# Patient Record
Sex: FEMALE | Race: WHITE | Employment: UNEMPLOYED | URBAN - METROPOLITAN AREA
[De-identification: names, ages, dates, MRNs, and addresses within clinical notes are randomized per-mention and may not be internally consistent; named-entity substitution may affect disease eponyms.]

---

## 2017-01-05 ENCOUNTER — OFFICE VISIT (OUTPATIENT)
Dept: UROLOGY | Facility: OTHER | Age: 62
End: 2017-01-05
Payer: MEDICARE

## 2017-01-05 VITALS
BODY MASS INDEX: 29.56 KG/M2 | SYSTOLIC BLOOD PRESSURE: 120 MMHG | DIASTOLIC BLOOD PRESSURE: 80 MMHG | WEIGHT: 159 LBS | HEART RATE: 72 BPM

## 2017-01-05 DIAGNOSIS — N36.2 URETHRAL CARUNCLE: Primary | ICD-10-CM

## 2017-01-05 DIAGNOSIS — N81.10 PROLAPSE OF VAGINAL WALL: ICD-10-CM

## 2017-01-05 PROCEDURE — 99214 OFFICE O/P EST MOD 30 MIN: CPT | Performed by: UROLOGY

## 2017-01-05 ASSESSMENT — PAIN SCALES - GENERAL: PAINLEVEL: MODERATE PAIN (4)

## 2017-01-05 NOTE — PROGRESS NOTES
SUBJECTIVE:                                                    Nathaly Farias is a 61 year old female who presents to clinic today for the following health issues:  Patient has not been taking medications since about 12/25/16. She states her abdominal pain gets worse from the meds. Patient has not been eating solids since then also. Reduced fluid intake also.    HPI    ABDOMINAL PAIN     Onset: 1 month     Description:   Character: Dull ache  Location: edy-umbilical region suprapubic region pelvic region  Radiation: None    Intensity: severe    Progression of Symptoms:  Same     Accompanying Signs & Symptoms:  Fever/Chills?: YES  Gas/Bloating: No  Nausea: YES  Vomitting: no   Diarrhea?: YES  Constipation:no   Dysuria or Hematuria: no    History:   Trauma: no   Previous similar pain: no    Previous tests done: none    Precipitating factors:   Does the pain change with:     Food: no      BM: no     Urination: no     Alleviating factors:  nothing    Therapies Tried and outcome: nothing    LMP:  not applicable     Dizziness     Onset: 1 month     Description:   Do you feel faint:  YES- not at this time  Does it feel like the surroundings (bed, room) are moving: YES- with fast movement  Unsteady/off balance: YES  Have you passed out or fallen: no     Intensity: severe    Progression of Symptoms:  worse    Accompanying Signs & Symptoms:  Heart palpitations: YES  Nausea, vomiting: no   Weakness in arms or legs: YES  Fatigue: YES  Vision or speech changes: no   Ringing in ears (Tinnitus): no   Hearing Loss: no    History:   Head trauma/concussion hx: no   Previous similar symptoms: no   Recent bleeding history: no     Precipitating factors:   Worse with activity or head movement: YES  Any new medications (BP?): no   Alcohol/drug abuse/withdrawal: no     Alleviating factors:   Does staying in a fixed position give relief:  YES       Therapies Tried and outcome: nothing      Problem list and histories reviewed & adjusted,  "as indicated.  Additional history: as documented        Problem list, Medication list, Allergies, and Medical/Social/Surgical histories reviewed in [facility] and updated as appropriate.    ROS:  C: NEGATIVE for fever, chills, change in weight  E/M: NEGATIVE for ear, mouth and throat problems  R: NEGATIVE for significant cough or SOB  CV: NEGATIVE for chest pain, palpitations or peripheral edema  : has hesitancy and is not passing urine well.    OBJECTIVE:                                                    /67 mmHg  Pulse 64  Temp(Src) 97.8  F (36.6  C) (Oral)  Resp 16  Ht 5' 1.5\" (1.562 m)  Wt 158 lb (71.668 kg)  BMI 29.37 kg/m2  LMP 06/05/2006  Body mass index is 29.37 kg/(m^2).   GENERAL: healthy, alert, well nourished, well hydrated, no distress  RESP: lungs clear to auscultation - no rales, no rhonchi, no wheezes  CV: regular rates and rhythm, normal S1 S2, no S3 or S4 and no murmur, no click or rub -  ABDOMEN: soft, full and uncomfortable over the bladder, no  hepatosplenomegaly, no masses, normal bowel sounds  Frantz- hallpike negative.  Though had laying to sitting or standing dizziness confirmed with orthostatics.  Cap refill at 7 sec, eyes and mucous membranes very dry.  Significant tenting of the skin.    Diagnostic test results:  Urinalysis - UTI noted     ASSESSMENT/PLAN:                                                        ICD-10-CM    1. Urinary hesitancy R39.11 *UA reflex to Microscopic and Culture ([facility] and [facility] (except [place] and [place])     Urine Culture Aerobic Bacterial     Urine Microscopic   2. Dizziness R42 CBC with platelets     Basic metabolic panel  (Ca, Cl, CO2, Creat, Gluc, K, Na, BUN)     Urine Culture Aerobic Bacterial   3. Acute cystitis with hematuria N30.01 ondansetron (ZOFRAN ODT) 4 MG ODT tab     ciprofloxacin (CIPRO) 250 MG tablet   4. Dehydration, severe E86.0 ondansetron (ZOFRAN ODT) 4 MG ODT tab   5. Orthostatic hypotension I95.1    6. " Paralysis agitans (H) G20        Discussed the urinary symptoms, need treatment with antibiotics.  Has pressure remaining after urination, residual today after urination reported at 130 ml.  This is not enough for urinary retention.  But the nausea and pressure she feels has had her limiting her water intake and she is severely dehydrated today.  She has not urinated for over 6 hours and it is a dark color according to her it is deep orange, though the UA reports yellow and clear.  Will get some electrolytes to see if there is more to correct now.  But encouraged hydration, antibiotics for UTI, Zofran given for the nausea to help keep fluids down.    Is too dizzy to drive or walk far.  Her lack of routine meds may have contributed to this.  Asked to hold BP meds as BP was initially low and orthostatics positive (whichi s the only med she was taking) and encouraged to return to her sinemet and other baseline meds.  Counseled on above issues for 50 min and >50% of time was spent in counseling for issues below:   1. Urinary hesitancy    2. Dizziness    3. Acute cystitis with hematuria    4. Dehydration, severe    5. Orthostatic hypotension    6. Paralysis agitans (H)    .      Maxine Monae MD, MD  [facility]

## 2017-01-05 NOTE — MR AVS SNAPSHOT
After Visit Summary   1/5/2017    Nathaly Farias    MRN: [id number]           Patient Information     Date Of Birth          1955        Visit Information        Provider Department      1/5/2017 8:45 AM Jack Esposito MD [facility]        Today's Diagnoses     Urethral caruncle    -  1     Prolapse of vaginal wall            Follow-ups after your visit        Your next 10 appointments already scheduled     Jan 06, 2017  9:30 AM   (Arrive by 9:15 AM)   New Patient Visit with Radha Shah, PhD [facility] ([facility])    [address] [place] [address]  [address] [facility] [id number] [phone]            Mar 06, 2017 10:00 AM   [facility]    [address] [place] [id number] [phone]            Oct 05, 2017  8:00 AM   [facility]    [address] [facility] [address] [phone]              Who to contact     If you have questions or need follow up information about today's clinic visit or your schedule please contact [facility] directly at [phone].  Normal or non-critical lab and imaging results will be communicated to you by MyChart, letter or phone within 4 business days after the clinic has received the results. If you do not hear from us within 7 days, please contact the clinic through MyChart or phone. If you have a critical or abnormal lab result, we will notify you by phone as soon as possible.  Submit refill requests through [employer] or call your pharmacy and they will forward the refill request to us. Please allow 3 business days for your refill to be completed.          Additional Information About Your Visit        [employer] Information     [employer] gives you secure access to your electronic health  record. If you see a primary care provider, you can also send messages to your care team and make appointments. If you have questions, please call your primary care clinic.  If you do not have a primary care provider, please call [phone] and they will assist you.        Care EveryWhere ID     This is your Care EveryWhere ID. This could be used by other organizations to access your [place] medical records  [id number]        Your Vitals Were     Pulse Last Period                72 06/05/2006           Blood Pressure from Last 3 Encounters:   01/05/17 120/80   12/28/16 110/84   12/21/16 112/85    Weight from Last 3 Encounters:   01/05/17 159 lb (72.122 kg)   12/28/16 168 lb (76.204 kg)   12/09/16 168 lb (76.204 kg)              Today, you had the following     No orders found for display         Today's Medication Changes          These changes are accurate as of: 1/5/17  9:31 AM.  If you have any questions, ask your nurse or doctor.               Start taking these medicines.        Dose/Directions    conjugated estrogens cream   Commonly known as:  PREMARIN   Used for:  Urethral caruncle   Started by:  Jack Esposito MD        Dose:  0.5 g   Place 0.5 g vaginally twice a week Use daily first week of use   Quantity:  30 g   Refills:  11            Where to get your medicines      These medications were sent to [facility] [address] [place] [address] [address]  [address] [facility] [address]     Phone:  [phone]    - conjugated estrogens cream             Primary Care Provider Office Phone # Fax #    Maxine Monae [phone]       [facility]  [address] [facility] [address]        Thank you!     Thank you for choosing [facility]  for your care. Our goal is always to provide you with excellent care. Hearing back from our patients is one way we can continue to improve our services. Please take a few minutes to complete the written survey  that you may receive in the mail after your visit with us. Thank you!             Your Updated Medication List - Protect others around you: Learn how to safely use, store and throw away your medicines at www.disposemymeds.org.          This list is accurate as of: 1/5/17  9:31 AM.  Always use your most recent med list.                   Brand Name Dispense Instructions for use    acetaminophen-codeine 300-30 MG per tablet    TYLENOL w/CODEINE No. 3    40 tablet    Take 1 tablet by mouth every 12 hours       carbidopa-levodopa  MG per tablet    SINEMET    540 tablet    2 tablets @ 8am, 11am/12noon, and 9pm       citalopram 20 MG tablet    celeXA    90 tablet    Take 1 tablet (20 mg) by mouth daily       conjugated estrogens cream    PREMARIN    30 g    Place 0.5 g vaginally twice a week Use daily first week of use       lisinopril-hydrochlorothiazide 10-12.5 MG per tablet    PRINZIDE/ZESTORETIC    30 tablet    TAKE ONE TABLET BY MOUTH ONCE DAILY DUE  FOR  LABS       melatonin 3 MG tablet     90 tablet    Take 1 tablet (3 mg) by mouth nightly as needed for sleep       polyethylene glycol powder    MIRALAX    510 g    Take 17 g (1 capful) by mouth daily Adjust as needed to TID prn in same ratio of 1 capful per 8 oz water       pramipexole 0.25 MG tablet    MIRAPEX     Take 1 tablet by mouth 3 times daily       trospium 20 MG tablet    SANCTURA    90 tablet    Take 1 tablet (20 mg) by mouth daily       ZOLMitriptan 5 MG tablet    ZOMIG    6 tablet    Take 1 tablet (5 mg) by mouth at onset of headache for migraine MAY REPEAT ONCE AFTER 2 HOURS. DO NOT EXCEED 2 TABLETS IN 24 HOURS.

## 2017-01-05 NOTE — PROGRESS NOTES
SUBJECTIVE:  Arvind Curry is a pleasant 61-year-old  female with history of overactive bladder and cystocele who presents to clinic today because of bleeding with wiping for the last several days.  She has history of a prolapsed bladder and has noticed blood in the tissue when wiping, she has no blood in the urine.  A recent urinary test showed 2-5 red blood cells per high-power field.  She has no other issues.      ASSESSMENT:  Pleasant 61-year-old  female with complaint of blood on tissue when wiping.  Examination today showed that she has urethral caruncle which is a common condition in postmenopausal and can lead to this condition.      RECOMMENDATION:  I will go ahead and start patient on estrogen cream to be used as directed and will have the patient come back to see me in 2 months from now for a reexamination.  She does have a cystocele, will consider treatment if she is more symptomatic from it.         PETER DAVID MD             D: 2017 09:34   T: 2017 13:11   MT: STEPHANIE#[id number]      Name:     ARVIND CURRY   MRN:      [id number]        Account:      [id number]   :      1955           Visit Date:   2017      Document: [id number]       cc: Maxine Monae MD

## 2017-01-05 NOTE — NURSING NOTE
"Chief Complaint   Patient presents with     RECHECK     Prolapse bladder, with hematuria.       Initial /80 mmHg  Pulse 72  Wt 159 lb (72.122 kg)  LMP 06/05/2006 Estimated body mass index is 29.56 kg/(m^2) as calculated from the following:    Height as of 12/28/16: 5' 1.5\" (1.562 m).    Weight as of this encounter: 159 lb (72.122 kg).  BP completed using cuff size: manoj Holm CMA      "

## 2017-01-05 NOTE — PROGRESS NOTES
Chief Complaint   Patient presents with     RECHECK     Prolapse bladder, with hematuria.       Nathaly Farias is a 61 year old female who presents today for follow up of   Chief Complaint   Patient presents with     RECHECK     Prolapse bladder, with hematuria.    See dictated note     Current Outpatient Prescriptions   Medication Sig Dispense Refill     conjugated estrogens (PREMARIN) cream Place 0.5 g vaginally twice a week Use daily first week of use 30 g 11     citalopram (CELEXA) 20 MG tablet Take 1 tablet (20 mg) by mouth daily 90 tablet 1     melatonin 3 MG tablet Take 1 tablet (3 mg) by mouth nightly as needed for sleep 90 tablet 3     trospium (SANCTURA) 20 MG tablet Take 1 tablet (20 mg) by mouth daily 90 tablet 3     polyethylene glycol (MIRALAX) powder Take 17 g (1 capful) by mouth daily Adjust as needed to TID prn in same ratio of 1 capful per 8 oz water 510 g 1     acetaminophen-codeine (TYLENOL W/CODEINE NO. 3) 300-30 MG per tablet Take 1 tablet by mouth every 12 hours 40 tablet 0     pramipexole (MIRAPEX) 0.25 MG tablet Take 1 tablet by mouth 3 times daily       carbidopa-levodopa (SINEMET)  MG per tablet 2 tablets @ 8am, 11am/12noon, and 9pm 540 tablet 3     lisinopril-hydrochlorothiazide (PRINZIDE,ZESTORETIC) 10-12.5 MG per tablet TAKE ONE TABLET BY MOUTH ONCE DAILY DUE  FOR  LABS 30 tablet 11     ZOLMitriptan (ZOMIG) 5 MG tablet Take 1 tablet (5 mg) by mouth at onset of headache for migraine MAY REPEAT ONCE AFTER 2 HOURS. DO NOT EXCEED 2 TABLETS IN 24 HOURS. 6 tablet 10     Allergies   Allergen Reactions     Sulfa Drugs      Tacchycardia, had to go to ED     Naproxen GI Disturbance     Oxycontin [Oxycodone] GI Disturbance     Made her feel funny/upset stomach     Ropinirole      Did not work      Past Medical History   Diagnosis Date     Unspecified arthropathy, hand 08/07/1997     Arthralgia of temporomandibular joint 10/22/1996     Other and unspecified hyperlipidemia      Variants of  migraine, not elsewhere classified, without mention of intractable migraine without mention of status migrainosus      Unspecified sleep apnea      moderate, sleep study 11/07, on CPAP, has daytime somnolence with this     History of MRI of brain and brain stem 2/21/2015     MRI OF THE BRAIN WITHOUT CONTRAST 7/10/2014 1:21 PM  COMPARISON: None. HISTORY: Left-sided upper and lower extremity tremor. Balance issues. TECHNIQUE:  Brain: Axial diffusion-weighted with ADC map, T2-weighted with fat saturation, T1-weighted and turboFLAIR and coronal T1-weighted images of the brain were obtained without intravenous contrast.  FINDINGS: There is mild diffuse cerebral volume loss     Wears glasses 2/23/2015     Constipation 2/23/2015     Incomplete RBBB 2/23/2015     Heart murmur 2/23/2015     Parkinson's disease (H)      Seborrheic dermatitis 2/29/2016     Motion sickness      Arthritis      Depressive disorder      Hypertension      Past Surgical History   Procedure Laterality Date     Hc open tx tibial shaft fx w plate/screws w/wo cerclage  2000     Multiple operations on left leg     C ligate fallopian tube  1998     Hc colonoscopy w/wo brush/wash  08/30/06     normal     Hc shldr arthroscop,surg,dis claviculectomy  11/16/06     Right shoulder     Hc shldr arthroscop,part acromioplas  11/16/06     Right shoulder     Cholecystectomy, laporoscopic  5/12/2008     Cholecystectomy, Laparoscopic     Arthrodesis toe(s)  3/2/2011     ARTHRODESIS TOE(S) performed by CLARA LUCAS at  OR     ------------other-------------       left shoulder     Family History   Problem Relation Age of Onset     DIABETES Father      type II, diagnosed in late 60's     Hypertension Father      CANCER Father      rare kidney cancer had kidney removed, 75 y.o. at onset     CEREBROVASCULAR DISEASE Father      HEART DISEASE Brother      Valvular disease corrected with surgery     HEART DISEASE Paternal Grandfather      MI     Migraines Son       "Migraines Sister      Neurologic Disorder Mother      head tremor     Rashes/Skin Problems Father      rosacea     Rashes/Skin Problems Sister      rosacea     Social History     Social History     Marital Status:      Spouse Name: violet     Number of Children: 2     Years of Education: N/A     Occupational History           Social History Main Topics     Smoking status: Never Smoker      Smokeless tobacco: Never Used      Comment: no smokers in the household     Alcohol Use: 0.0 oz/week     0 Standard drinks or equivalent per week      Comment: very occ     Drug Use: No     Sexual Activity:     Partners: Male     Birth Control/ Protection: Surgical, Female Surgical      Comment: tubal ligation     Other Topics Concern     Caffeine Concern Yes     OCC     Exercise No     \"not faithfully\"     Seat Belt Yes     Self-Exams No     Social History Narrative    ALLERGIES:  She is allergic to sulfa, and has had gastrointestinal side effects from naproxen.               FAMILY HISTORY:  Strongly positive for headaches, including in her son and sister.  There is no family history of tremor.               SOCIAL HISTORY:  She does not work outside the home.  She does not smoke.  She uses very little alcohol.  Lives in [place]. . Has 2 kids: son who is 35 yr s old. And daughter who is 30 yrs old. Her  is working as a  .        REVIEW OF SYSTEMS  =================  C: NEGATIVE for fever, chills, change in weight  I: NEGATIVE for worrisome rashes, moles or lesions  E/M: NEGATIVE for ear, mouth and throat problems  R: NEGATIVE for significant cough or SHORTNESS OF BREATH,   CV: NEGATIVE for chest pain, palpitations or peripheral edema  GI: NEGATIVE for nausea, abdominal pain, heartburn, or change in bowel habits  NEURO: NEGATIVE any motor/sensory changes  PSYCH: NEGATIVE for recent mood disorder    Physical Exam:  /80 mmHg  Pulse 72  Wt 72.122 kg (159 lb)  LMP 06/05/2006 "   Patient is pleasant, in no acute distress, good general condition.  Lung: no evidence of respiratory distress    Abdomen: Soft, nondistended, non tender. No masses. No rebound or guarding.   Exam: urethral caruncle.  Cystocele seen.  No abnormal vaginal discharge  Skin: Warm and dry.  No redness.  Psych: normal mood and affect  Neuro: alert and oriented    Assessment/Plan:   (N36.2) Urethral caruncle  (primary encounter diagnosis)  Comment:    Plan: conjugated estrogens (PREMARIN) cream         d    (N81.10) Prolapse of vaginal wall  Comment:    Plan: See dictated note

## 2017-01-09 ENCOUNTER — OFFICE VISIT (OUTPATIENT)
Dept: FAMILY MEDICINE | Facility: OTHER | Age: 62
End: 2017-01-09
Payer: MEDICARE

## 2017-01-09 VITALS
SYSTOLIC BLOOD PRESSURE: 123 MMHG | HEIGHT: 62 IN | TEMPERATURE: 97.8 F | WEIGHT: 158 LBS | BODY MASS INDEX: 29.08 KG/M2 | HEART RATE: 64 BPM | RESPIRATION RATE: 16 BRPM | DIASTOLIC BLOOD PRESSURE: 67 MMHG

## 2017-01-09 DIAGNOSIS — E86.0 DEHYDRATION, SEVERE: ICD-10-CM

## 2017-01-09 DIAGNOSIS — G20.A1 PARALYSIS AGITANS (H): ICD-10-CM

## 2017-01-09 DIAGNOSIS — I95.1 ORTHOSTATIC HYPOTENSION: ICD-10-CM

## 2017-01-09 DIAGNOSIS — R42 DIZZINESS: ICD-10-CM

## 2017-01-09 DIAGNOSIS — R39.11 URINARY HESITANCY: Primary | ICD-10-CM

## 2017-01-09 DIAGNOSIS — N30.01 ACUTE CYSTITIS WITH HEMATURIA: ICD-10-CM

## 2017-01-09 LAB
ALBUMIN UR-MCNC: NEGATIVE MG/DL
ANION GAP SERPL CALCULATED.3IONS-SCNC: 10 MMOL/L (ref 3–14)
APPEARANCE UR: CLEAR
BACTERIA #/AREA URNS HPF: ABNORMAL /HPF
BILIRUB UR QL STRIP: ABNORMAL
BUN SERPL-MCNC: 14 MG/DL (ref 7–30)
CALCIUM SERPL-MCNC: 9.5 MG/DL (ref 8.5–10.1)
CHLORIDE SERPL-SCNC: 105 MMOL/L (ref 94–109)
CO2 SERPL-SCNC: 27 MMOL/L (ref 20–32)
COLOR UR AUTO: YELLOW
CREAT SERPL-MCNC: 0.87 MG/DL (ref 0.52–1.04)
ERYTHROCYTE [DISTWIDTH] IN BLOOD BY AUTOMATED COUNT: 14.1 % (ref 10–15)
GFR SERPL CREATININE-BSD FRML MDRD: 66 ML/MIN/1.7M2
GLUCOSE SERPL-MCNC: 84 MG/DL (ref 70–99)
GLUCOSE UR STRIP-MCNC: NEGATIVE MG/DL
HCT VFR BLD AUTO: 41 % (ref 35–47)
HGB BLD-MCNC: 13.4 G/DL (ref 11.7–15.7)
HGB UR QL STRIP: ABNORMAL
KETONES UR STRIP-MCNC: 15 MG/DL
LEUKOCYTE ESTERASE UR QL STRIP: ABNORMAL
MCH RBC QN AUTO: 30.5 PG (ref 26.5–33)
MCHC RBC AUTO-ENTMCNC: 32.7 G/DL (ref 31.5–36.5)
MCV RBC AUTO: 93 FL (ref 78–100)
MUCOUS THREADS #/AREA URNS LPF: PRESENT /LPF
NITRATE UR QL: NEGATIVE
PH UR STRIP: 5 PH (ref 5–7)
PLATELET # BLD AUTO: 287 10E9/L (ref 150–450)
POTASSIUM SERPL-SCNC: 4.3 MMOL/L (ref 3.4–5.3)
RBC # BLD AUTO: 4.39 10E12/L (ref 3.8–5.2)
RBC #/AREA URNS AUTO: ABNORMAL /HPF (ref 0–2)
SODIUM SERPL-SCNC: 142 MMOL/L (ref 133–144)
SP GR UR STRIP: >1.03 (ref 1–1.03)
URN SPEC COLLECT METH UR: ABNORMAL
UROBILINOGEN UR STRIP-ACNC: 0.2 EU/DL (ref 0.2–1)
WBC # BLD AUTO: 7.9 10E9/L (ref 4–11)
WBC #/AREA URNS AUTO: ABNORMAL /HPF (ref 0–2)

## 2017-01-09 PROCEDURE — 36415 COLL VENOUS BLD VENIPUNCTURE: CPT | Performed by: FAMILY MEDICINE

## 2017-01-09 PROCEDURE — 81001 URINALYSIS AUTO W/SCOPE: CPT | Performed by: FAMILY MEDICINE

## 2017-01-09 PROCEDURE — 85027 COMPLETE CBC AUTOMATED: CPT | Performed by: FAMILY MEDICINE

## 2017-01-09 PROCEDURE — 87086 URINE CULTURE/COLONY COUNT: CPT | Performed by: FAMILY MEDICINE

## 2017-01-09 PROCEDURE — 99214 OFFICE O/P EST MOD 30 MIN: CPT | Performed by: FAMILY MEDICINE

## 2017-01-09 PROCEDURE — 80048 BASIC METABOLIC PNL TOTAL CA: CPT | Performed by: FAMILY MEDICINE

## 2017-01-09 RX ORDER — CIPROFLOXACIN 250 MG/1
250 TABLET, FILM COATED ORAL 2 TIMES DAILY
Qty: 6 TABLET | Refills: 0 | Status: SHIPPED | OUTPATIENT
Start: 2017-01-09 | End: 2017-04-03

## 2017-01-09 RX ORDER — ONDANSETRON 4 MG/1
4-8 TABLET, ORALLY DISINTEGRATING ORAL EVERY 8 HOURS PRN
Qty: 20 TABLET | Refills: 1 | Status: SHIPPED | OUTPATIENT
Start: 2017-01-09 | End: 2017-04-03

## 2017-01-09 ASSESSMENT — PAIN SCALES - GENERAL: PAINLEVEL: MODERATE PAIN (4)

## 2017-01-09 NOTE — PROGRESS NOTES
Bladder scanned performed. Average reading was 100cc. Highest reading was 131cc. Dr. Dov de la torre.     Iveth Mckeon, RN, BSN

## 2017-01-09 NOTE — Clinical Note
SASKIAI patient symptoms sounds like she has bladder outlet type issues but did not have significant bladder scan today.  As she is severely dehydrated at this time, would it be worthwhile to have her come back and repeat while hydrated or does this not make a difference.

## 2017-01-10 NOTE — PROGRESS NOTES
Quick Note:    Nathaly, the rest of the labs are normal.  Please let me know if you have any questions.   Maxine Monae MD      ______

## 2017-01-11 LAB
BACTERIA SPEC CULT: NORMAL
MICRO REPORT STATUS: NORMAL
SPECIMEN SOURCE: NORMAL

## 2017-01-11 NOTE — PROGRESS NOTES
Quick Note:    Nathaly, there was not enough growing to test on sensitivities. Hopefully you are feeling better with your current meds.  Please let me know if you have any questions.   Maxine Monae MD      ______

## 2017-02-17 ENCOUNTER — OFFICE VISIT (OUTPATIENT)
Dept: NEUROPSYCHOLOGY | Facility: CLINIC | Age: 62
End: 2017-02-17

## 2017-02-17 DIAGNOSIS — F09 MENTAL DISORDER DUE TO GENERAL MEDICAL CONDITION: ICD-10-CM

## 2017-02-17 DIAGNOSIS — G20.A1 PARKINSON'S DISEASE (H): Primary | ICD-10-CM

## 2017-02-17 NOTE — MR AVS SNAPSHOT
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 ([facility])    [address] [facility] [id number] [phone]              Who to contact     Please call your clinic at [phone] to:    Ask questions about your health    Make or cancel appointments    Discuss your medicines    Learn about your test results    Speak to your doctor   If you have compliments or concerns about an experience at your clinic, or if you wish to file a complaint, please contact [facility] Patient Relations at [phone] or email us at [email or web address]         Additional Information About Your Visit        MyChart Information     [employer] gives you secure access to your electronic health record. If you see a primary care provider, you can also send messages to your care team and make appointments. If you have questions, please call your primary care clinic.  If you do not have a primary care provider, please call [phone] and they will assist you.      "[employer]" is an electronic gateway that provides easy, online access to your medical records. With "[employer]", you can request a clinic appointment, read your test results, renew a prescription or  communicate with your care team.     To access your existing account, please contact your [facility] or call [phone] for assistance.        Care EveryWhere ID     This is your Care EveryWhere ID. This could be used by other organizations to access your [place] medical records  [id number]        Your Vitals Were     Last Period                   06/05/2006            Blood Pressure from Last 3 Encounters:   01/09/17 123/67   01/05/17 120/80   12/28/16 110/84    Weight from Last 3 Encounters:   02/27/17 65.8 kg (145 lb)   01/09/17 71.7 kg (158 lb)   01/05/17 72.1 kg (159 lb)              We Performed the Following     [id number] TESTING, PER HR/PSYCHOLOGIST     NEUROPSYCH TESTING BY [facility]        Primary Care Provider Office Phone # Fax #    Maxine Luda Monae [phone]       [facility]  [address] [address]        Thank you!     Thank you for choosing [facility]  for your care. Our goal is always to provide you with excellent care. Hearing back from our patients is one way we can continue to improve our services. Please take a few minutes to complete the written survey that you may receive in the mail after your visit with us. Thank you!             Your Updated Medication List - Protect others around you: Learn how to safely use, store and throw away your medicines at www.disposemymeds.org.          This list is accurate as of: 2/17/17 11:59 PM.  Always use your most recent med list.                   Brand Name Dispense Instructions for use    acetaminophen-codeine 300-30 MG per tablet    TYLENOL w/CODEINE No. 3    40 tablet    Take 1 tablet by mouth every 12 hours       carbidopa-levodopa  MG per tablet    SINEMET    540 tablet    2 tablets @ 8am, 11am/12noon, and 9pm       ciprofloxacin 250 MG tablet    CIPRO    6 tablet    Take 1 tablet (250 mg) by mouth 2 times daily       citalopram 20 MG tablet     celeXA    90 tablet    Take 1 tablet (20 mg) by mouth daily       conjugated estrogens cream    PREMARIN    30 g    Place 0.5 g vaginally twice a week Use daily first week of use       lisinopril-hydrochlorothiazide 10-12.5 MG per tablet    PRINZIDE/ZESTORETIC    30 tablet    TAKE ONE TABLET BY MOUTH ONCE DAILY DUE  FOR  LABS       melatonin 3 MG tablet     90 tablet    Take 1 tablet (3 mg) by mouth nightly as needed for sleep       ondansetron 4 MG ODT tab    ZOFRAN ODT    20 tablet    Take 1-2 tablets (4-8 mg) by mouth every 8 hours as needed for nausea       polyethylene glycol powder    MIRALAX    510 g    Take 17 g (1 capful) by mouth daily Adjust as needed to TID prn in same ratio of 1 capful per 8 oz water       pramipexole 0.25 MG tablet    MIRAPEX     Take 1 tablet by mouth 3 times daily       trospium 20 MG tablet    SANCTURA    90 tablet    Take 1 tablet (20 mg) by mouth daily       ZOLMitriptan 5 MG tablet    ZOMIG    6 tablet    Take 1 tablet (5 mg) by mouth at onset of headache for migraine MAY REPEAT ONCE AFTER 2 HOURS. DO NOT EXCEED 2 TABLETS IN 24 HOURS.

## 2017-02-17 NOTE — PROGRESS NOTES
The patient was seen for neuropsychological evaluation at the request of Maxine Monae for the purpose of diagnostic clarification and treatment planning.  Three hours of face to face testing were provided by this writer.  Please see Dr. Radha Shah's report for a full interpretation of the findings.    Lashanda Ya  Psychometrist

## 2017-02-27 ENCOUNTER — OFFICE VISIT (OUTPATIENT)
Dept: ORTHOPEDICS | Facility: CLINIC | Age: 62
End: 2017-02-27
Payer: MEDICARE

## 2017-02-27 VITALS — WEIGHT: 145 LBS | BODY MASS INDEX: 26.68 KG/M2 | TEMPERATURE: 97.3 F | HEIGHT: 62 IN

## 2017-02-27 DIAGNOSIS — M17.32 POST-TRAUMATIC OSTEOARTHRITIS OF LEFT KNEE: Primary | ICD-10-CM

## 2017-02-27 PROCEDURE — 20610 DRAIN/INJ JOINT/BURSA W/O US: CPT | Mod: LT | Performed by: ORTHOPAEDIC SURGERY

## 2017-02-27 ASSESSMENT — PAIN SCALES - GENERAL: PAINLEVEL: MODERATE PAIN (4)

## 2017-02-27 NOTE — NURSING NOTE
"Chief Complaint   Patient presents with     RECHECK     left knee lov 12/9/16       Initial Temp 97.3  F (36.3  C)  Ht 1.562 m (5' 1.5\")  Wt 65.8 kg (145 lb)  LMP 06/05/2006  BMI 26.95 kg/m2 Estimated body mass index is 26.95 kg/(m^2) as calculated from the following:    Height as of this encounter: 1.562 m (5' 1.5\").    Weight as of this encounter: 65.8 kg (145 lb).  Medication Reconciliation: complete    BP completed using cuff size: NA (Not Taken)    Sindy Russell MA      "

## 2017-02-27 NOTE — PATIENT INSTRUCTIONS
Encounter Diagnosis   Name Primary?     Post-traumatic osteoarthritis of left knee Yes     Rest, ice and elevate above heart level as needed for pain control  1. We talked about how your cortisone injection done on 12/9/16 that gave you excellent relief, but after you bumped your left knee 1 1/2 weeks ago.    2. Now it is hurting again.    3. We talked about the gel injection and decided to proceed with this.  4. We feel that this injection will hopefully give you a longer-lasting relief time.  Synvisc 1 injection instructions:  1. You received your injection of Synvisc one interior left knee today.  2. The joint may be more painful for the first 1-2 days.  3. We ask you to continue to rest the joint for a few more days before resuming regular activities.  4. Pain Medications you can take:  Tylenol  Take 1000 mg by mouth every 6 hours as needed; maximum dose 4000 mg a day  Ibuprofen  600 mg every 6 hours as needed; maximum 2400 mg a day  (OK to take tylenol and ibuprofen at the same time)  5. Rest, ice and elevate as needed for pain control  6. Watch for these signs of infection: redness, swelling, drainage, warmth to touch, increased pain, or fever. Call the clinic or make an appointment to be seen if you think you have an infection.  7. Also sometimes, because it is a lot of fluid, and he can react to the injection and swell. If this is the case sometimes we have you come back and we take some fluid off interview a cortisone injection.  8. Sometimes it can take 6 weeks from the last injection for it to reach its full effect.  9. You can followup with Christine Crowley MD in 2 weeks, at that time if you are still having pain we would consider a cortisone injection. No xrays will be needed at that time.  [employer] and [employer] may offer reliable information regarding your diagnosis and treatment plan.    THANK YOU for coming in today. If you receive a survey via [email or web address] or mail please let us know if there was  anything you especially appreciated today or if there is any way we can improve our clinic. We appreciate your input.    GENERAL INFORMATION:  Our hours are:  Monday :     Clinic 7:30 AM-430 PM ([facility])  Tuesday:      Operating Room All Day ([facility])  Wednesday: Clinic 7:30 AM - 11:15 AM ([facility])             Clinic 1:00 PM - 4:00PM ([facility])  Thursday:     Administrative Day  Friday:          Clinic 7:30 AM - 11:15 AM ([facility])            Clinic 1:00 PM - 4:00 PM ([facility])    We are not in the office Thursdays. Therefore non- urgent calls and medical messages received on Thursday will be addressed when we are back in the office on Wednesday. Urgent matters will be reviewed and addressed by one of our partners in the office as needed.    If lab work was done today as part of your evaluation you will generally be contacted via [employer], mail, or phone with the results within 1-5 days. If there is an alarming result we will contact you by phone. Lab results come back at varying times, I generally wait until all labs are resulted before making comments on results. Please note labs are automatically released to [employer] (if you have signed up for it) once available-at times you may see these prior to my having a chance to review them as well.    If you need refills please contact your pharmacist. They will send a refill request to me to review. Please allow 3 business days for us to process all refill requests. All narcotic refills should be handled in the clinic at the time of your visit.

## 2017-02-27 NOTE — NURSING NOTE
The following medication was given by Jack Kaplan PA-C     MEDICATION: Synvisc/ONE  ROUTE: Joint Injection  SITE: left knee  DOSE: one syringe  LOT #: [id number]  : [employer]  EXPIRATION DATE:  5/2019  NDC: [address]-3      Patient instructed to remain in clinic for 20 minutes afterwards, and to report any adverse reaction to me immediately.

## 2017-02-27 NOTE — PROGRESS NOTES
"Office Visit-Follow up    Chief Complaint: Nathaly Farias is a 61 year old female who is being seen for   Chief Complaint   Patient presents with     RECHECK     left knee lov 12/9/16       History of Present Illness:   Mechanism of Injury: no new major injury however a week and a half ago she bumped her knee against the gate and about a week ago she stepped up from getting out of bed and felt knee pain. She had approximately a little less than 3 months of relief from the last cortisone injection.  Location: left knee medial  Duration of Pain: for the last week and a half  Rating of Pain: 4 out of 10  Pain Quality: ache  Pain is better with: rest  Pain is worse with: activity  Treatment so far consists of: Aleve, Tylenol, cortisone injection.12/9/2016, physical therapy for her tibial injury in the past.   Associated Features: slight knee swelling at times. Patient denies any numbness or tingling or catching a lodging or large swelling. Patient feels that the inside of her knee is causing her pain.  Prior history of related problems: yes prior to the cortisone injection  Pain is Limiting: ambulation  Here to: follow-up on cortisone injection    REVIEW OF SYSTEMS  General: negative for, night sweats, dizziness, fatigue  Resp: No shortness of breath and no cough  CV: negative for chest pain, syncope or near-syncope  GI: negative for nausea, vomiting and diarrhea  : negative for dysuria and hematuria  Musculoskeletal: as above  Neurologic: negative for syncope   Hematologic: negative for bleeding disorder    Physical Exam:  Vitals: Temp 97.3  F (36.3  C)  Ht 1.562 m (5' 1.5\")  Wt 65.8 kg (145 lb)  LMP 06/05/2006  BMI 26.95 kg/m2  BMI= Body mass index is 26.95 kg/(m^2).  Constitutional: healthy, alert and no acute distress   Psychiatric: mentation appears normal and affect normal/bright  NEURO: no focal deficits  RESP: Normal with easy respirations and no use of accessory muscles to breathe, no audible wheezing or " retractions  CV: left leg warm to the touch  SKIN: No erythema, rashes, excoriation, or breakdown. No evidence of infection. We see a will healed midline incision on her left knee   MUSCULOSKELETAL:    INSPECTION of left knee: No gross deformities, erythema, ecchymosis, atrophy or fasciculations. We see a very slight amount of swelling. We do see a very slight tremor from the patient's Parkinson's.    PALPATION: there is a very slight amount of confusion in the left knee. No major tenderness to palpation on palpation anteriorly and posteriorly. Patient has some medial generalized knee pain on palpation and also some tenderness laterally. No increased warmth noted.    ROM: patient able to do for extension and flexes to approximately 100 to 105  without any major catching or locking her pain     STRENGTH: able to do a straight leg raise    SPECIAL TEST: Patient has a negative Lachman's negative drawer sign. Patient's knee is stable to varus and valgus stress at 30  of flexion. Patient has a negative Kecia's.   GAIT: slight antalgic left leg  Lymph: no palpable lymph nodes      Diagnostic Modalities:  Previous imaging reviewed.  Independent visualization of the images was performed.      Impression: 1. Left knee posttraumatic degenerative joint disease. 2. Painful hardware from left tibia ORIF    Plan:  All of the above pertinent physical exam and imaging modalities findings was reviewed with Nathaly and her daughter.                                          INJECTION PROCEDURE:  The patient was counseled about an  injection, including discussion of risks (including infection), contents of the injection, rationale for performing the injection, and expected benefits of the injection. The skin was prepped with alcohol and betadine and then utilizing sterile technique an injection of the left knee joint from the anterolateral approach in the seated position was performed. The injection consisted Synvisc-One (6cc). The  patient tolerated the injection well, and there were no complications. The injection site was covered with a Band-Aid. The injection was performed by RICHELLE Serna    Patient Instructions:   1. We talked about how your cortisone injection done on 12/9/16 that gave you excellent relief, but after you bumped your left knee 1 1/2 weeks ago.    2. Now it is hurting again.    3. We talked about the gel injection and decided to proceed with this.  4. We feel that this injection will hopefully give you a longer-lasting relief time.  5. You can followup with Christine Crowley MD in 2 weeks, at that time if you are still having pain we would consider a cortisone injection. No xrays will be needed at that time.  Re-x-ray on return: No    Scribed by Jack Kaplan PA-C on 2/27/2017 at 10:37 AM, based on Dr. Christine Crowley's statements to me.    This note was dictated with [facility].    SARBJIT Ortiz MD

## 2017-02-27 NOTE — MR AVS SNAPSHOT
After Visit Summary   2/27/2017    Nathaly Farias    MRN: [id number]           Patient Information     Date Of Birth          1955        Visit Information        Provider Department      2/27/2017 10:00 AM Christine Crowley MD [facility]        Today's Diagnoses     Post-traumatic osteoarthritis of left knee    -  1      Care Instructions    Encounter Diagnosis   Name Primary?     Post-traumatic osteoarthritis of left knee Yes     Rest, ice and elevate above heart level as needed for pain control  1. We talked about how your cortisone injection done on 12/9/16 that gave you excellent relief, but after you bumped your left knee 1 1/2 weeks ago.    2. Now it is hurting again.    3. We talked about the gel injection and decided to proceed with this.  4. We feel that this injection will hopefully give you a longer-lasting relief time.  Synvisc 1 injection instructions:  1. You received your injection of Synvisc one interior left knee today.  2. The joint may be more painful for the first 1-2 days.  3. We ask you to continue to rest the joint for a few more days before resuming regular activities.  4. Pain Medications you can take:  Tylenol  Take 1000 mg by mouth every 6 hours as needed; maximum dose 4000 mg a day  Ibuprofen  600 mg every 6 hours as needed; maximum 2400 mg a day  (OK to take tylenol and ibuprofen at the same time)  5. Rest, ice and elevate as needed for pain control  6. Watch for these signs of infection: redness, swelling, drainage, warmth to touch, increased pain, or fever. Call the clinic or make an appointment to be seen if you think you have an infection.  7. Also sometimes, because it is a lot of fluid, and he can react to the injection and swell. If this is the case sometimes we have you come back and we take some fluid off interview a cortisone injection.  8. Sometimes it can take 6 weeks from the last injection for it to reach its full effect.  9. You can  followup with Christine Crowley MD in 2 weeks, at that time if you are still having pain we would consider a cortisone injection. No xrays will be needed at that time.  [employer] may offer reliable information regarding your diagnosis and treatment plan.    THANK YOU for coming in today. If you receive a survey via Clip or mail please let us know if there was anything you especially appreciated today or if there is any way we can improve our clinic. We appreciate your input.    GENERAL INFORMATION:  Our hours are:  Monday :     Clinic 7:30 AM-430 PM ([facility])  Tuesday:      Operating Room All Day ([facility])  Wednesday: Clinic 7:30 AM - 11:15 AM ([facility])             Clinic 1:00 PM - 4:00PM ([facility])  Thursday:     Administrative Day  Friday:          Clinic 7:30 AM - 11:15 AM ([facility])            Clinic 1:00 PM - 4:00 PM ([facility])    We are not in the office Thursdays. Therefore non- urgent calls and medical messages received on Thursday will be addressed when we are back in the office on Wednesday. Urgent matters will be reviewed and addressed by one of our partners in the office as needed.    If lab work was done today as part of your evaluation you will generally be contacted via Clip, mail, or phone with the results within 1-5 days. If there is an alarming result we will contact you by phone. Lab results come back at varying times, I generally wait until all labs are resulted before making comments on results. Please note labs are automatically released to [employer] (if you have signed up for it) once available-at times you may see these prior to my having a chance to review them as well.    If you need refills please contact your pharmacist. They will send a refill request to me to review. Please allow 3 business days for us to process all refill  requests. All narcotic refills should be handled in the clinic at the time of your visit.                 Follow-ups after your visit        Your next 10 appointments already scheduled     Mar 06, 2017 10:00 AM CST   [facility] Visit with Jet Porter MD   [facility]    [address] [place] [id number]   [phone]            Mar 08, 2017 11:15 AM CST   Return Visit with Jack Esposito MD   [facility]    [address] [id number] [phone]            Oct 05, 2017  8:00 AM CDT   Return Visit with Jack Esposito MD   [facility]    [address] [place] [id number] [phone]              Who to contact     If you have questions or need follow up information about today's clinic visit or your schedule please contact [facility] directly at [phone].  Normal or non-critical lab and imaging results will be communicated to you by MyChart, letter or phone within 4 business days after the clinic has received the results. If you do not hear from us within 7 days, please contact the clinic through [employer] or phone. If you have a critical or abnormal lab result, we will notify you by phone as soon as possible.  Submit refill requests through [employer] or call your pharmacy and they will forward the refill request to us. Please allow 3 business days for your refill to be completed.          Additional Information About Your Visit        [employer] Information     [employer] gives you secure access to your electronic health record. If you see a primary care provider, you can also send messages to your care team and make appointments. If you have questions, please call your primary care clinic.  If you do not have a primary care provider, please call [phone] and they will assist you.        Care EveryWhere ID     This is  "your Care EveryWhere ID. This could be used by other organizations to access your [place] medical records  [id number]        Your Vitals Were     Temperature Height Last Period BMI (Body Mass Index)          97.3  F (36.3  C) 1.562 m (5' 1.5\") 06/05/2006 26.95 kg/m2         Blood Pressure from Last 3 Encounters:   01/09/17 123/67   01/05/17 120/80   12/28/16 110/84    Weight from Last 3 Encounters:   02/27/17 65.8 kg (145 lb)   01/09/17 71.7 kg (158 lb)   01/05/17 72.1 kg (159 lb)              Today, you had the following     No orders found for display       Primary Care Provider Office Phone # Fax #    Maxine Monae [phone]       [facility]  [address] [address]        Thank you!     Thank you for choosing [facility]  for your care. Our goal is always to provide you with excellent care. Hearing back from our patients is one way we can continue to improve our services. Please take a few minutes to complete the written survey that you may receive in the mail after your visit with us. Thank you!             Your Updated Medication List - Protect others around you: Learn how to safely use, store and throw away your medicines at www.disposemymeds.org.          This list is accurate as of: 2/27/17 10:28 AM.  Always use your most recent med list.                   Brand Name Dispense Instructions for use    acetaminophen-codeine 300-30 MG per tablet    TYLENOL w/CODEINE No. 3    40 tablet    Take 1 tablet by mouth every 12 hours       carbidopa-levodopa  MG per tablet    SINEMET    540 tablet    2 tablets @ 8am, 11am/12noon, and 9pm       ciprofloxacin 250 MG tablet    CIPRO    6 tablet    Take 1 tablet (250 mg) by mouth 2 times daily       citalopram 20 MG tablet    celeXA    90 tablet    Take 1 tablet (20 mg) by mouth daily       conjugated estrogens cream    PREMARIN    30 g    Place 0.5 g vaginally twice a week Use daily first week of use "       lisinopril-hydrochlorothiazide 10-12.5 MG per tablet    PRINZIDE/ZESTORETIC    30 tablet    TAKE ONE TABLET BY MOUTH ONCE DAILY DUE  FOR  LABS       melatonin 3 MG tablet     90 tablet    Take 1 tablet (3 mg) by mouth nightly as needed for sleep       ondansetron 4 MG ODT tab    ZOFRAN ODT    20 tablet    Take 1-2 tablets (4-8 mg) by mouth every 8 hours as needed for nausea       polyethylene glycol powder    MIRALAX    510 g    Take 17 g (1 capful) by mouth daily Adjust as needed to TID prn in same ratio of 1 capful per 8 oz water       pramipexole 0.25 MG tablet    MIRAPEX     Take 1 tablet by mouth 3 times daily       trospium 20 MG tablet    SANCTURA    90 tablet    Take 1 tablet (20 mg) by mouth daily       ZOLMitriptan 5 MG tablet    ZOMIG    6 tablet    Take 1 tablet (5 mg) by mouth at onset of headache for migraine MAY REPEAT ONCE AFTER 2 HOURS. DO NOT EXCEED 2 TABLETS IN 24 HOURS.

## 2017-03-06 ENCOUNTER — DOCUMENTATION ONLY (OUTPATIENT)
Dept: NEUROLOGY | Facility: CLINIC | Age: 62
End: 2017-03-06

## 2017-03-06 PROBLEM — F09 COGNITIVE DISORDER: Status: ACTIVE | Noted: 2017-03-06

## 2017-03-06 NOTE — PROGRESS NOTES
She has marked attentional difficulties, impairments in memory including a rapid forgetting rate, mild anomia, and mild executive dysfunction. 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 She's required increasing assistance with finances, medications, driving, and cooking. I think this is a mild dementia, and there's a suggestion of mesial temporal lobe involvement, so Alzheimer's disease can't be ruled out, although there are also some features consistent with frontal system involvement. She's not reporting significant problems with mood

## 2017-03-12 NOTE — PROGRESS NOTES
NAME: Nathaly Farias  MR#: [id number]  YOB: 1955  DATE OF EXAM: 2/17/2017    Neuropsychology Laboratory  [facility]  [address]  [place]  [address] [phone]    NEUROPSYCHOLOGICAL EVALUATION    RELEVANT HISTORY AND REASON FOR REFERRAL    Nathaly Farias is a 61-year-old, right-handed disabled  with 12 years of formal education. Information was obtained via interview with the patient and her  and daughter, and review of her medical records. Records indicate that she was initially evaluated by neurology in July 2014 for tremor, which began in 2009 and affected mainly her left hand at rest. She also was reporting worsening balance at the time over the last couple of years, and shuffling gait. She has been treated for Parkinson's disease since then. More recently, there have been some concerns regarding memory decline. She was referred for neuropsychological evaluation by Jet Potrer M.D., for further characterization of any cognitive difficulties and to evaluate mood.    CLINICAL INTERVIEW FINDINGS    Upon interview, Ms. Farias indicated that she was diagnosed with Parkinson's disease about 3   years ago, although her family believes it was closer to six years ago. They agreed that her left hand was shaking, and interfered with her job. The symptoms reportedly moved to her leg as well, and her  stated that they are worse if she is stressed or tired. She reported that she has had difficulty with cognition since high school. Her family believes that she has had gradually progressive difficulty with cognition over the last two or three years. She forgets what is being said even within a conversation. She has been misplacing items. She repeats questions. Her daughter stated that they may talk one day, and then she will call a couple of days later and ask the same thing. She has noticed difficult with word finding. She has difficulty  "multitasking. She is less organized and perhaps more indecisive than normal.    Ms. Farias lives with her , who manages their finances. Her  and daughter have been helping her to remember to take her medications. She drives short distances, and not in unfamiliar places. Her family has had some concerns about her reaction time, and she cannot see as well at night. Her  has always done most of the cooking. She handles her personal cares independently.    Ms. Farias reported a history of depression which was treated when her father  in . She continues to be prescribed psychotropic medications by her primary care provider. She has not worked with a psychologist, nor has she been hospitalized for psychiatric treatment. She acknowledged that things seem to get to her and she is more tearful. Her dog  six months ago, and she is still grieving that. She does not believe that she is particularly depressed, but she is anxious and more \"touchy.\" She has been sleeping fairly well, for almost 12 hours a night, which is more than she used to sleep. She may nap for two or three hours during the day. Her daughter noted that although she sleeps a lot, this is not necessarily a change for her. Her appetite has been low, and she has lost some weight, perhaps because she is eating better. Her energy level is low. Her interest level and motivation are also low, and her daughter noted that she thinks everything is too much of a hassle. She does enjoy spending time with her grandchildren, however. She denied suicidal ideation or any history of attempts to commit suicide. She denied visual or auditory hallucinations, although her  stated that she sometimes hears people talking in the house when there is no one there. She denied illicit drug use, tobacco use, alcohol use, gambling, or other compulsive behaviors.    In school, Ms. Farias had trouble remembering what she had read, and perhaps with reading " comprehension. She earned C's and D's. She enjoyed track and horses in school, and she graduated from high school. She worked for [employer]. She last worked six years ago, leaving her job because she was shaking too much. 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 She received disability. She was  to her first  for 19 years. She has been  to her current  since 2001. She has two children and five grandchildren.    Ms. Farias denied any history of seizure, stroke, or head injury resulting loss of consciousness. Her balance has not been good. She has fallen more than once; when this happens, she is suddenly on the ground. She seems to have poor coordination on her left side. Her handwriting is smaller than normal. She has had chronic headaches since the age of 17. She is bothered by left leg pain. There has been some discussion about deep brain stimulation surgery for management of her symptoms, but they have not heard much about it yet.    PAST MEDICAL HISTORY:  Medical records indicate a history of depression, osteoarthritis, heart murmur, rosacea, Parkinson's disease, hyperlipidemia, hypertension, sleep apnea, obesity, migraine.    CURRENT MEDICATIONS:  Include ondansetron, ciprofloxacin, conjugated estrogens, citalopram, melatonin, trospium, polyethylene glycol, acetaminophen-codeine, pramipexole, carbidopa-levodopa, lisinopril-hydrochlorothiazide, and zolmitriptan.    FAMILY MEDICAL HISTORY:  Significant for a grandmother with dementia who lived to be [facility], and a father with cerebrovascular disease and bladder cancer.    BEHAVIORAL OBSERVATIONS    During the evaluation, Ms. Farias was pleasant, cooperative, and seemed to understand the instructions, although she tended to lose set once she started a task. She was alert and oriented to person, place, and dates, but not the year, stating first that it was 2012 and then correcting herself to state that it  was 2016. Tremors were observed clinically, primarily on her left side. Gait was slow and shuffling. Mood was euthymic although she stated that she was nervous. Speech was somewhat slow, with normal articulation and volume. Spontaneous conversation was present and appropriate. Ms. Farias appeared to put forth consistent effort, and the results are believed to accurately reflect her current level of functioning.    MEASURES ADMINISTERED    The following measures were administered by a trained psychometrist, under the direct supervision of a licensed psychologist.    Subtests of the Wechsler Adult Intelligence Scale-4; Reading subtest of the Wide Range Achievement Test-4; subtests of the Wechsler Memory Scale-Revised; Adam Verbal Learning Test-Revised; Denver Naming Test; Controlled Oral Word Association Test; Semantic Fluency; Trail Making Test; Stroop; Wisconsin Card Sorting Test; Ochoa Judgement of Line Orientation; Clock Drawing; Dementia Rating Scale - 2 (DRS-2); Garcia Depression Inventory-2 (BDI-2).      RESULTS AND INTERPRETATION    Performance on a screening measure of dementia was mildly impaired (DRS-2 Total Score = 123/144). Specifically, she had difficulty on tasks of conceptualization and memory on this measure. Overall intellectual functioning was estimated to fall in the borderline range, consistent with premorbid estimates based on single word reading abilities.    Confrontation naming was mildly impaired for her age. Expressive vocabulary was mildly impaired. Letter fluency was low average, and generative naming to category was mildly impaired.    Attention span was moderately impaired. Divided attention was severely impaired on both a timed, visuomotor sequencing task, and an untimed, auditory sequencing task. Performance on a measure of cognitive flexibility and speed was severely impaired. Psychomotor processing speed was low average.    Basic visual perception, including matching lines and  angles, was average for her age. Construction of a clock was notable for difficulty with conceptualization. She rody all of the numbers correctly with the exception of the six, which she replaced with the number 30. Nonverbal deductive reasoning was mildly impaired.    Novel problem-solving, including the ability to generate strategies and solutions, was mildly impaired for her age and level of education. On this task, she had moderate difficulty with conceptualization.    Immediate and 30 minute delayed recall of verbal narrative material were moderately to severely impaired. On a multiple trial list learning task, immediate recall was average, with moderately impaired retention (56%) following a 25 minute delay. Recognition memory in this task was mildly impaired. Immediate recall of visual material was mildly impaired, with moderately impaired recall following a 30 minute delay. Recognition memory on this task was somewhat better than free recall.    On the BDI-2, a self-report questionnaire, Ms. Farias denied experiencing significant depressive symptomatology.     IMPRESSIONS AND RECOMMENDATIONS    Current results indicate marked attentional difficulties, impairments in memory, which in some cases reflected impairments in retention and in other cases reflected retrieval difficulties, and mild anomia as well as mild executive dysfunction. Premorbid intellectual functioning is estimated to fall in the borderline range. She denied experiencing significant depressive symptomatology.    This pattern of performance is suggestive of mild dementia, and there is a suggestion of mesial temporal lobe as well as frontal and subcortical system involvement. She has had increasing difficulty managing her instrumental activities of daily living. Taken together with her history, a neurodegenerative etiology seems likely. Although Parkinson's disease with dementia is a diagnostic consideration, the possibility of an additional  etiology, including Alzheimer's disease, cannot entirely be ruled out. In view of the marked attentional difficulties, nonrestorative sleep and the effects of medications may also contribute.  Although she has a history of depression, she is not reporting significant depressive symptomatology currently, but does appear to be experiencing mild anxiety.    In terms of daily functioning, Ms. Farias may require increasing assistance managing her instrumental activities of daily living. In particular, she may require additional assistance managing her medications. She continues to drive, although she has limited her driving to short distances and places with which she is familiar. Given her cognitive difficulties, it may be prudent for her to consider a formal 's assessment, such as that offered at [facility]. She will likely benefit from structure and routine. If she has difficulty managing large, complex tasks, others may assist by breaking down such tasks into smaller, more manageable parts. She has marked attentional difficulties, and may find it helpful when working on a task to work in an environment that is relatively free from distractions, such as noises or other interruptions. She may find it helpful to take frequent, brief breaks when working on a project. She may benefit from the use of written reminders or checklists. It may be helpful to follow her over time. Repeated neuropsychological evaluation in one year may help to determine whether her cognitive difficulties are progressive. These results have not been discussed with Ms. Farias or her family, although they were encouraged to contact my office to schedule an appointment for feedback should they so desire.      Radha Shah, Ph.D., ABPP  Licensed Psychologist, LP [id number]  Board Certified in Clinical Neuropsychology    Time spent:  Four hours professional time, including interview, record review, data integration, and report writing (CPT 32451); an  additional three hours, including testing administered by a psychometrist and interpreted by a neuropsychologist (CPT 65842). ICD-10 diagnosis: G20; F06.8.

## 2017-03-13 PROBLEM — F03.90 DEMENTIA (H): Status: ACTIVE | Noted: 2017-03-13

## 2017-03-13 NOTE — PROGRESS NOTES
DEMENTIA RATING SCALE- 2        TRAIL MAKING TEST             Raw  MAS    Raw  MAS      Seconds Errors  MAS  %ile       Attention  36  11  Concept 29  5    A  45  0  7  16       Init/Psv   35  8  Memory 17  3    B  d/c  --  --  --       Construct  6  10  Total  123/144 3                                   STROOP             WAIS-IV                Raw ? Loyd ? Total  MAS  %ile           Raw    Age SS     Word  49 ? 0 ? 49  2  <1     Similarities    16    6      Color  32 ? 0 ? 32  2  <1     Comprehension   19    8      C/W  10 ? 0 ? 10  2  <1     Letter Num. Seq  5    1                     Digit Span    14    4      WISCONSIN CARD SORTING TEST     Matrix Reas.    7    5      # Categories  1    6-10 %ile                     % persev err.  12   T 41  18 %ile     WIDE RANGE ACHIEVEMENT TEST- 4                         Standard  %ile    Grade    LEIGH VERBAL LEARNIING TEST-Rev         Score    Rank    Equiv    Form  1             Reading   75    5    5.4          Raw  T                       Trial 1      7         WECHSLER MEMORY SCALE- REVISED      Trial 2      9                 Raw  MAS  %ile    Trial 3      9         Information & Orientation  13        Total 1-3    25  44       Logical Memory Immed.  7  2  <1    Learning    2         Logical Memory 30 Min.  2  2  <1    Delay      5  25       Visual Reprod Immed.    25  5  5    Percent Retained  56  25       Visual Reprod 30 Min.   5  2  <1    True Positives    11         30 Minute Recognition   3        Discrimination Index  9  36                       False Positives   2         BOSTON NAMING TEST                         Score  47  MAS  5  5  %ile      BOOGIE JUDGEMENT of LINE ORIENTATION      [ 47  w/o cues 4 w/phonemic cues]    Form  H                             Raw  20   MAS  9       CONTROLLED ORAL WORD ASSOC TEST                   Score  28  MAS  7  16 %ile                                     CLOCK DRAWING           SEMANTIC FLUENCY            Command 3 \3   Copy  3 \3   Score  26  MAS  5  5 %ile                                     BDI-2    4      7     MAS= [place]ay Older Adult Normative Study Age Adj. Scaled Scores

## 2017-03-14 ENCOUNTER — TELEPHONE (OUTPATIENT)
Dept: FAMILY MEDICINE | Facility: OTHER | Age: 62
End: 2017-03-14

## 2017-03-14 NOTE — TELEPHONE ENCOUNTER
Panel Management Review      Patient has the following on her problem list:     Depression / Dysthymia review  PHQ-9 SCORE 8/24/2015 3/14/2016 12/5/2016   Total Score - - -   Total Score 0 1 7      Patient is due for:  None    Hypertension   Last three blood pressure readings:  BP Readings from Last 3 Encounters:   01/09/17 123/67   01/05/17 120/80   12/28/16 110/84     Blood pressure: Passed    HTN Guidelines:  Age 18-59 BP range:  Less than 140/90  Age 60-85 with Diabetes:  Less than 140/90  Age 60-85 without Diabetes:  less than 150/90      Composite cancer screening  Chart review shows that this patient is due/due soon for the following None  Summary:    Patient is due/failing the following:   Lipids, CMP    Action needed:   Patient needs fasting lab only appointment    Type of outreach:    Phone, left message for patient to call back.     Questions for provider review:    None                                                                                                                                    Vale Larios [facility]       Chart routed to Care Team .

## 2017-03-14 NOTE — LETTER
[facility]  [address]   [place] [address]  Phone: [phone]  March 22, 2017      Nathaly Farias  [address] [address]      Dear Nathaly,    We care about your health and have reviewed your health plan including your medical conditions, medications, and lab results.  Based on this review, it is recommended that you follow up regarding the following health topic(s):  -Cholesterol  -High Blood Pressure    We recommend you take the following action(s):  -schedule a LAB ONLY APPOINTMENT to recheck your: Lipids (fasting cholesterol - nothing to eat except water and/or meds for 8-10 hours) and CMP(complete metabolic panel) within the next 1-4 weeks.  If' you have had labs completed eslewhere, please let us know so we can update your records.       Please call us at the [facility] - [phone] (or use [facility]) to address the above recommendations.     Thank you for trusting [facility] and we appreciate the opportunity to serve you.  We look forward to supporting your healthcare needs in the future.    Healthy Regards,    Your Health Care Team  [facility]

## 2017-03-23 ENCOUNTER — TELEPHONE (OUTPATIENT)
Dept: ORTHOPEDICS | Facility: OTHER | Age: 62
End: 2017-03-23

## 2017-03-23 NOTE — TELEPHONE ENCOUNTER
Reason for call:  Same Day Appointment  Reason for Call:  Same Day Appointment, Requested Provider:  JOANNA     PCP: Maxine Monae    Reason for visit: LEFT KNEE     Duration of symptoms: 3 MONTHS    Have you been treated for this in the past? Yes    Additional comments: NONE    Can we leave a detailed message on this number? YES    Phone number patient can be reached at: Home number on file [phone] (home)    Best Time: ANYTIME    Call taken on 3/23/2017 at 8:35 AM by Anirudh Russell

## 2017-03-23 NOTE — TELEPHONE ENCOUNTER
Spoke with patient, she is having increased left knee pain in the last few days.  I told her that Dr. Crowley is not in today and is full on Friday.  I asked her if she felt it could wait till Monday and she is fine with this plan.  She is scheduled for  Monday at 8:00 in [place].

## 2017-03-27 ENCOUNTER — OFFICE VISIT (OUTPATIENT)
Dept: ORTHOPEDICS | Facility: CLINIC | Age: 62
End: 2017-03-27
Payer: MEDICARE

## 2017-03-27 VITALS — BODY MASS INDEX: 26.68 KG/M2 | HEIGHT: 62 IN | WEIGHT: 145 LBS | TEMPERATURE: 96 F

## 2017-03-27 DIAGNOSIS — M17.32 POST-TRAUMATIC OSTEOARTHRITIS OF LEFT KNEE: Primary | ICD-10-CM

## 2017-03-27 PROCEDURE — 20610 DRAIN/INJ JOINT/BURSA W/O US: CPT | Mod: LT | Performed by: ORTHOPAEDIC SURGERY

## 2017-03-27 ASSESSMENT — PAIN SCALES - GENERAL: PAINLEVEL: NO PAIN (1)

## 2017-03-27 NOTE — LETTER
"  3/27/2017       RE: Nathaly Farias  [address] [address]           Dear Colleague,    Thank you for referring your patient, Nathaly Farias, to the [facility]. Please see a copy of my visit note below.    Office Visit-Follow up    Chief Complaint: Nathaly Farias is a 61 year old female who is being seen for   Chief Complaint   Patient presents with     RECHECK     left knee, increase pain and feels like it slips        History of Present Illness:   Mechanism of Injury: no recent injury  Location: left lateral knee  Duration of Pain: for many years  Rating of Pain: 1 out of 10 today  Pain Quality: sharp, can feel of rubbing on the outside of the knee  Pain is better with: rest  Pain is worse with: ambulation  Treatment so far consists of: cortisone injection on 12/9/2016, Synvisc one injection on 2/27/2017.   Associated Features: none. No new injury.  Pain is Limiting: activity  Here to: discussed possible cortisone injection    REVIEW OF SYSTEMS  General: negative for, night sweats, dizziness, fatigue  Resp: No shortness of breath and no cough  CV: negative for chest pain, syncope or near-syncope  GI: negative for nausea, vomiting and diarrhea  : negative for dysuria and hematuria  Musculoskeletal: as above  Neurologic: negative for syncope   Hematologic: negative for bleeding disorder    Physical Exam:  Vitals: Temp 96  F (35.6  C)  Ht 1.562 m (5' 1.5\")  Wt 65.8 kg (145 lb)  LMP 06/05/2006  BMI 26.95 kg/m2  BMI= Body mass index is 26.95 kg/(m^2).  Constitutional: healthy, alert and no acute distress   Psychiatric: mentation appears normal and affect normal/bright  NEURO: no focal deficits, CMS intact left lower extremity except for some slight decrease sensation lateral to the incision she has on her leg  RESP: Normal with easy respirations and no use of accessory muscles to breathe, no audible wheezing or retractions  CV: montana warm  SKIN: No erythema, rashes, " excoriation, or breakdown. No evidence of infection. Incision well-heeled   MUSCULOSKELETAL:    INSPECTION of left knee: No gross deformities, erythema, edema, ecchymosis, atrophy or fasciculations.     PALPATION: slight tenderness when palpation over the lateral aspect of the knee close to the lateral incision. No tenderness to palpation anterior posterior or medial. No increased warmth. I am able to palpate prominent hardware on the lateral side of the knee but this does not seem to cause her much pain.    ROM: full extension flexion 115  without catching lock-in or major pain today.     STRENGTH: 5 out of 5 quad strength of the    SPECIAL TEST: Patient has a negative Lachman's negative drawer sign. Patient's knee is stable to varus and valgus stress at 30  of flexion. Patient has a negative Kecia's.   GAIT: non-antalgic  Lymph: no palpable lymph nodes      Diagnostic Modalities:  Previous imaging reviewed.  Independent visualization of the images was performed.      Impression: left knee posttraumatic arthritis    Plan:  All of the above pertinent physical exam and imaging modalities findings was reviewed with Nathaly.                                          INJECTION PROCEDURE:  The patient was counseled about an  injection, including discussion of risks (including infection), contents of the injection, rationale for performing the injection, and expected benefits of the injection. The skin was prepped with alcohol and betadine and then utilizing sterile technique an injection of the left knee joint from the anterolateral approach in the seated position was performed. I used ekta chloride spray prior to doing the injection. The injection consisted 1ml of Kenalog (40mg per 1ml) with 8ml 1% lidocaine plain. The patient tolerated the injection well, and there were no complications. The injection site was covered with a Band-Aid. The injection was performed by RICHELLE Serna     Patient Instructions:   1. It  sounds like this in this one helped you but only a small amount.  2. You are stating that you are feeling rubbing inside of your knee that is causing your pain especially on the lateral side of the knee.  3. Your last cortisone injection was 12/9/2016. This helped to more than the gel injection so we will do another cortisone injection today.  4. You can followup with Christine Crowley MD in 6 weeks, if you are having pain at that time we can do a cortisone injection.    Re-x-ray on return: No    Scribed by Jack Kaplan PA-C on 3/27/2017 at 8:22 AM, based on Dr. Christine Crowley's statements to me.    This note was dictated with [employer].    SARBJIT Ortiz MD         Again, thank you for allowing me to participate in the care of your patient.        Sincerely,    Christine Crowley MD

## 2017-03-27 NOTE — NURSING NOTE
"Chief Complaint   Patient presents with     RECHECK     left knee, increase pain and feels like it slips        Initial Temp 96  F (35.6  C)  Ht 1.562 m (5' 1.5\")  Wt 65.8 kg (145 lb)  LMP 06/05/2006  BMI 26.95 kg/m2 Estimated body mass index is 26.95 kg/(m^2) as calculated from the following:    Height as of this encounter: 1.562 m (5' 1.5\").    Weight as of this encounter: 65.8 kg (145 lb).  Medication Reconciliation: complete    BP completed using cuff size: NA (Not Taken)    Sindy Russell MA      "

## 2017-03-27 NOTE — MR AVS SNAPSHOT
After Visit Summary   3/27/2017    Nathaly Farias    MRN: [id number]           Patient Information     Date Of Birth          1955        Visit Information        Provider Department      3/27/2017 8:00 AM Christine Crowley MD [facility]        Today's Diagnoses     Post-traumatic osteoarthritis of left knee    -  1      Care Instructions    Encounter Diagnosis   Name Primary?     Post-traumatic osteoarthritis of left knee Yes     Rest, ice and elevate above heart level as needed for pain control  1. It sounds like this in this one helped you but only a small amount.  2. You are stating that you are feeling rubbing inside of your knee that is causing your pain especially on the lateral side of the knee.  3. Your last cortisone injection was 12/9/2016. This helped to more than the gel injection so we will do another cortisone injection today.  Cortisone Instructions:  1. You received an injection of cortisone into your left knee today.  2. The joint may be more painful for the first 1-2 days.  3. We ask you to continue to rest the joint for a few more days before resuming regular activities.  4. Pain Medications you can take:  Tylenol  Take 1000 mg by mouth every 6 hours as needed; maximum dose 4000 mg a day  Ibuprofen  600 mg every 6 hours as needed; maximum 2400 mg a day  (OK to take tylenol and ibuprofen at the same time)  5. Rest, ice and elevate as needed for pain control  6. Watch for these signs of infection: redness, swelling, drainage, warmth to touch, increased pain, or fever. Call the clinic or make an appointment to be seen if you think you have an infection.  7. If you are diabetic, make sure you keep a close eye on your blood sugars, they can get elevated with cortisone injections.   8. Sometimes it can take 1-2 weeks for it to reach its full effect.  9. You can followup with Christine Crowley MD in 6 weeks.    [employer] may offer reliable  information regarding your diagnosis and treatment plan.    THANK YOU for coming in today. If you receive a survey via Mine or mail please let us know if there was anything you especially appreciated today or if there is any way we can improve our clinic. We appreciate your input.    GENERAL INFORMATION:  Our hours are:  Monday :     Clinic 7:30 AM-430 PM ([facility])  Tuesday:      Operating Room All Day ([facility])  Wednesday: Clinic 7:30 AM - 11:15 AM ([facility])             Clinic 1:00 PM - 4:00PM ([facility])  Thursday:     Administrative Day  Friday:          Clinic 7:30 AM - 11:15 AM ([facility])            Clinic 1:00 PM - 4:00 PM ([facility])    We are not in the office Thursdays. Therefore non- urgent calls and medical messages received on Thursday will be addressed when we are back in the office on Wednesday. Urgent matters will be reviewed and addressed by one of our partners in the office as needed.    If lab work was done today as part of your evaluation you will generally be contacted via Mine, mail, or phone with the results within 1-5 days. If there is an alarming result we will contact you by phone. Lab results come back at varying times, I generally wait until all labs are resulted before making comments on results. Please note labs are automatically released to Mine (if you have signed up for it) once available-at times you may see these prior to my having a chance to review them as well.    If you need refills please contact your pharmacist. They will send a refill request to me to review. Please allow 3 business days for us to process all refill requests. All narcotic refills should be handled in the clinic at the time of your visit.    Cortisone Injections  Cortisone is a type of steroid. It can greatly reduce swelling, redness, and  irritation (inflammation) and pain. Being injected with cortisone is simple and doesn t take long. Your doctor may ask you questions about your health. Certain health conditions, such as diabetes, can be affected by cortisone.     Your pain may be relieved by a cortisone injection.   Why have a cortisone injection?  Injecting cortisone can relieve pain for anything from a sports injury to arthritis. Your doctor may suggest an injection if rest, splints, or oral medicine doesn t relieve your pain. Injecting cortisone is simpler than having surgery. And cortisone may provide the lasting pain relief that can help you get out and enjoy life again.  Getting the injection  Your doctor will start by cleaning and occasionally numbing your skin at the injection site. Next you ll be injected with local anesthetics (for short-term pain relief) and cortisone. The injection may last a few moments. A small bandage will be put over the injection site. You ll then be ready to go home.  After your injection  After being injected, make sure you don t injure the treated region. But stay active. Enjoy a walk or some other mild activity. Just be careful not to strain the region that gave you trouble.  The next day  Some people feel more pain after being injected. This is normal, and it will go away soon. Applying ice for 20 minutes at a time to your injury may reduce the increased pain. Rest for the first day or two. You don t need to stay in bed. But avoid tasks that may strain the injured region.  If you have diabetes  Cortisone injections can cause blood sugar to be increased for several days after the injection. If you have diabetes, you should follow your blood  sugar closely during this time. Follow your regular plan for what to do when your blood sugar is elevated.     6363-3303 [employer]. [address] [place] [address], [place] [address]. All rights reserved. This information is not intended as a substitute for  professional medical care. Always follow your healthcare professional's instructions.                  Follow-ups after your visit        Your next 10 appointments already scheduled     Apr 03, 2017  3:00 PM CDT   Return Visit with Jet Porter MD   [facility]    [id number] [place] [id number] [phone]            Oct 05, 2017  8:00 AM CDT   [facility] Visit with Jack Esposito MD   [facility]    [address] [facility] [id number] [phone]              Who to contact     If you have questions or need follow up information about today's clinic visit or your schedule please contact [facility] directly at [phone].  Normal or non-critical lab and imaging results will be communicated to you by MyChart, letter or phone within 4 business days after the clinic has received the results. If you do not hear from us within 7 days, please contact the clinic through [employer] or phone. If you have a critical or abnormal lab result, we will notify you by phone as soon as possible.  Submit refill requests through [employer] or call your pharmacy and they will forward the refill request to us. Please allow 3 business days for your refill to be completed.          Additional Information About Your Visit        [employer] Information     [employer] gives you secure access to your electronic health record. If you see a primary care provider, you can also send messages to your care team and make appointments. If you have questions, please call your primary care clinic.  If you do not have a primary care provider, please call [phone] and they will assist you.        Care EveryWhere ID     This is your Care EveryWhere ID. This could be used by other organizations to access your [facility] medical records  [id number]        Your Vitals Were     Temperature Height Last Period BMI (Body Mass  "Index)          96  F (35.6  C) 1.562 m (5' 1.5\") 06/05/2006 26.95 kg/m2         Blood Pressure from Last 3 Encounters:   01/09/17 123/67   01/05/17 120/80   12/28/16 110/84    Weight from Last 3 Encounters:   03/27/17 65.8 kg (145 lb)   02/27/17 65.8 kg (145 lb)   01/09/17 71.7 kg (158 lb)              Today, you had the following     No orders found for display       Primary Care Provider Office Phone # Fax #    Maxine Luda Monae [phone]       [facility]  [address] [facility] [address]        Thank you!     Thank you for choosing [facility]  for your care. Our goal is always to provide you with excellent care. Hearing back from our patients is one way we can continue to improve our services. Please take a few minutes to complete the written survey that you may receive in the mail after your visit with us. Thank you!             Your Updated Medication List - Protect others around you: Learn how to safely use, store and throw away your medicines at www.disposemymeds.org.          This list is accurate as of: 3/27/17  8:11 AM.  Always use your most recent med list.                   Brand Name Dispense Instructions for use    acetaminophen-codeine 300-30 MG per tablet    TYLENOL w/CODEINE No. 3    40 tablet    Take 1 tablet by mouth every 12 hours       carbidopa-levodopa  MG per tablet    SINEMET    540 tablet    2 tablets @ 8am, 11am/12noon, and 9pm       ciprofloxacin 250 MG tablet    CIPRO    6 tablet    Take 1 tablet (250 mg) by mouth 2 times daily       citalopram 20 MG tablet    celeXA    90 tablet    Take 1 tablet (20 mg) by mouth daily       conjugated estrogens cream    PREMARIN    30 g    Place 0.5 g vaginally twice a week Use daily first week of use       lisinopril-hydrochlorothiazide 10-12.5 MG per tablet    PRINZIDE/ZESTORETIC    30 tablet    TAKE ONE TABLET BY MOUTH ONCE DAILY DUE  FOR  LABS       melatonin 3 MG tablet     90 tablet    Take " 1 tablet (3 mg) by mouth nightly as needed for sleep       ondansetron 4 MG ODT tab    ZOFRAN ODT    20 tablet    Take 1-2 tablets (4-8 mg) by mouth every 8 hours as needed for nausea       polyethylene glycol powder    MIRALAX    510 g    Take 17 g (1 capful) by mouth daily Adjust as needed to TID prn in same ratio of 1 capful per 8 oz water       pramipexole 0.25 MG tablet    MIRAPEX     Take 1 tablet by mouth 3 times daily       trospium 20 MG tablet    SANCTURA    90 tablet    Take 1 tablet (20 mg) by mouth daily       ZOLMitriptan 5 MG tablet    ZOMIG    6 tablet    Take 1 tablet (5 mg) by mouth at onset of headache for migraine MAY REPEAT ONCE AFTER 2 HOURS. DO NOT EXCEED 2 TABLETS IN 24 HOURS.

## 2017-03-27 NOTE — PATIENT INSTRUCTIONS
Encounter Diagnosis   Name Primary?     Post-traumatic osteoarthritis of left knee Yes     Rest, ice and elevate above heart level as needed for pain control  1. It sounds like this in this one helped you but only a small amount.  2. You are stating that you are feeling rubbing inside of your knee that is causing your pain especially on the lateral side of the knee.  3. Your last cortisone injection was 12/9/2016. This helped to more than the gel injection so we will do another cortisone injection today.  Cortisone Instructions:  1. You received an injection of cortisone into your left knee today.  2. The joint may be more painful for the first 1-2 days.  3. We ask you to continue to rest the joint for a few more days before resuming regular activities.  4. Pain Medications you can take:  Tylenol  Take 1000 mg by mouth every 6 hours as needed; maximum dose 4000 mg a day  Ibuprofen  600 mg every 6 hours as needed; maximum 2400 mg a day  (OK to take tylenol and ibuprofen at the same time)  5. Rest, ice and elevate as needed for pain control  6. Watch for these signs of infection: redness, swelling, drainage, warmth to touch, increased pain, or fever. Call the clinic or make an appointment to be seen if you think you have an infection.  7. If you are diabetic, make sure you keep a close eye on your blood sugars, they can get elevated with cortisone injections.   8. Sometimes it can take 1-2 weeks for it to reach its full effect.  9. You can followup with Christine Crowley MD in 6 weeks.    [employer] and [facility] may offer reliable information regarding your diagnosis and treatment plan.    THANK YOU for coming in today. If you receive a survey via [employer] or mail please let us know if there was anything you especially appreciated today or if there is any way we can improve our clinic. We appreciate your input.    GENERAL INFORMATION:  Our hours are:  Monday :     Clinic 7:30 AM-430 PM ([facility]  [place])  Tuesday:      Operating Room All Day ([facility])  Wednesday: Clinic 7:30 AM - 11:15 AM ([facility])             Clinic 1:00 PM - 4:00PM ([facility])  Thursday:     Administrative Day  Friday:          Clinic 7:30 AM - 11:15 AM ([facility])            Clinic 1:00 PM - 4:00 PM ([facility])    We are not in the office Thursdays. Therefore non- urgent calls and medical messages received on Thursday will be addressed when we are back in the office on Wednesday. Urgent matters will be reviewed and addressed by one of our partners in the office as needed.    If lab work was done today as part of your evaluation you will generally be contacted via [employer] mail, or phone with the results within 1-5 days. If there is an alarming result we will contact you by phone. Lab results come back at varying times, I generally wait until all labs are resulted before making comments on results. Please note labs are automatically released to [employer] (if you have signed up for it) once available-at times you may see these prior to my having a chance to review them as well.    If you need refills please contact your pharmacist. They will send a refill request to me to review. Please allow 3 business days for us to process all refill requests. All narcotic refills should be handled in the clinic at the time of your visit.    Cortisone Injections  Cortisone is a type of steroid. It can greatly reduce swelling, redness, and irritation (inflammation) and pain. Being injected with cortisone is simple and doesn t take long. Your doctor may ask you questions about your health. Certain health conditions, such as diabetes, can be affected by cortisone.     Your pain may be relieved by a cortisone injection.   Why have a cortisone injection?  Injecting cortisone can relieve pain for anything from a sports injury to arthritis. Your  doctor may suggest an injection if rest, splints, or oral medicine doesn t relieve your pain. Injecting cortisone is simpler than having surgery. And cortisone may provide the lasting pain relief that can help you get out and enjoy life again.  Getting the injection  Your doctor will start by cleaning and occasionally numbing your skin at the injection site. Next you ll be injected with local anesthetics (for short-term pain relief) and cortisone. The injection may last a few moments. A small bandage will be put over the injection site. You ll then be ready to go home.  After your injection  After being injected, make sure you don t injure the treated region. But stay active. Enjoy a walk or some other mild activity. Just be careful not to strain the region that gave you trouble.  The next day  Some people feel more pain after being injected. This is normal, and it will go away soon. Applying ice for 20 minutes at a time to your injury may reduce the increased pain. Rest for the first day or two. You don t need to stay in bed. But avoid tasks that may strain the injured region.  If you have diabetes  Cortisone injections can cause blood sugar to be increased for several days after the injection. If you have diabetes, you should follow your blood  sugar closely during this time. Follow your regular plan for what to do when your blood sugar is elevated.     6352-5731 [employer]. [address] [place] [address], [place] [address]. All rights reserved. This information is not intended as a substitute for professional medical care. Always follow your healthcare professional's instructions.

## 2017-03-27 NOTE — PROGRESS NOTES
"Office Visit-Follow up    Chief Complaint: Nathaly Farias is a 61 year old female who is being seen for   Chief Complaint   Patient presents with     RECHECK     left knee, increase pain and feels like it slips        History of Present Illness:   Mechanism of Injury: no recent injury  Location: left lateral knee  Duration of Pain: for many years  Rating of Pain: 1 out of 10 today  Pain Quality: sharp, can feel of rubbing on the outside of the knee  Pain is better with: rest  Pain is worse with: ambulation  Treatment so far consists of: cortisone injection on 12/9/2016, Synvisc one injection on 2/27/2017.   Associated Features: none. No new injury.  Pain is Limiting: activity  Here to: discussed possible cortisone injection    REVIEW OF SYSTEMS  General: negative for, night sweats, dizziness, fatigue  Resp: No shortness of breath and no cough  CV: negative for chest pain, syncope or near-syncope  GI: negative for nausea, vomiting and diarrhea  : negative for dysuria and hematuria  Musculoskeletal: as above  Neurologic: negative for syncope   Hematologic: negative for bleeding disorder    Physical Exam:  Vitals: Temp 96  F (35.6  C)  Ht 1.562 m (5' 1.5\")  Wt 65.8 kg (145 lb)  LMP 06/05/2006  BMI 26.95 kg/m2  BMI= Body mass index is 26.95 kg/(m^2).  Constitutional: healthy, alert and no acute distress   Psychiatric: mentation appears normal and affect normal/bright  NEURO: no focal deficits, CMS intact left lower extremity except for some slight decrease sensation lateral to the incision she has on her leg  RESP: Normal with easy respirations and no use of accessory muscles to breathe, no audible wheezing or retractions  CV: [place] warm  SKIN: No erythema, rashes, excoriation, or breakdown. No evidence of infection. Incision well-heeled   MUSCULOSKELETAL:    INSPECTION of left knee: No gross deformities, erythema, edema, ecchymosis, atrophy or fasciculations.     PALPATION: slight tenderness when palpation over " the lateral aspect of the knee close to the lateral incision. No tenderness to palpation anterior posterior or medial. No increased warmth. I am able to palpate prominent hardware on the lateral side of the knee but this does not seem to cause her much pain.    ROM: full extension flexion 115  without catching lock-in or major pain today.     STRENGTH: 5 out of 5 quad strength of the    SPECIAL TEST: Patient has a negative Lachman's negative drawer sign. Patient's knee is stable to varus and valgus stress at 30  of flexion. Patient has a negative Kecia's.   GAIT: non-antalgic  Lymph: no palpable lymph nodes      Diagnostic Modalities:  Previous imaging reviewed.  Independent visualization of the images was performed.      Impression: left knee posttraumatic arthritis    Plan:  All of the above pertinent physical exam and imaging modalities findings was reviewed with Nathaly.                                          INJECTION PROCEDURE:  The patient was counseled about an  injection, including discussion of risks (including infection), contents of the injection, rationale for performing the injection, and expected benefits of the injection. The skin was prepped with alcohol and betadine and then utilizing sterile technique an injection of the left knee joint from the anterolateral approach in the seated position was performed. I used ekta chloride spray prior to doing the injection. The injection consisted 1ml of Kenalog (40mg per 1ml) with 8ml 1% lidocaine plain. The patient tolerated the injection well, and there were no complications. The injection site was covered with a Band-Aid. The injection was performed by RICHELLE Serna     Patient Instructions:   1. It sounds like this in this one helped you but only a small amount.  2. You are stating that you are feeling rubbing inside of your knee that is causing your pain especially on the lateral side of the knee.  3. Your last cortisone injection was 12/9/2016.  This helped to more than the gel injection so we will do another cortisone injection today.  4. You can followup with Christine Crowley MD in 6 weeks, if you are having pain at that time we can do a cortisone injection.    Re-x-ray on return: No    Scribed by Jack Kaplan PA-C on 3/27/2017 at 8:22 AM, based on Dr. Christine Crowley's statements to me.    This note was dictated with [employer].    SARBJIT Ortiz MD

## 2017-04-03 ENCOUNTER — OFFICE VISIT (OUTPATIENT)
Dept: NEUROLOGY | Facility: CLINIC | Age: 62
End: 2017-04-03
Payer: MEDICARE

## 2017-04-03 VITALS
BODY MASS INDEX: 28.65 KG/M2 | WEIGHT: 154.1 LBS | DIASTOLIC BLOOD PRESSURE: 71 MMHG | HEART RATE: 88 BPM | SYSTOLIC BLOOD PRESSURE: 109 MMHG | OXYGEN SATURATION: 96 %

## 2017-04-03 DIAGNOSIS — G20.A1 PARALYSIS AGITANS (H): ICD-10-CM

## 2017-04-03 DIAGNOSIS — M25.562 ACUTE PAIN OF LEFT KNEE: ICD-10-CM

## 2017-04-03 DIAGNOSIS — F03.A0 MILD DEMENTIA (H): Primary | ICD-10-CM

## 2017-04-03 PROCEDURE — 99214 OFFICE O/P EST MOD 30 MIN: CPT | Performed by: PSYCHIATRY & NEUROLOGY

## 2017-04-03 RX ORDER — RIVASTIGMINE 13.3 MG/24H
1 PATCH, EXTENDED RELEASE TRANSDERMAL DAILY
Qty: 30 PATCH | Refills: 11 | Status: SHIPPED | OUTPATIENT
Start: 2017-04-03 | End: 2017-07-07

## 2017-04-03 RX ORDER — CARBIDOPA AND LEVODOPA 25; 100 MG/1; MG/1
TABLET ORAL
Qty: 540 TABLET | Refills: 3 | Status: SHIPPED | OUTPATIENT
Start: 2017-04-03 | End: 2018-02-26

## 2017-04-03 RX ORDER — PRAMIPEXOLE DIHYDROCHLORIDE 0.25 MG/1
0.25 TABLET ORAL 3 TIMES DAILY
Qty: 270 TABLET | Refills: 3 | COMMUNITY
Start: 2017-04-03 | End: 2017-07-19

## 2017-04-03 RX ORDER — RIVASTIGMINE 9.5 MG/24H
PATCH, EXTENDED RELEASE TRANSDERMAL
Qty: 30 PATCH | Refills: 11 | Status: SHIPPED | OUTPATIENT
Start: 2017-04-03 | End: 2017-07-07

## 2017-04-03 RX ORDER — DONEPEZIL HYDROCHLORIDE 5 MG/1
TABLET, FILM COATED ORAL
Qty: 30 TABLET | Refills: 11 | Status: SHIPPED | OUTPATIENT
Start: 2017-04-03 | End: 2017-07-19

## 2017-04-03 RX ORDER — RIVASTIGMINE 4.6 MG/24H
PATCH, EXTENDED RELEASE TRANSDERMAL
Qty: 30 PATCH | Refills: 11 | Status: SHIPPED | OUTPATIENT
Start: 2017-04-03 | End: 2017-07-07

## 2017-04-03 ASSESSMENT — PAIN SCALES - GENERAL: PAINLEVEL: NO PAIN (0)

## 2017-04-03 NOTE — NURSING NOTE
"Nathaly Farias's goals for this visit include: return  She requests these members of her care team be copied on today's visit information:     PCP: Maxine Monae    Referring Provider:  No referring provider defined for this encounter.    Chief Complaint   Patient presents with     RECHECK       Initial /71  Pulse 88  Wt 69.9 kg (154 lb 1.6 oz)  LMP 06/05/2006  SpO2 96%  BMI 28.65 kg/m2 Estimated body mass index is 28.65 kg/(m^2) as calculated from the following:    Height as of 3/27/17: 1.562 m (5' 1.5\").    Weight as of this encounter: 69.9 kg (154 lb 1.6 oz).  Medication Reconciliation: complete    Do you need any medication refills at today's visit? N  "

## 2017-04-03 NOTE — MR AVS SNAPSHOT
After Visit Summary   4/3/2017    Nathaly Farias    MRN: [id number]           Patient Information     Date Of Birth          1955        Visit Information        Provider Department      4/3/2017 3:00 PM Jet Porter MD [facility]        Today's Diagnoses     Mild dementia    -  1    Paralysis agitans (H)        Acute pain of left knee          Care Instructions      PATIENT: Nathaly Farias    : 1955    MOISE: April 3, 2017    Parkinson  Mild dementia    Sinemet 25/100 2 tabs 3 times per day. She is encouraged to take her medication one hour before protein or two hours after protein.   cipro - completed course.   celexa 20mg per day for depression   Prinzide - for blood pressure.   miralax - as needed.   mirapex  0.25mg 3/day -- NOT SURE IF SHE IS TAKING   sanctura  -- NOT SURE IF SHE IS TAKING.   zomig as needed.   Melatonin 5mg 2 tabs daily     Ongoing leg problems.   Had synvisc shot and has had been using crutches and has metal in the leg.     PLAN  1. Sort out medications - mirapex, sanctura  2. Not sure if rivastigmine will be covered but will try this with the exelon patch  4.6 mg patch daily x 1month then 9.5mg patch daily x 1 month then 13.3mg patch daily    If not then may have to use donepezil 5mg/day x month then 10mg /day    Return back in 3 months.     Jet porter MD         Follow-ups after your visit        Follow-up notes from your care team     Return in about 3 months (around 7/3/2017).      Your next 10 appointments already scheduled     Oct 05, 2017  8:00 AM CDT   Return Visit with Jack Esposito MD   [facility] ([facility])    [address]  [facility] [address] [phone]              Who to contact     If you have questions or need follow up information about today's clinic visit or your schedule please contact [facility] directly at [phone].  Normal or non-critical lab and  imaging results will be communicated to you by MyChart, letter or phone within 4 business days after the clinic has received the results. If you do not hear from us within 7 days, please contact the clinic through [employer] or phone. If you have a critical or abnormal lab result, we will notify you by phone as soon as possible.  Submit refill requests through [employer] or call your pharmacy and they will forward the refill request to us. Please allow 3 business days for your refill to be completed.          Additional Information About Your Visit        R[employer] Information     [employer] gives you secure access to your electronic health record. If you see a primary care provider, you can also send messages to your care team and make appointments. If you have questions, please call your primary care clinic.  If you do not have a primary care provider, please call [phone] and they will assist you.      [employer] is an electronic gateway that provides easy, online access to your medical records. With [employer], you can request a clinic appointment, read your test results, renew a prescription or communicate with your care team.     To access your existing account, please contact your [facility] or call [phone] for assistance.        Care EveryWhere ID     This is your Care EveryWhere ID. This could be used by other organizations to access your [place] medical records  [id number]        Your Vitals Were     Pulse Last Period Pulse Oximetry BMI (Body Mass Index)          88 06/05/2006 96% 28.65 kg/m2         Blood Pressure from Last 3 Encounters:   04/03/17 109/71   01/09/17 123/67   01/05/17 120/80    Weight from Last 3 Encounters:   04/03/17 69.9 kg (154 lb 1.6 oz)   03/27/17 65.8 kg (145 lb)   02/27/17 65.8 kg (145 lb)              Today, you had the following     No orders found for display         Today's Medication Changes          These changes are accurate as of: 4/3/17  3:41 PM.   If you have any questions, ask your nurse or doctor.               Start taking these medicines.        Dose/Directions    donepezil 5 MG tablet   Commonly known as:  ARICEPT   Used for:  Mild dementia, Paralysis agitans (H)   Started by:  Jet Porter MD        5mg daily for one month then 10mg daily   Quantity:  30 tablet   Refills:  11       * rivastigmine 4.6 MG/24HR 24 hr patch   Commonly known as:  EXELON   Used for:  Paralysis agitans (H), Mild dementia   Started by:  Jet Porter MD        4.6 mg patch daily x 1month then 9.5mg patch daily x 1 month then 13.3mg patch daily   Quantity:  30 patch   Refills:  11       * rivastigmine 9.5 MG/24HR 24 hr patch   Commonly known as:  EXELON   Used for:  Paralysis agitans (H), Mild dementia   Started by:  Jet Porter MD        4.6 mg patch daily x 1month then 9.5mg patch daily x 1 month then 13.3mg patch daily   Quantity:  30 patch   Refills:  11       * rivastigmine 13.3 MG/24HR 24 hr patch   Commonly known as:  EXELON   Used for:  Paralysis agitans (H), Mild dementia   Started by:  Jet Porter MD        Dose:  1 patch   Place 1 patch onto the skin daily 4.6 mg patch daily x 1month then 9.5mg patch daily x 1 month then 13.3mg patch daily   Quantity:  30 patch   Refills:  11       * Notice:  This list has 3 medication(s) that are the same as other medications prescribed for you. Read the directions carefully, and ask your doctor or other care provider to review them with you.      These medicines have changed or have updated prescriptions.        Dose/Directions    melatonin 5 MG tablet   This may have changed:  Another medication with the same name was removed. Continue taking this medication, and follow the directions you see here.   Changed by:  Jet Porter MD        Dose:  10 mg   Take 10 mg by mouth At Bedtime   Refills:  0         Stop taking these medicines if you haven't already. Please contact your care team if you have  questions.     ciprofloxacin 250 MG tablet   Commonly known as:  CIPRO   Stopped by:  Jet Porter MD                Where to get your medicines      These medications were sent to [facility] [address] [place], [place] - [address]  [address], [facility] [address]     Phone:  [phone]     carbidopa-levodopa  MG per tablet    rivastigmine 13.3 MG/24HR 24 hr patch    rivastigmine 4.6 MG/24HR 24 hr patch    rivastigmine 9.5 MG/24HR 24 hr patch         Some of these will need a paper prescription and others can be bought over the counter.  Ask your nurse if you have questions.     Bring a paper prescription for each of these medications     donepezil 5 MG tablet                Primary Care Provider Office Phone # Fax #    Maxine Monae -[phone]       [facility]  [address] [facility] [address]        Thank you!     Thank you for choosing [facility]  for your care. Our goal is always to provide you with excellent care. Hearing back from our patients is one way we can continue to improve our services. Please take a few minutes to complete the written survey that you may receive in the mail after your visit with us. Thank you!             Your Updated Medication List - Protect others around you: Learn how to safely use, store and throw away your medicines at www.disposemymeds.org.          This list is accurate as of: 4/3/17  3:41 PM.  Always use your most recent med list.                   Brand Name Dispense Instructions for use    carbidopa-levodopa  MG per tablet    SINEMET    540 tablet    2 tablets @ 8am, 11am/12noon, and 9pm       citalopram 20 MG tablet    celeXA    90 tablet    Take 1 tablet (20 mg) by mouth daily       conjugated estrogens cream    PREMARIN    30 g    Place 0.5 g vaginally twice a week Use daily first week of use       donepezil 5 MG tablet    ARICEPT    30 tablet    5mg daily for one month then 10mg daily        lisinopril-hydrochlorothiazide 10-12.5 MG per tablet    PRINZIDE/ZESTORETIC    30 tablet    TAKE ONE TABLET BY MOUTH ONCE DAILY DUE  FOR  LABS       melatonin 5 MG tablet      Take 10 mg by mouth At Bedtime       polyethylene glycol powder    MIRALAX    510 g    Take 17 g (1 capful) by mouth daily Adjust as needed to TID prn in same ratio of 1 capful per 8 oz water       pramipexole 0.25 MG tablet    MIRAPEX    270 tablet    Take 1 tablet (0.25 mg) by mouth 3 times daily       * rivastigmine 4.6 MG/24HR 24 hr patch    EXELON    30 patch    4.6 mg patch daily x 1month then 9.5mg patch daily x 1 month then 13.3mg patch daily       * rivastigmine 9.5 MG/24HR 24 hr patch    EXELON    30 patch    4.6 mg patch daily x 1month then 9.5mg patch daily x 1 month then 13.3mg patch daily       * rivastigmine 13.3 MG/24HR 24 hr patch    EXELON    30 patch    Place 1 patch onto the skin daily 4.6 mg patch daily x 1month then 9.5mg patch daily x 1 month then 13.3mg patch daily       trospium 20 MG tablet    SANCTURA    90 tablet    Take 1 tablet (20 mg) by mouth daily       TYLENOL PM EXTRA STRENGTH PO      Take 1 tablet by mouth as needed       ZOLMitriptan 5 MG tablet    ZOMIG    6 tablet    Take 1 tablet (5 mg) by mouth at onset of headache for migraine MAY REPEAT ONCE AFTER 2 HOURS. DO NOT EXCEED 2 TABLETS IN 24 HOURS.       * Notice:  This list has 3 medication(s) that are the same as other medications prescribed for you. Read the directions carefully, and ask your doctor or other care provider to review them with you.

## 2017-04-03 NOTE — LETTER
4/3/2017      RE: Nathaly Farias  [address] [facility] [id number]       Diagnosis/Summary/Recommendations:    PATIENT: Nathaly Farias    : 1955    MOISE: April 3, 2017    Parkinson  Mild dementia    Sinemet 25/100 2 tabs 3 times per day. She is encouraged to take her medication one hour before protein or two hours after protein.   cipro - completed course.   celexa 20mg per day for depression   Prinzide - for blood pressure.   miralax - as needed.   mirapex  0.25mg 3/day -- NOT SURE IF SHE IS TAKING   sanctura  -- NOT SURE IF SHE IS TAKING.   zomig as needed.   Melatonin 5mg 2 tabs daily     Ongoing leg problems.   Had synvisc shot and has had been using crutches and has metal in the leg.     PLAN  1. Sort out medications - mirapex, sanctura  2. Not sure if rivastigmine will be covered but will try this with the exelon patch  4.6 mg patch daily x 1month then 9.5mg patch daily x 1 month then 13.3mg patch daily    If not then may have to use donepezil 5mg/day x month then 10mg /day    Return back in 3 months.     While dbs is not likely to be considered down the road, we may consider repeating her neuropsych and think about less invasive procedures such as gamma knife or focused beam u/s - these were discussed briefly as well as dbs.     Jet porter MD     ______________________________________    Cc: 62 year old female   Issues discussed today April 3, 2017      parkinson      Over 50% of this visit was spent in patient care and care coordination.     History obtained from patient    Total visit time was 25 minutes      Jet Porter MD     ______________________________________    Last visit date and details:      She has marked attentional difficulties, impairments in memory including a rapid forgetting rate, mild anomia, and mild executive dysfunction. 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 She's  required increasing assistance with finances, medications, driving, and cooking. I think this is a mild dementia, and there's a suggestion of mesial temporal lobe involvement, so Alzheimer's disease can't be ruled out, although there are also some features consistent with frontal system involvement. She's not reporting significant problems with mood    IMPRESSIONS AND RECOMMENDATIONS     Current results indicate marked attentional difficulties, impairments in memory, which in some cases reflected impairments in retention and in other cases reflected retrieval difficulties, and mild anomia as well as mild executive dysfunction. Premorbid intellectual functioning is estimated to fall in the borderline range. She denied experiencing significant depressive symptomatology.     This pattern of performance is suggestive of mild dementia, and there is a suggestion of mesial temporal lobe as well as frontal and subcortical system involvement. She has had increasing difficulty managing her instrumental activities of daily living. Taken together with her history, a neurodegenerative etiology seems likely. Although Parkinson's disease with dementia is a diagnostic consideration, the possibility of an additional etiology, including Alzheimer's disease, cannot entirely be ruled out. In view of the marked attentional difficulties, nonrestorative sleep and the effects of medications may also contribute. Although she has a history of depression, she is not reporting significant depressive symptomatology currently, but does appear to be experiencing mild anxiety.     In terms of daily functioning, Ms. Farias may require increasing assistance managing her instrumental activities of daily living. In particular, she may require additional assistance managing her medications. She continues to drive, although she has limited her driving to short distances and places with which she is familiar. Given her cognitive difficulties, it may be  prudent for her to consider a formal 's assessment, such [facility]. She will likely benefit from structure and routine. If she has difficulty managing large, complex tasks, others may assist by breaking down such tasks into smaller, more manageable parts. She has marked attentional difficulties, and may find it helpful when working on a task to work in an environment that is relatively free from distractions, such as noises or other interruptions. She may find it helpful to take frequent, brief breaks when working on a project. She may benefit from the use of written reminders or checklists. It may be helpful to follow her over time. Repeated neuropsychological evaluation in one year may help to determine whether her cognitive difficulties are progressive. These results have not been discussed with Ms. Farias or her family, although they were encouraged to contact my office to schedule an appointment for feedback should they so desire.        Radha Shah, Ph.D., [facility]  Licensed Psychologist, [employer] [id number]  Board Certified in Clinical Neuropsychology     Time spent: Four hours professional time, including interview, record review, data integration, and report writing (CPT 77968); an additional three hours, including testing administered by a psychometrist and interpreted by a neuropsychologist (CPT 34151). ICD-10 diagnosis: G20; F06.8.    Parkinson  Sinemet 25/100: 2 tabs three times per day  mirapex 0.25mg 3/day     Memory loss     Bladder  sanctura 20mg twice daily  Has not tried to go off this medication  It could be affecting her memory.      Mood  celexa 20mg/day and has been on it for a while.      Blood pressure  Prinzide     Minocycline - has not been taking this for her rosacea     zomig headaches     Insomnia   Wakes up 2 hours after going to bed and then has problems going back to sleep and cannot sleep very well. She has not tried melatonin.      Here today with her daughter migel  who lives in [place].      PLAN  1. Melatonin 5mg 1 hour before bedtime to see if helps her sleep.  There is a longer acting form of melatonin if still having sleep problems. The dose can be increased to 10 or 15mg at night. After trying this for a week or two may consider a trial of remeron/mirtazapine which is an antidepressant that also helps sleep.   2. Mirtazapine (remeron) comes in a 15mg, 30mg and 45mg dose. Usually start with 1/2 of 15mg at bedtime to see if helps sleep and after a week or two may increase to 15mg at night. And the dose can be increased further with possible wean off celexa. Sometimes people go off the celexa by going to 1/2 tablet daily for one week and then stopping it for 2 weeks before trying the remeron; others take the remeron with the celexa initially and then go off the celexa if the remeron is doing the job.   3. She is on sanctura (bladder medication) and may be this can be reduced to once per day and then go off this but bladder problems may return.  4. Would like to have memory testing done at some point with Dr. Shah  5. May consider another brain mri scan  6. If needed Dr. Hubbard is an excellent neurologist in [place] through [facility] and we work well together.   7. Return back in 3-6 months to see me or Carmen Milligan NP  8. Discussed mychart with patient and her daughter.   9. Briefly talked about mirapex and its effect on cognition.  10. Briefly talked about dbs     Jet Porter MD            ______________________________________      Patient was asked about 14 Review of systems including changes in vision (dry eyes, double vision), hearing, heart, lungs, musculoskeletal, depression, anxiety, snoring, RBD, insomnia, urinary frequency, urinary urgency, constipation, swallowing problems, hematological, ID, allergies, skin problems: seborrhea, endocrinological: thyroid, diabetes, cholesterol; balance, weight changes, and other neurological problems and these were not  significant at this time except for   Patient Active Problem List   Diagnosis     Prolapse of vaginal wall     Migraine variant     Symptomatic menopausal or female climacteric states     Obesity     Malaise and fatigue     Other and unspecified adverse effect of drug, medicinal and biological substance     Other and unspecified disc disorder of cervical region     Sleep apnea     Essential hypertension, benign     Chronic cholecystitis     Overactive bladder     Neck pain     Headache     HYPERLIPIDEMIA LDL GOAL <160     Mild major depression (H)     Advanced directives, counseling/discussion     HTN, goal below 140/90     Paralysis agitans (H)     History of MRI of brain and brain stem     Wears glasses     Constipation     Incomplete RBBB     Heart murmur     Family history of tremor     Rosacea     Asymptomatic varicose veins     Seborrheic dermatitis     Post-traumatic osteoarthritis of left knee     Pain from implanted hardware, initial encounter     Cognitive disorder evalaution 2017     Dementia          Allergies   Allergen Reactions     Sulfa Drugs      Tacchycardia, had to go to ED     Naproxen GI Disturbance     Oxycontin [Oxycodone] GI Disturbance     Made her feel funny/upset stomach     Ropinirole      Did not work     Past Surgical History:   Procedure Laterality Date     ------------OTHER-------------      left shoulder     ARTHRODESIS TOE(S)  3/2/2011    ARTHRODESIS TOE(S) performed by CLARA LUCAS at [facility]     C LIGATE FALLOPIAN TUBE  1998     CHOLECYSTECTOMY, LAPOROSCOPIC  5/12/2008    Cholecystectomy, Laparoscopic     HC COLONOSCOPY W/WO BRUSH/WASH  08/30/06    normal     HC OPEN TX TIBIAL SHAFT FX W PLATE/SCREWS W/WO CERCLAGE  2000    Multiple operations on left leg      SHLDR ARTHROSCOP,PART ACROMIOPLAS  11/16/06    Right shoulder     [facility]    Right shoulder     Past Medical History:   Diagnosis Date     Arthralgia of temporomandibular  joint 10/22/1996     Arthritis      Cognitive disorder evalaution 2017 3/6/2017    She has marked attentional difficulties, impairments in memory including a rapid forgetting rate, mild anomia, and mild executive dysfunction. 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 She's required increasing assistance with finances, medications, driving, and cooking. I think this is a mild dementia, and     Constipation 2/23/2015     Dementia 3/13/2017    IMPRESSIONS AND RECOMMENDATIONS   Current results indicate marked attentional difficulties, impairments in memory, which in some cases reflected impairments in retention and in other cases reflected retrieval difficulties, and mild anomia as well as mild executive dysfunction. Premorbid intellectual functioning is estimated to fall in the borderline range. She denied experiencing significant depre     Depressive disorder      Heart murmur 2/23/2015     History of MRI of brain and brain stem 2/21/2015    MRI OF THE BRAIN WITHOUT CONTRAST 7/10/2014 1:21 PM  COMPARISON: None. HISTORY: Left-sided upper and lower extremity tremor. Balance issues. TECHNIQUE:  Brain: Axial diffusion-weighted with ADC map, T2-weighted with fat saturation, T1-weighted and turboFLAIR and coronal T1-weighted images of the brain were obtained without intravenous contrast.  FINDINGS: There is mild diffuse cerebral volume loss     Hypertension      Incomplete RBBB 2/23/2015     Motion sickness      Other and unspecified hyperlipidemia      Parkinson's disease (H)      Seborrheic dermatitis 2/29/2016     Unspecified arthropathy, hand 08/07/1997     Unspecified sleep apnea     moderate, sleep study 11/07, on CPAP, has daytime somnolence with this     Variants of migraine, not elsewhere classified, without mention of intractable migraine without mention of status migrainosus      Wears glasses 2/23/2015     Social History  "    Social History     Marital status:      Spouse name: violet     Number of children: 2     Years of education: N/A     Occupational History           Social History Main Topics     Smoking status: Never Smoker     Smokeless tobacco: Never Used      Comment: no smokers in the household     Alcohol use 0.0 oz/week     0 Standard drinks or equivalent per week      Comment: very occ     Drug use: No     Sexual activity: Yes     Partners: Male     Birth control/ protection: Surgical, Female Surgical      Comment: tubal ligation     Other Topics Concern     Caffeine Concern Yes     OCC     Exercise No     \"not faithfully\"     Seat Belt Yes     Self-Exams No     Social History Narrative    ALLERGIES:  She is allergic to sulfa, and has had gastrointestinal side effects from naproxen.               FAMILY HISTORY:  Strongly positive for headaches, including in her son and sister.  There is no family history of tremor.               SOCIAL HISTORY:  She does not work outside the home.  She does not smoke.  She uses very little alcohol.  Lives in [place]. . Has 2 kids: son who is 35 yr s old. And daughter who is 30 yrs old. Her  is working as a  .        Drug and lactation database from the [place] [employer]:  http://toxnet.nlm.nih.gov/cgi-bin/sis/htmlgen?LACT      B/P: 109/71, T: Data Unavailable, P: 88, R: Data Unavailable 154 lbs 1.6 oz  Blood pressure 109/71, pulse 88, weight 69.9 kg (154 lb 1.6 oz), last menstrual period 06/05/2006, SpO2 96 %, not currently breastfeeding., Body mass index is 28.65 kg/(m^2).  Medications and Vitals not listed are documented in the cart and reviewed by me.     Current Outpatient Prescriptions   Medication Sig Dispense Refill     Diphenhydramine-APAP, sleep, (TYLENOL PM EXTRA STRENGTH PO) Take 1 tablet by mouth as needed       melatonin 5 MG tablet Take 10 mg by mouth At Bedtime       ciprofloxacin (CIPRO) 250 MG tablet " Take 1 tablet (250 mg) by mouth 2 times daily 6 tablet 0     conjugated estrogens (PREMARIN) cream Place 0.5 g vaginally twice a week Use daily first week of use 30 g 11     citalopram (CELEXA) 20 MG tablet Take 1 tablet (20 mg) by mouth daily 90 tablet 1     polyethylene glycol (MIRALAX) powder Take 17 g (1 capful) by mouth daily Adjust as needed to TID prn in same ratio of 1 capful per 8 oz water 510 g 1     pramipexole (MIRAPEX) 0.25 MG tablet Take 1 tablet by mouth 3 times daily       carbidopa-levodopa (SINEMET)  MG per tablet 2 tablets @ 8am, 11am/12noon, and 9pm 540 tablet 3     lisinopril-hydrochlorothiazide (PRINZIDE,ZESTORETIC) 10-12.5 MG per tablet TAKE ONE TABLET BY MOUTH ONCE DAILY DUE  FOR  LABS 30 tablet 11     ZOLMitriptan (ZOMIG) 5 MG tablet Take 1 tablet (5 mg) by mouth at onset of headache for migraine MAY REPEAT ONCE AFTER 2 HOURS. DO NOT EXCEED 2 TABLETS IN 24 HOURS. 6 tablet 10     trospium (SANCTURA) 20 MG tablet Take 1 tablet (20 mg) by mouth daily (Patient not taking: Reported on 4/3/2017) 90 tablet 3         Jet Porter MD

## 2017-04-03 NOTE — PATIENT INSTRUCTIONS
PATIENT: Nathaly Farias    : 1955    MOISE: April 3, 2017    Parkinson  Mild dementia    Sinemet 25/100 2 tabs 3 times per day. She is encouraged to take her medication one hour before protein or two hours after protein.   cipro - completed course.   celexa 20mg per day for depression   Prinzide - for blood pressure.   miralax - as needed.   mirapex  0.25mg 3/day -- NOT SURE IF SHE IS TAKING   sanctura  -- NOT SURE IF SHE IS TAKING.   zomig as needed.   Melatonin 5mg 2 tabs daily     Ongoing leg problems.   Had synvisc shot and has had been using crutches and has metal in the leg.     PLAN  1. Sort out medications - mirapex, sanctura  2. Not sure if rivastigmine will be covered but will try this with the exelon patch  4.6 mg patch daily x 1month then 9.5mg patch daily x 1 month then 13.3mg patch daily    If not then may have to use donepezil 5mg/day x month then 10mg /day    Return back in 3 months.     Jet myers MD

## 2017-04-03 NOTE — PROGRESS NOTES
Diagnosis/Summary/Recommendations:    PATIENT: Nathaly Farias    : 1955    MOISE: April 3, 2017    Parkinson  Mild dementia    Sinemet 25/100 2 tabs 3 times per day. She is encouraged to take her medication one hour before protein or two hours after protein.   cipro - completed course.   celexa 20mg per day for depression   Prinzide - for blood pressure.   miralax - as needed.   mirapex  0.25mg 3/day -- NOT SURE IF SHE IS TAKING   sanctura  -- NOT SURE IF SHE IS TAKING.   zomig as needed.   Melatonin 5mg 2 tabs daily     Ongoing leg problems.   Had synvisc shot and has had been using crutches and has metal in the leg.     PLAN  1. Sort out medications - mirapex, sanctura  2. Not sure if rivastigmine will be covered but will try this with the exelon patch  4.6 mg patch daily x 1month then 9.5mg patch daily x 1 month then 13.3mg patch daily    If not then may have to use donepezil 5mg/day x month then 10mg /day    Return back in 3 months.     While dbs is not likely to be considered down the road, we may consider repeating her neuropsych and think about less invasive procedures such as gamma knife or focused beam u/s - these were discussed briefly as well as dbs.     Jet porter MD     ______________________________________    Cc: 62 year old female   Issues discussed today April 3, 2017      parkinson      Over 50% of this visit was spent in patient care and care coordination.     History obtained from patient    Total visit time was 25 minutes      Jet Porter MD     ______________________________________    Last visit date and details:      She has marked attentional difficulties, impairments in memory including a rapid forgetting rate, mild anomia, and mild executive dysfunction. 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 She's required increasing assistance with finances, medications, driving, and cooking. I think this  is a mild dementia, and there's a suggestion of mesial temporal lobe involvement, so Alzheimer's disease can't be ruled out, although there are also some features consistent with frontal system involvement. She's not reporting significant problems with mood    IMPRESSIONS AND RECOMMENDATIONS     Current results indicate marked attentional difficulties, impairments in memory, which in some cases reflected impairments in retention and in other cases reflected retrieval difficulties, and mild anomia as well as mild executive dysfunction. Premorbid intellectual functioning is estimated to fall in the borderline range. She denied experiencing significant depressive symptomatology.     This pattern of performance is suggestive of mild dementia, and there is a suggestion of mesial temporal lobe as well as frontal and subcortical system involvement. She has had increasing difficulty managing her instrumental activities of daily living. Taken together with her history, a neurodegenerative etiology seems likely. Although Parkinson's disease with dementia is a diagnostic consideration, the possibility of an additional etiology, including Alzheimer's disease, cannot entirely be ruled out. In view of the marked attentional difficulties, nonrestorative sleep and the effects of medications may also contribute. Although she has a history of depression, she is not reporting significant depressive symptomatology currently, but does appear to be experiencing mild anxiety.     In terms of daily functioning, Ms. Farias may require increasing assistance managing her instrumental activities of daily living. In particular, she may require additional assistance managing her medications. She continues to drive, although she has limited her driving to short distances and places with which she is familiar. Given her cognitive difficulties, it may be prudent for her to consider a formal 's assessment, such as that offered at [facility].  She will likely benefit from structure and routine. If she has difficulty managing large, complex tasks, others may assist by breaking down such tasks into smaller, more manageable parts. She has marked attentional difficulties, and may find it helpful when working on a task to work in an environment that is relatively free from distractions, such as noises or other interruptions. She may find it helpful to take frequent, brief breaks when working on a project. She may benefit from the use of written reminders or checklists. It may be helpful to follow her over time. Repeated neuropsychological evaluation in one year may help to determine whether her cognitive difficulties are progressive. These results have not been discussed with Ms. Farias or her family, although they were encouraged to contact my office to schedule an appointment for feedback should they so desire.        Radha Shah, Ph.D., [facility]  Licensed Psychologist, [place] [id number]  Board Certified in Clinical Neuropsychology     Time spent: Four hours professional time, including interview, record review, data integration, and report writing (CPT 04864); an additional three hours, including testing administered by a psychometrist and interpreted by a neuropsychologist (CPT 47124). ICD-10 diagnosis: G20; F06.8.    Parkinson  Sinemet 25/100: 2 tabs three times per day  mirapex 0.25mg 3/day     Memory loss     Bladder  sanctura 20mg twice daily  Has not tried to go off this medication  It could be affecting her memory.      Mood  celexa 20mg/day and has been on it for a while.      Blood pressure  Prinzide     Minocycline - has not been taking this for her rosacea     zomig headaches     Insomnia   Wakes up 2 hours after going to bed and then has problems going back to sleep and cannot sleep very well. She has not tried melatonin.      Here today with her daughter migel who lives in [place].      PLAN  1. Melatonin 5mg 1 hour before bedtime to see if helps her  sleep.  There is a longer acting form of melatonin if still having sleep problems. The dose can be increased to 10 or 15mg at night. After trying this for a week or two may consider a trial of remeron/mirtazapine which is an antidepressant that also helps sleep.   2. Mirtazapine (remeron) comes in a 15mg, 30mg and 45mg dose. Usually start with 1/2 of 15mg at bedtime to see if helps sleep and after a week or two may increase to 15mg at night. And the dose can be increased further with possible wean off celexa. Sometimes people go off the celexa by going to 1/2 tablet daily for one week and then stopping it for 2 weeks before trying the remeron; others take the remeron with the celexa initially and then go off the celexa if the remeron is doing the job.   3. She is on sanctura (bladder medication) and may be this can be reduced to once per day and then go off this but bladder problems may return.  4. Would like to have memory testing done at some point with Dr. Shah  5. May consider another brain mri scan  6. If needed Dr. Hubbard is an excellent neurologist in [place] through [employer] and we work well together.   7. Return back in 3-6 months to see me or Carmen Milligan NP  8. Discussed mychart with patient and her daughter.   9. Briefly talked about mirapex and its effect on cognition.  10. Briefly talked about dbs     Jet Porter MD            ______________________________________      Patient was asked about 14 Review of systems including changes in vision (dry eyes, double vision), hearing, heart, lungs, musculoskeletal, depression, anxiety, snoring, RBD, insomnia, urinary frequency, urinary urgency, constipation, swallowing problems, hematological, ID, allergies, skin problems: seborrhea, endocrinological: thyroid, diabetes, cholesterol; balance, weight changes, and other neurological problems and these were not significant at this time except for   Patient Active Problem List   Diagnosis     Prolapse of  vaginal wall     Migraine variant     Symptomatic menopausal or female climacteric states     Obesity     Malaise and fatigue     Other and unspecified adverse effect of drug, medicinal and biological substance     Other and unspecified disc disorder of cervical region     Sleep apnea     Essential hypertension, benign     Chronic cholecystitis     Overactive bladder     Neck pain     Headache     HYPERLIPIDEMIA LDL GOAL <160     Mild major depression (H)     Advanced directives, counseling/discussion     HTN, goal below 140/90     Paralysis agitans (H)     History of MRI of brain and brain stem     Wears glasses     Constipation     Incomplete RBBB     Heart murmur     Family history of tremor     Rosacea     Asymptomatic varicose veins     Seborrheic dermatitis     Post-traumatic osteoarthritis of left knee     Pain from implanted hardware, initial encounter     Cognitive disorder evalaution 2017     Dementia          Allergies   Allergen Reactions     Sulfa Drugs      Tacchycardia, had to go to ED     Naproxen GI Disturbance     Oxycontin [Oxycodone] GI Disturbance     Made her feel funny/upset stomach     Ropinirole      Did not work     Past Surgical History:   Procedure Laterality Date     ------------OTHER-------------      left shoulder     ARTHRODESIS TOE(S)  3/2/2011    ARTHRODESIS TOE(S) performed by CLARA LUCAS at [facility]     C LIGATE FALLOPIAN TUBE  1998     CHOLECYSTECTOMY, LAPOROSCOPIC  5/12/2008    Cholecystectomy, Laparoscopic     HC COLONOSCOPY W/WO BRUSH/WASH  08/30/06    normal     HC OPEN TX TIBIAL SHAFT FX W PLATE/SCREWS W/WO CERCLAGE  2000    Multiple operations on left leg      SHLDR ARTHROSCOP,PART ACROMIOPLAS  11/16/06    Right shoulder     [facility]  11[facility]16/[facility]    Right shoulder     Past Medical History:   Diagnosis Date     Arthralgia of temporomandibular joint 10/22/1996     Arthritis      Cognitive disorder evalaution 2017 3/6/2017    She has  marked attentional difficulties, impairments in memory including a rapid forgetting rate, mild anomia, and mild executive dysfunction. 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 She's required increasing assistance with finances, medications, driving, and cooking. I think this is a mild dementia, and     Constipation 2/23/2015     Dementia 3/13/2017    IMPRESSIONS AND RECOMMENDATIONS   Current results indicate marked attentional difficulties, impairments in memory, which in some cases reflected impairments in retention and in other cases reflected retrieval difficulties, and mild anomia as well as mild executive dysfunction. Premorbid intellectual functioning is estimated to fall in the borderline range. She denied experiencing significant depre     Depressive disorder      Heart murmur 2/23/2015     History of MRI of brain and brain stem 2/21/2015    MRI OF THE BRAIN WITHOUT CONTRAST 7/10/2014 1:21 PM  COMPARISON: None. HISTORY: Left-sided upper and lower extremity tremor. Balance issues. TECHNIQUE:  Brain: Axial diffusion-weighted with ADC map, T2-weighted with fat saturation, T1-weighted and turboFLAIR and coronal T1-weighted images of the brain were obtained without intravenous contrast.  FINDINGS: There is mild diffuse cerebral volume loss     Hypertension      Incomplete RBBB 2/23/2015     Motion sickness      Other and unspecified hyperlipidemia      Parkinson's disease (H)      Seborrheic dermatitis 2/29/2016     Unspecified arthropathy, hand 08/07/1997     Unspecified sleep apnea     moderate, sleep study 11/07, on CPAP, has daytime somnolence with this     Variants of migraine, not elsewhere classified, without mention of intractable migraine without mention of status migrainosus      Wears glasses 2/23/2015     Social History     Social History     Marital status:      Spouse name: violet Flores  "children: 2     Years of education: N/A     Occupational History           Social History Main Topics     Smoking status: Never Smoker     Smokeless tobacco: Never Used      Comment: no smokers in the household     Alcohol use 0.0 oz/week     0 Standard drinks or equivalent per week      Comment: very occ     Drug use: No     Sexual activity: Yes     Partners: Male     Birth control/ protection: Surgical, Female Surgical      Comment: tubal ligation     Other Topics Concern     Caffeine Concern Yes     OCC     Exercise No     \"not faithfully\"     Seat Belt Yes     Self-Exams No     Social History Narrative    ALLERGIES:  She is allergic to sulfa, and has had gastrointestinal side effects from naproxen.               FAMILY HISTORY:  Strongly positive for headaches, including in her son and sister.  There is no family history of tremor.               SOCIAL HISTORY:  She does not work outside the home.  She does not smoke.  She uses very little alcohol.  Lives in [place]. . Has 2 kids: son who is 35 yr s old. And daughter who is 30 yrs old. Her  is working as a  .        Drug and lactation database from the [place] [employer]:  http://toxnet.nlm.nih.gov/cgi-bin/sis/htmlgen?LACT      B/P: 109/71, T: Data Unavailable, P: 88, R: Data Unavailable 154 lbs 1.6 oz  Blood pressure 109/71, pulse 88, weight 69.9 kg (154 lb 1.6 oz), last menstrual period 06/05/2006, SpO2 96 %, not currently breastfeeding., Body mass index is 28.65 kg/(m^2).  Medications and Vitals not listed are documented in the cart and reviewed by me.     Current Outpatient Prescriptions   Medication Sig Dispense Refill     Diphenhydramine-APAP, sleep, (TYLENOL PM EXTRA STRENGTH PO) Take 1 tablet by mouth as needed       melatonin 5 MG tablet Take 10 mg by mouth At Bedtime       ciprofloxacin (CIPRO) 250 MG tablet Take 1 tablet (250 mg) by mouth 2 times daily 6 tablet 0     conjugated estrogens " (PREMARIN) cream Place 0.5 g vaginally twice a week Use daily first week of use 30 g 11     citalopram (CELEXA) 20 MG tablet Take 1 tablet (20 mg) by mouth daily 90 tablet 1     polyethylene glycol (MIRALAX) powder Take 17 g (1 capful) by mouth daily Adjust as needed to TID prn in same ratio of 1 capful per 8 oz water 510 g 1     pramipexole (MIRAPEX) 0.25 MG tablet Take 1 tablet by mouth 3 times daily       carbidopa-levodopa (SINEMET)  MG per tablet 2 tablets @ 8am, 11am/12noon, and 9pm 540 tablet 3     lisinopril-hydrochlorothiazide (PRINZIDE,ZESTORETIC) 10-12.5 MG per tablet TAKE ONE TABLET BY MOUTH ONCE DAILY DUE  FOR  LABS 30 tablet 11     ZOLMitriptan (ZOMIG) 5 MG tablet Take 1 tablet (5 mg) by mouth at onset of headache for migraine MAY REPEAT ONCE AFTER 2 HOURS. DO NOT EXCEED 2 TABLETS IN 24 HOURS. 6 tablet 10     trospium (SANCTURA) 20 MG tablet Take 1 tablet (20 mg) by mouth daily (Patient not taking: Reported on 4/3/2017) 90 tablet 3         Jet Porter MD

## 2017-04-21 ENCOUNTER — TELEPHONE (OUTPATIENT)
Dept: FAMILY MEDICINE | Facility: OTHER | Age: 62
End: 2017-04-21

## 2017-04-21 NOTE — TELEPHONE ENCOUNTER
Reason for call:  Symptom  Reason for call:  Patient reporting a symptom    Symptom or request: nauseated, stomach pain , dizziness    Duration (how long have symptoms been present): about a month    Have you been treated for this before? No    Additional comments: pt has been feeling sick lately. She complains of stomach pain and dizziness. She also feels nauseated. Pt being triaged.     Phone Number patient can be reached at:  Home number on file [phone] (home)    Best Time:  anytime    Can we leave a detailed message on this number:  YES    Call taken on 4/21/2017 at 3:29 PM by Radha Roach

## 2017-04-21 NOTE — TELEPHONE ENCOUNTER
Spoke with patient.  Feeling nauseated and stomach hurts.  Feels like stomach movement in stomach.  Started about month ago.  Stays that same.    Hx of yeast infections: didn't feel like she was getting better. Saw Dr. Esposito.     Advised to be seen in clinic.    RECOMMENDED DISPOSITION:  See within 2 weeks -   Will comply with recommendation: yes   If further questions/concerns or if Sx do not improve, worsen or new Sx develop, call your PCP or [facility] as soon as possible.    NOTES:  Disposition was determined by the first positive assessment question, therefore all previous assessment questions were negative.     Guideline used:abdominal pain adult  Telephone Triage Protocols for Nurses, Fourth Edition, Sonam Duran RN

## 2017-04-24 ENCOUNTER — OFFICE VISIT (OUTPATIENT)
Dept: FAMILY MEDICINE | Facility: OTHER | Age: 62
End: 2017-04-24
Payer: MEDICARE

## 2017-04-24 VITALS
BODY MASS INDEX: 26.68 KG/M2 | WEIGHT: 145 LBS | OXYGEN SATURATION: 100 % | HEART RATE: 65 BPM | SYSTOLIC BLOOD PRESSURE: 116 MMHG | HEIGHT: 62 IN | TEMPERATURE: 98.3 F | DIASTOLIC BLOOD PRESSURE: 74 MMHG | RESPIRATION RATE: 16 BRPM

## 2017-04-24 DIAGNOSIS — Z12.11 SCREEN FOR COLON CANCER: ICD-10-CM

## 2017-04-24 DIAGNOSIS — R30.0 DYSURIA: ICD-10-CM

## 2017-04-24 DIAGNOSIS — R10.13 ABDOMINAL PAIN, EPIGASTRIC: ICD-10-CM

## 2017-04-24 DIAGNOSIS — G20.A1 PARALYSIS AGITANS (H): ICD-10-CM

## 2017-04-24 DIAGNOSIS — K21.9 GASTROESOPHAGEAL REFLUX DISEASE, ESOPHAGITIS PRESENCE NOT SPECIFIED: Primary | ICD-10-CM

## 2017-04-24 LAB
ALBUMIN SERPL-MCNC: 4.1 G/DL (ref 3.4–5)
ALP SERPL-CCNC: 49 U/L (ref 40–150)
ALT SERPL W P-5'-P-CCNC: 13 U/L (ref 0–50)
ANION GAP SERPL CALCULATED.3IONS-SCNC: 6 MMOL/L (ref 3–14)
AST SERPL W P-5'-P-CCNC: 16 U/L (ref 0–45)
BASOPHILS # BLD AUTO: 0 10E9/L (ref 0–0.2)
BASOPHILS NFR BLD AUTO: 0.3 %
BILIRUB SERPL-MCNC: 0.9 MG/DL (ref 0.2–1.3)
BUN SERPL-MCNC: 20 MG/DL (ref 7–30)
CALCIUM SERPL-MCNC: 9.6 MG/DL (ref 8.5–10.1)
CHLORIDE SERPL-SCNC: 104 MMOL/L (ref 94–109)
CO2 SERPL-SCNC: 29 MMOL/L (ref 20–32)
CREAT SERPL-MCNC: 0.96 MG/DL (ref 0.52–1.04)
CRP SERPL-MCNC: <2.9 MG/L (ref 0–8)
DIFFERENTIAL METHOD BLD: NORMAL
EOSINOPHIL # BLD AUTO: 0.1 10E9/L (ref 0–0.7)
EOSINOPHIL NFR BLD AUTO: 1.3 %
ERYTHROCYTE [DISTWIDTH] IN BLOOD BY AUTOMATED COUNT: 13.6 % (ref 10–15)
GFR SERPL CREATININE-BSD FRML MDRD: 59 ML/MIN/1.7M2
GLUCOSE SERPL-MCNC: 87 MG/DL (ref 70–99)
HCT VFR BLD AUTO: 40.3 % (ref 35–47)
HGB BLD-MCNC: 13.3 G/DL (ref 11.7–15.7)
LIPASE SERPL-CCNC: 130 U/L (ref 73–393)
LYMPHOCYTES # BLD AUTO: 2.8 10E9/L (ref 0.8–5.3)
LYMPHOCYTES NFR BLD AUTO: 37.3 %
MCH RBC QN AUTO: 30.8 PG (ref 26.5–33)
MCHC RBC AUTO-ENTMCNC: 33 G/DL (ref 31.5–36.5)
MCV RBC AUTO: 93 FL (ref 78–100)
MONOCYTES # BLD AUTO: 0.5 10E9/L (ref 0–1.3)
MONOCYTES NFR BLD AUTO: 6.2 %
NEUTROPHILS # BLD AUTO: 4.2 10E9/L (ref 1.6–8.3)
NEUTROPHILS NFR BLD AUTO: 54.9 %
PLATELET # BLD AUTO: 294 10E9/L (ref 150–450)
POTASSIUM SERPL-SCNC: 4.9 MMOL/L (ref 3.4–5.3)
PROT SERPL-MCNC: 8.2 G/DL (ref 6.8–8.8)
RBC # BLD AUTO: 4.32 10E12/L (ref 3.8–5.2)
SODIUM SERPL-SCNC: 139 MMOL/L (ref 133–144)
WBC # BLD AUTO: 7.6 10E9/L (ref 4–11)

## 2017-04-24 PROCEDURE — 85025 COMPLETE CBC W/AUTO DIFF WBC: CPT | Performed by: FAMILY MEDICINE

## 2017-04-24 PROCEDURE — 80053 COMPREHEN METABOLIC PANEL: CPT | Performed by: FAMILY MEDICINE

## 2017-04-24 PROCEDURE — 36415 COLL VENOUS BLD VENIPUNCTURE: CPT | Performed by: FAMILY MEDICINE

## 2017-04-24 PROCEDURE — 83690 ASSAY OF LIPASE: CPT | Performed by: FAMILY MEDICINE

## 2017-04-24 PROCEDURE — 99214 OFFICE O/P EST MOD 30 MIN: CPT | Performed by: FAMILY MEDICINE

## 2017-04-24 PROCEDURE — 86140 C-REACTIVE PROTEIN: CPT | Performed by: FAMILY MEDICINE

## 2017-04-24 RX ORDER — OMEPRAZOLE 40 MG/1
40 CAPSULE, DELAYED RELEASE ORAL DAILY
Qty: 30 CAPSULE | Refills: 2 | Status: SHIPPED | OUTPATIENT
Start: 2017-04-24 | End: 2017-07-19

## 2017-04-24 ASSESSMENT — PAIN SCALES - GENERAL: PAINLEVEL: EXTREME PAIN (8)

## 2017-04-24 NOTE — NURSING NOTE
"Chief Complaint   Patient presents with     Abdominal Pain     Health Maintenance       Initial /74 (BP Location: Right arm, Patient Position: Chair, Cuff Size: Adult Regular)  Pulse 65  Temp 98.3  F (36.8  C) (Oral)  Resp 16  Ht 5' 1.5\" (1.562 m)  Wt 145 lb (65.8 kg)  LMP 06/05/2006  SpO2 100%  BMI 26.95 kg/m2 Estimated body mass index is 26.95 kg/(m^2) as calculated from the following:    Height as of this encounter: 5' 1.5\" (1.562 m).    Weight as of this encounter: 145 lb (65.8 kg).  Medication Reconciliation: complete   Michelle Chi CMA (AAMA)      "

## 2017-04-24 NOTE — PATIENT INSTRUCTIONS
Tips to Control Acid Reflux  To control acid reflux, you ll need to make some basic diet and lifestyle changes. The simple steps outlined below may be all you ll need to relieve discomfort.  Watch What You Eat      Avoid fatty foods and spicy foods.    Eat fewer acidic foods, such as citrus and tomato-based foods. These can increase symptoms.    Limit drinking alcohol, caffeine, and fizzy beverages. All increase acid reflux.    Try limiting chocolate, peppermint, and spearmint. These can worsen acid reflux in some people.  Watch When You Eat    Avoid lying down for 3 hours after eating.    Do not snack before going to bed.  Raise Your Head    Raising your head and upper body by 4 inches to 6 inches helps limit reflux when you re lying down. Put blocks under the head of the bed frame to raise it.  Other Changes    Lose weight, if you need to.    Don t work out near bedtime.    Avoid tight-fitting clothes.    Limit aspirin and ibuprofen.    Stop smoking.     1142-3735 [employer]. [address] [place] [address], [place] [id number]. All rights reserved. This information is not intended as a substitute for professional medical care. Always follow your healthcare professional's instructions.        Tips to Control Acid Reflux  To control acid reflux, you ll need to make some basic diet and lifestyle changes. The simple steps outlined below may be all you ll need to relieve discomfort.  Watch What You Eat      Avoid fatty foods and spicy foods.    Eat fewer acidic foods, such as citrus and tomato-based foods. These can increase symptoms.    Limit drinking alcohol, caffeine, and fizzy beverages. All increase acid reflux.    Try limiting chocolate, peppermint, and spearmint. These can worsen acid reflux in some people.  Watch When You Eat    Avoid lying down for 3 hours after eating.    Do not snack before going to bed.  Raise Your Head    Raising your head and upper body by 4 inches to 6 inches helps limit reflux  when you re lying down. Put blocks under the head of the bed frame to raise it.  Other Changes    Lose weight, if you need to.    Don t work out near bedtime.    Avoid tight-fitting clothes.    Limit aspirin and ibuprofen.    Stop smoking.     5475-8981 [employer]. [address] [place] [address]. All rights reserved. This information is not intended as a substitute for professional medical care. Always follow your healthcare professional's instructions.        Discharge Instructions for Gastroesophageal Reflux Disease (GERD)  Gastroesophageal reflux disease (GERD) is a backflow of acid from the stomach into the swallowing tube (esophagus).  Home care  These home care steps can help you manage GERD:    Maintain a healthy weight. Get help to lose any extra pounds.    Avoid lying down after meals.    Avoid eating late at night.    Elevate the head of your bed by 6 inches. You can do this by placing wooden blocks under the head of your bed.    Avoid wearing tight-fitting clothes.    Avoid foods that might irritate your stomach, such as the following:    Alcohol    Fat    Chocolate    Caffeine    Spearmint or peppermint    Talk to your doctor if you are taking any of the following medications. These medications can make GERD symptoms worse:    Calcium channel blockers    Theophylline    Anticholinergic medications such as oxybutynin and benzatropine    Begin an exercise program. Ask your doctor how to get started. You can benefit from simple activities, such as walking or gardening.    Break the smoking habit. Enroll in a stop-smoking program to improve your chances of success.    Limit alcohol intake to no more than 2 drinks a day.    Take your medications exactly as directed. Don t skip doses.    Avoid over-the-counter nonsteroidal anti-inflammatory drugs, such as aspirin and ibuprofen (Advil, Motrin).    If possible, avoid nitrates (heart medications such as nitroglycerin and  Isordil).  Follow-up care  Make a follow-up appointment as directed by our staff.     When to seek medical care  Call your doctor immediately if you have any of the following:    Trouble swallowing    Pain when swallowing    Feeling of food caught in your chest or throat    Pain in the neck, chest, or back    Heartburn that causes you to vomit    Vomiting blood    Black or tarry stools (from digested blood)    More saliva (watering of the mouth) than usual    Weight loss of more than 3% to 5% of your total body weight in a month    Hoarseness or sore throat that won t go away    Choking, coughing, or wheezing     1257-8371 [employer]. [address] [place] [address], [place] [address]. All rights reserved. This information is not intended as a substitute for professional medical care. Always follow your healthcare professional's instructions.

## 2017-04-24 NOTE — MR AVS SNAPSHOT
After Visit Summary   4/24/2017    Nathaly Farias    MRN: [id number]           Patient Information     Date Of Birth          1955        Visit Information        Provider Department      4/24/2017 11:20 AM Melisa Hinojosa MD [facility]        Today's Diagnoses     Gastroesophageal reflux disease, esophagitis presence not specified    -  1    Abdominal pain, epigastric        Screen for colon cancer          Care Instructions      Tips to Control Acid Reflux  To control acid reflux, you ll need to make some basic diet and lifestyle changes. The simple steps outlined below may be all you ll need to relieve discomfort.  Watch What You Eat      Avoid fatty foods and spicy foods.    Eat fewer acidic foods, such as citrus and tomato-based foods. These can increase symptoms.    Limit drinking alcohol, caffeine, and fizzy beverages. All increase acid reflux.    Try limiting chocolate, peppermint, and spearmint. These can worsen acid reflux in some people.  Watch When You Eat    Avoid lying down for 3 hours after eating.    Do not snack before going to bed.  Raise Your Head    Raising your head and upper body by 4 inches to 6 inches helps limit reflux when you re lying down. Put blocks under the head of the bed frame to raise it.  Other Changes    Lose weight, if you need to.    Don t work out near bedtime.    Avoid tight-fitting clothes.    Limit aspirin and ibuprofen.    Stop smoking.     2503-6227 [employer]. [address] [place] [address], [place] [address]. All rights reserved. This information is not intended as a substitute for professional medical care. Always follow your healthcare professional's instructions.        Tips to Control Acid Reflux  To control acid reflux, you ll need to make some basic diet and lifestyle changes. The simple steps outlined below may be all you ll need to relieve discomfort.  Watch What You Eat      Avoid fatty foods and spicy foods.    Eat  fewer acidic foods, such as citrus and tomato-based foods. These can increase symptoms.    Limit drinking alcohol, caffeine, and fizzy beverages. All increase acid reflux.    Try limiting chocolate, peppermint, and spearmint. These can worsen acid reflux in some people.  Watch When You Eat    Avoid lying down for 3 hours after eating.    Do not snack before going to bed.  Raise Your Head    Raising your head and upper body by 4 inches to 6 inches helps limit reflux when you re lying down. Put blocks under the head of the bed frame to raise it.  Other Changes    Lose weight, if you need to.    Don t work out near bedtime.    Avoid tight-fitting clothes.    Limit aspirin and ibuprofen.    Stop smoking.     2160-3523 [employer] [address] [place] [address], [place] [address]. All rights reserved. This information is not intended as a substitute for professional medical care. Always follow your healthcare professional's instructions.        Discharge Instructions for Gastroesophageal Reflux Disease (GERD)  Gastroesophageal reflux disease (GERD) is a backflow of acid from the stomach into the swallowing tube (esophagus).  Home care  These home care steps can help you manage GERD:    Maintain a healthy weight. Get help to lose any extra pounds.    Avoid lying down after meals.    Avoid eating late at night.    Elevate the head of your bed by 6 inches. You can do this by placing wooden blocks under the head of your bed.    Avoid wearing tight-fitting clothes.    Avoid foods that might irritate your stomach, such as the following:    Alcohol    Fat    Chocolate    Caffeine    Spearmint or peppermint    Talk to your doctor if you are taking any of the following medications. These medications can make GERD symptoms worse:    Calcium channel blockers    Theophylline    Anticholinergic medications such as oxybutynin and benzatropine    Begin an exercise program. Ask your doctor how to get started. You can benefit from  simple activities, such as walking or gardening.    Break the smoking habit. Enroll in a stop-smoking program to improve your chances of success.    Limit alcohol intake to no more than 2 drinks a day.    Take your medications exactly as directed. Don t skip doses.    Avoid over-the-counter nonsteroidal anti-inflammatory drugs, such as aspirin and ibuprofen (Advil, Motrin).    If possible, avoid nitrates (heart medications such as nitroglycerin and Isordil).  Follow-up care  Make a follow-up appointment as directed by our staff.     When to seek medical care  Call your doctor immediately if you have any of the following:    Trouble swallowing    Pain when swallowing    Feeling of food caught in your chest or throat    Pain in the neck, chest, or back    Heartburn that causes you to vomit    Vomiting blood    Black or tarry stools (from digested blood)    More saliva (watering of the mouth) than usual    Weight loss of more than 3% to 5% of your total body weight in a month    Hoarseness or sore throat that won t go away    Choking, coughing, or wheezing     2832-5407 [employer]. [address] [place] [address]. All rights reserved. This information is not intended as a substitute for professional medical care. Always follow your healthcare professional's instructions.              Follow-ups after your visit        Follow-up notes from your care team     Return in about 4 weeks (around 5/22/2017).      Your next 10 appointments already scheduled     Jul 17, 2017  9:30 AM CDT   Return Visit with Jet Porter MD   [facility]    [address] [place] [id number] [phone]            Oct 05, 2017  8:00 AM CDT   Return Visit with Jack Esposito MD   [facility]    [address] [facility] [id number] [phone]              Who to contact     If you have questions or  "need follow up information about today's clinic visit or your schedule please contact [facility] directly at [phone].  Normal or non-critical lab and imaging results will be communicated to you by MyChart, letter or phone within 4 business days after the clinic has received the results. If you do not hear from us within 7 days, please contact the clinic through [employer] or phone. If you have a critical or abnormal lab result, we will notify you by phone as soon as possible.  Submit refill requests through [employer] or call your pharmacy and they will forward the refill request to us. Please allow 3 business days for your refill to be completed.          Additional Information About Your Visit        [employer] Information     [employer] gives you secure access to your electronic health record. If you see a primary care provider, you can also send messages to your care team and make appointments. If you have questions, please call your primary care clinic.  If you do not have a primary care provider, please call [phone] and they will assist you.        Care EveryWhere ID     This is your Care EveryWhere ID. This could be used by other organizations to access your [place] medical records  [id number]        Your Vitals Were     Pulse Temperature Respirations Height Last Period Pulse Oximetry    65 98.3  F (36.8  C) (Oral) 16 5' 1.5\" (1.562 m) 06/05/2006 100%    BMI (Body Mass Index)                   26.95 kg/m2            Blood Pressure from Last 3 Encounters:   04/24/17 116/74   04/03/17 109/71   01/09/17 123/67    Weight from Last 3 Encounters:   04/24/17 145 lb (65.8 kg)   04/03/17 154 lb 1.6 oz (69.9 kg)   03/27/17 145 lb (65.8 kg)              We Performed the Following     *UA reflex to Microscopic     CBC with platelets and differential     Comprehensive metabolic panel (BMP + Alb, Alk Phos, ALT, AST, Total. Bili, TP)     CRP, inflammation     Lipase          Today's Medication Changes       "    These changes are accurate as of: 4/24/17 12:22 PM.  If you have any questions, ask your nurse or doctor.               Start taking these medicines.        Dose/Directions    omeprazole 40 MG capsule   Commonly known as:  priLOSEC   Used for:  Gastroesophageal reflux disease, esophagitis presence not specified   Started by:  Melisa Hinojosa MD        Dose:  40 mg   Take 1 capsule (40 mg) by mouth daily Take 30-60 minutes before a meal.   Quantity:  30 capsule   Refills:  2            Where to get your medicines      These medications were sent to [facility] [address] [place] [address] [address]  [address] [facility] [address]     Phone:  [phone]     omeprazole 40 MG capsule                Primary Care Provider Office Phone # Fax #    Maxine Monae [phone]       [facility]  [address] [facility] [address]        Thank you!     Thank you for choosing [facility]  for your care. Our goal is always to provide you with excellent care. Hearing back from our patients is one way we can continue to improve our services. Please take a few minutes to complete the written survey that you may receive in the mail after your visit with us. Thank you!             Your Updated Medication List - Protect others around you: Learn how to safely use, store and throw away your medicines at www.disposemymeds.org.          This list is accurate as of: 4/24/17 12:22 PM.  Always use your most recent med list.                   Brand Name Dispense Instructions for use    carbidopa-levodopa  MG per tablet    SINEMET    540 tablet    2 tablets @ 8am, 11am/12noon, and 9pm       citalopram 20 MG tablet    celeXA    90 tablet    Take 1 tablet (20 mg) by mouth daily       conjugated estrogens cream    PREMARIN    30 g    Place 0.5 g vaginally twice a week Use daily first week of use       donepezil 5 MG tablet    ARICEPT    30 tablet    5mg daily for one month then  10mg daily       lisinopril-hydrochlorothiazide 10-12.5 MG per tablet    PRINZIDE/ZESTORETIC    30 tablet    TAKE ONE TABLET BY MOUTH ONCE DAILY DUE  FOR  LABS       melatonin 5 MG tablet      Take 10 mg by mouth At Bedtime       omeprazole 40 MG capsule    priLOSEC    30 capsule    Take 1 capsule (40 mg) by mouth daily Take 30-60 minutes before a meal.       polyethylene glycol powder    MIRALAX    510 g    Take 17 g (1 capful) by mouth daily Adjust as needed to TID prn in same ratio of 1 capful per 8 oz water       pramipexole 0.25 MG tablet    MIRAPEX    270 tablet    Take 1 tablet (0.25 mg) by mouth 3 times daily       * rivastigmine 4.6 MG/24HR 24 hr patch    EXELON    30 patch    4.6 mg patch daily x 1month then 9.5mg patch daily x 1 month then 13.3mg patch daily       * rivastigmine 9.5 MG/24HR 24 hr patch    EXELON    30 patch    4.6 mg patch daily x 1month then 9.5mg patch daily x 1 month then 13.3mg patch daily       * rivastigmine 13.3 MG/24HR 24 hr patch    EXELON    30 patch    Place 1 patch onto the skin daily 4.6 mg patch daily x 1month then 9.5mg patch daily x 1 month then 13.3mg patch daily       trospium 20 MG tablet    SANCTURA    90 tablet    Take 1 tablet (20 mg) by mouth daily       TYLENOL PM EXTRA STRENGTH PO      Take 1 tablet by mouth as needed Reported on 4/24/2017       ZOLMitriptan 5 MG tablet    ZOMIG    6 tablet    Take 1 tablet (5 mg) by mouth at onset of headache for migraine MAY REPEAT ONCE AFTER 2 HOURS. DO NOT EXCEED 2 TABLETS IN 24 HOURS.       * Notice:  This list has 3 medication(s) that are the same as other medications prescribed for you. Read the directions carefully, and ask your doctor or other care provider to review them with you.

## 2017-04-24 NOTE — PROGRESS NOTES
SUBJECTIVE:                                                    Nathaly Farias is a 62 year old female who presents to clinic today for the following health issues:      HPI    ABDOMINAL PAIN     Onset: x3 weeks    Description:   Character: Patient states it just hurst  Location: epigastric region  Radiation: None    Intensity: moderate    Progression of Symptoms:  same    Accompanying Signs & Symptoms:  Fever/Chills?: YES- Chills  Gas/Bloating: no   Nausea: YES  Vomitting: no   Diarrhea?: no   Constipation: no  Dysuria or Hematuria: no    History:   Trauma: no   Previous similar pain: no    Previous tests done: Colonoscopy (12/21/16)    Precipitating factors:   Does the pain change with:     Food: no      BM: no     Urination: no     Alleviating factors:  No    Therapies Tried and outcome: Tums    LMP:  not applicable     Dizziness     Onset: [id number]    Description:   Do you feel faint:  no   Does it feel like the surroundings (bed, room) are moving: No (but has at home)  Unsteady/off balance: no (does have Parkinson's)  Have you passed out or fallen: no     Intensity: mild, moderate    Progression of Symptoms:  same    Accompanying Signs & Symptoms:  Heart palpitations: no   Nausea, vomiting: YES- Nausea  Weakness in arms or legs: no   Fatigue: YES  Vision or speech changes: no   Ringing in ears (Tinnitus): no   Hearing Loss: no    History:   Head trauma/concussion hx: no   Previous similar symptoms: no   Recent bleeding history: no     Precipitating factors:   Worse with activity or head movement: YES- Patient moves slower  Any new medications (BP?): no   Alcohol/drug abuse/withdrawal: no     Alleviating factors:   Does staying in a fixed position give relief:  YES       Therapies Tried and outcome: Rest      Pain all over the abdomen . Pain stays the same. It may get little quiet. Does not know if its releated to food. Had an yeast infection a while ago and  Does not know if they could be related. She reports  "she got better after the abx in January . Is a poor historian and does give a clear history of evaluation of symptoms . She was seen by her PCP in January for similar symptoms at which point she was diagnosed with a urinary tract infection . She does not have a recollection of this even when I reminded her . SHe has history of parkinsons . She reports she is consistantly taking her pills including her sinnemet. She denies any diarrhea, blood in stools, fevers . Has nausea but no vomitings.    When asked about dizziness- states \" I do not know how to explain it . Its just with this stuff going on. I used to be able to go in blinking light but now I cannot handle it. Its not related to changes in position. Present all the time . Denies any changes in vision , or one sided weakness, syncope , chest pain, Shortness of breath or palpitations with it . \"     Problem list and histories reviewed & adjusted, as indicated.  Additional history: as documented      Patient Active Problem List   Diagnosis     Prolapse of vaginal wall     Migraine variant     Symptomatic menopausal or female climacteric states     Obesity     Malaise and fatigue     Other and unspecified adverse effect of drug, medicinal and biological substance     Other and unspecified disc disorder of cervical region     Sleep apnea     Essential hypertension, benign     Chronic cholecystitis     Overactive bladder     Neck pain     Headache     HYPERLIPIDEMIA LDL GOAL <160     Mild major depression (H)     Advanced directives, counseling/discussion     HTN, goal below 140/90     Paralysis agitans (H)     History of MRI of brain and brain stem     Wears glasses     Constipation     Incomplete RBBB     Heart murmur     Family history of tremor     Rosacea     Asymptomatic varicose veins     Seborrheic dermatitis     Post-traumatic osteoarthritis of left knee     Pain from implanted hardware, initial encounter     Cognitive disorder evalaution 2017     Dementia "     Past Surgical History:   Procedure Laterality Date     ------------OTHER-------------      left shoulder     ARTHRODESIS TOE(S)  3/2/2011    ARTHRODESIS TOE(S) performed by CLARA LUCAS at [facility]     C LIGATE FALLOPIAN TUBE  1998     CHOLECYSTECTOMY, LAPOROSCOPIC  5/12/2008    Cholecystectomy, Laparoscopic     HC COLONOSCOPY W/WO BRUSH/WASH  08/30/06    normal     HC OPEN TX TIBIAL SHAFT FX W PLATE/SCREWS W/WO CERCLAGE  2000    Multiple operations on left leg     HC SHLDR ARTHROSCOP,PART ACROMIOPLAS  11/16/06    Right shoulder     HC SHLDR ARTHROSCOP,SURG,DIS CLAVICULECTOMY  11/16/06    Right shoulder       Social History   Substance Use Topics     Smoking status: Never Smoker     Smokeless tobacco: Never Used      Comment: no smokers in the household     Alcohol use 0.0 oz/week     0 Standard drinks or equivalent per week      Comment: very occ     Family History   Problem Relation Age of Onset     DIABETES Father      type II, diagnosed in late 60's     Hypertension Father      CANCER Father      rare kidney cancer had kidney removed, 75 y.o. at onset     CEREBROVASCULAR DISEASE Father      Rashes/Skin Problems Father      rosacea     Neurologic Disorder Mother      head tremor     HEART DISEASE Brother      Valvular disease corrected with surgery     HEART DISEASE Paternal Grandfather      MI     Migraines Son      Migraines Sister      Rashes/Skin Problems Sister      rosacea         Current Outpatient Prescriptions   Medication Sig Dispense Refill     omeprazole (PRILOSEC) 40 MG capsule Take 1 capsule (40 mg) by mouth daily Take 30-60 minutes before a meal. 30 capsule 2     melatonin 5 MG tablet Take 10 mg by mouth At Bedtime       carbidopa-levodopa (SINEMET)  MG per tablet 2 tablets @ 8am, 11am/12noon, and 9pm 540 tablet 3     pramipexole (MIRAPEX) 0.25 MG tablet Take 1 tablet (0.25 mg) by mouth 3 times daily 270 tablet 3     rivastigmine (EXELON) 4.6 MG/24HR 24 hr patch 4.6 mg patch daily x  1month then 9.5mg patch daily x 1 month then 13.3mg patch daily 30 patch 11     rivastigmine (EXELON) 9.5 MG/24HR 24 hr patch 4.6 mg patch daily x 1month then 9.5mg patch daily x 1 month then 13.3mg patch daily 30 patch 11     rivastigmine (EXELON) 13.3 MG/24HR 24 hr patch Place 1 patch onto the skin daily 4.6 mg patch daily x 1month then 9.5mg patch daily x 1 month then 13.3mg patch daily 30 patch 11     donepezil (ARICEPT) 5 MG tablet 5mg daily for one month then 10mg daily 30 tablet 11     conjugated estrogens (PREMARIN) cream Place 0.5 g vaginally twice a week Use daily first week of use 30 g 11     citalopram (CELEXA) 20 MG tablet Take 1 tablet (20 mg) by mouth daily 90 tablet 1     lisinopril-hydrochlorothiazide (PRINZIDE,ZESTORETIC) 10-12.5 MG per tablet TAKE ONE TABLET BY MOUTH ONCE DAILY DUE  FOR  LABS 30 tablet 11     Diphenhydramine-APAP, sleep, (TYLENOL PM EXTRA STRENGTH PO) Take 1 tablet by mouth as needed Reported on 4/24/2017       trospium (SANCTURA) 20 MG tablet Take 1 tablet (20 mg) by mouth daily (Patient not taking: Reported on 4/3/2017) 90 tablet 3     polyethylene glycol (MIRALAX) powder Take 17 g (1 capful) by mouth daily Adjust as needed to TID prn in same ratio of 1 capful per 8 oz water (Patient not taking: Reported on 4/24/2017) 510 g 1     ZOLMitriptan (ZOMIG) 5 MG tablet Take 1 tablet (5 mg) by mouth at onset of headache for migraine MAY REPEAT ONCE AFTER 2 HOURS. DO NOT EXCEED 2 TABLETS IN 24 HOURS. (Patient not taking: Reported on 4/24/2017) 6 tablet 10     Allergies   Allergen Reactions     Sulfa Drugs      Tacchycardia, had to go to ED     Naproxen GI Disturbance     Oxycontin [Oxycodone] GI Disturbance     Made her feel funny/upset stomach     Ropinirole      Did not work     BP Readings from Last 3 Encounters:   04/24/17 116/74   04/03/17 109/71   01/09/17 123/67    Wt Readings from Last 3 Encounters:   04/24/17 145 lb (65.8 kg)   04/03/17 154 lb 1.6 oz (69.9 kg)   03/27/17 145 lb  "(65.8 kg)                  Labs reviewed in EPIC    ROS:  Constitutional, HEENT, cardiovascular, pulmonary, gi and gu systems are negative, except as otherwise noted.    OBJECTIVE:                                                    /74 (BP Location: Right arm, Patient Position: Chair, Cuff Size: Adult Regular)  Pulse 65  Temp 98.3  F (36.8  C) (Oral)  Resp 16  Ht 5' 1.5\" (1.562 m)  Wt 145 lb (65.8 kg)  LMP 06/05/2006  SpO2 100%  BMI 26.95 kg/m2  Body mass index is 26.95 kg/(m^2).  Physical Exam   Constitutional: She is oriented to person, place, and time. She appears well-developed and well-nourished.   HENT:   Head: Normocephalic and atraumatic.   Eyes: EOM are normal.   Cardiovascular: Normal rate and regular rhythm.    Pulmonary/Chest: Effort normal and breath sounds normal.   Abdominal: Soft. Bowel sounds are normal. She exhibits no distension and no mass. There is tenderness. There is no rebound and no guarding.   Neurological: She is alert and oriented to person, place, and time. No cranial nerve deficit.   tremors         Diagnostic Test Results:  none      ASSESSMENT/PLAN:                                                      Problem List Items Addressed This Visit     None      Visit Diagnoses     Gastroesophageal reflux disease, esophagitis presence not specified    -  Primary    Relevant Medications    omeprazole (PRILOSEC) 40 MG capsule    Abdominal pain, epigastric        Relevant Orders    CBC with platelets and differential    Comprehensive metabolic panel (BMP + Alb, Alk Phos, ALT, AST, Total. Bili, TP)    *UA reflex to Microscopic    Lipase    CRP, inflammation    Screen for colon cancer        Relevant Orders    Fecal colorectal cancer screen (FIT)       Pt comes in complaining of epigastric pain and dizziness constantly . On review of charts she had similar symptoms even when presented in January at [facility] point she was diagnosed with a UTI. She has since been treated for it but per her " report symptoms still persist. Based on exam and presentation this seems like GERD especially with her history of parkinsons and the medications she is on could aggravate this . She is s/p cholecystectomy . Will try PPI for now. Labs as ordered to r/o other acute pathology likePancreatitis and IBD and other metabolic causes   U/a to r/o residual UTI . Last colonoscopy was in 12/2016.   Advised to return to clinic in 4 wks , if persistent symptoms will consider Endoscopy  Dizziness likely orthostatic from poor PO intake . Advise to keep up with hydration . Héctor evalaute further if sympotms change or persist   Pt is agreeable to the above plan      Update- Urinalysis shows signs of mild infection. Will treat with ciprofloxacin.  Melisa Hinojosa MD  [facility]

## 2017-04-25 ENCOUNTER — TELEPHONE (OUTPATIENT)
Dept: FAMILY MEDICINE | Facility: OTHER | Age: 62
End: 2017-04-25

## 2017-04-25 DIAGNOSIS — R10.13 ABDOMINAL PAIN, EPIGASTRIC: ICD-10-CM

## 2017-04-25 LAB
ALBUMIN UR-MCNC: NEGATIVE MG/DL
APPEARANCE UR: CLEAR
BILIRUB UR QL STRIP: NEGATIVE
COLOR UR AUTO: YELLOW
GLUCOSE UR STRIP-MCNC: NEGATIVE MG/DL
HGB UR QL STRIP: NEGATIVE
KETONES UR STRIP-MCNC: 15 MG/DL
LEUKOCYTE ESTERASE UR QL STRIP: ABNORMAL
NITRATE UR QL: NEGATIVE
NON-SQ EPI CELLS #/AREA URNS LPF: ABNORMAL /LPF
PH UR STRIP: 5 PH (ref 5–7)
RBC #/AREA URNS AUTO: ABNORMAL /HPF (ref 0–2)
SP GR UR STRIP: 1.01 (ref 1–1.03)
URN SPEC COLLECT METH UR: ABNORMAL
UROBILINOGEN UR STRIP-ACNC: 0.2 EU/DL (ref 0.2–1)
WBC #/AREA URNS AUTO: ABNORMAL /HPF (ref 0–2)

## 2017-04-25 PROCEDURE — 81001 URINALYSIS AUTO W/SCOPE: CPT | Performed by: FAMILY MEDICINE

## 2017-04-25 RX ORDER — CIPROFLOXACIN 500 MG/1
500 TABLET, FILM COATED ORAL 2 TIMES DAILY
Qty: 10 TABLET | Refills: 0 | Status: SHIPPED | OUTPATIENT
Start: 2017-04-25 | End: 2017-04-30

## 2017-04-25 NOTE — TELEPHONE ENCOUNTER
----- Message from Melisa Hinojosa MD sent at 4/25/2017  2:37 PM CDT -----  Reason for patient to the urinalysis showed signs of mild infection.  I sent an antibiotic to the pharmacy. Please Advise to fill the prescription

## 2017-04-27 ENCOUNTER — TELEPHONE (OUTPATIENT)
Dept: FAMILY MEDICINE | Facility: OTHER | Age: 62
End: 2017-04-27

## 2017-04-27 NOTE — TELEPHONE ENCOUNTER
Reason for Call:  Medication or medication refill:    Do you use a [facility]?  Name of the pharmacy and phone number for the current request:  [facility] - [phone] (Fax [phone])    Name of the medication requested: Ciprofloxacin 500mg 2x aday for 5days    Other request: Pt states she is having lots of diarrhea from medication, stopped taking the medication.  Would like to know if an alternative med?      Can we leave a detailed message on this number? YES    Phone number patient can be reached at: Home number on file [phone] (home)    Best Time: anytime    Call taken on 4/27/2017 at 8:35 AM by Jammie Otero

## 2017-04-27 NOTE — TELEPHONE ENCOUNTER
RK saw patient for this.  No note as to why this length of course.  Usual is 3 days, so this would be completed today.  Also no culture done, so unclear if another med needed.  Should culture if not feeling better.  Otherwise should be completing antibiotics today.  Maxine Monae MD

## 2017-04-27 NOTE — TELEPHONE ENCOUNTER
Patient informed. She expressed understanding. Will stop taking abx after today.  Ashwini Joiner, CMA

## 2017-05-02 ENCOUNTER — TELEPHONE (OUTPATIENT)
Dept: FAMILY MEDICINE | Facility: OTHER | Age: 62
End: 2017-05-02

## 2017-05-02 NOTE — TELEPHONE ENCOUNTER
Reason for Call:  Same Day Appointment, Requested Provider:  Maxine Monae MD     PCP: Maxine Monae    Reason for visit: stomach pain, medicatin does not seem to be helping    Duration of symptoms: ongoing    Have you been treated for this in the past? Yes    Additional comments: is scheduled for next available on 5/10.  Is asking if you could see her this week.     Can we leave a detailed message on this number? YES    Phone number patient can be reached at: Home number on file [phone] (home)    Best Time: any, patient is aware Dr Monae is out today and will address request tomorrow,     Call taken on 5/2/2017 at 8:01 AM by Mima Smith

## 2017-05-02 NOTE — TELEPHONE ENCOUNTER
Please schedule where there is an opening. Has already been evaluated for symptoms, but can transfer to triage for new or worsening symptoms.     Iveth Mckeon RN, BSN

## 2017-05-02 NOTE — TELEPHONE ENCOUNTER
Contacted pt, she will keep upcoming appt on 5/10/17.  Will call back if having worsening sx's.  Kaleigh Azevedo CMA

## 2017-05-04 NOTE — PROGRESS NOTES
"  SUBJECTIVE:                                                    Nathaly Farias is a 62 year old female who presents with sister to clinic today for the following health issues:      HPI    ABDOMINAL PAIN     Onset: ongoing, months    Description:   Character: \"constant pain\"  Location: right lower quadrant, across abdomen  Radiation: None    Intensity: moderate    Progression of Symptoms:  worsening    Accompanying Signs & Symptoms:  Fever/Chills?: no   Gas/Bloating: no   Nausea: YES  Vomitting: YES  Diarrhea?: no   Constipation:YES  Dysuria or Hematuria: no    History:   Trauma: no   Previous similar pain: YES   Previous tests done: none    Precipitating factors:   Does the pain change with:     Food: no      BM: no     Urination: no     Alleviating factors:  none    Therapies Tried and outcome: omeprazole-no relief    LMP:  not applicable     Dizziness     Onset: months    Description:   Do you feel faint:  YES  Does it feel like the surroundings (bed, room) are moving: YES  Unsteady/off balance: YES  Have you passed out or fallen: no     Intensity: moderate    Progression of Symptoms:  same    Accompanying Signs & Symptoms:  Heart palpitations: no   Nausea, vomiting: YES  Weakness in arms or legs: no   Fatigue: YES  Vision or speech changes: no   Ringing in ears (Tinnitus): no   Hearing Loss: no    History:   Head trauma/concussion hx: no   Previous similar symptoms: YES  Recent bleeding history: no     Precipitating factors:   Worse with activity or head movement: YES  Any new medications (BP?): no   Alcohol/drug abuse/withdrawal: no     Alleviating factors:   Does staying in a fixed position give relief:  YES       Therapies Tried and outcome: none    Meghna Cam RN Telephone Encounter 5/10/16 9:20 AM     S-(situation): appt today-CTC on file for  Wolf     B-(background): Wolf wants provider to know that she is still having problems and would like any and every test ran possible.  very " concerned. States she has parkinson's and early onset dementia and is concerned she won't ask appropriate questions at the visit     A-(assessment): abdominal pain still present; states is now more on the side (unsure what side) vs epigastric region. Vomits after eating. Dizzy at times off and on. Weight loss present, keeps losing. Not sleeping.      R-(recommendations): notified  will forward this to provider. Provider will examine and order necessary tests. Verbalizes understanding.     Meghna Cam, RN, BSN        GI: The patient reports RLQ abdominal pain for the past month. She has a hx of upper quadrant pain but now reports that the pain has switched to the lower abdomen area. She reports a lot of pain and notes that it is affecting her eating abilities. She thinks the pain is food related but is unsure of patterns. She reports that she is able to eat crackers and chicken but notes pain increase with brisa. She reports that her last bowel movement was 2-3 weeks ago and notes it being pebbly but not cracked.    Problem list and histories reviewed & adjusted, as indicated.  Additional history: as documented    Patient Active Problem List   Diagnosis     Prolapse of vaginal wall     Migraine variant     Symptomatic menopausal or female climacteric states     Obesity     Malaise and fatigue     Other and unspecified adverse effect of drug, medicinal and biological substance     Other and unspecified disc disorder of cervical region     Sleep apnea     Essential hypertension, benign     Chronic cholecystitis     Overactive bladder     Neck pain     Headache     HYPERLIPIDEMIA LDL GOAL <160     Mild major depression (H)     Advanced directives, counseling/discussion     HTN, goal below 140/90     Paralysis agitans (H)     History of MRI of brain and brain stem     Wears glasses     Constipation     Incomplete RBBB     Heart murmur     Family history of tremor     Rosacea     Asymptomatic varicose veins      Seborrheic dermatitis     Post-traumatic osteoarthritis of left knee     Pain from implanted hardware, initial encounter     Cognitive disorder evalaution 2017     Dementia     Past Surgical History:   Procedure Laterality Date     ------------OTHER-------------      left shoulder     ARTHRODESIS TOE(S)  3/2/2011    ARTHRODESIS TOE(S) performed by CLARA LUCAS at [facility]     C LIGATE FALLOPIAN TUBE  1998     CHOLECYSTECTOMY, LAPOROSCOPIC  5/12/2008    Cholecystectomy, Laparoscopic     HC COLONOSCOPY W/WO BRUSH/WASH  08/30/06    normal     HC OPEN TX TIBIAL SHAFT FX W PLATE/SCREWS W/WO CERCLAGE  2000    Multiple operations on left leg     HC SHLDR ARTHROSCOP,PART ACROMIOPLAS  11/16/06    Right shoulder     HC SHLDR ARTHROSCOP,SURG,DIS CLAVICULECTOMY  11/16/06    Right shoulder       Social History   Substance Use Topics     Smoking status: Never Smoker     Smokeless tobacco: Never Used      Comment: no smokers in the household     Alcohol use 0.0 oz/week     0 Standard drinks or equivalent per week      Comment: very occ     Family History   Problem Relation Age of Onset     DIABETES Father      type II, diagnosed in late 60's     Hypertension Father      CANCER Father      rare kidney cancer had kidney removed, 75 y.o. at onset     CEREBROVASCULAR DISEASE Father      Rashes/Skin Problems Father      rosacea     Neurologic Disorder Mother      head tremor     HEART DISEASE Brother      Valvular disease corrected with surgery     HEART DISEASE Paternal Grandfather      MI     Migraines Son      Migraines Sister      Rashes/Skin Problems Sister      rosacea           ROS:  Constitutional, HEENT, cardiovascular, pulmonary, GI, , musculoskeletal, neuro, skin, endocrine and psych systems are negative, except as in HPI or otherwise noted     This document serves as a record of the services and decisions personally performed and made by Maxine Monae MD. It was created on her behalf by Jack Duarte , a trained  medical scribe. The creation of this document is based the provider's statements to the medical scribe.  Jack Duarte, May 10, 2017 2:44 PM     OBJECTIVE:                                                    /60  Pulse 76  Temp 98.8  F (37.1  C) (Temporal)  Resp 16  Wt 138 lb (62.6 kg)  LMP 06/05/2006  BMI 25.65 kg/m2  Body mass index is 25.65 kg/(m^2).   GENERAL: healthy, alert, well nourished, well hydrated, no distress  ABDOMEN: soft, no tenderness, no  hepatosplenomegaly, no masses, normal bowel sounds  SKIN: no suspicious lesions, no rashes  PSYCH: Alert and oriented times 3; speech- coherent , normal rate and volume; able to articulate logical thoughts, able to abstract reason, no tangential thoughts, no hallucinations or delusions, affect- normal    No results found for this or any previous visit (from the past 24 hour(s)).     ASSESSMENT/PLAN:                                                        ICD-10-CM    1. Hyperlipidemia LDL goal <160 E78.5 NUTRITION REFERRAL   2. HTN, goal below 140/90 I10 lisinopril-hydrochlorothiazide (PRINZIDE/ZESTORETIC) 10-12.5 MG per tablet     NUTRITION REFERRAL   3. Other constipation K59.09 polyethylene glycol (MIRALAX) powder     NUTRITION REFERRAL     GASTROENTEROLOGY ADULT REF PROCEDURE ONLY     Noted constipation as well as epigastric pain today.  Likely may have GERD secondary to the constipation.  Needs help with nutrition eventually as she describes mostly bread and chicken diet.  Would do better to add in fruits and veggies and high fiber foods once we get her caught up.  Did well with BMs after colonoscopy for awhile last DEc.  May need to do colonoscopy prep if her instructions are not enough for her.  Plan nutrition to maintain after miralax catches her up, though will likely need 1 time daily minimum to keep on top of stools.    Patient Instructions   -Your infrequent bowel movements may be causing your pain. Miralax capful in glass of water: up to 3x  daily as needed.  -Prunes, apples, high fiber foods to help with bowel movents.  -Endoscopy recommended if pain does not improve in a week.      The information in this document, created by the medical scribe for me, accurately reflects the services I personally performed and the decisions made by me. I have reviewed and approved this document for accuracy.   MD Maxine Brown MD, MD  [facility]

## 2017-05-10 ENCOUNTER — TELEPHONE (OUTPATIENT)
Dept: FAMILY MEDICINE | Facility: OTHER | Age: 62
End: 2017-05-10

## 2017-05-10 ENCOUNTER — OFFICE VISIT (OUTPATIENT)
Dept: FAMILY MEDICINE | Facility: OTHER | Age: 62
End: 2017-05-10
Payer: MEDICARE

## 2017-05-10 VITALS
SYSTOLIC BLOOD PRESSURE: 124 MMHG | DIASTOLIC BLOOD PRESSURE: 60 MMHG | HEART RATE: 76 BPM | BODY MASS INDEX: 25.65 KG/M2 | WEIGHT: 138 LBS | RESPIRATION RATE: 16 BRPM | TEMPERATURE: 98.8 F

## 2017-05-10 DIAGNOSIS — E78.5 HYPERLIPIDEMIA LDL GOAL <160: Primary | ICD-10-CM

## 2017-05-10 DIAGNOSIS — K59.09 OTHER CONSTIPATION: ICD-10-CM

## 2017-05-10 DIAGNOSIS — I10 HTN, GOAL BELOW 140/90: ICD-10-CM

## 2017-05-10 PROCEDURE — 99214 OFFICE O/P EST MOD 30 MIN: CPT | Performed by: FAMILY MEDICINE

## 2017-05-10 RX ORDER — LISINOPRIL/HYDROCHLOROTHIAZIDE 10-12.5 MG
TABLET ORAL
Qty: 30 TABLET | Refills: 11 | Status: SHIPPED | OUTPATIENT
Start: 2017-05-10 | End: 2017-10-26

## 2017-05-10 RX ORDER — POLYETHYLENE GLYCOL 3350 17 G/17G
1 POWDER, FOR SOLUTION ORAL DAILY
Qty: 510 G | Refills: 1 | Status: SHIPPED | OUTPATIENT
Start: 2017-05-10 | End: 2017-11-13

## 2017-05-10 NOTE — NURSING NOTE
"Chief Complaint   Patient presents with     Abdominal Pain     Panel Management       Initial /60  Pulse 76  Temp 98.8  F (37.1  C) (Temporal)  Resp 16  Wt 138 lb (62.6 kg)  LMP 06/05/2006  BMI 25.65 kg/m2 Estimated body mass index is 25.65 kg/(m^2) as calculated from the following:    Height as of 4/24/17: 5' 1.5\" (1.562 m).    Weight as of this encounter: 138 lb (62.6 kg).  Medication Reconciliation: complete   Vale Larios CMA    "

## 2017-05-10 NOTE — MR AVS SNAPSHOT
After Visit Summary   5/10/2017    Nathaly Farias    MRN: [id number]           Patient Information     Date Of Birth          1955        Visit Information        Provider Department      5/10/2017 2:30 PM Maxine Monae MD [facility]        Today's Diagnoses     Hyperlipidemia LDL goal <160    -  1    HTN, goal below 140/90        Other constipation          Care Instructions    -Your infrequent bowel movements may be causing your pain. Miralax capful in glass of water: up to 3x daily as needed.  -Prunes, apples, high fiber foods to help with bowel movents.  -Endoscopy recommended if pain does not improve in a week.        Follow-ups after your visit        Additional Services     GASTROENTEROLOGY ADULT REF PROCEDURE ONLY       Last Lab Result: Creatinine (mg/dL)       Date                     Value                 04/24/2017               0.96             ----------  Body mass index is 25.65 kg/(m^2).     Needed:  No  Language:  English    Patient will be contacted to schedule procedure.     Please be aware that coverage of these services is subject to the terms and limitations of your health insurance plan.  Call member services at your health plan with any benefit or coverage questions.  Any procedures must be performed at a [facility] facility OR coordinated by your clinic's referral office.    Please bring the following with you to your appointment:    (1) Any X-Rays, CTs or MRIs which have been performed.  Contact the facility where they were done to arrange for  prior to your scheduled appointment.    (2) List of current medications   (3) This referral request   (4) Any documents/labs given to you for this referral            NUTRITION REFERRAL       Your provider has referred you to: [facility]: [facility] [phone]   [email or web address]    Please be aware that coverage of these services is subject to the  terms and limitations of your health insurance plan.  Call member services at your health plan with any benefit or coverage questions.      Please bring the following with you to your appointment:    (1) This referral request  (2) Any documents given to you regarding this referral  (3) Any specific questions you have about diet and/or food choices                  Your next 10 appointments already scheduled     May 22, 2017  9:20 AM CDT   [facility]    [address] [id number] [phone]           Bring a current list of meds and any records pertaining to this visit.  For Physicals, please bring immunization records and any forms needing to be filled out.  Please arrive 10 minutes early to complete paperwork.            Jul 17, 2017  9:30 AM CDT   Return Visit with Jet Potrer MD   [facility]    [address] [place] [id number] [phone]            Oct 05, 2017  8:00 AM CDT   [facility] Visit with Jack Esposito MD   [facility]    [address] [facility] [id number] [phone]              Who to contact     If you have questions or need follow up information about today's clinic visit or your schedule please contact [facility] directly at [phone].  Normal or non-critical lab and imaging results will be communicated to you by MyChart, letter or phone within 4 business days after the clinic has received the results. If you do not hear from us within 7 days, please contact the clinic through MyChart or phone. If you have a critical or abnormal lab result, we will notify you by phone as soon as possible.  Submit refill requests through [employer] or call your pharmacy and they will forward the refill request to us. Please allow 3 business days for your refill to be  completed.          Additional Information About Your Visit        [employer] Information     [employer] gives you secure access to your electronic health record. If you see a primary care provider, you can also send messages to your care team and make appointments. If you have questions, please call your primary care clinic.  If you do not have a primary care provider, please call [phone] and they will assist you.        Care EveryWhere ID     This is your Care EveryWhere ID. This could be used by other organizations to access your [facility] medical records  [id number]        Your Vitals Were     Pulse Temperature Respirations Last Period BMI (Body Mass Index)       76 98.8  F (37.1  C) (Temporal) 16 06/05/2006 25.65 kg/m2        Blood Pressure from Last 3 Encounters:   05/10/17 124/60   04/24/17 116/74   04/03/17 109/71    Weight from Last 3 Encounters:   05/10/17 138 lb (62.6 kg)   04/24/17 145 lb (65.8 kg)   04/03/17 154 lb 1.6 oz (69.9 kg)              We Performed the Following     GASTROENTEROLOGY ADULT REF PROCEDURE ONLY     NUTRITION REFERRAL          Today's Medication Changes          These changes are accurate as of: 5/10/17  3:29 PM.  If you have any questions, ask your nurse or doctor.               These medicines have changed or have updated prescriptions.        Dose/Directions    lisinopril-hydrochlorothiazide 10-12.5 MG per tablet   Commonly known as:  PRINZIDE/ZESTORETIC   This may have changed:  See the new instructions.   Used for:  HTN, goal below 140/90   Changed by:  Maxine Monae MD        TAKE ONE TABLET BY MOUTH ONCE DAILY DUE  FOR  LABS   Quantity:  30 tablet   Refills:  11            Where to get your medicines      These medications were sent to [facility] [address] [place] [address] - [address]  [address] [facility] [address]     Phone:  [phone]     lisinopril-hydrochlorothiazide 10-12.5 MG per tablet    polyethylene glycol powder                Primary Care Provider  Office Phone # Fax #    Maxine Luda Monae [phone]       [facility]  [address] [facility] [address]        Thank you!     Thank you for choosing [facility]  for your care. Our goal is always to provide you with excellent care. Hearing back from our patients is one way we can continue to improve our services. Please take a few minutes to complete the written survey that you may receive in the mail after your visit with us. Thank you!             Your Updated Medication List - Protect others around you: Learn how to safely use, store and throw away your medicines at www.disposemymeds.org.          This list is accurate as of: 5/10/17  3:29 PM.  Always use your most recent med list.                   Brand Name Dispense Instructions for use    carbidopa-levodopa  MG per tablet    SINEMET    540 tablet    2 tablets @ 8am, 11am/12noon, and 9pm       citalopram 20 MG tablet    celeXA    90 tablet    Take 1 tablet (20 mg) by mouth daily       conjugated estrogens cream    PREMARIN    30 g    Place 0.5 g vaginally twice a week Use daily first week of use       donepezil 5 MG tablet    ARICEPT    30 tablet    5mg daily for one month then 10mg daily       lisinopril-hydrochlorothiazide 10-12.5 MG per tablet    PRINZIDE/ZESTORETIC    30 tablet    TAKE ONE TABLET BY MOUTH ONCE DAILY DUE  FOR  LABS       melatonin 5 MG tablet      Take 10 mg by mouth At Bedtime Reported on 5/10/2017       omeprazole 40 MG capsule    priLOSEC    30 capsule    Take 1 capsule (40 mg) by mouth daily Take 30-60 minutes before a meal.       polyethylene glycol powder    MIRALAX    510 g    Take 17 g (1 capful) by mouth daily Adjust as needed to TID prn in same ratio of 1 capful per 8 oz water       pramipexole 0.25 MG tablet    MIRAPEX    270 tablet    Take 1 tablet (0.25 mg) by mouth 3 times daily       * rivastigmine 4.6 MG/24HR 24 hr patch    EXELON    30 patch    4.6 mg patch daily x  1month then 9.5mg patch daily x 1 month then 13.3mg patch daily       * rivastigmine 9.5 MG/24HR 24 hr patch    EXELON    30 patch    4.6 mg patch daily x 1month then 9.5mg patch daily x 1 month then 13.3mg patch daily       * rivastigmine 13.3 MG/24HR 24 hr patch    EXELON    30 patch    Place 1 patch onto the skin daily 4.6 mg patch daily x 1month then 9.5mg patch daily x 1 month then 13.3mg patch daily       trospium 20 MG tablet    SANCTURA    90 tablet    Take 1 tablet (20 mg) by mouth daily       TYLENOL PM EXTRA STRENGTH PO      Take 1 tablet by mouth as needed Reported on 4/24/2017       ZOLMitriptan 5 MG tablet    ZOMIG    6 tablet    Take 1 tablet (5 mg) by mouth at onset of headache for migraine MAY REPEAT ONCE AFTER 2 HOURS. DO NOT EXCEED 2 TABLETS IN 24 HOURS.       * Notice:  This list has 3 medication(s) that are the same as other medications prescribed for you. Read the directions carefully, and ask your doctor or other care provider to review them with you.

## 2017-05-10 NOTE — TELEPHONE ENCOUNTER
S-(situation): appt today-CTC on file for  Wolf    B-(background): Wolf wants provider to know that she is still having problems and would like any and every test ran possible.  very concerned. States she has parkinson's and early onset dementia and is concerned she won't ask appropriate questions at the visit    A-(assessment): abdominal pain still present; states is now more on the side (unsure what side) vs epigastric region. Vomits after eating. Dizzy at times off and on. Weight loss present, keeps losing. Not sleeping.     R-(recommendations): notified  will forward this to provider. Provider will examine and order necessary tests. Verbalizes understanding.    Meghna Cam, RN, BSN

## 2017-05-10 NOTE — TELEPHONE ENCOUNTER
Reason for call:  Symptom  Reason for call:  Patient reporting a symptom    Symptom or request: stomach pains,  is concerned that this could be cancer or something more serious.     Duration (how long have symptoms been present): has been seen in past for this    Have you been treated for this before? Yes    Additional comments: daughter calling to touch base with you before her appt today due to dimensia and patient not giving all info. Will discuss further with nurse or provider.    Phone Number patient can be reached at:  Other phone number:  Daughter Veronica Glaser [phone]    Best Time:  any    Can we leave a detailed message on this number:  YES    Call taken on 5/10/2017 at 8:19 AM by Narcisa Johns

## 2017-05-10 NOTE — PATIENT INSTRUCTIONS
-Your infrequent bowel movements may be causing your pain. Miralax capful in glass of water: up to 3x daily as needed.  -Prunes, apples, high fiber foods to help with bowel movents.  -Endoscopy recommended if pain does not improve in a week.

## 2017-05-11 PROCEDURE — 82274 ASSAY TEST FOR BLOOD FECAL: CPT | Performed by: FAMILY MEDICINE

## 2017-05-12 ENCOUNTER — TELEPHONE (OUTPATIENT)
Dept: FAMILY MEDICINE | Facility: OTHER | Age: 62
End: 2017-05-12

## 2017-05-12 DIAGNOSIS — Z12.11 SPECIAL SCREENING FOR MALIGNANT NEOPLASMS, COLON: Primary | ICD-10-CM

## 2017-05-12 NOTE — TELEPHONE ENCOUNTER
Left message for patient to return call to schedule EGD/colonoscopy. If Sue or Cecy are not available, please transfer to same day surgery        OK to schedule with RAMIRO or jim

## 2017-05-13 LAB — HEMOCCULT STL QL IA: NEGATIVE

## 2017-05-15 NOTE — TELEPHONE ENCOUNTER
Left message for patient to return call to schedule colonoscopy or EGD. If Cecy or Sue are unavailable, please transfer to the surgery center.     OK to schedule with RAMIRO or Keagan

## 2017-05-30 ENCOUNTER — TELEPHONE (OUTPATIENT)
Dept: FAMILY MEDICINE | Facility: OTHER | Age: 62
End: 2017-05-30

## 2017-05-30 NOTE — TELEPHONE ENCOUNTER
Spoke with pt, she is going to try to get a rid to the [facility] lab since it's to closest and get her cholesterol drawn in the next week or so.

## 2017-06-14 ENCOUNTER — OFFICE VISIT (OUTPATIENT)
Dept: ORTHOPEDICS | Facility: CLINIC | Age: 62
End: 2017-06-14
Payer: MEDICARE

## 2017-06-14 VITALS — TEMPERATURE: 97.8 F | HEIGHT: 62 IN

## 2017-06-14 DIAGNOSIS — M17.32 POST-TRAUMATIC OSTEOARTHRITIS OF LEFT KNEE: Primary | ICD-10-CM

## 2017-06-14 PROCEDURE — 20610 DRAIN/INJ JOINT/BURSA W/O US: CPT | Mod: LT | Performed by: ORTHOPAEDIC SURGERY

## 2017-06-14 ASSESSMENT — PAIN SCALES - GENERAL: PAINLEVEL: MODERATE PAIN (4)

## 2017-06-14 NOTE — LETTER
"  6/14/2017       RE: Nathaly Farias  [address] [facility] [address]           Dear Colleague,    Thank you for referring your patient, Nathaly Farias, to the [facility]. Please see a copy of my visit note below.    Office Visit-Follow up    Chief Complaint: Nathaly Farias is a 62 year old female who is being seen for   Chief Complaint   Patient presents with     RECHECK     left knee lov 3/27/17 inj given       History of Present Illness:   Cortisone helps better than visco injections, and last for at least a few months, would like another one today  She is not interested in knee replacement, which i would refer her to one of my partners for since her knee is complex    REVIEW OF SYSTEMS  General: negative for, night sweats, dizziness, fatigue  Resp: No shortness of breath and no cough  CV: negative for chest pain, syncope or near-syncope  GI: negative for nausea, vomiting and diarrhea  : negative for dysuria and hematuria  Musculoskeletal: as above  Neurologic: negative for syncope   Hematologic: negative for bleeding disorder    Physical Exam:  Vitals: Temp 97.8  F (36.6  C)  Ht 1.57 m (5' 1.8\")  LMP 06/05/2006  BMI= There is no height or weight on file to calculate BMI.  Constitutional: healthy, alert and no acute distress   Psychiatric: mentation appears normal and affect normal/bright  NEURO: no focal deficits  RESP: Normal with easy respirations and no use of accessory muscles to breathe, no audible wheezing or retractions  CV: No peripheral edema  SKIN: No erythema, rashes, excoriation, or breakdown. No evidence of infection.   JOINT/EXTREMITIES:left Knee Exam: Inspection: No effusion, No quad atrophy  Tender: mild globally about the knee and down tibial spine  Active Range of Motion: pain with flexion, full extension  GAIT: antalgic            Diagnostic Modalities:  None today.      Impression: left knee severe posttraumatic arthritis    Plan:  All of the above pertinent " physical exam and imaging modalities findings was reviewed with Nathaly.                                          CONSERVATIVE CARE:  I recommend conservative care for the patient to include steroid injections, activity modifications.                                         INJECTION PROCEDURE:  The patient was counseled about an  injection, including discussion of risks (including infection), contents of the injection, rationale for performing the injection, and expected benefits of the injection. The skin was prepped with alcohol and betadine and then utilizing sterile technique an injection of the left knee joint from the superior lateral approach in the supine position was performed. The injection consisted 1ml of Kenalog (40mg per 1ml) with 8ml 1% lidocaine plain. The patient tolerated the injection well, and there were no complications. The injection site was covered with a Band-Aid. The injection was performed by Christine Crowley M.D.                                                FUTURE PLAN:  On their return if they still have symptoms we will consider injection of steroids.      Return to clinic 6, week(s), or sooner as needed for changes.  Re-x-ray on return: No    Christine Crowley M.D.          Again, thank you for allowing me to participate in the care of your patient.        Sincerely,              Christine Crowley MD

## 2017-06-14 NOTE — MR AVS SNAPSHOT
After Visit Summary   6/14/2017    Nathaly Farias    MRN: [id number]           Patient Information     Date Of Birth          1955        Visit Information        Provider Department      6/14/2017 1:40 PM Christine Crowley MD [facility]        Care Instructions    Follow up in 6 weeks          Follow-ups after your visit        Your next 10 appointments already scheduled     Jul 05, 2017   Procedure with Tj Denney MD   [facility] ([facility])    [address] [facility] [address]   [phone]            Jul 17, 2017  9:30 AM CDT   [facility] Visit with Jet Porter MD   [facility]    [address] [place] [id number] [phone]            Oct 05, 2017  8:00 AM CDT   [facility]    [address] [facility] [id number] [phone]              Who to contact     If you have questions or need follow up information about today's clinic visit or your schedule please contact [facility] directly at [phone].  Normal or non-critical lab and imaging results will be communicated to you by MyChart, letter or phone within 4 business days after the clinic has received the results. If you do not hear from us within 7 days, please contact the clinic through [employer] or phone. If you have a critical or abnormal lab result, we will notify you by phone as soon as possible.  Submit refill requests through [employer] or call your pharmacy and they will forward the refill request to us. Please allow 3 business days for your refill to be completed.          Additional Information About Your Visit        [email or web address] Information     [employer] gives you secure access to your electronic health record. If you see a primary care provider, you can also send messages to your care team and  "make appointments. If you have questions, please call your primary care clinic.  If you do not have a primary care provider, please call [phone] and they will assist you.        Care EveryWhere ID     This is your Care EveryWhere ID. This could be used by other organizations to access your [facility] medical records  [id number]        Your Vitals Were     Temperature Height Last Period             97.8  F (36.6  C) 1.57 m (5' 1.8\") 06/05/2006          Blood Pressure from Last 3 Encounters:   05/10/17 124/60   04/24/17 116/74   04/03/17 109/71    Weight from Last 3 Encounters:   05/10/17 62.6 kg (138 lb)   04/24/17 65.8 kg (145 lb)   04/03/17 69.9 kg (154 lb 1.6 oz)              Today, you had the following     No orders found for display       Primary Care Provider Office Phone # Fax #    Maxine Luda Monae [phone]       [address] [address]        Thank you!     Thank you for choosing [facility]  for your care. Our goal is always to provide you with excellent care. Hearing back from our patients is one way we can continue to improve our services. Please take a few minutes to complete the written survey that you may receive in the mail after your visit with us. Thank you!             Your Updated Medication List - Protect others around you: Learn how to safely use, store and throw away your medicines at www.disposemymeds.org.          This list is accurate as of: 6/14/17  2:08 PM.  Always use your most recent med list.                   Brand Name Dispense Instructions for use    carbidopa-levodopa  MG per tablet    SINEMET    540 tablet    2 tablets @ 8am, 11am/12noon, and 9pm       citalopram 20 MG tablet    celeXA    90 tablet    Take 1 tablet (20 mg) by mouth daily       conjugated estrogens cream    PREMARIN    30 g    Place 0.5 g vaginally twice a week Use daily first week of use       donepezil 5 MG tablet    ARICEPT    30 " tablet    5mg daily for one month then 10mg daily       lisinopril-hydrochlorothiazide 10-12.5 MG per tablet    PRINZIDE/ZESTORETIC    30 tablet    TAKE ONE TABLET BY MOUTH ONCE DAILY DUE  FOR  LABS       melatonin 5 MG tablet      Take 10 mg by mouth At Bedtime Reported on 5/10/2017       omeprazole 40 MG capsule    priLOSEC    30 capsule    Take 1 capsule (40 mg) by mouth daily Take 30-60 minutes before a meal.       polyethylene glycol powder    MIRALAX    510 g    Take 17 g (1 capful) by mouth daily Adjust as needed to TID prn in same ratio of 1 capful per 8 oz water       pramipexole 0.25 MG tablet    MIRAPEX    270 tablet    Take 1 tablet (0.25 mg) by mouth 3 times daily       * rivastigmine 4.6 MG/24HR 24 hr patch    EXELON    30 patch    4.6 mg patch daily x 1month then 9.5mg patch daily x 1 month then 13.3mg patch daily       * rivastigmine 9.5 MG/24HR 24 hr patch    EXELON    30 patch    4.6 mg patch daily x 1month then 9.5mg patch daily x 1 month then 13.3mg patch daily       * rivastigmine 13.3 MG/24HR 24 hr patch    EXELON    30 patch    Place 1 patch onto the skin daily 4.6 mg patch daily x 1month then 9.5mg patch daily x 1 month then 13.3mg patch daily       trospium 20 MG tablet    SANCTURA    90 tablet    Take 1 tablet (20 mg) by mouth daily       TYLENOL PM EXTRA STRENGTH PO      Take 1 tablet by mouth as needed Reported on 4/24/2017       ZOLMitriptan 5 MG tablet    ZOMIG    6 tablet    Take 1 tablet (5 mg) by mouth at onset of headache for migraine MAY REPEAT ONCE AFTER 2 HOURS. DO NOT EXCEED 2 TABLETS IN 24 HOURS.       * Notice:  This list has 3 medication(s) that are the same as other medications prescribed for you. Read the directions carefully, and ask your doctor or other care provider to review them with you.

## 2017-06-14 NOTE — PROGRESS NOTES
"Office Visit-Follow up    Chief Complaint: Nathaly Farias is a 62 year old female who is being seen for   Chief Complaint   Patient presents with     RECHECK     left knee lov 3/27/17 inj given       History of Present Illness:   Cortisone helps better than visco injections, and last for at least a few months, would like another one today  She is not interested in knee replacement, which i would refer her to one of my partners for since her knee is complex    REVIEW OF SYSTEMS  General: negative for, night sweats, dizziness, fatigue  Resp: No shortness of breath and no cough  CV: negative for chest pain, syncope or near-syncope  GI: negative for nausea, vomiting and diarrhea  : negative for dysuria and hematuria  Musculoskeletal: as above  Neurologic: negative for syncope   Hematologic: negative for bleeding disorder    Physical Exam:  Vitals: Temp 97.8  F (36.6  C)  Ht 1.57 m (5' 1.8\")  LMP 06/05/2006  BMI= There is no height or weight on file to calculate BMI.  Constitutional: healthy, alert and no acute distress   Psychiatric: mentation appears normal and affect normal/bright  NEURO: no focal deficits  RESP: Normal with easy respirations and no use of accessory muscles to breathe, no audible wheezing or retractions  CV: No peripheral edema  SKIN: No erythema, rashes, excoriation, or breakdown. No evidence of infection.   JOINT/EXTREMITIES:left Knee Exam: Inspection: No effusion, No quad atrophy  Tender: mild globally about the knee and down tibial spine  Active Range of Motion: pain with flexion, full extension  GAIT: antalgic            Diagnostic Modalities:  None today.      Impression: left knee severe posttraumatic arthritis    Plan:  All of the above pertinent physical exam and imaging modalities findings was reviewed with Nathaly.                                          CONSERVATIVE CARE:  I recommend conservative care for the patient to include steroid injections, activity modifications.                "                          INJECTION PROCEDURE:  The patient was counseled about an  injection, including discussion of risks (including infection), contents of the injection, rationale for performing the injection, and expected benefits of the injection. The skin was prepped with alcohol and betadine and then utilizing sterile technique an injection of the left knee joint from the superior lateral approach in the supine position was performed. The injection consisted 1ml of Kenalog (40mg per 1ml) with 8ml 1% lidocaine plain. The patient tolerated the injection well, and there were no complications. The injection site was covered with a Band-Aid. The injection was performed by Chritsine Crowley M.D.                                                FUTURE PLAN:  On their return if they still have symptoms we will consider injection of steroids.      Return to clinic 6, week(s), or sooner as needed for changes.  Re-x-ray on return: No    Christine Crowley M.D.

## 2017-06-14 NOTE — NURSING NOTE
"Chief Complaint   Patient presents with     RECHECK     left knee lov 3/27/17 inj given       Initial Temp 97.8  F (36.6  C)  Ht 1.57 m (5' 1.8\")  LMP 06/05/2006 Estimated body mass index is 25.65 kg/(m^2) as calculated from the following:    Height as of 4/24/17: 1.562 m (5' 1.5\").    Weight as of 5/10/17: 62.6 kg (138 lb).  Medication Reconciliation: complete    BP completed using cuff size: NA (Not Taken)    Sindy Russell MA      "

## 2017-07-05 ENCOUNTER — SURGERY (OUTPATIENT)
Age: 62
End: 2017-07-05

## 2017-07-05 ENCOUNTER — TELEPHONE (OUTPATIENT)
Dept: FAMILY MEDICINE | Facility: OTHER | Age: 62
End: 2017-07-05

## 2017-07-05 ENCOUNTER — HOSPITAL ENCOUNTER (OUTPATIENT)
Facility: CLINIC | Age: 62
Discharge: HOME | End: 2017-07-05
Attending: INTERNAL MEDICINE | Admitting: INTERNAL MEDICINE
Payer: MEDICARE

## 2017-07-05 VITALS
TEMPERATURE: 98.2 F | OXYGEN SATURATION: 99 % | DIASTOLIC BLOOD PRESSURE: 95 MMHG | SYSTOLIC BLOOD PRESSURE: 140 MMHG | RESPIRATION RATE: 18 BRPM

## 2017-07-05 PROBLEM — K44.9 HIATAL HERNIA: Status: ACTIVE | Noted: 2017-07-05

## 2017-07-05 LAB — UPPER GI ENDOSCOPY: NORMAL

## 2017-07-05 PROCEDURE — 25000128 H RX IP 250 OP 636: Performed by: INTERNAL MEDICINE

## 2017-07-05 PROCEDURE — 43239 EGD BIOPSY SINGLE/MULTIPLE: CPT | Performed by: INTERNAL MEDICINE

## 2017-07-05 PROCEDURE — 40000296 ZZH STATISTIC ENDO RECOVERY CLASS 1:2 FIRST HOUR: Performed by: INTERNAL MEDICINE

## 2017-07-05 PROCEDURE — 88305 TISSUE EXAM BY PATHOLOGIST: CPT | Mod: 26 | Performed by: INTERNAL MEDICINE

## 2017-07-05 PROCEDURE — 88305 TISSUE EXAM BY PATHOLOGIST: CPT | Performed by: INTERNAL MEDICINE

## 2017-07-05 PROCEDURE — G0500 MOD SEDAT ENDO SERVICE >5YRS: HCPCS

## 2017-07-05 RX ORDER — ONDANSETRON 4 MG/1
4 TABLET, ORALLY DISINTEGRATING ORAL EVERY 6 HOURS PRN
Status: CANCELLED | OUTPATIENT
Start: 2017-07-05

## 2017-07-05 RX ORDER — FLUMAZENIL 0.1 MG/ML
0.2 INJECTION, SOLUTION INTRAVENOUS
Status: CANCELLED | OUTPATIENT
Start: 2017-07-05 | End: 2017-07-05

## 2017-07-05 RX ORDER — FENTANYL CITRATE 50 UG/ML
INJECTION, SOLUTION INTRAMUSCULAR; INTRAVENOUS PRN
Status: DISCONTINUED | OUTPATIENT
Start: 2017-07-05 | End: 2017-07-05 | Stop reason: HOSPADM

## 2017-07-05 RX ORDER — ONDANSETRON 2 MG/ML
4 INJECTION INTRAMUSCULAR; INTRAVENOUS EVERY 6 HOURS PRN
Status: CANCELLED | OUTPATIENT
Start: 2017-07-05

## 2017-07-05 RX ORDER — LIDOCAINE 40 MG/G
CREAM TOPICAL
Status: DISCONTINUED | OUTPATIENT
Start: 2017-07-05 | End: 2017-07-05 | Stop reason: HOSPADM

## 2017-07-05 RX ORDER — NALOXONE HYDROCHLORIDE 0.4 MG/ML
.1-.4 INJECTION, SOLUTION INTRAMUSCULAR; INTRAVENOUS; SUBCUTANEOUS
Status: CANCELLED | OUTPATIENT
Start: 2017-07-05 | End: 2017-07-06

## 2017-07-05 RX ORDER — ONDANSETRON 2 MG/ML
4 INJECTION INTRAMUSCULAR; INTRAVENOUS
Status: DISCONTINUED | OUTPATIENT
Start: 2017-07-05 | End: 2017-07-05 | Stop reason: HOSPADM

## 2017-07-05 RX ADMIN — FENTANYL CITRATE 25 MCG: 50 INJECTION, SOLUTION INTRAMUSCULAR; INTRAVENOUS at 08:38

## 2017-07-05 RX ADMIN — MIDAZOLAM HYDROCHLORIDE 1 MG: 1 INJECTION, SOLUTION INTRAMUSCULAR; INTRAVENOUS at 08:39

## 2017-07-05 RX ADMIN — MIDAZOLAM HYDROCHLORIDE 1 MG: 1 INJECTION, SOLUTION INTRAMUSCULAR; INTRAVENOUS at 08:43

## 2017-07-05 RX ADMIN — MIDAZOLAM HYDROCHLORIDE 2 MG: 1 INJECTION, SOLUTION INTRAMUSCULAR; INTRAVENOUS at 08:38

## 2017-07-05 RX ADMIN — MIDAZOLAM HYDROCHLORIDE 1 MG: 1 INJECTION, SOLUTION INTRAMUSCULAR; INTRAVENOUS at 08:45

## 2017-07-05 NOTE — H&P
[facility] GI Pre-Procedure Physical Assessment    Nathaly Farias MRN# [id number]   Age: 62 year old YOB: 1955      Date of Surgery: 7/5/2017  Location [facility]      Date of Exam 7/5/2017 Facility (Same day)       Home clinic: [facility]  Primary care provider: Maxine Monae         Active problem list:   Patient Active Problem List   Diagnosis     Prolapse of vaginal wall     Migraine variant     Symptomatic menopausal or female climacteric states     Obesity     Malaise and fatigue     Other and unspecified adverse effect of drug, medicinal and biological substance     Other and unspecified disc disorder of cervical region     Sleep apnea     Essential hypertension, benign     Chronic cholecystitis     Overactive bladder     Neck pain     Headache     HYPERLIPIDEMIA LDL GOAL <160     Mild major depression (H)     Advanced directives, counseling/discussion     HTN, goal below 140/90     Paralysis agitans (H)     History of MRI of brain and brain stem     Wears glasses     Constipation     Incomplete RBBB     Heart murmur     Family history of tremor     Rosacea     Asymptomatic varicose veins     Seborrheic dermatitis     Post-traumatic osteoarthritis of left knee     Pain from implanted hardware, initial encounter     Cognitive disorder evalaution 2017     Dementia            Medications (include herbals and vitamins):   Any Plavix use in the last 7 days?  No     Current Facility-Administered Medications   Medication     lidocaine 1 % 1 mL     lidocaine (LMX4) kit     sodium chloride (PF) 0.9% PF flush 3 mL     sodium chloride (PF) 0.9% PF flush 3 mL     sodium chloride (PF) 0.9% PF flush 3 mL     ondansetron (ZOFRAN) injection 4 mg             Allergies:      Allergies   Allergen Reactions     Sulfa Drugs      Tacchycardia, had to go to ED     Naproxen GI Disturbance     Oxycontin [Oxycodone] GI Disturbance     Made her feel funny/upset stomach      Ropinirole      Did not work     Allergy to Latex?  No  Allergy to tape?    No          Social History:     Social History   Substance Use Topics     Smoking status: Never Smoker     Smokeless tobacco: Never Used      Comment: no smokers in the household     Alcohol use 0.0 oz/week     0 Standard drinks or equivalent per week      Comment: very occ            Physical Exam:   All vitals have been reviewed  Blood pressure 121/82, temperature 98.2  F (36.8  C), temperature source Oral, resp. rate 18, last menstrual period 06/05/2006, SpO2 100 %, not currently breastfeeding.  Airway assessment:   Patient is able to open mouth wide  Patient is able to stick out tongue      Lungs:   No increased work of breathing, good air exchange, clear to auscultation bilaterally, no crackles or wheezing      Cardiovascular:   Normal apical impulse, regular rate and rhythm, normal S1 and S2, no S3 or S4, and no murmur noted           Lab / Radiology Results:   All laboratory data reviewed          Assessment:   Appropriately NPO  Chief complaint or anatomic assessment of involved area: abdominal pain         Plan:   Moderate (conscious) sedation     Patient's active problems diagnostically and therapeutically optimized for the planned procedure  Risks, benefits, alternatives to sedation and blood explained and consent obtained  Risks, benefits, alternatives to procedure explained and consent obtained  P2 (patient with mild systemic disease)  Orders and progress notes are in the chart  Discharge from Phase 1 and / or Phase 2 recovery when patient meets criteria    I have reviewed the history and physical, lab finding(s), diagnostic data, medicaitons, and the plan for sedation.  I have determined this patient to be an appropriate candidate for the planned sedation / procedure and have reassessed the patient immediately prior to sedation / procedure.    I have personally and medically directed the administration of medications  used.    PAZ BENITEZ MD

## 2017-07-05 NOTE — LETTER
[facility]  [address]   [place] [address]  Phone: [phone]  July 5, 2017      Nathaly Farias  [address] [facility] [address]      Dear Nathaly,      We care about your health and have reviewed your health plan including your medical conditions, medications, and lab results.     We recommend you take the following action(s):  -schedule a LAB ONLY APPOINTMENT to recheck your: Lipids (fasting cholesterol - nothing to eat except water and/or meds for 8-10 hours) within the next 1-4 weeks.  If' you have had labs completed eslewhere, please let us know so we can update your records.       Please call us at the [facility] - [phone] (or use [employer]) to address the above recommendations.     Please fill out the PHQ-9, this is based on the last two weeks. Please drop it off at the clinic or mail it back in when you are done.     Thank you for trusting [facility] and we appreciate the opportunity to serve you.  We look forward to supporting your healthcare needs in the future.    Healthy Regards,    Your Health Care Team  [facility]

## 2017-07-05 NOTE — TELEPHONE ENCOUNTER
Panel Management Review      Patient has the following on her problem list:     Depression / Dysthymia review  PHQ-9 SCORE 8/24/2015 3/14/2016 12/5/2016   Total Score - - -   Total Score 0 1 7      Patient is due for:  PHQ9    Hypertension   Last three blood pressure readings:  BP Readings from Last 3 Encounters:   07/05/17 (!) 140/95   05/10/17 124/60   04/24/17 116/74     Blood pressure: FAILED    HTN Guidelines:  Age 18-59 BP range:  Less than 140/90  Age 60-85 with Diabetes:  Less than 140/90  Age 60-85 without Diabetes:  less than 150/90      Composite cancer screening  Chart review shows that this patient is due/due soon for the following None  Summary:    Patient is due/failing the following:   Advance directive planning , LDL and PHQ9    Action needed:   Patient needs to do PHQ9. and Patient needs fasting lab only appointment    Type of outreach:    Sent letter. and sent PHQ9.     Questions for provider review:    None                                                                                                                                    Lu Nunez      Chart routed to Care Team .

## 2017-07-07 ENCOUNTER — HOSPITAL ENCOUNTER (EMERGENCY)
Facility: CLINIC | Age: 62
Discharge: HOME | End: 2017-07-07
Attending: EMERGENCY MEDICINE | Admitting: EMERGENCY MEDICINE
Payer: MEDICARE

## 2017-07-07 ENCOUNTER — APPOINTMENT (OUTPATIENT)
Dept: CT IMAGING | Facility: CLINIC | Age: 62
End: 2017-07-07
Attending: EMERGENCY MEDICINE
Payer: MEDICARE

## 2017-07-07 VITALS
SYSTOLIC BLOOD PRESSURE: 118 MMHG | BODY MASS INDEX: 26.06 KG/M2 | OXYGEN SATURATION: 96 % | TEMPERATURE: 98.3 F | DIASTOLIC BLOOD PRESSURE: 88 MMHG | RESPIRATION RATE: 16 BRPM | WEIGHT: 138 LBS | HEART RATE: 71 BPM | HEIGHT: 61 IN

## 2017-07-07 DIAGNOSIS — F41.9 ANXIETY: ICD-10-CM

## 2017-07-07 DIAGNOSIS — G20.A1 PARKINSON'S DISEASE (H): ICD-10-CM

## 2017-07-07 DIAGNOSIS — R10.84 ABDOMINAL PAIN, GENERALIZED: ICD-10-CM

## 2017-07-07 DIAGNOSIS — R03.1 NONSPECIFIC LOW BLOOD PRESSURE READING: ICD-10-CM

## 2017-07-07 LAB
ALBUMIN SERPL-MCNC: 3.8 G/DL (ref 3.4–5)
ALBUMIN UR-MCNC: NEGATIVE MG/DL
ALP SERPL-CCNC: 54 U/L (ref 40–150)
ALT SERPL W P-5'-P-CCNC: 8 U/L (ref 0–50)
ANION GAP SERPL CALCULATED.3IONS-SCNC: 8 MMOL/L (ref 3–14)
APPEARANCE UR: ABNORMAL
AST SERPL W P-5'-P-CCNC: 16 U/L (ref 0–45)
BACTERIA #/AREA URNS HPF: ABNORMAL /HPF
BILIRUB SERPL-MCNC: 1.1 MG/DL (ref 0.2–1.3)
BILIRUB UR QL STRIP: NEGATIVE
BUN SERPL-MCNC: 26 MG/DL (ref 7–30)
CALCIUM SERPL-MCNC: 9.4 MG/DL (ref 8.5–10.1)
CHLORIDE SERPL-SCNC: 103 MMOL/L (ref 94–109)
CO2 SERPL-SCNC: 29 MMOL/L (ref 20–32)
COLOR UR AUTO: YELLOW
COPATH REPORT: NORMAL
CREAT SERPL-MCNC: 1.24 MG/DL (ref 0.52–1.04)
DIFFERENTIAL METHOD BLD: NORMAL
EOSINOPHIL # BLD AUTO: 0.1 10E9/L (ref 0–0.7)
EOSINOPHIL NFR BLD AUTO: 1 %
ERYTHROCYTE [DISTWIDTH] IN BLOOD BY AUTOMATED COUNT: 13.7 % (ref 10–15)
GFR SERPL CREATININE-BSD FRML MDRD: 44 ML/MIN/1.7M2
GLUCOSE SERPL-MCNC: 92 MG/DL (ref 70–99)
GLUCOSE UR STRIP-MCNC: NEGATIVE MG/DL
HCT VFR BLD AUTO: 40.2 % (ref 35–47)
HGB BLD-MCNC: 12.7 G/DL (ref 11.7–15.7)
HGB UR QL STRIP: NEGATIVE
HYALINE CASTS #/AREA URNS LPF: 4 /LPF (ref 0–2)
KETONES UR STRIP-MCNC: 5 MG/DL
LEUKOCYTE ESTERASE UR QL STRIP: ABNORMAL
LIPASE SERPL-CCNC: 183 U/L (ref 73–393)
LYMPHOCYTES # BLD AUTO: 2.5 10E9/L (ref 0.8–5.3)
LYMPHOCYTES NFR BLD AUTO: 31 %
MCH RBC QN AUTO: 29.7 PG (ref 26.5–33)
MCHC RBC AUTO-ENTMCNC: 31.6 G/DL (ref 31.5–36.5)
MCV RBC AUTO: 94 FL (ref 78–100)
MONOCYTES # BLD AUTO: 0.6 10E9/L (ref 0–1.3)
MONOCYTES NFR BLD AUTO: 7 %
MUCOUS THREADS #/AREA URNS LPF: PRESENT /LPF
NEUTROPHILS # BLD AUTO: 4.9 10E9/L (ref 1.6–8.3)
NEUTROPHILS NFR BLD AUTO: 61 %
NITRATE UR QL: NEGATIVE
PH UR STRIP: 5 PH (ref 5–7)
PLATELET # BLD AUTO: 250 10E9/L (ref 150–450)
POTASSIUM SERPL-SCNC: 4.2 MMOL/L (ref 3.4–5.3)
PROT SERPL-MCNC: 7.7 G/DL (ref 6.8–8.8)
RBC # BLD AUTO: 4.28 10E12/L (ref 3.8–5.2)
RBC #/AREA URNS AUTO: 1 /HPF (ref 0–2)
SODIUM SERPL-SCNC: 140 MMOL/L (ref 133–144)
SP GR UR STRIP: 1.02 (ref 1–1.03)
SQUAMOUS #/AREA URNS AUTO: 1 /HPF (ref 0–1)
TSH SERPL DL<=0.05 MIU/L-ACNC: 0.91 MU/L (ref 0.4–4)
URN SPEC COLLECT METH UR: ABNORMAL
UROBILINOGEN UR STRIP-MCNC: 0 MG/DL (ref 0–2)
WBC # BLD AUTO: 8.1 10E9/L (ref 4–11)
WBC #/AREA URNS AUTO: 12 /HPF (ref 0–2)

## 2017-07-07 PROCEDURE — 74177 CT ABD & PELVIS W/CONTRAST: CPT

## 2017-07-07 PROCEDURE — 83690 ASSAY OF LIPASE: CPT | Performed by: EMERGENCY MEDICINE

## 2017-07-07 PROCEDURE — 80053 COMPREHEN METABOLIC PANEL: CPT | Performed by: EMERGENCY MEDICINE

## 2017-07-07 PROCEDURE — 25000128 H RX IP 250 OP 636: Performed by: EMERGENCY MEDICINE

## 2017-07-07 PROCEDURE — 84443 ASSAY THYROID STIM HORMONE: CPT | Performed by: EMERGENCY MEDICINE

## 2017-07-07 PROCEDURE — 96374 THER/PROPH/DIAG INJ IV PUSH: CPT | Mod: 59 | Performed by: EMERGENCY MEDICINE

## 2017-07-07 PROCEDURE — 96361 HYDRATE IV INFUSION ADD-ON: CPT | Performed by: EMERGENCY MEDICINE

## 2017-07-07 PROCEDURE — 85025 COMPLETE CBC W/AUTO DIFF WBC: CPT | Performed by: EMERGENCY MEDICINE

## 2017-07-07 PROCEDURE — 81001 URINALYSIS AUTO W/SCOPE: CPT | Performed by: EMERGENCY MEDICINE

## 2017-07-07 PROCEDURE — 99285 EMERGENCY DEPT VISIT HI MDM: CPT | Mod: Z6 | Performed by: EMERGENCY MEDICINE

## 2017-07-07 PROCEDURE — 25000125 ZZHC RX 250: Performed by: EMERGENCY MEDICINE

## 2017-07-07 PROCEDURE — 99285 EMERGENCY DEPT VISIT HI MDM: CPT | Mod: 25 | Performed by: EMERGENCY MEDICINE

## 2017-07-07 RX ORDER — LORAZEPAM 0.5 MG/1
0.5 TABLET ORAL 2 TIMES DAILY PRN
Qty: 10 TABLET | Refills: 0 | Status: SHIPPED | OUTPATIENT
Start: 2017-07-07 | End: 2017-07-07

## 2017-07-07 RX ORDER — LORAZEPAM 0.5 MG/1
0.5 TABLET ORAL 2 TIMES DAILY PRN
Qty: 10 TABLET | Refills: 0 | Status: SHIPPED | OUTPATIENT
Start: 2017-07-07 | End: 2018-05-03

## 2017-07-07 RX ORDER — LIDOCAINE 40 MG/G
CREAM TOPICAL
Status: DISCONTINUED | OUTPATIENT
Start: 2017-07-07 | End: 2017-07-07 | Stop reason: HOSPADM

## 2017-07-07 RX ORDER — LORAZEPAM 2 MG/ML
0.5 INJECTION INTRAMUSCULAR ONCE
Status: COMPLETED | OUTPATIENT
Start: 2017-07-07 | End: 2017-07-07

## 2017-07-07 RX ORDER — ONDANSETRON 2 MG/ML
4 INJECTION INTRAMUSCULAR; INTRAVENOUS EVERY 30 MIN PRN
Status: DISCONTINUED | OUTPATIENT
Start: 2017-07-07 | End: 2017-07-07 | Stop reason: HOSPADM

## 2017-07-07 RX ORDER — HYDROMORPHONE HYDROCHLORIDE 1 MG/ML
0.5 INJECTION, SOLUTION INTRAMUSCULAR; INTRAVENOUS; SUBCUTANEOUS
Status: DISCONTINUED | OUTPATIENT
Start: 2017-07-07 | End: 2017-07-07 | Stop reason: HOSPADM

## 2017-07-07 RX ORDER — IOPAMIDOL 755 MG/ML
500 INJECTION, SOLUTION INTRAVASCULAR ONCE
Status: COMPLETED | OUTPATIENT
Start: 2017-07-07 | End: 2017-07-07

## 2017-07-07 RX ADMIN — SODIUM CHLORIDE 60 ML: 9 INJECTION, SOLUTION INTRAVENOUS at 18:46

## 2017-07-07 RX ADMIN — LORAZEPAM 0.5 MG: 2 INJECTION INTRAMUSCULAR; INTRAVENOUS at 17:41

## 2017-07-07 RX ADMIN — IOPAMIDOL 100 ML: 755 INJECTION, SOLUTION INTRAVENOUS at 18:47

## 2017-07-07 RX ADMIN — SODIUM CHLORIDE 1000 ML: 9 INJECTION, SOLUTION INTRAVENOUS at 17:40

## 2017-07-07 ASSESSMENT — ENCOUNTER SYMPTOMS
DIARRHEA: 0
FATIGUE: 1
ABDOMINAL PAIN: 1
CONSTIPATION: 0
BLOOD IN STOOL: 0
VOMITING: 1
NAUSEA: 1
APPETITE CHANGE: 1
ANAL BLEEDING: 0
FEVER: 0

## 2017-07-07 NOTE — ED NOTES
Pt comes in with daughter for complaints of fatigue and nausea. Pt had an EGD on Wednesday, 7/5, for ongoing abd pain. Pt returned home and vomited dark red blood. Pt denies blood in her stool or vomiting recently. Pt does complain of abd discomfort.

## 2017-07-07 NOTE — ED NOTES
PT w/complex medical hx including Parkinson's/dementia who lives w/her  who is an .  An Upper GI was done Wednesday am.  When she got home she had a lot of n/v that was dark brown w/o any further emesis since.  She has been very tired and wanted to sleep all the time.  No appetite r/t nausea.  Pt has a medium sized hiatal hernia and is just not hungry and constantly feels nauseated.  These sx have been going on since christmas.

## 2017-07-07 NOTE — ED PROVIDER NOTES
"  History     Chief Complaint   Patient presents with     Fatigue     The history is provided by the patient and a relative.     Nathaly Farias is a 62 year old female with a history of Parkinson's Disease, Dementia, Constipation, and Hiatal Hernia, who presents to the ED complaining of abdominal pain and fatigue. The patient was initially evaluated for abdominal pain while in the clinic on 12/5/2016, about 7 months ago. At that time, the provider thought that she was likely constipated and started her on Miralax. She also ordered a colonoscopy. Patient had a colonoscopy on 12/21/2016 in which 3 adenomatous polyps were removed, but was otherwise normal. She was seen in the clinic once again complaining of abdominal pain a short time later on 1/9, about 6 months ago. She was found to have a UTI at that time so she was treated with antibiotics and given Zofran to combat nausea. Patient was not seen regarding her abdominal pain again until 4/24, a little over 2 months ago, at which time the provider thought her symptoms were related to GERd and prescribed a PPI. She presented soon after on 5/10 complaining of the same pain so she was scheduled for an endoscopy. Patient had an EGD on 7/5, 2 days ago. She says that when she returned home from her procedure she felt nauseated and experienced an episode of emesis which \"looked black and thick like coffee grounds\". Her daughter states that since then she has continued to complain of nausea, upper abdominal discomfort, and extreme fatigue - \"she just wants to sleep\". Her appetite has been decreased due to her nausea and her daughter is concerned that she has not been taking her daily medications as prescribed because she has been feeling ill. Patient denies any hematochezia, melena, hematemesis, fever, or other associated symptoms. SHx of Cholecystectomy. Her last Abdominal CT was normal in 4/2016, a little over a year ago.     Patient Active Problem List   Diagnosis     " Prolapse of vaginal wall     Migraine variant     Symptomatic menopausal or female climacteric states     Obesity     Malaise and fatigue     Other and unspecified adverse effect of drug, medicinal and biological substance     Other and unspecified disc disorder of cervical region     Sleep apnea     Essential hypertension, benign     Chronic cholecystitis     Overactive bladder     Neck pain     Headache     HYPERLIPIDEMIA LDL GOAL <160     Mild major depression (H)     Advanced directives, counseling/discussion     HTN, goal below 140/90     Paralysis agitans (H)     History of MRI of brain and brain stem     Wears glasses     Constipation     Incomplete RBBB     Heart murmur     Family history of tremor     Rosacea     Asymptomatic varicose veins     Seborrheic dermatitis     Post-traumatic osteoarthritis of left knee     Pain from implanted hardware, initial encounter     Cognitive disorder evalaution 2017     Dementia     Hiatal hernia     Past Medical History:   Diagnosis Date     Arthralgia of temporomandibular joint 10/22/1996     Arthritis      Cognitive disorder evalaution 2017 3/6/2017    She has marked attentional difficulties, impairments in memory including a rapid forgetting rate, mild anomia, and mild executive dysfunction. 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 She's required increasing assistance with finances, medications, driving, and cooking. I think this is a mild dementia, and     Constipation 2/23/2015     Dementia 3/13/2017    IMPRESSIONS AND RECOMMENDATIONS   Current results indicate marked attentional difficulties, impairments in memory, which in some cases reflected impairments in retention and in other cases reflected retrieval difficulties, and mild anomia as well as mild executive dysfunction. Premorbid intellectual functioning is estimated to fall in the borderline range. She denied  experiencing significant depre     Depressive disorder      Heart murmur 2/23/2015     History of MRI of brain and brain stem 2/21/2015    MRI OF THE BRAIN WITHOUT CONTRAST 7/10/2014 1:21 PM  COMPARISON: None. HISTORY: Left-sided upper and lower extremity tremor. Balance issues. TECHNIQUE:  Brain: Axial diffusion-weighted with ADC map, T2-weighted with fat saturation, T1-weighted and turboFLAIR and coronal T1-weighted images of the brain were obtained without intravenous contrast.  FINDINGS: There is mild diffuse cerebral volume loss     Hypertension      Incomplete RBBB 2/23/2015     Motion sickness      Other and unspecified hyperlipidemia      Parkinson's disease (H)      Seborrheic dermatitis 2/29/2016     Unspecified arthropathy, hand 08/07/1997     Unspecified sleep apnea     moderate, sleep study 11/07, on CPAP, has daytime somnolence with this     Variants of migraine, not elsewhere classified, without mention of intractable migraine without mention of status migrainosus      Wears glasses 2/23/2015       Past Surgical History:   Procedure Laterality Date     ------------OTHER-------------      left shoulder     ARTHRODESIS TOE(S)  3/2/2011    ARTHRODESIS TOE(S) performed by CLARA LUCAS at  OR     C LIGATE FALLOPIAN TUBE  1998     CHOLECYSTECTOMY, LAPOROSCOPIC  5/12/2008    Cholecystectomy, Laparoscopic     ESOPHAGOSCOPY, GASTROSCOPY, DUODENOSCOPY (EGD), COMBINED N/A 7/5/2017    Procedure: COMBINED ESOPHAGOSCOPY, GASTROSCOPY, DUODENOSCOPY (EGD), BIOPSY SINGLE OR MULTIPLE;  Esophagogastroduodenoscopy with Multiple Biopsies by Biopsy;  Surgeon: Tj Denney MD;  Location: [facility]     HC COLONOSCOPY W/WO BRUSH/WASH  08/30/06    normal     HC OPEN TX TIBIAL SHAFT FX W PLATE/SCREWS W/WO CERCLAGE  2000    Multiple operations on left leg     HC SHLDR ARTHROSCOP,PART ACROMIOPLAS  11/16/06    Right shoulder     HC SHLDR ARTHROSCOP,SURG,DIS CLAVICULECTOMY  11/16/06    Right shoulder       Family History    Problem Relation Age of Onset     DIABETES Father      type II, diagnosed in late 60's     Hypertension Father      CANCER Father      rare kidney cancer had kidney removed, 75 y.o. at onset     CEREBROVASCULAR DISEASE Father      Rashes/Skin Problems Father      rosacea     Neurologic Disorder Mother      head tremor     HEART DISEASE Brother      Valvular disease corrected with surgery     HEART DISEASE Paternal Grandfather      MI     Migraines Son      Migraines Sister      Rashes/Skin Problems Sister      rosacea       Social History   Substance Use Topics     Smoking status: Never Smoker     Smokeless tobacco: Never Used      Comment: no smokers in the household     Alcohol use 0.0 oz/week     0 Standard drinks or equivalent per week      Comment: very occ        Immunization History   Administered Date(s) Administered     Influenza (H1N1) 01/12/2010     Influenza (IIV3) 10/15/1996, 10/14/1997, 10/15/1998, 10/06/1999, 12/29/2000, 11/15/2001, 11/11/2002, 10/16/2003, 11/01/2004, 11/02/2005, 11/12/2007, 11/10/2008, 11/10/2010, 10/04/2011, 01/04/2013, 09/03/2014     Influenza Vaccine IM 3yrs+ 4 Valent IIV4 08/22/2016     Influenza Vaccine, 3 YRS +, IM (QUADRIVALENT W/PRESERVATIVES) 09/08/2015     Pneumococcal 23 valent 11/01/2004     TD (ADULT, 7+) 10/05/1999     TDAP Vaccine (Adacel) 10/04/2011     Zoster vaccine, live 11/29/2014          Allergies   Allergen Reactions     Sulfa Drugs      Tacchycardia, had to go to ED     Naproxen GI Disturbance     Oxycontin [Oxycodone] GI Disturbance     Made her feel funny/upset stomach     Ropinirole      Did not work       Current Outpatient Prescriptions   Medication Sig Dispense Refill     LORazepam (ATIVAN) 0.5 MG tablet Take 1 tablet (0.5 mg) by mouth 2 times daily as needed for anxiety 10 tablet 0     lisinopril-hydrochlorothiazide (PRINZIDE/ZESTORETIC) 10-12.5 MG per tablet TAKE ONE TABLET BY MOUTH ONCE DAILY DUE  FOR  LABS 30 tablet 11     polyethylene glycol  "(MIRALAX) powder Take 17 g (1 capful) by mouth daily Adjust as needed to TID prn in same ratio of 1 capful per 8 oz water 510 g 1     omeprazole (PRILOSEC) 40 MG capsule Take 1 capsule (40 mg) by mouth daily Take 30-60 minutes before a meal. 30 capsule 2     Diphenhydramine-APAP, sleep, (TYLENOL PM EXTRA STRENGTH PO) Take 1 tablet by mouth as needed Reported on 4/24/2017       melatonin 5 MG tablet Take 10 mg by mouth At Bedtime Reported on 5/10/2017       carbidopa-levodopa (SINEMET)  MG per tablet 2 tablets @ 8am, 11am/12noon, and 9pm 540 tablet 3     pramipexole (MIRAPEX) 0.25 MG tablet Take 1 tablet (0.25 mg) by mouth 3 times daily 270 tablet 3     donepezil (ARICEPT) 5 MG tablet 5mg daily for one month then 10mg daily 30 tablet 11     citalopram (CELEXA) 20 MG tablet Take 1 tablet (20 mg) by mouth daily 90 tablet 1     trospium (SANCTURA) 20 MG tablet Take 1 tablet (20 mg) by mouth daily 90 tablet 3     ZOLMitriptan (ZOMIG) 5 MG tablet Take 1 tablet (5 mg) by mouth at onset of headache for migraine MAY REPEAT ONCE AFTER 2 HOURS. DO NOT EXCEED 2 TABLETS IN 24 HOURS. 6 tablet 10     conjugated estrogens (PREMARIN) cream Place 0.5 g vaginally twice a week Use daily first week of use 30 g 11     I have reviewed the Medications, Allergies, Past Medical and Surgical History, and Social History in the Epic system.    Review of Systems   Constitutional: Positive for appetite change (decreased) and fatigue. Negative for fever.   Gastrointestinal: Positive for abdominal pain (upper), nausea and vomiting. Negative for anal bleeding, blood in stool, constipation and diarrhea.   All other systems reviewed and are negative.      Physical Exam   BP: 94/85  Pulse: 113  Temp: 98.3  F (36.8  C)  Resp: 16  Height: 154.9 cm (5' 1\")  Weight: 62.6 kg (138 lb)  SpO2: 96 %    Physical Exam   Constitutional: She is oriented to person, place, and time. No distress.   Patient is tearful, anxious, and tremulous.    HENT:   Head: " Normocephalic and atraumatic.   Eyes: Conjunctivae and EOM are normal.   Cardiovascular: Normal rate, regular rhythm, normal heart sounds and intact distal pulses.    No murmur heard.  Pulmonary/Chest: Effort normal and breath sounds normal. No respiratory distress. She has no wheezes. She has no rales. She exhibits no tenderness.   Abdominal: Soft. Bowel sounds are normal. She exhibits no distension. There is no hepatosplenomegaly. There is tenderness (diffuse, mild). There is guarding. There is no rigidity and no rebound.   Neurological: She is alert and oriented to person, place, and time.   Skin: Skin is warm and dry. No rash noted. She is not diaphoretic. No pallor.   Psychiatric: She has a normal mood and affect. Her behavior is normal.   Nursing note and vitals reviewed.      ED Course     ED Course     Procedures             Critical Care time:  none               Results for orders placed or performed during the hospital encounter of 07/07/17 (from the past 24 hour(s))   UA reflex to Microscopic   Result Value Ref Range    Color Urine Yellow     Appearance Urine Slightly Cloudy     Glucose Urine Negative NEG mg/dL    Bilirubin Urine Negative NEG    Ketones Urine 5 (A) NEG mg/dL    Specific Gravity Urine 1.020 1.003 - 1.035    Blood Urine Negative NEG    pH Urine 5.0 5.0 - 7.0 pH    Protein Albumin Urine Negative NEG mg/dL    Urobilinogen mg/dL 0.0 0.0 - 2.0 mg/dL    Nitrite Urine Negative NEG    Leukocyte Esterase Urine Moderate (A) NEG    Source Midstream Urine     RBC Urine 1 0 - 2 /HPF    WBC Urine 12 (H) 0 - 2 /HPF    Bacteria Urine Few (A) NEG /HPF    Squamous Epithelial /HPF Urine 1 0 - 1 /HPF    Mucous Urine Present (A) NEG /LPF    Hyaline Casts 4 (H) 0 - 2 /LPF   CBC with platelets differential   Result Value Ref Range    WBC 8.1 4.0 - 11.0 10e9/L    RBC Count 4.28 3.8 - 5.2 10e12/L    Hemoglobin 12.7 11.7 - 15.7 g/dL    Hematocrit 40.2 35.0 - 47.0 %    MCV 94 78 - 100 fl    MCH 29.7 26.5 - 33.0 pg     MCHC 31.6 31.5 - 36.5 g/dL    RDW 13.7 10.0 - 15.0 %    Platelet Count 250 150 - 450 10e9/L    Diff Method Automated Method     % Neutrophils 61.0 %    % Lymphocytes 31.0 %    % Monocytes 7.0 %    % Eosinophils 1.0 %    Absolute Neutrophil 4.9 1.6 - 8.3 10e9/L    Absolute Lymphocytes 2.5 0.8 - 5.3 10e9/L    Absolute Monocytes 0.6 0.0 - 1.3 10e9/L    Absolute Eosinophils 0.1 0.0 - 0.7 10e9/L   Comprehensive metabolic panel   Result Value Ref Range    Sodium 140 133 - 144 mmol/L    Potassium 4.2 3.4 - 5.3 mmol/L    Chloride 103 94 - 109 mmol/L    Carbon Dioxide 29 20 - 32 mmol/L    Anion Gap 8 3 - 14 mmol/L    Glucose 92 70 - 99 mg/dL    Urea Nitrogen 26 7 - 30 mg/dL    Creatinine 1.24 (H) 0.52 - 1.04 mg/dL    GFR Estimate 44 (L) >60 mL/min/1.7m2    GFR Estimate If Black 53 (L) >60 mL/min/1.7m2    Calcium 9.4 8.5 - 10.1 mg/dL    Bilirubin Total 1.1 0.2 - 1.3 mg/dL    Albumin 3.8 3.4 - 5.0 g/dL    Protein Total 7.7 6.8 - 8.8 g/dL    Alkaline Phosphatase 54 40 - 150 U/L    ALT 8 0 - 50 U/L    AST 16 0 - 45 U/L   Lipase   Result Value Ref Range    Lipase 183 73 - 393 U/L   TSH   Result Value Ref Range    TSH 0.91 0.40 - 4.00 mU/L   CT Abdomen Pelvis w Contrast    Narrative    CT ABDOMEN AND PELVIS WITH CONTRAST  7/7/2017 7:03 PM    TECHNIQUE: Images from diaphragm to pubic symphysis 100mL, Isovue-370  IV contrast.  Radiation dose for this scan was reduced using automated exposure  control, adjustment of the mA and/or kV according to patient size, or  iterative reconstruction technique.    HISTORY: Abdominal pain.    COMPARISON: 4/7/2016 CT abdomen and pelvis.    FINDINGS:   Abdomen and Pelvis: Lung bases clear. Splenic calcifications  compatible with granulomas. Cholecystectomy clips. No worrisome focal  liver lesions, hypodense band near the falciform ligament is a typical  location for focal fatty infiltration. Normal-appearing pancreas,  adrenal glands and kidneys. No periaortic or pelvic adenopathy. No  free  fluid.    Stomach is distended with gas and fluid. No other acute-appearing  bowel abnormality and no evidence for bowel obstruction. Appendix  appears normal. No aggressive bone lesions.      Impression    IMPRESSION: Stomach distended with gas and fluid, otherwise no acute  abnormality.    NERISSA DOW MD       Medications   lidocaine 1 % 1 mL (not administered)   lidocaine (LMX4) kit (not administered)   sodium chloride (PF) 0.9% PF flush 3 mL (not administered)   sodium chloride (PF) 0.9% PF flush 3 mL (not administered)   ondansetron (ZOFRAN) injection 4 mg (not administered)   HYDROmorphone (PF) (DILAUDID) injection 0.5 mg (not administered)   0.9% sodium chloride BOLUS (0 mLs Intravenous Stopped 7/7/17 1948)   LORazepam (ATIVAN) injection 0.5 mg (0.5 mg Intravenous Given 7/7/17 1741)   sodium chloride (PF) 0.9% PF flush 3 mL (3 mLs Intravenous Given 7/7/17 1846)   iopamidol (ISOVUE-370) solution 500 mL (100 mLs Intravenous Given 7/7/17 1847)   sodium chloride 0.9 % for CT scan flush dose 1,000 mL (60 mLs Intravenous Given 7/7/17 1846)       Assessments & Plan (with Medical Decision Making)  Nathaly is a 62 year old female who presents to the ED complaining of persistent abdominal pain for several months that has worsened since her EGD 2 days ago, nausea, vomiting, and fatigue. Her blood pressure is slightly low at 94/85 mmHg. She is otherwise afebrile with normal vitals upon presentation to the ED. On exam, she exhibits guarding and mild, diffuse abdominal tenderness. She is tearful, anxious, and tremulous. IV established. Patient was given IV fluids, IV Zofran, IV Dilaudid, and IV Ativan with some relief. Labs and CT without significant acute finding.  Will send urine for culture.  As she admits to some anxiety and improved here with Ativan was sent home with small dose of Ativan as trial.  Will need close follow up in clinic.  Return if condition worsens.       I have reviewed the nursing notes.    I have  reviewed the findings, diagnosis, plan and need for follow up with the patient.       Discharge Medication List as of 7/7/2017  7:55 PM      START taking these medications    Details   LORazepam (ATIVAN) 0.5 MG tablet Take 1 tablet (0.5 mg) by mouth 2 times daily as needed for anxiety, Disp-10 tablet, R-0, Local Print             Final diagnoses:   Abdominal pain, generalized   Anxiety   Parkinson's disease (H)     This document serves as a record of services personally performed by Jose J Clark MD. It was created on their behalf by Disha Reed, a trained medical scribe. The creation of this record is based on the provider's personal observations and the statements of the patient. This document has been checked and approved by the attending provider.    Note: Chart documentation done in part with Dragon Voice Recognition software. Although reviewed after completion, some word and grammatical errors may remain.    7/7/2017   [facility] EMERGENCY DEPARTMENT     Jose J Clark MD  07/07/17 0227

## 2017-07-07 NOTE — ED AVS SNAPSHOT
[facility] Emergency Department    [address]     [place] [address]    Phone:  [phone]    Fax:  [phone]                                       Nathaly Farias   MRN: [id number]    Department:  [facility] Emergency Department   Date of Visit:  7/7/2017           Patient Information     Date Of Birth          1955        Your diagnoses for this visit were:     Abdominal pain, generalized     Anxiety     Parkinson's disease (H)        You were seen by Jose J Clark MD.      Follow-up Information     Follow up with Maxine Monae MD.    Specialty:  Family Practice    Why:  next week    Contact information:    [facility]   [address]  [facility] [address]  [phone]        Discharge References/Attachments     ABDOMINAL PAIN, UNKNOWN CAUSE, (FEMALE) (ENGLISH)      Future Appointments        Provider Department Dept Phone Center    7/13/2017 2:15 PM Maxine Monae MD, MD [facility] [phone] [facility]    7/17/2017 9:30 AM Jet Porter MD [facility] [phone] [place]    10/5/2017 8:00 AM Jack Esposito MD [facility] [phone] [facility]      24 Hour Appointment Hotline       To make an appointment at any [facility], call [phone] ([phone]). If you don't have a family doctor or clinic, we will help you find one. [facility] are conveniently located to serve the needs of you and your family.             Review of your medicines      START taking        Dose / Directions Last dose taken    LORazepam 0.5 MG tablet   Commonly known as:  ATIVAN   Dose:  0.5 mg   Quantity:  10 tablet        Take 1 tablet (0.5 mg) by mouth 2 times daily as needed for anxiety   Refills:  0          Our records show that you are taking the medicines listed below. If these are incorrect, please call your family doctor or clinic.        Dose / Directions Last dose taken    carbidopa-levodopa   MG per tablet   Commonly known as:  SINEMET   Quantity:  540 tablet        2 tablets @ 8am, 11am/12noon, and 9pm   Refills:  3        citalopram 20 MG tablet   Commonly known as:  celeXA   Dose:  20 mg   Quantity:  90 tablet        Take 1 tablet (20 mg) by mouth daily   Refills:  1        conjugated estrogens cream   Commonly known as:  PREMARIN   Dose:  0.5 g   Quantity:  30 g        Place 0.5 g vaginally twice a week Use daily first week of use   Refills:  11        donepezil 5 MG tablet   Commonly known as:  ARICEPT   Quantity:  30 tablet        5mg daily for one month then 10mg daily   Refills:  11        lisinopril-hydrochlorothiazide 10-12.5 MG per tablet   Commonly known as:  PRINZIDE/ZESTORETIC   Quantity:  30 tablet        TAKE ONE TABLET BY MOUTH ONCE DAILY DUE  FOR  LABS   Refills:  11        melatonin 5 MG tablet   Dose:  10 mg        Take 10 mg by mouth At Bedtime Reported on 5/10/2017   Refills:  0        omeprazole 40 MG capsule   Commonly known as:  priLOSEC   Dose:  40 mg   Quantity:  30 capsule        Take 1 capsule (40 mg) by mouth daily Take 30-60 minutes before a meal.   Refills:  2        polyethylene glycol powder   Commonly known as:  MIRALAX   Dose:  1 capful   Quantity:  510 g        Take 17 g (1 capful) by mouth daily Adjust as needed to TID prn in same ratio of 1 capful per 8 oz water   Refills:  1        pramipexole 0.25 MG tablet   Commonly known as:  MIRAPEX   Dose:  0.25 mg   Quantity:  270 tablet        Take 1 tablet (0.25 mg) by mouth 3 times daily   Refills:  3        trospium 20 MG tablet   Commonly known as:  SANCTURA   Dose:  20 mg   Quantity:  90 tablet        Take 1 tablet (20 mg) by mouth daily   Refills:  3        TYLENOL PM EXTRA STRENGTH PO   Dose:  1 tablet        Take 1 tablet by mouth as needed Reported on 4/24/2017   Refills:  0        ZOLMitriptan 5 MG tablet   Commonly known as:  ZOMIG   Dose:  5 mg   Quantity:  6 tablet        Take 1 tablet (5 mg) by mouth at  onset of headache for migraine MAY REPEAT ONCE AFTER 2 HOURS. DO NOT EXCEED 2 TABLETS IN 24 HOURS.   Refills:  10                Prescriptions were sent or printed at these locations (1 Prescription)                   [facility], [place] - [address]   [address], [place] [address]    Telephone:  [phone]   Fax:  [phone]   Hours:                  Printed at Department/Unit printer (1 of 1)         LORazepam (ATIVAN) 0.5 MG tablet                Procedures and tests performed during your visit     CBC with platelets differential    CT Abdomen Pelvis w Contrast    Comprehensive metabolic panel    Lipase    Peripheral IV catheter    TSH    UA reflex to Microscopic      Orders Needing Specimen Collection     None      Pending Results     No orders found from 7/5/2017 to 7/8/2017.            Pending Culture Results     No orders found from 7/5/2017 to 7/8/2017.            Pending Results Instructions     If you had any lab results that were not finalized at the time of your Discharge, you can call the ED Lab Result RN at [phone]. You will be contacted by this team for any positive Lab results or changes in treatment. The nurses are available 7 days a week from 10A to 6:30P.  You can leave a message 24 hours per day and they will return your call.        Thank you for choosing [facility]       Thank you for choosing [facility] for your care. Our goal is always to provide you with excellent care. Hearing back from our patients is one way we can continue to improve our services. Please take a few minutes to complete the written survey that you may receive in the mail after you visit with us. Thank you!        [employer] Information     [employer] gives you secure access to your electronic health record. If you see a primary care provider, you can also send messages to your care team and make appointments. If you have questions, please call your primary care clinic.  If you do not  have a primary care provider, please call [phone] and they will assist you.        Care EveryWhere ID     This is your Care EveryWhere ID. This could be used by other organizations to access your [facility] medical records  [id number]        Equal Access to Services     ARACELY DAVENPORT : Jn Rodgers, april samuel, xi worley. So [facility] [phone].    ATENCIÓN: Si habla español, tiene a shoemaker disposición servicios gratuitos de asistencia lingüística. Llame al [phone].    We comply with applicable federal civil rights laws and [place] laws. We do not discriminate on the basis of race, color, national origin, age, disability sex, sexual orientation or gender identity.            After Visit Summary       This is your record. Keep this with you and show to your community pharmacist(s) and doctor(s) at your next visit.

## 2017-07-07 NOTE — ED AVS SNAPSHOT
[facility] Emergency Department    [address] [place] [address]    Phone:  [phone]    Fax:  [phone]                                       Nathaly Farias   MRN: [id number]    Department:  [facility] Emergency Department   Date of Visit:  7/7/2017           After Visit Summary Signature Page     I have received my discharge instructions, and my questions have been answered. I have discussed any challenges I see with this plan with the nurse or doctor.    ..........................................................................................................................................  Patient/Patient Representative Signature      ..........................................................................................................................................  Patient Representative Print Name and Relationship to Patient    ..................................................               ................................................  Date                                            Time    ..........................................................................................................................................  Reviewed by Signature/Title    ...................................................              ..............................................  Date                                                            Time

## 2017-07-08 NOTE — ED NOTES
Report received from Cecy DE LEON at 1915.  Reviewed discharge instructions with pt and her daughter.

## 2017-07-10 ENCOUNTER — OFFICE VISIT (OUTPATIENT)
Dept: FAMILY MEDICINE | Facility: OTHER | Age: 62
End: 2017-07-10
Payer: MEDICARE

## 2017-07-10 ENCOUNTER — TELEPHONE (OUTPATIENT)
Dept: FAMILY MEDICINE | Facility: OTHER | Age: 62
End: 2017-07-10

## 2017-07-10 VITALS
WEIGHT: 132 LBS | BODY MASS INDEX: 24.29 KG/M2 | SYSTOLIC BLOOD PRESSURE: 122 MMHG | DIASTOLIC BLOOD PRESSURE: 86 MMHG | TEMPERATURE: 98 F | RESPIRATION RATE: 18 BRPM | HEIGHT: 62 IN | HEART RATE: 68 BPM

## 2017-07-10 DIAGNOSIS — R10.84 ABDOMINAL PAIN, GENERALIZED: Primary | ICD-10-CM

## 2017-07-10 PROCEDURE — 99213 OFFICE O/P EST LOW 20 MIN: CPT | Performed by: FAMILY MEDICINE

## 2017-07-10 ASSESSMENT — ANXIETY QUESTIONNAIRES
5. BEING SO RESTLESS THAT IT IS HARD TO SIT STILL: NOT AT ALL
7. FEELING AFRAID AS IF SOMETHING AWFUL MIGHT HAPPEN: NOT AT ALL
GAD7 TOTAL SCORE: 5
4. TROUBLE RELAXING: SEVERAL DAYS
GAD7 TOTAL SCORE: 5
1. FEELING NERVOUS, ANXIOUS, OR ON EDGE: SEVERAL DAYS
3. WORRYING TOO MUCH ABOUT DIFFERENT THINGS: NEARLY EVERY DAY
GAD7 TOTAL SCORE: 5
7. FEELING AFRAID AS IF SOMETHING AWFUL MIGHT HAPPEN: NOT AT ALL
2. NOT BEING ABLE TO STOP OR CONTROL WORRYING: NOT AT ALL
6. BECOMING EASILY ANNOYED OR IRRITABLE: NOT AT ALL

## 2017-07-10 ASSESSMENT — PATIENT HEALTH QUESTIONNAIRE - PHQ9
10. IF YOU CHECKED OFF ANY PROBLEMS, HOW DIFFICULT HAVE THESE PROBLEMS MADE IT FOR YOU TO DO YOUR WORK, TAKE CARE OF THINGS AT HOME, OR GET ALONG WITH OTHER PEOPLE: VERY DIFFICULT
SUM OF ALL RESPONSES TO PHQ QUESTIONS 1-9: 17
SUM OF ALL RESPONSES TO PHQ QUESTIONS 1-9: 17

## 2017-07-10 ASSESSMENT — PAIN SCALES - GENERAL: PAINLEVEL: MODERATE PAIN (4)

## 2017-07-10 NOTE — PROGRESS NOTES
SUBJECTIVE:                                                    Nathaly Farias is a 62 year old female who presents to clinic today for the following health issues:      HPI    ED/UC Followup:    Facility:  [facility] Emergency Department  Date of visit: 7/7/17  Reason for visit: Abdominal Pain  Current Status: Still has Abdominal Pain & Nauseas     Answers for HPI/ROS submitted by the patient on 7/10/2017   If you checked off any problems, how difficult have these problems made it for you to do your work, take care of things at home, or get along with other people?: Very difficult  PHQ9 TOTAL SCORE: 17  REX 7 TOTAL SCORE: 5    Problem list and histories reviewed & adjusted, as indicated.  Additional history: as documented      Patient Active Problem List   Diagnosis     Prolapse of vaginal wall     Migraine variant     Symptomatic menopausal or female climacteric states     Obesity     Malaise and fatigue     Other and unspecified adverse effect of drug, medicinal and biological substance     Other and unspecified disc disorder of cervical region     Sleep apnea     Essential hypertension, benign     Chronic cholecystitis     Overactive bladder     Neck pain     Headache     HYPERLIPIDEMIA LDL GOAL <160     Mild major depression (H)     Advanced directives, counseling/discussion     HTN, goal below 140/90     Paralysis agitans (H)     History of MRI of brain and brain stem     Wears glasses     Constipation     Incomplete RBBB     Heart murmur     Family history of tremor     Rosacea     Asymptomatic varicose veins     Seborrheic dermatitis     Post-traumatic osteoarthritis of left knee     Pain from implanted hardware, initial encounter     Cognitive disorder evalaution 2017     Dementia     Hiatal hernia     Past Surgical History:   Procedure Laterality Date     ------------OTHER-------------      left shoulder     ARTHRODESIS TOE(S)  3/2/2011    ARTHRODESIS TOE(S) performed by CLARA LUCAS at [facility]  OR     C LIGATE FALLOPIAN TUBE  1998     CHOLECYSTECTOMY, LAPOROSCOPIC  5/12/2008    Cholecystectomy, Laparoscopic     ESOPHAGOSCOPY, GASTROSCOPY, DUODENOSCOPY (EGD), COMBINED N/A 7/5/2017    Procedure: COMBINED ESOPHAGOSCOPY, GASTROSCOPY, DUODENOSCOPY (EGD), BIOPSY SINGLE OR MULTIPLE;  Esophagogastroduodenoscopy with Multiple Biopsies by Biopsy;  Surgeon: Tj Denney MD;  Location: [facility]     HC COLONOSCOPY W/WO BRUSH/WASH  08/30/06    normal     HC OPEN TX TIBIAL SHAFT FX W PLATE/SCREWS W/WO CERCLAGE  2000    Multiple operations on left leg     HC SHLDR ARTHROSCOP,PART ACROMIOPLAS  11/16/06    Right shoulder     HC SHLDR ARTHROSCOP,SURG,DIS CLAVICULECTOMY  11/16/06    Right shoulder       Social History   Substance Use Topics     Smoking status: Never Smoker     Smokeless tobacco: Never Used      Comment: no smokers in the household     Alcohol use 0.0 oz/week     0 Standard drinks or equivalent per week      Comment: very occ     Family History   Problem Relation Age of Onset     DIABETES Father      type II, diagnosed in late 60's     Hypertension Father      CANCER Father      rare kidney cancer had kidney removed, 75 y.o. at onset     CEREBROVASCULAR DISEASE Father      Rashes/Skin Problems Father      rosacea     Neurologic Disorder Mother      head tremor     HEART DISEASE Brother      Valvular disease corrected with surgery     HEART DISEASE Paternal Grandfather      MI     Migraines Son      Migraines Sister      Rashes/Skin Problems Sister      rosacea         Current Outpatient Prescriptions   Medication Sig Dispense Refill     LORazepam (ATIVAN) 0.5 MG tablet Take 1 tablet (0.5 mg) by mouth 2 times daily as needed for anxiety 10 tablet 0     lisinopril-hydrochlorothiazide (PRINZIDE/ZESTORETIC) 10-12.5 MG per tablet TAKE ONE TABLET BY MOUTH ONCE DAILY DUE  FOR  LABS 30 tablet 11     polyethylene glycol (MIRALAX) powder Take 17 g (1 capful) by mouth daily Adjust as needed to TID prn in same ratio of 1  "capful per 8 oz water 510 g 1     Diphenhydramine-APAP, sleep, (TYLENOL PM EXTRA STRENGTH PO) Take 1 tablet by mouth as needed Reported on 4/24/2017       melatonin 5 MG tablet Take 10 mg by mouth nightly as needed Reported on 5/10/2017       carbidopa-levodopa (SINEMET)  MG per tablet 2 tablets @ 8am, 11am/12noon, and 9pm 540 tablet 3     citalopram (CELEXA) 20 MG tablet Take 1 tablet (20 mg) by mouth daily 90 tablet 1     ZOLMitriptan (ZOMIG) 5 MG tablet Take 1 tablet (5 mg) by mouth at onset of headache for migraine MAY REPEAT ONCE AFTER 2 HOURS. DO NOT EXCEED 2 TABLETS IN 24 HOURS. 6 tablet 10     trospium (SANCTURA) 20 MG tablet Take 20 mg by mouth every morning       oxyCODONE (ROXICODONE) 5 MG IR tablet Take 5 mg by mouth every 4 hours as needed for moderate to severe pain 0.5-1 tab as needed       senna (SENOKOT) 8.6 MG tablet Take 1 tablet by mouth daily as needed for constipation       pramipexole (MIRAPEX) 0.25 MG tablet Taking 1/2 tab 3/day. Was rxd 1 tab 3/day 270 tablet 3     Allergies   Allergen Reactions     Sulfa Drugs      Tacchycardia, had to go to ED     Naproxen GI Disturbance     Oxycontin [Oxycodone] GI Disturbance     Made her feel funny/upset stomach     Rivastigmine      Patch was too expensive     Ropinirole      Did not work     BP Readings from Last 3 Encounters:   07/17/17 104/69   07/10/17 122/86   07/07/17 118/88    Wt Readings from Last 3 Encounters:   07/17/17 136 lb (61.7 kg)   07/10/17 132 lb (59.9 kg)   07/07/17 138 lb (62.6 kg)                  Labs reviewed in EPIC        ROS:  Constitutional, HEENT, cardiovascular, pulmonary, GI, , musculoskeletal, neuro, skin, endocrine and psych systems are negative, except as otherwise noted.      OBJECTIVE:   /86 (BP Location: Right arm, Patient Position: Chair, Cuff Size: Adult Regular)  Pulse 68  Temp 98  F (36.7  C) (Temporal)  Resp 18  Ht 5' 1.8\" (1.57 m)  Wt 132 lb (59.9 kg)  LMP 06/05/2006  BMI 24.3 kg/m2  Body " mass index is 24.3 kg/(m^2).   Physical Exam   Constitutional: She is oriented to person, place, and time. She appears well-developed and well-nourished.   HENT:   Head: Normocephalic and atraumatic.   Eyes: EOM are normal.   Cardiovascular: Normal rate and regular rhythm.    Pulmonary/Chest: Effort normal and breath sounds normal.   Abdominal: Soft. Bowel sounds are normal. She exhibits no distension and no mass. There is tenderness. There is no rebound and no guarding.   Neurological: She is alert and oriented to person, place, and time. No cranial nerve deficit.   tremors           ASSESSMENT/PLAN:     Problem List Items Addressed This Visit     None         1. Abdominal pain, generalized  Constipation vs GERD vs Anxiety   Discussed bowel regimen   Discussed medication that could contribute  Trial laxatives and anti anxiety medication  Discussed home care  Reportable signs and symptoms discussed  RTC if symptoms persist or fail to improve    Melisa Hinojosa MD  [facility]

## 2017-07-10 NOTE — TELEPHONE ENCOUNTER
Left message for patient to call back. Please see message below. AE not available for work in, but other providers have openings she could schedule with.  Ashwini Joiner, CMA

## 2017-07-10 NOTE — PATIENT INSTRUCTIONS
1. Miralax - 1capful every 6-8 hrs as needed  2. Senna -8.6mg 2 times a day as needed  3. Colace-100mg 2 times a day as needed.

## 2017-07-10 NOTE — MR AVS SNAPSHOT
After Visit Summary   7/10/2017    Nathaly Farias    MRN: [id number]           Patient Information     Date Of Birth          1955        Visit Information        Provider Department      7/10/2017 4:00 PM Melisa Hinojosa MD [facility]        Care Instructions    1. Miralax - 1capful every 6-8 hrs as needed  2. Senna -8.6mg 2 times a day as needed  3. Colace-100mg 2 times a day as needed.          Follow-ups after your visit        Your next 10 appointments already scheduled     Jul 17, 2017  9:30 AM CDT   [facility] Visit with Jet Porter MD   [facility]    [address] [place] [id number]   [phone]            Jul 17, 2017  1:00 PM CDT   [facility]    [address] [id number] [phone]           Bring a current list of meds and any records pertaining to this visit.  For Physicals, please bring immunization records and any forms needing to be filled out.  Please arrive 10 minutes early to complete paperwork.            Oct 05, 2017  8:00 AM CDT   [facility]ito [facility]   [facility]    [address] [facility] [phone]              Who to contact     If you have questions or need follow up information about today's clinic visit or your schedule please contact [facility] directly at [phone].  Normal or non-critical lab and imaging results will be communicated to you by MyChart, letter or phone within 4 business days after the clinic has received the results. If you do not hear from us within 7 days, please contact the clinic through MyChart or phone. If you have a critical or abnormal lab result, we will notify you by phone as soon as possible.  Submit refill requests through [facility] or call your  "pharmacy and they will forward the refill request to us. Please allow 3 business days for your refill to be completed.          Additional Information About Your Visit        MyChart Information     [facility] gives you secure access to your electronic health record. If you see a primary care provider, you can also send messages to your care team and make appointments. If you have questions, please call your primary care clinic.  If you do not have a primary care provider, please call [phone] and they will assist you.        Care EveryWhere ID     This is your Care EveryWhere ID. This could be used by other organizations to access your [facility] medical records  [id number]        Your Vitals Were     Pulse Temperature Respirations Height Last Period BMI (Body Mass Index)    68 98  F (36.7  C) (Temporal) 18 5' 1.8\" (1.57 m) 06/05/2006 24.3 kg/m2       Blood Pressure from Last 3 Encounters:   07/10/17 122/86   07/07/17 118/88   07/05/17 (!) 140/95    Weight from Last 3 Encounters:   07/10/17 132 lb (59.9 kg)   07/07/17 138 lb (62.6 kg)   05/10/17 138 lb (62.6 kg)              Today, you had the following     No orders found for display       Primary Care Provider Office Phone # Fax #    Maxine Luda Monae [phone]       [facility]  [address] [facility] [address]        Equal Access to Services     ARACELY DAVENPORT : Hadii aad ku hadasho Soomaali, waaxda luqadaha, qaybta kaalmada adeegyada, xi butts . So [facility] [phone].    ATENCIÓN: Si habla español, tiene a shoemaker disposición servicios gratuitos de asistencia lingüística. Paulo al [phone].    We comply with applicable federal civil rights laws and [place] laws. We do not discriminate on the basis of race, color, national origin, age, disability sex, sexual orientation or gender identity.            Thank you!     Thank you for choosing [facility]  for your care. Our goal is " always to provide you with excellent care. Hearing back from our patients is one way we can continue to improve our services. Please take a few minutes to complete the written survey that you may receive in the mail after your visit with us. Thank you!             Your Updated Medication List - Protect others around you: Learn how to safely use, store and throw away your medicines at www.disposemymeds.org.          This list is accurate as of: 7/10/17  5:27 PM.  Always use your most recent med list.                   Brand Name Dispense Instructions for use Diagnosis    carbidopa-levodopa  MG per tablet    SINEMET    540 tablet    2 tablets @ 8am, 11am/12noon, and 9pm    Paralysis agitans (H)       citalopram 20 MG tablet    celeXA    90 tablet    Take 1 tablet (20 mg) by mouth daily    Major depressive disorder, recurrent episode, mild (H)       conjugated estrogens cream    PREMARIN    30 g    Place 0.5 g vaginally twice a week Use daily first week of use    Urethral caruncle       donepezil 5 MG tablet    ARICEPT    30 tablet    5mg daily for one month then 10mg daily    Mild dementia, Paralysis agitans (H)       lisinopril-hydrochlorothiazide 10-12.5 MG per tablet    PRINZIDE/ZESTORETIC    30 tablet    TAKE ONE TABLET BY MOUTH ONCE DAILY DUE  FOR  LABS    HTN, goal below 140/90       LORazepam 0.5 MG tablet    ATIVAN    10 tablet    Take 1 tablet (0.5 mg) by mouth 2 times daily as needed for anxiety        melatonin 5 MG tablet      Take 10 mg by mouth At Bedtime Reported on 5/10/2017        omeprazole 40 MG capsule    priLOSEC    30 capsule    Take 1 capsule (40 mg) by mouth daily Take 30-60 minutes before a meal.    Gastroesophageal reflux disease, esophagitis presence not specified       polyethylene glycol powder    MIRALAX    510 g    Take 17 g (1 capful) by mouth daily Adjust as needed to TID prn in same ratio of 1 capful per 8 oz water    Other constipation       pramipexole 0.25 MG tablet     MIRAPEX    270 tablet    Take 1 tablet (0.25 mg) by mouth 3 times daily    Acute pain of left knee       trospium 20 MG tablet    SANCTURA    90 tablet    Take 1 tablet (20 mg) by mouth daily    Urinary frequency       TYLENOL PM EXTRA STRENGTH PO      Take 1 tablet by mouth as needed Reported on 4/24/2017        ZOLMitriptan 5 MG tablet    ZOMIG    6 tablet    Take 1 tablet (5 mg) by mouth at onset of headache for migraine MAY REPEAT ONCE AFTER 2 HOURS. DO NOT EXCEED 2 TABLETS IN 24 HOURS.    Variants of migraine, not elsewhere classified, without mention of intractable migraine without mention of status migrainosus

## 2017-07-10 NOTE — TELEPHONE ENCOUNTER
Reason for Call:  Same Day Appointment, Requested Provider:  Maxine Monae MD     PCP: Maxine Monae    Reason for visit: ED recheck abd pain per B Clark    Duration of symptoms: Ongoing since December 2016    Have you been treated for this in the past? Yes    Additional comments: Pt is scheduled on 7/13 @ 2:15pm, however due to transportation requesting to be seen today by Dov.    Can we leave a detailed message on this number? YES    Phone number patient can be reached at: Home number on file [phone] (home)    Best Time: asap    Call taken on 7/10/2017 at 8:59 AM by Jammie Otero

## 2017-07-10 NOTE — TELEPHONE ENCOUNTER
I am likely leaving clinic for delivery today and have to double book already scheduled patients, so will be unable to work more patients in today.  Maxine Monae MD

## 2017-07-11 ASSESSMENT — PATIENT HEALTH QUESTIONNAIRE - PHQ9: SUM OF ALL RESPONSES TO PHQ QUESTIONS 1-9: 17

## 2017-07-11 ASSESSMENT — ANXIETY QUESTIONNAIRES: GAD7 TOTAL SCORE: 5

## 2017-07-14 NOTE — PROGRESS NOTES
Diagnosis/Summary/Recommendations:    PATIENT: Nathaly Farias  62 year old female     : 1955    MOISE: 2017    Parkinson  Mild dementia  ED visit for abdominal pain    Did not get coverage for her rivastigmine due to cost  Had been started on donepezil   Not clear if gi problems related to donepezil      Sinemet 25/100 2 tabs 3/day  Citalopram 20mg - it is in her pill box.     aricept 5mg ? 10mg  - > ?taking this?    Lisinopril/hctz - is taking this.     Lorazepam - not clear what it looks like     Gi issues  Melatonin  Omeprazole  miralax - states she is taking miralax.     mirapex 0.25mg 3/day  - ? Taking this.   sanctura - not taking this anymore.   zolmitriptan    Over 50% of this visit was spent in patient care and care coordination.     History obtained from patient    Total visit time was 25 minutes    PLAN:  Add 1-2 senokot S at bedtime  Continue the miralax every morning    Need clarification of her medications  1. mirapex dose and if she is taking   2. Is she taking donepezil  3. The lorazepam - color of medication and how frequent.     We may ultimately having her go off the donepezil if she is still having abdominal pain    Use drugs.com to help in identifying medications and showing images of medications    Return back in 3-6 months.       Jet Porter MD     ______________________________________    Last visit date and details:     MOISE: April 3, 2017     Parkinson  Mild dementia     Sinemet 25/100 2 tabs 3 times per day. She is encouraged to take her medication one hour before protein or two hours after protein.   cipro - completed course.   celexa 20mg per day for depression   Prinzide - for blood pressure.   miralax - as needed.   mirapex  0.25mg 3/day -- NOT SURE IF SHE IS TAKING   sanctura  -- NOT SURE IF SHE IS TAKING.   zomig as needed.   Melatonin 5mg 2 tabs daily      Ongoing leg problems.   Had synvisc shot and has had been using crutches and has metal in the leg.      PLAN  1.  "Sort out medications - mirapex, sanctura  2. Not sure if rivastigmine will be covered but will try this with the exelon patch  4.6 mg patch daily x 1month then 9.5mg patch daily x 1 month then 13.3mg patch daily     If not then may have to use donepezil 5mg/day x month then 10mg /day     Return back in 3 months.      While dbs is not likely to be considered down the road, we may consider repeating her neuropsych and think about less invasive procedures such as gamma knife or focused beam u/s - these were discussed briefly as well as dbs.      Jet myers MD      EMERGENCY ROOM VISIT DETAILS BELOW    The history is provided by the patient and a relative.      Nathaly Farias is a 62 year old female with a history of Parkinson's Disease, Dementia, Constipation, and Hiatal Hernia, who presents to the ED complaining of abdominal pain and fatigue. The patient was initially evaluated for abdominal pain while in the clinic on 12/5/2016, about 7 months ago. At that time, the provider thought that she was likely constipated and started her on Miralax. She also ordered a colonoscopy. Patient had a colonoscopy on 12/21/2016 in which 3 adenomatous polyps were removed, but was otherwise normal. She was seen in the clinic once again complaining of abdominal pain a short time later on 1/9, about 6 months ago. She was found to have a UTI at that time so she was treated with antibiotics and given Zofran to combat nausea. Patient was not seen regarding her abdominal pain again until 4/24, a little over 2 months ago, at which time the provider thought her symptoms were related to GERd and prescribed a PPI. She presented soon after on 5/10 complaining of the same pain so she was scheduled for an endoscopy. Patient had an EGD on 7/5, 2 days ago. She says that when she returned home from her procedure she felt nauseated and experienced an episode of emesis which \"looked black and thick like coffee grounds\". Her daughter states that since " "then she has continued to complain of nausea, upper abdominal discomfort, and extreme fatigue - \"she just wants to sleep\". Her appetite has been decreased due to her nausea and her daughter is concerned that she has not been taking her daily medications as prescribed because she has been feeling ill. Patient denies any hematochezia, melena, hematemesis, fever, or other associated symptoms. SHx of Cholecystectomy. Her last Abdominal CT was normal in 4/2016, a little over a year ago.          Patient Active Problem List   Diagnosis     Prolapse of vaginal wall     Migraine variant     Symptomatic menopausal or female climacteric states     Obesity     Malaise and fatigue     Other and unspecified adverse effect of drug, medicinal and biological substance     Other and unspecified disc disorder of cervical region     Sleep apnea     Essential hypertension, benign     Chronic cholecystitis     Overactive bladder     Neck pain     Headache     HYPERLIPIDEMIA LDL GOAL <160     Mild major depression (H)     Advanced directives, counseling/discussion     HTN, goal below 140/90     Paralysis agitans (H)     History of MRI of brain and brain stem     Wears glasses     Constipation     Incomplete RBBB     Heart murmur     Family history of tremor     Rosacea     Asymptomatic varicose veins     Seborrheic dermatitis     Post-traumatic osteoarthritis of left knee     Pain from implanted hardware, initial encounter     Cognitive disorder evalaution 2017     Dementia     Hiatal hernia       Past Medical History    Past Medical History:   Diagnosis Date     Arthralgia of temporomandibular joint 10/22/1996     Arthritis       Cognitive disorder evalaution 2017 3/6/2017     She has marked attentional difficulties, impairments in memory including a rapid forgetting rate, mild anomia, and mild executive dysfunction. 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 She's required increasing assistance with finances, medications, driving, and cooking. I think this is a mild dementia, and     Constipation 2/23/2015     Dementia 3/13/2017     IMPRESSIONS AND RECOMMENDATIONS   Current results indicate marked attentional difficulties, impairments in memory, which in some cases reflected impairments in retention and in other cases reflected retrieval difficulties, and mild anomia as well as mild executive dysfunction. Premorbid intellectual functioning is estimated to fall in the borderline range. She denied experiencing significant depre     Depressive disorder       Heart murmur 2/23/2015     History of MRI of brain and brain stem 2/21/2015     MRI OF THE BRAIN WITHOUT CONTRAST 7/10/2014 1:21 PM  COMPARISON: None. HISTORY: Left-sided upper and lower extremity tremor. Balance issues. TECHNIQUE:  Brain: Axial diffusion-weighted with ADC map, T2-weighted with fat saturation, T1-weighted and turboFLAIR and coronal T1-weighted images of the brain were obtained without intravenous contrast.  FINDINGS: There is mild diffuse cerebral volume loss     Hypertension       Incomplete RBBB 2/23/2015     Motion sickness       Other and unspecified hyperlipidemia       Parkinson's disease (H)       Seborrheic dermatitis 2/29/2016     Unspecified arthropathy, hand 08/07/1997     Unspecified sleep apnea       moderate, sleep study 11/07, on CPAP, has daytime somnolence with this     Variants of migraine, not elsewhere classified, without mention of intractable migraine without mention of status migrainosus       Wears glasses 2/23/2015             Past Surgical History          Past Surgical History:   Procedure Laterality Date     ------------OTHER-------------         left shoulder     ARTHRODESIS TOE(S)   3/2/2011     ARTHRODESIS TOE(S) performed by CLARA LUCAS at  OR     C LIGATE FALLOPIAN TUBE   1998     CHOLECYSTECTOMY, LAPOROSCOPIC   5/12/2008     Cholecystectomy,  Laparoscopic     ESOPHAGOSCOPY, GASTROSCOPY, DUODENOSCOPY (EGD), COMBINED N/A 7/5/2017     Procedure: COMBINED ESOPHAGOSCOPY, GASTROSCOPY, DUODENOSCOPY (EGD), BIOPSY SINGLE OR MULTIPLE;  Esophagogastroduodenoscopy with Multiple Biopsies by Biopsy;  Surgeon: Tj Denney MD;  Location: [facility]     HC COLONOSCOPY W/WO BRUSH/WASH   08/30/06     normal     HC OPEN TX TIBIAL SHAFT FX W PLATE/SCREWS W/WO CERCLAGE   2000     Multiple operations on left leg     HC SHLDR ARTHROSCOP,PART ACROMIOPLAS   11/16/06     Right shoulder     HC SHLDR ARTHROSCOP,SURG,DIS CLAVICULECTOMY   11/16/06     Right shoulder             Family History           Family History   Problem Relation Age of Onset     DIABETES Father         type II, diagnosed in late 60's     Hypertension Father       CANCER Father         rare kidney cancer had kidney removed, 75 y.o. at onset     CEREBROVASCULAR DISEASE Father       Rashes/Skin Problems Father         rosacea     Neurologic Disorder Mother         head tremor     HEART DISEASE Brother         Valvular disease corrected with surgery     HEART DISEASE Paternal Grandfather         MI     Migraines Son       Migraines Sister       Rashes/Skin Problems Sister         rosacea                    Social History    Substance Use Topics      Smoking status: Never Smoker      Smokeless tobacco: Never Used          Comment: no smokers in the household      Alcohol use 0.0 oz/week       0 Standard drinks or equivalent per week         Comment: very occ               Immunization History   Administered Date(s) Administered     Influenza (H1N1) 01/12/2010     Influenza (IIV3) 10/15/1996, 10/14/1997, 10/15/1998, 10/06/1999, 12/29/2000, 11/15/2001, 11/11/2002, 10/16/2003, 11/01/2004, 11/02/2005, 11/12/2007, 11/10/2008, 11/10/2010, 10/04/2011, 01/04/2013, 09/03/2014     Influenza Vaccine IM 3yrs+ 4 Valent IIV4 08/22/2016     Influenza Vaccine, 3 YRS +, IM (QUADRIVALENT W/PRESERVATIVES) 09/08/2015     Pneumococcal  23 valent 11/01/2004     TD (ADULT, 7+) 10/05/1999     TDAP Vaccine (Adacel) 10/04/2011     Zoster vaccine, live 11/29/2014                   Allergies   Allergen Reactions     Sulfa Drugs         Tacchycardia, had to go to ED     Naproxen GI Disturbance     Oxycontin [Oxycodone] GI Disturbance       Made her feel funny/upset stomach     Ropinirole         Did not work          Current Outpatient Prescriptions           Current Outpatient Prescriptions   Medication Sig Dispense Refill     LORazepam (ATIVAN) 0.5 MG tablet Take 1 tablet (0.5 mg) by mouth 2 times daily as needed for anxiety 10 tablet 0     lisinopril-hydrochlorothiazide (PRINZIDE/ZESTORETIC) 10-12.5 MG per tablet TAKE ONE TABLET BY MOUTH ONCE DAILY DUE  FOR  LABS 30 tablet 11     polyethylene glycol (MIRALAX) powder Take 17 g (1 capful) by mouth daily Adjust as needed to TID prn in same ratio of 1 capful per 8 oz water 510 g 1     omeprazole (PRILOSEC) 40 MG capsule Take 1 capsule (40 mg) by mouth daily Take 30-60 minutes before a meal. 30 capsule 2     Diphenhydramine-APAP, sleep, (TYLENOL PM EXTRA STRENGTH PO) Take 1 tablet by mouth as needed Reported on 4/24/2017         melatonin 5 MG tablet Take 10 mg by mouth At Bedtime Reported on 5/10/2017         carbidopa-levodopa (SINEMET)  MG per tablet 2 tablets @ 8am, 11am/12noon, and 9pm 540 tablet 3     pramipexole (MIRAPEX) 0.25 MG tablet Take 1 tablet (0.25 mg) by mouth 3 times daily 270 tablet 3     donepezil (ARICEPT) 5 MG tablet 5mg daily for one month then 10mg daily 30 tablet 11     citalopram (CELEXA) 20 MG tablet Take 1 tablet (20 mg) by mouth daily 90 tablet 1     trospium (SANCTURA) 20 MG tablet Take 1 tablet (20 mg) by mouth daily 90 tablet 3     ZOLMitriptan (ZOMIG) 5 MG tablet Take 1 tablet (5 mg) by mouth at onset of headache for migraine MAY REPEAT ONCE AFTER 2 HOURS. DO NOT EXCEED 2 TABLETS IN 24 HOURS. 6 tablet 10     conjugated estrogens (PREMARIN) cream Place 0.5 g vaginally  "twice a week Use daily first week of use 30 g 11         I have reviewed the Medications, Allergies, Past Medical and Surgical History, and Social History in the Epic system.     Review of Systems   Constitutional: Positive for appetite change (decreased) and fatigue. Negative for fever.   Gastrointestinal: Positive for abdominal pain (upper), nausea and vomiting. Negative for anal bleeding, blood in stool, constipation and diarrhea.   All other systems reviewed and are negative.        Physical Exam   BP: 94/85  Pulse: 113  Temp: 98.3  F (36.8  C)  Resp: 16  Height: 154.9 cm (5' 1\")  Weight: 62.6 kg (138 lb)  SpO2: 96 %     Physical Exam   Constitutional: She is oriented to person, place, and time. No distress.   Patient is tearful, anxious, and tremulous.    HENT:   Head: Normocephalic and atraumatic.   Eyes: Conjunctivae and EOM are normal.   Cardiovascular: Normal rate, regular rhythm, normal heart sounds and intact distal pulses.    No murmur heard.  Pulmonary/Chest: Effort normal and breath sounds normal. No respiratory distress. She has no wheezes. She has no rales. She exhibits no tenderness.   Abdominal: Soft. Bowel sounds are normal. She exhibits no distension. There is no hepatosplenomegaly. There is tenderness (diffuse, mild). There is guarding. There is no rigidity and no rebound.   Neurological: She is alert and oriented to person, place, and time.   Skin: Skin is warm and dry. No rash noted. She is not diaphoretic. No pallor.   Psychiatric: She has a normal mood and affect. Her behavior is normal.   Nursing note and vitals reviewed.        ED Course      ED Course      Procedures              Critical Care time:  none                        Results for orders placed or performed during the hospital encounter of 07/07/17 (from the past 24 hour(s))   UA reflex to Microscopic   Result Value Ref Range     Color Urine Yellow       Appearance Urine Slightly Cloudy       Glucose Urine Negative NEG mg/dL     " Bilirubin Urine Negative NEG     Ketones Urine 5 (A) NEG mg/dL     Specific Gravity Urine 1.020 1.003 - 1.035     Blood Urine Negative NEG     pH Urine 5.0 5.0 - 7.0 pH     Protein Albumin Urine Negative NEG mg/dL     Urobilinogen mg/dL 0.0 0.0 - 2.0 mg/dL     Nitrite Urine Negative NEG     Leukocyte Esterase Urine Moderate (A) NEG     Source Midstream Urine       RBC Urine 1 0 - 2 /HPF     WBC Urine 12 (H) 0 - 2 /HPF     Bacteria Urine Few (A) NEG /HPF     Squamous Epithelial /HPF Urine 1 0 - 1 /HPF     Mucous Urine Present (A) NEG /LPF     Hyaline Casts 4 (H) 0 - 2 /LPF   CBC with platelets differential   Result Value Ref Range     WBC 8.1 4.0 - 11.0 10e9/L     RBC Count 4.28 3.8 - 5.2 10e12/L     Hemoglobin 12.7 11.7 - 15.7 g/dL     Hematocrit 40.2 35.0 - 47.0 %     MCV 94 78 - 100 fl     MCH 29.7 26.5 - 33.0 pg     MCHC 31.6 31.5 - 36.5 g/dL     RDW 13.7 10.0 - 15.0 %     Platelet Count 250 150 - 450 10e9/L     Diff Method Automated Method       % Neutrophils 61.0 %     % Lymphocytes 31.0 %     % Monocytes 7.0 %     % Eosinophils 1.0 %     Absolute Neutrophil 4.9 1.6 - 8.3 10e9/L     Absolute Lymphocytes 2.5 0.8 - 5.3 10e9/L     Absolute Monocytes 0.6 0.0 - 1.3 10e9/L     Absolute Eosinophils 0.1 0.0 - 0.7 10e9/L   Comprehensive metabolic panel   Result Value Ref Range     Sodium 140 133 - 144 mmol/L     Potassium 4.2 3.4 - 5.3 mmol/L     Chloride 103 94 - 109 mmol/L     Carbon Dioxide 29 20 - 32 mmol/L     Anion Gap 8 3 - 14 mmol/L     Glucose 92 70 - 99 mg/dL     Urea Nitrogen 26 7 - 30 mg/dL     Creatinine 1.24 (H) 0.52 - 1.04 mg/dL     GFR Estimate 44 (L) >60 mL/min/1.7m2     GFR Estimate If Black 53 (L) >60 mL/min/1.7m2     Calcium 9.4 8.5 - 10.1 mg/dL     Bilirubin Total 1.1 0.2 - 1.3 mg/dL     Albumin 3.8 3.4 - 5.0 g/dL     Protein Total 7.7 6.8 - 8.8 g/dL     Alkaline Phosphatase 54 40 - 150 U/L     ALT 8 0 - 50 U/L     AST 16 0 - 45 U/L   Lipase   Result Value Ref Range     Lipase 183 73 - 393 U/L   TSH    Result Value Ref Range     TSH 0.91 0.40 - 4.00 mU/L   CT Abdomen Pelvis w Contrast     Narrative     CT ABDOMEN AND PELVIS WITH CONTRAST  7/7/2017 7:03 PM     TECHNIQUE: Images from diaphragm to pubic symphysis 100mL, Isovue-370  IV contrast.  Radiation dose for this scan was reduced using automated exposure  control, adjustment of the mA and/or kV according to patient size, or  iterative reconstruction technique.     HISTORY: Abdominal pain.     COMPARISON: 4/7/2016 CT abdomen and pelvis.     FINDINGS:   Abdomen and Pelvis: Lung bases clear. Splenic calcifications  compatible with granulomas. Cholecystectomy clips. No worrisome focal  liver lesions, hypodense band near the falciform ligament is a typical  location for focal fatty infiltration. Normal-appearing pancreas,  adrenal glands and kidneys. No periaortic or pelvic adenopathy. No  free fluid.     Stomach is distended with gas and fluid. No other acute-appearing  bowel abnormality and no evidence for bowel obstruction. Appendix  appears normal. No aggressive bone lesions.     Impression     IMPRESSION: Stomach distended with gas and fluid, otherwise no acute  abnormality.     NERISSA DOW MD         Medications   lidocaine 1 % 1 mL (not administered)   lidocaine (LMX4) kit (not administered)   sodium chloride (PF) 0.9% PF flush 3 mL (not administered)   sodium chloride (PF) 0.9% PF flush 3 mL (not administered)   ondansetron (ZOFRAN) injection 4 mg (not administered)   HYDROmorphone (PF) (DILAUDID) injection 0.5 mg (not administered)   0.9% sodium chloride BOLUS (0 mLs Intravenous Stopped 7/7/17 1948)   LORazepam (ATIVAN) injection 0.5 mg (0.5 mg Intravenous Given 7/7/17 1741)   sodium chloride (PF) 0.9% PF flush 3 mL (3 mLs Intravenous Given 7/7/17 1846)   iopamidol (ISOVUE-370) solution 500 mL (100 mLs Intravenous Given 7/7/17 1847)   sodium chloride 0.9 % for CT scan flush dose 1,000 mL (60 mLs Intravenous Given 7/7/17 1846)         Assessments &  Plan (with Medical Decision Making)  Nathaly is a 62 year old female who presents to the ED complaining of persistent abdominal pain for several months that has worsened since her EGD 2 days ago, nausea, vomiting, and fatigue. Her blood pressure is slightly low at 94/85 mmHg. She is otherwise afebrile with normal vitals upon presentation to the ED. On exam, she exhibits guarding and mild, diffuse abdominal tenderness. She is tearful, anxious, and tremulous. IV established. Patient was given IV fluids, IV Zofran, IV Dilaudid, and IV Ativan with some relief. Labs and CT without significant acute finding.  Will send urine for culture.  As she admits to some anxiety and improved here with Ativan was sent home with small dose of Ativan as trial.  Will need close follow up in clinic.  Return if condition worsens.        I have reviewed the nursing notes.     I have reviewed the findings, diagnosis, plan and need for follow up with the patient.            Discharge Medication List as of 7/7/2017  7:55 PM           START taking these medications     Details   LORazepam (ATIVAN) 0.5 MG tablet Take 1 tablet (0.5 mg) by mouth 2 times daily as needed for anxiety, Disp-10 tablet, R-0, Local Print                 Final diagnoses:   Abdominal pain, generalized   Anxiety   Parkinson's disease (H)      This document serves as a record of services personally performed by Jose J Clark MD. It was created on their behalf by Disha Reed, a trained medical scribe. The creation of this record is based on the provider's personal observations and the statements of the patient. This document has been checked and approved by the attending provider.        ______________________________________      Patient was asked about 14 Review of systems including changes in vision (dry eyes, double vision), hearing, heart, lungs, musculoskeletal, depression, anxiety, snoring, RBD, insomnia, urinary frequency, urinary urgency, constipation, swallowing  problems, hematological, ID, allergies, skin problems: seborrhea, endocrinological: thyroid, diabetes, cholesterol; balance, weight changes, and other neurological problems and these were not significant at this time except for   Patient Active Problem List   Diagnosis     Prolapse of vaginal wall     Migraine variant     Symptomatic menopausal or female climacteric states     Obesity     Malaise and fatigue     Other and unspecified adverse effect of drug, medicinal and biological substance     Other and unspecified disc disorder of cervical region     Sleep apnea     Essential hypertension, benign     Chronic cholecystitis     Overactive bladder     Neck pain     Headache     HYPERLIPIDEMIA LDL GOAL <160     Mild major depression (H)     Advanced directives, counseling/discussion     HTN, goal below 140/90     Paralysis agitans (H)     History of MRI of brain and brain stem     Wears glasses     Constipation     Incomplete RBBB     Heart murmur     Family history of tremor     Rosacea     Asymptomatic varicose veins     Seborrheic dermatitis     Post-traumatic osteoarthritis of left knee     Pain from implanted hardware, initial encounter     Cognitive disorder evalaution 2017     Dementia     Hiatal hernia          Allergies   Allergen Reactions     Sulfa Drugs      Tacchycardia, had to go to ED     Naproxen GI Disturbance     Oxycontin [Oxycodone] GI Disturbance     Made her feel funny/upset stomach     Ropinirole      Did not work     Past Surgical History:   Procedure Laterality Date     ------------OTHER-------------      left shoulder     ARTHRODESIS TOE(S)  3/2/2011    ARTHRODESIS TOE(S) performed by CLARA LUCAS at [facility]     C LIGATE FALLOPIAN TUBE  1998     CHOLECYSTECTOMY, LAPOROSCOPIC  5/12/2008    Cholecystectomy, Laparoscopic     ESOPHAGOSCOPY, GASTROSCOPY, DUODENOSCOPY (EGD), COMBINED N/A 7/5/2017    Procedure: COMBINED ESOPHAGOSCOPY, GASTROSCOPY, DUODENOSCOPY (EGD), BIOPSY SINGLE OR  MULTIPLE;  Esophagogastroduodenoscopy with Multiple Biopsies by Biopsy;  Surgeon: Tj Denney MD;  Location: [facility]     HC COLONOSCOPY W/WO BRUSH/WASH  08/30/06    normal     HC OPEN TX TIBIAL SHAFT FX W PLATE/SCREWS W/WO CERCLAGE  2000    Multiple operations on left leg     HC SHLDR ARTHROSCOP,PART ACROMIOPLAS  11/16/06    Right shoulder     HC SHLDR ARTHROSCOP,SURG,DIS CLAVICULECTOMY  11/16/06    Right shoulder     Past Medical History:   Diagnosis Date     Arthralgia of temporomandibular joint 10/22/1996     Arthritis      Cognitive disorder evalaution 2017 3/6/2017    She has marked attentional difficulties, impairments in memory including a rapid forgetting rate, mild anomia, and mild executive dysfunction. 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 She's required increasing assistance with finances, medications, driving, and cooking. I think this is a mild dementia, and     Constipation 2/23/2015     Dementia 3/13/2017    IMPRESSIONS AND RECOMMENDATIONS   Current results indicate marked attentional difficulties, impairments in memory, which in some cases reflected impairments in retention and in other cases reflected retrieval difficulties, and mild anomia as well as mild executive dysfunction. Premorbid intellectual functioning is estimated to fall in the borderline range. She denied experiencing significant depre     Depressive disorder      Heart murmur 2/23/2015     History of MRI of brain and brain stem 2/21/2015    MRI OF THE BRAIN WITHOUT CONTRAST 7/10/2014 1:21 PM  COMPARISON: None. HISTORY: Left-sided upper and lower extremity tremor. Balance issues. TECHNIQUE:  Brain: Axial diffusion-weighted with ADC map, T2-weighted with fat saturation, T1-weighted and turboFLAIR and coronal T1-weighted images of the brain were obtained without intravenous contrast.  FINDINGS: There is mild diffuse cerebral volume loss  "    Hypertension      Incomplete RBBB 2/23/2015     Motion sickness      Other and unspecified hyperlipidemia      Parkinson's disease (H)      Seborrheic dermatitis 2/29/2016     Unspecified arthropathy, hand 08/07/1997     Unspecified sleep apnea     moderate, sleep study 11/07, on CPAP, has daytime somnolence with this     Variants of migraine, not elsewhere classified, without mention of intractable migraine without mention of status migrainosus      Wears glasses 2/23/2015     Social History     Social History     Marital status:      Spouse name: violet     Number of children: 2     Years of education: N/A     Occupational History           Social History Main Topics     Smoking status: Never Smoker     Smokeless tobacco: Never Used      Comment: no smokers in the household     Alcohol use 0.0 oz/week     0 Standard drinks or equivalent per week      Comment: very occ     Drug use: No     Sexual activity: Yes     Partners: Male     Birth control/ protection: Surgical, Female Surgical      Comment: tubal ligation     Other Topics Concern     Caffeine Concern Yes     OCC     Exercise No     \"not faithfully\"     Seat Belt Yes     Self-Exams No     Social History Narrative    ALLERGIES:  She is allergic to sulfa, and has had gastrointestinal side effects from naproxen.               FAMILY HISTORY:  Strongly positive for headaches, including in her son and sister.  There is no family history of tremor.               SOCIAL HISTORY:  She does not work outside the home.  She does not smoke.  She uses very little alcohol.  Lives in [place]. . Has 2 kids: son who is 35 yr s old. And daughter who is 30 yrs old. Her  is working as a  .        Drug and lactation database from the [place] [employer]:  http://toxnet.nlm.nih.gov/cgi-bin/sis/htmlgen?LACT      B/P: Data Unavailable, T: Data Unavailable, P: Data Unavailable, R: Data Unavailable 0 lbs 0 oz  Last " menstrual period 06/05/2006, not currently breastfeeding., There is no height or weight on file to calculate BMI.  Medications and Vitals not listed above were documented in the cart and reviewed by me.     Current Outpatient Prescriptions   Medication Sig Dispense Refill     LORazepam (ATIVAN) 0.5 MG tablet Take 1 tablet (0.5 mg) by mouth 2 times daily as needed for anxiety 10 tablet 0     lisinopril-hydrochlorothiazide (PRINZIDE/ZESTORETIC) 10-12.5 MG per tablet TAKE ONE TABLET BY MOUTH ONCE DAILY DUE  FOR  LABS 30 tablet 11     polyethylene glycol (MIRALAX) powder Take 17 g (1 capful) by mouth daily Adjust as needed to TID prn in same ratio of 1 capful per 8 oz water 510 g 1     omeprazole (PRILOSEC) 40 MG capsule Take 1 capsule (40 mg) by mouth daily Take 30-60 minutes before a meal. 30 capsule 2     Diphenhydramine-APAP, sleep, (TYLENOL PM EXTRA STRENGTH PO) Take 1 tablet by mouth as needed Reported on 4/24/2017       melatonin 5 MG tablet Take 10 mg by mouth At Bedtime Reported on 5/10/2017       carbidopa-levodopa (SINEMET)  MG per tablet 2 tablets @ 8am, 11am/12noon, and 9pm 540 tablet 3     pramipexole (MIRAPEX) 0.25 MG tablet Take 1 tablet (0.25 mg) by mouth 3 times daily 270 tablet 3     donepezil (ARICEPT) 5 MG tablet 5mg daily for one month then 10mg daily 30 tablet 11     conjugated estrogens (PREMARIN) cream Place 0.5 g vaginally twice a week Use daily first week of use 30 g 11     citalopram (CELEXA) 20 MG tablet Take 1 tablet (20 mg) by mouth daily 90 tablet 1     trospium (SANCTURA) 20 MG tablet Take 1 tablet (20 mg) by mouth daily 90 tablet 3     ZOLMitriptan (ZOMIG) 5 MG tablet Take 1 tablet (5 mg) by mouth at onset of headache for migraine MAY REPEAT ONCE AFTER 2 HOURS. DO NOT EXCEED 2 TABLETS IN 24 HOURS. 6 tablet 10         Jet Porter MD

## 2017-07-17 ENCOUNTER — OFFICE VISIT (OUTPATIENT)
Dept: NEUROLOGY | Facility: CLINIC | Age: 62
End: 2017-07-17
Payer: MEDICARE

## 2017-07-17 VITALS
BODY MASS INDEX: 25.04 KG/M2 | TEMPERATURE: 97.9 F | WEIGHT: 136 LBS | HEART RATE: 73 BPM | DIASTOLIC BLOOD PRESSURE: 69 MMHG | OXYGEN SATURATION: 98 % | SYSTOLIC BLOOD PRESSURE: 104 MMHG

## 2017-07-17 DIAGNOSIS — G20.A1 PARALYSIS AGITANS (H): Primary | ICD-10-CM

## 2017-07-17 PROCEDURE — 99214 OFFICE O/P EST MOD 30 MIN: CPT | Performed by: PSYCHIATRY & NEUROLOGY

## 2017-07-17 ASSESSMENT — PAIN SCALES - GENERAL: PAINLEVEL: MILD PAIN (3)

## 2017-07-17 NOTE — PATIENT INSTRUCTIONS
Diagnosis/Summary/Recommendations:    PATIENT: Nathaly Farias  62 year old female     : 1955    MOISE: 2017    Parkinson  Mild dementia  ED visit for abdominal pain    Did not get coverage for her rivastigmine due to cost  Had been started on donepezil   Not clear if gi problems related to donepezil      Sinemet 25/100 2 tabs 3/day  Citalopram 20mg - it is in her pill box.     aricept 5mg ? 10mg  - > ?taking this?    Lisinopril/hctz - is taking this.     Lorazepam - not clear what it looks like     Gi issues  Melatonin  Omeprazole  miralax - states she is taking miralax.     mirapex 0.25mg 3/day  - ? Taking this.   sanctura - not taking this anymore.   zolmitriptan    Over 50% of this visit was spent in patient care and care coordination.     History obtained from patient    Total visit time was 25 minutes    PLAN:  Add 1-2 senokot S at bedtime  Continue the miralax every morning    Need clarification of her medications  1. mirapex dose and if she is taking   2. Is she taking donepezil  3. The lorazepam - color of medication and how frequent.     We may ultimately having her go off the donepezil if she is still having abdominal pain    Use drugs.com to help in identifying medications and showing images of medications    Return back in 3-6 months.       Jet Porter MD

## 2017-07-17 NOTE — NURSING NOTE
"Nathaly Farias's goals for this visit include: return  She requests these members of her care team be copied on today's visit information:     PCP: Maxine Monae    Referring Provider:  No referring provider defined for this encounter.    Chief Complaint   Patient presents with     RECHECK       Initial /69  Pulse 73  Temp 97.9  F (36.6  C) (Oral)  Wt 61.7 kg (136 lb)  LMP 06/05/2006  SpO2 98%  BMI 25.04 kg/m2 Estimated body mass index is 25.04 kg/(m^2) as calculated from the following:    Height as of 7/10/17: 1.57 m (5' 1.8\").    Weight as of this encounter: 61.7 kg (136 lb).  Medication Reconciliation: complete    Do you need any medication refills at today's visit? ?  "

## 2017-07-17 NOTE — LETTER
2017      RE: Nathaly Farias  [address] [facility] [address]       Diagnosis/Summary/Recommendations:    PATIENT: Nathaly Farias  62 year old female     : 1955    MOISE: 2017    Parkinson  Mild dementia  ED visit for abdominal pain    Did not get coverage for her rivastigmine due to cost  Had been started on donepezil   Not clear if gi problems related to donepezil      Sinemet 25/100 2 tabs 3/day  Citalopram 20mg - it is in her pill box.     aricept 5mg ? 10mg  - > ?taking this?    Lisinopril/hctz - is taking this.     Lorazepam - not clear what it looks like     Gi issues  Melatonin  Omeprazole  miralax - states she is taking miralax.     mirapex 0.25mg 3/day  - ? Taking this.   sanctura - not taking this anymore.   zolmitriptan    Over 50% of this visit was spent in patient care and care coordination.     History obtained from patient    Total visit time was 25 minutes    PLAN:  Add 1-2 senokot S at bedtime  Continue the miralax every morning    Need clarification of her medications  1. mirapex dose and if she is taking   2. Is she taking donepezil  3. The lorazepam - color of medication and how frequent.     We may ultimately having her go off the donepezil if she is still having abdominal pain    Use drugs.com to help in identifying medications and showing images of medications    Return back in 3-6 months.       Jet Porter MD     ______________________________________    Last visit date and details:     MOISE: April 3, 2017     Parkinson  Mild dementia     Sinemet 25/100 2 tabs 3 times per day. She is encouraged to take her medication one hour before protein or two hours after protein.   cipro - completed course.   celexa 20mg per day for depression   Prinzide - for blood pressure.   miralax - as needed.   mirapex  0.25mg 3/day -- NOT SURE IF SHE IS TAKING   sanctura  -- NOT SURE IF SHE IS TAKING.   zomig as needed.   Melatonin 5mg 2 tabs daily      Ongoing leg problems.   Had  synvisc shot and has had been using crutches and has metal in the leg.      PLAN  1. Sort out medications - mirapex, sanctura  2. Not sure if rivastigmine will be covered but will try this with the exelon patch  4.6 mg patch daily x 1month then 9.5mg patch daily x 1 month then 13.3mg patch daily     If not then may have to use donepezil 5mg/day x month then 10mg /day     Return back in 3 months.      While dbs is not likely to be considered down the road, we may consider repeating her neuropsych and think about less invasive procedures such as gamma knife or focused beam u/s - these were discussed briefly as well as dbs.      Jet myers MD      EMERGENCY ROOM VISIT DETAILS BELOW    The history is provided by the patient and a relative.      Nathaly Farias is a 62 year old female with a history of Parkinson's Disease, Dementia, Constipation, and Hiatal Hernia, who presents to the ED complaining of abdominal pain and fatigue. The patient was initially evaluated for abdominal pain while in the clinic on 12/5/2016, about 7 months ago. At that time, the provider thought that she was likely constipated and started her on Miralax. She also ordered a colonoscopy. Patient had a colonoscopy on 12/21/2016 in which 3 adenomatous polyps were removed, but was otherwise normal. She was seen in the clinic once again complaining of abdominal pain a short time later on 1/9, about 6 months ago. She was found to have a UTI at that time so she was treated with antibiotics and given Zofran to combat nausea. Patient was not seen regarding her abdominal pain again until 4/24, a little over 2 months ago, at which time the provider thought her symptoms were related to GERd and prescribed a PPI. She presented soon after on 5/10 complaining of the same pain so she was scheduled for an endoscopy. Patient had an EGD on 7/5, 2 days ago. She says that when she returned home from her procedure she felt nauseated and experienced an episode of emesis  "which \"looked black and thick like coffee grounds\". Her daughter states that since then she has continued to complain of nausea, upper abdominal discomfort, and extreme fatigue - \"she just wants to sleep\". Her appetite has been decreased due to her nausea and her daughter is concerned that she has not been taking her daily medications as prescribed because she has been feeling ill. Patient denies any hematochezia, melena, hematemesis, fever, or other associated symptoms. SHx of Cholecystectomy. Her last Abdominal CT was normal in 4/2016, a little over a year ago.          Patient Active Problem List   Diagnosis     Prolapse of vaginal wall     Migraine variant     Symptomatic menopausal or female climacteric states     Obesity     Malaise and fatigue     Other and unspecified adverse effect of drug, medicinal and biological substance     Other and unspecified disc disorder of cervical region     Sleep apnea     Essential hypertension, benign     Chronic cholecystitis     Overactive bladder     Neck pain     Headache     HYPERLIPIDEMIA LDL GOAL <160     Mild major depression (H)     Advanced directives, counseling/discussion     HTN, goal below 140/90     Paralysis agitans (H)     History of MRI of brain and brain stem     Wears glasses     Constipation     Incomplete RBBB     Heart murmur     Family history of tremor     Rosacea     Asymptomatic varicose veins     Seborrheic dermatitis     Post-traumatic osteoarthritis of left knee     Pain from implanted hardware, initial encounter     Cognitive disorder evalaution 2017     Dementia     Hiatal hernia       Past Medical History         Past Medical History:   Diagnosis Date     Arthralgia of temporomandibular joint 10/22/1996     Arthritis       Cognitive disorder evalaution 2017 3/6/2017     She has marked attentional difficulties, impairments in memory including a rapid forgetting rate, mild anomia, and mild executive dysfunction. She seems to have borderline " intellectual functioning, which is probably consistent with her educational and occupational attainment. She's required increasing assistance with finances, medications, driving, and cooking. I think this is a mild dementia, and     Constipation 2/23/2015     Dementia 3/13/2017     IMPRESSIONS AND RECOMMENDATIONS   Current results indicate marked attentional difficulties, impairments in memory, which in some cases reflected impairments in retention and in other cases reflected retrieval difficulties, and mild anomia as well as mild executive dysfunction. Premorbid intellectual functioning is estimated to fall in the borderline range. She denied experiencing significant depre     Depressive disorder       Heart murmur 2/23/2015     History of MRI of brain and brain stem 2/21/2015     MRI OF THE BRAIN WITHOUT CONTRAST 7/10/2014 1:21 PM  COMPARISON: None. HISTORY: Left-sided upper and lower extremity tremor. Balance issues. TECHNIQUE:  Brain: Axial diffusion-weighted with ADC map, T2-weighted with fat saturation, T1-weighted and turboFLAIR and coronal T1-weighted images of the brain were obtained without intravenous contrast.  FINDINGS: There is mild diffuse cerebral volume loss     Hypertension       Incomplete RBBB 2/23/2015     Motion sickness       Other and unspecified hyperlipidemia       Parkinson's disease (H)       Seborrheic dermatitis 2/29/2016     Unspecified arthropathy, hand 08/07/1997     Unspecified sleep apnea       moderate, sleep study 11/07, on CPAP, has daytime somnolence with this     Variants of migraine, not elsewhere classified, without mention of intractable migraine without mention of status migrainosus       Wears glasses 2/23/2015             Past Surgical History          Past Surgical History:   Procedure Laterality Date     ------------OTHER-------------         left shoulder     ARTHRODESIS TOE(S)   3/2/2011     ARTHRODESIS TOE(S) performed by CLARA LUCAS at  OR     C LIGATE  FALLOPIAN TUBE   1998     CHOLECYSTECTOMY, LAPOROSCOPIC   5/12/2008     Cholecystectomy, Laparoscopic     ESOPHAGOSCOPY, GASTROSCOPY, DUODENOSCOPY (EGD), COMBINED N/A 7/5/2017     Procedure: COMBINED ESOPHAGOSCOPY, GASTROSCOPY, DUODENOSCOPY (EGD), BIOPSY SINGLE OR MULTIPLE;  Esophagogastroduodenoscopy with Multiple Biopsies by Biopsy;  Surgeon: Tj Denney MD;  Location: [facility]     HC COLONOSCOPY W/WO BRUSH/WASH   08/30/06     normal     HC OPEN TX TIBIAL SHAFT FX W PLATE/SCREWS W/WO CERCLAGE   2000     Multiple operations on left leg     HC SHLDR ARTHROSCOP,PART ACROMIOPLAS   11/16/06     Right shoulder     HC SHLDR ARTHROSCOP,SURG,DIS CLAVICULECTOMY   11/16/06     Right shoulder             Family History           Family History   Problem Relation Age of Onset     DIABETES Father         type II, diagnosed in late 60's     Hypertension Father       CANCER Father         rare kidney cancer had kidney removed, 75 y.o. at onset     CEREBROVASCULAR DISEASE Father       Rashes/Skin Problems Father         rosacea     Neurologic Disorder Mother         head tremor     HEART DISEASE Brother         Valvular disease corrected with surgery     HEART DISEASE Paternal Grandfather         MI     Migraines Son       Migraines Sister       Rashes/Skin Problems Sister         rosacea                    Social History    Substance Use Topics      Smoking status: Never Smoker      Smokeless tobacco: Never Used          Comment: no smokers in the household      Alcohol use 0.0 oz/week       0 Standard drinks or equivalent per week         Comment: very occ               Immunization History   Administered Date(s) Administered     Influenza (H1N1) 01/12/2010     Influenza (IIV3) 10/15/1996, 10/14/1997, 10/15/1998, 10/06/1999, 12/29/2000, 11/15/2001, 11/11/2002, 10/16/2003, 11/01/2004, 11/02/2005, 11/12/2007, 11/10/2008, 11/10/2010, 10/04/2011, 01/04/2013, 09/03/2014     Influenza Vaccine IM 3yrs+ 4 Valent IIV4 08/22/2016      Influenza Vaccine, 3 YRS +, IM (QUADRIVALENT W/PRESERVATIVES) 09/08/2015     Pneumococcal 23 valent 11/01/2004     TD (ADULT, 7+) 10/05/1999     TDAP Vaccine (Adacel) 10/04/2011     Zoster vaccine, live 11/29/2014                   Allergies   Allergen Reactions     Sulfa Drugs         Tacchycardia, had to go to ED     Naproxen GI Disturbance     Oxycontin [Oxycodone] GI Disturbance       Made her feel funny/upset stomach     Ropinirole         Did not work          Current Outpatient Prescriptions           Current Outpatient Prescriptions   Medication Sig Dispense Refill     LORazepam (ATIVAN) 0.5 MG tablet Take 1 tablet (0.5 mg) by mouth 2 times daily as needed for anxiety 10 tablet 0     lisinopril-hydrochlorothiazide (PRINZIDE/ZESTORETIC) 10-12.5 MG per tablet TAKE ONE TABLET BY MOUTH ONCE DAILY DUE  FOR  LABS 30 tablet 11     polyethylene glycol (MIRALAX) powder Take 17 g (1 capful) by mouth daily Adjust as needed to TID prn in same ratio of 1 capful per 8 oz water 510 g 1     omeprazole (PRILOSEC) 40 MG capsule Take 1 capsule (40 mg) by mouth daily Take 30-60 minutes before a meal. 30 capsule 2     Diphenhydramine-APAP, sleep, (TYLENOL PM EXTRA STRENGTH PO) Take 1 tablet by mouth as needed Reported on 4/24/2017         melatonin 5 MG tablet Take 10 mg by mouth At Bedtime Reported on 5/10/2017         carbidopa-levodopa (SINEMET)  MG per tablet 2 tablets @ 8am, 11am/12noon, and 9pm 540 tablet 3     pramipexole (MIRAPEX) 0.25 MG tablet Take 1 tablet (0.25 mg) by mouth 3 times daily 270 tablet 3     donepezil (ARICEPT) 5 MG tablet 5mg daily for one month then 10mg daily 30 tablet 11     citalopram (CELEXA) 20 MG tablet Take 1 tablet (20 mg) by mouth daily 90 tablet 1     trospium (SANCTURA) 20 MG tablet Take 1 tablet (20 mg) by mouth daily 90 tablet 3     ZOLMitriptan (ZOMIG) 5 MG tablet Take 1 tablet (5 mg) by mouth at onset of headache for migraine MAY REPEAT ONCE AFTER 2 HOURS. DO NOT EXCEED 2 TABLETS  "IN 24 HOURS. 6 tablet 10     conjugated estrogens (PREMARIN) cream Place 0.5 g vaginally twice a week Use daily first week of use 30 g 11         I have reviewed the Medications, Allergies, Past Medical and Surgical History, and Social History in the Epic system.     Review of Systems   Constitutional: Positive for appetite change (decreased) and fatigue. Negative for fever.   Gastrointestinal: Positive for abdominal pain (upper), nausea and vomiting. Negative for anal bleeding, blood in stool, constipation and diarrhea.   All other systems reviewed and are negative.        Physical Exam   BP: 94/85  Pulse: 113  Temp: 98.3  F (36.8  C)  Resp: 16  Height: 154.9 cm (5' 1\")  Weight: 62.6 kg (138 lb)  SpO2: 96 %     Physical Exam   Constitutional: She is oriented to person, place, and time. No distress.   Patient is tearful, anxious, and tremulous.    HENT:   Head: Normocephalic and atraumatic.   Eyes: Conjunctivae and EOM are normal.   Cardiovascular: Normal rate, regular rhythm, normal heart sounds and intact distal pulses.    No murmur heard.  Pulmonary/Chest: Effort normal and breath sounds normal. No respiratory distress. She has no wheezes. She has no rales. She exhibits no tenderness.   Abdominal: Soft. Bowel sounds are normal. She exhibits no distension. There is no hepatosplenomegaly. There is tenderness (diffuse, mild). There is guarding. There is no rigidity and no rebound.   Neurological: She is alert and oriented to person, place, and time.   Skin: Skin is warm and dry. No rash noted. She is not diaphoretic. No pallor.   Psychiatric: She has a normal mood and affect. Her behavior is normal.   Nursing note and vitals reviewed.        ED Course      ED Course      Procedures              Critical Care time:  none                        Results for orders placed or performed during the hospital encounter of 07/07/17 (from the past 24 hour(s))   UA reflex to Microscopic   Result Value Ref Range     Color Urine " Yellow       Appearance Urine Slightly Cloudy       Glucose Urine Negative NEG mg/dL     Bilirubin Urine Negative NEG     Ketones Urine 5 (A) NEG mg/dL     Specific Gravity Urine 1.020 1.003 - 1.035     Blood Urine Negative NEG     pH Urine 5.0 5.0 - 7.0 pH     Protein Albumin Urine Negative NEG mg/dL     Urobilinogen mg/dL 0.0 0.0 - 2.0 mg/dL     Nitrite Urine Negative NEG     Leukocyte Esterase Urine Moderate (A) NEG     Source Midstream Urine       RBC Urine 1 0 - 2 /HPF     WBC Urine 12 (H) 0 - 2 /HPF     Bacteria Urine Few (A) NEG /HPF     Squamous Epithelial /HPF Urine 1 0 - 1 /HPF     Mucous Urine Present (A) NEG /LPF     Hyaline Casts 4 (H) 0 - 2 /LPF   CBC with platelets differential   Result Value Ref Range     WBC 8.1 4.0 - 11.0 10e9/L     RBC Count 4.28 3.8 - 5.2 10e12/L     Hemoglobin 12.7 11.7 - 15.7 g/dL     Hematocrit 40.2 35.0 - 47.0 %     MCV 94 78 - 100 fl     MCH 29.7 26.5 - 33.0 pg     MCHC 31.6 31.5 - 36.5 g/dL     RDW 13.7 10.0 - 15.0 %     Platelet Count 250 150 - 450 10e9/L     Diff Method Automated Method       % Neutrophils 61.0 %     % Lymphocytes 31.0 %     % Monocytes 7.0 %     % Eosinophils 1.0 %     Absolute Neutrophil 4.9 1.6 - 8.3 10e9/L     Absolute Lymphocytes 2.5 0.8 - 5.3 10e9/L     Absolute Monocytes 0.6 0.0 - 1.3 10e9/L     Absolute Eosinophils 0.1 0.0 - 0.7 10e9/L   Comprehensive metabolic panel   Result Value Ref Range     Sodium 140 133 - 144 mmol/L     Potassium 4.2 3.4 - 5.3 mmol/L     Chloride 103 94 - 109 mmol/L     Carbon Dioxide 29 20 - 32 mmol/L     Anion Gap 8 3 - 14 mmol/L     Glucose 92 70 - 99 mg/dL     Urea Nitrogen 26 7 - 30 mg/dL     Creatinine 1.24 (H) 0.52 - 1.04 mg/dL     GFR Estimate 44 (L) >60 mL/min/1.7m2     GFR Estimate If Black 53 (L) >60 mL/min/1.7m2     Calcium 9.4 8.5 - 10.1 mg/dL     Bilirubin Total 1.1 0.2 - 1.3 mg/dL     Albumin 3.8 3.4 - 5.0 g/dL     Protein Total 7.7 6.8 - 8.8 g/dL     Alkaline Phosphatase 54 40 - 150 U/L     ALT 8 0 - 50 U/L      AST 16 0 - 45 U/L   Lipase   Result Value Ref Range     Lipase 183 73 - 393 U/L   TSH   Result Value Ref Range     TSH 0.91 0.40 - 4.00 mU/L   CT Abdomen Pelvis w Contrast     Narrative     CT ABDOMEN AND PELVIS WITH CONTRAST  7/7/2017 7:03 PM     TECHNIQUE: Images from diaphragm to pubic symphysis 100mL, Isovue-370  IV contrast.  Radiation dose for this scan was reduced using automated exposure  control, adjustment of the mA and/or kV according to patient size, or  iterative reconstruction technique.     HISTORY: Abdominal pain.     COMPARISON: 4/7/2016 CT abdomen and pelvis.     FINDINGS:   Abdomen and Pelvis: Lung bases clear. Splenic calcifications  compatible with granulomas. Cholecystectomy clips. No worrisome focal  liver lesions, hypodense band near the falciform ligament is a typical  location for focal fatty infiltration. Normal-appearing pancreas,  adrenal glands and kidneys. No periaortic or pelvic adenopathy. No  free fluid.     Stomach is distended with gas and fluid. No other acute-appearing  bowel abnormality and no evidence for bowel obstruction. Appendix  appears normal. No aggressive bone lesions.     Impression     IMPRESSION: Stomach distended with gas and fluid, otherwise no acute  abnormality.     NERISSA DOW MD         Medications   lidocaine 1 % 1 mL (not administered)   lidocaine (LMX4) kit (not administered)   sodium chloride (PF) 0.9% PF flush 3 mL (not administered)   sodium chloride (PF) 0.9% PF flush 3 mL (not administered)   ondansetron (ZOFRAN) injection 4 mg (not administered)   HYDROmorphone (PF) (DILAUDID) injection 0.5 mg (not administered)   0.9% sodium chloride BOLUS (0 mLs Intravenous Stopped 7/7/17 1948)   LORazepam (ATIVAN) injection 0.5 mg (0.5 mg Intravenous Given 7/7/17 1741)   sodium chloride (PF) 0.9% PF flush 3 mL (3 mLs Intravenous Given 7/7/17 1846)   iopamidol (ISOVUE-370) solution 500 mL (100 mLs Intravenous Given 7/7/17 1847)   sodium chloride 0.9 % for CT  scan flush dose 1,000 mL (60 mLs Intravenous Given 7/7/17 9622)         Assessments & Plan (with Medical Decision Making)  Nathaly is a 62 year old female who presents to the ED complaining of persistent abdominal pain for several months that has worsened since her EGD 2 days ago, nausea, vomiting, and fatigue. Her blood pressure is slightly low at 94/85 mmHg. She is otherwise afebrile with normal vitals upon presentation to the ED. On exam, she exhibits guarding and mild, diffuse abdominal tenderness. She is tearful, anxious, and tremulous. IV established. Patient was given IV fluids, IV Zofran, IV Dilaudid, and IV Ativan with some relief. Labs and CT without significant acute finding.  Will send urine for culture.  As she admits to some anxiety and improved here with Ativan was sent home with small dose of Ativan as trial.  Will need close follow up in clinic.  Return if condition worsens.        I have reviewed the nursing notes.     I have reviewed the findings, diagnosis, plan and need for follow up with the patient.            Discharge Medication List as of 7/7/2017  7:55 PM           START taking these medications     Details   LORazepam (ATIVAN) 0.5 MG tablet Take 1 tablet (0.5 mg) by mouth 2 times daily as needed for anxiety, Disp-10 tablet, R-0, Local Print                 Final diagnoses:   Abdominal pain, generalized   Anxiety   Parkinson's disease (H)      This document serves as a record of services personally performed by Jose J Clark MD. It was created on their behalf by Disha Reed, a trained medical scribe. The creation of this record is based on the provider's personal observations and the statements of the patient. This document has been checked and approved by the attending provider.        ______________________________________      Patient was asked about 14 Review of systems including changes in vision (dry eyes, double vision), hearing, heart, lungs, musculoskeletal, depression,  anxiety, snoring, RBD, insomnia, urinary frequency, urinary urgency, constipation, swallowing problems, hematological, ID, allergies, skin problems: seborrhea, endocrinological: thyroid, diabetes, cholesterol; balance, weight changes, and other neurological problems and these were not significant at this time except for   Patient Active Problem List   Diagnosis     Prolapse of vaginal wall     Migraine variant     Symptomatic menopausal or female climacteric states     Obesity     Malaise and fatigue     Other and unspecified adverse effect of drug, medicinal and biological substance     Other and unspecified disc disorder of cervical region     Sleep apnea     Essential hypertension, benign     Chronic cholecystitis     Overactive bladder     Neck pain     Headache     HYPERLIPIDEMIA LDL GOAL <160     Mild major depression (H)     Advanced directives, counseling/discussion     HTN, goal below 140/90     Paralysis agitans (H)     History of MRI of brain and brain stem     Wears glasses     Constipation     Incomplete RBBB     Heart murmur     Family history of tremor     Rosacea     Asymptomatic varicose veins     Seborrheic dermatitis     Post-traumatic osteoarthritis of left knee     Pain from implanted hardware, initial encounter     Cognitive disorder evalaution 2017     Dementia     Hiatal hernia          Allergies   Allergen Reactions     Sulfa Drugs      Tacchycardia, had to go to ED     Naproxen GI Disturbance     Oxycontin [Oxycodone] GI Disturbance     Made her feel funny/upset stomach     Ropinirole      Did not work     Past Surgical History:   Procedure Laterality Date     ------------OTHER-------------      left shoulder     ARTHRODESIS TOE(S)  3/2/2011    ARTHRODESIS TOE(S) performed by CL[facility] LUCAS at [facility]     C LIGATE FALLOPIAN TUBE  1998     CHOLECYSTECTOMY, LAPOROSCOPIC  5/12/2008    Cholecystectomy, Laparoscopic     ESOPHAGOSCOPY, GASTROSCOPY, DUODENOSCOPY (EGD), COMBINED N/A 7/5/2017     Procedure: COMBINED ESOPHAGOSCOPY, GASTROSCOPY, DUODENOSCOPY (EGD), BIOPSY SINGLE OR MULTIPLE;  Esophagogastroduodenoscopy with Multiple Biopsies by Biopsy;  Surgeon: Tj Denney MD;  Location: [facility]     HC COLONOSCOPY W/WO BRUSH/WASH  08/30/06    normal     HC OPEN TX TIBIAL SHAFT FX W PLATE/SCREWS W/WO CERCLAGE  2000    Multiple operations on left leg     HC SHLDR ARTHROSCOP,PART ACROMIOPLAS  11/16/06    Right shoulder     HC SHLDR ARTHROSCOP,SURG,DIS CLAVICULECTOMY  11/16/06    Right shoulder     Past Medical History:   Diagnosis Date     Arthralgia of temporomandibular joint 10/22/1996     Arthritis      Cognitive disorder evalaution 2017 3/6/2017    She has marked attentional difficulties, impairments in memory including a rapid forgetting rate, mild anomia, and mild executive dysfunction. 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 She's required increasing assistance with finances, medications, driving, and cooking. I think this is a mild dementia, and     Constipation 2/23/2015     Dementia 3/13/2017    IMPRESSIONS AND RECOMMENDATIONS   Current results indicate marked attentional difficulties, impairments in memory, which in some cases reflected impairments in retention and in other cases reflected retrieval difficulties, and mild anomia as well as mild executive dysfunction. Premorbid intellectual functioning is estimated to fall in the borderline range. She denied experiencing significant depre     Depressive disorder      Heart murmur 2/23/2015     History of MRI of brain and brain stem 2/21/2015    MRI OF THE BRAIN WITHOUT CONTRAST 7/10/2014 1:21 PM  COMPARISON: None. HISTORY: Left-sided upper and lower extremity tremor. Balance issues. TECHNIQUE:  Brain: Axial diffusion-weighted with ADC map, T2-weighted with fat saturation, T1-weighted and turboFLAIR and coronal T1-weighted images of the brain were  "obtained without intravenous contrast.  FINDINGS: There is mild diffuse cerebral volume loss     Hypertension      Incomplete RBBB 2/23/2015     Motion sickness      Other and unspecified hyperlipidemia      Parkinson's disease (H)      Seborrheic dermatitis 2/29/2016     Unspecified arthropathy, hand 08/07/1997     Unspecified sleep apnea     moderate, sleep study 11/07, on CPAP, has daytime somnolence with this     Variants of migraine, not elsewhere classified, without mention of intractable migraine without mention of status migrainosus      Wears glasses 2/23/2015     Social History     Social History     Marital status:      Spouse name: violet     Number of children: 2     Years of education: N/A     Occupational History           Social History Main Topics     Smoking status: Never Smoker     Smokeless tobacco: Never Used      Comment: no smokers in the household     Alcohol use 0.0 oz/week     0 Standard drinks or equivalent per week      Comment: very occ     Drug use: No     Sexual activity: Yes     Partners: Male     Birth control/ protection: Surgical, Female Surgical      Comment: tubal ligation     Other Topics Concern     Caffeine Concern Yes     OCC     Exercise No     \"not faithfully\"     Seat Belt Yes     Self-Exams No     Social History Narrative    ALLERGIES:  She is allergic to sulfa, and has had gastrointestinal side effects from naproxen.               FAMILY HISTORY:  Strongly positive for headaches, including in her son and sister.  There is no family history of tremor.               SOCIAL HISTORY:  She does not work outside the home.  She does not smoke.  She uses very little alcohol.  Lives in [place]. . Has 2 kids: son who is 35 yr s old. And daughter who is 30 yrs old. Her  is working as a  .        Drug and lactation database from the [place] [employer]:  http://toxnet.nlm.nih.gov/cgi-bin/sis/htmlgen?LACT      B/P: " Data Unavailable, T: Data Unavailable, P: Data Unavailable, R: Data Unavailable 0 lbs 0 oz  Last menstrual period 06/05/2006, not currently breastfeeding., There is no height or weight on file to calculate BMI.  Medications and Vitals not listed above were documented in the cart and reviewed by me.     Current Outpatient Prescriptions   Medication Sig Dispense Refill     LORazepam (ATIVAN) 0.5 MG tablet Take 1 tablet (0.5 mg) by mouth 2 times daily as needed for anxiety 10 tablet 0     lisinopril-hydrochlorothiazide (PRINZIDE/ZESTORETIC) 10-12.5 MG per tablet TAKE ONE TABLET BY MOUTH ONCE DAILY DUE  FOR  LABS 30 tablet 11     polyethylene glycol (MIRALAX) powder Take 17 g (1 capful) by mouth daily Adjust as needed to TID prn in same ratio of 1 capful per 8 oz water 510 g 1     omeprazole (PRILOSEC) 40 MG capsule Take 1 capsule (40 mg) by mouth daily Take 30-60 minutes before a meal. 30 capsule 2     Diphenhydramine-APAP, sleep, (TYLENOL PM EXTRA STRENGTH PO) Take 1 tablet by mouth as needed Reported on 4/24/2017       melatonin 5 MG tablet Take 10 mg by mouth At Bedtime Reported on 5/10/2017       carbidopa-levodopa (SINEMET)  MG per tablet 2 tablets @ 8am, 11am/12noon, and 9pm 540 tablet 3     pramipexole (MIRAPEX) 0.25 MG tablet Take 1 tablet (0.25 mg) by mouth 3 times daily 270 tablet 3     donepezil (ARICEPT) 5 MG tablet 5mg daily for one month then 10mg daily 30 tablet 11     conjugated estrogens (PREMARIN) cream Place 0.5 g vaginally twice a week Use daily first week of use 30 g 11     citalopram (CELEXA) 20 MG tablet Take 1 tablet (20 mg) by mouth daily 90 tablet 1     trospium (SANCTURA) 20 MG tablet Take 1 tablet (20 mg) by mouth daily 90 tablet 3     ZOLMitriptan (ZOMIG) 5 MG tablet Take 1 tablet (5 mg) by mouth at onset of headache for migraine MAY REPEAT ONCE AFTER 2 HOURS. DO NOT EXCEED 2 TABLETS IN 24 HOURS. 6 tablet 10         Jet Porter MD

## 2017-07-17 NOTE — MR AVS SNAPSHOT
After Visit Summary   2017    Nathaly Farias    MRN: [id number]           Patient Information     Date Of Birth          1955        Visit Information        Provider Department      2017 9:30 AM Jet Porter MD [facility]        Today's Diagnoses     Paralysis agitans (H)    -  1      Care Instructions    Diagnosis/Summary/Recommendations:    PATIENT: Nathaly Farias  62 year old female     : 1955    MOISE: 2017    Parkinson  Mild dementia  ED visit for abdominal pain    Did not get coverage for her rivastigmine due to cost  Had been started on donepezil   Not clear if gi problems related to donepezil      Sinemet 25/100 2 tabs 3/day  Citalopram 20mg - it is in her pill box.     aricept 5mg ? 10mg  - > ?taking this?    Lisinopril/hctz - is taking this.     Lorazepam - not clear what it looks like     Gi issues  Melatonin  Omeprazole  miralax - states she is taking miralax.     mirapex 0.25mg 3/day  - ? Taking this.   sanctura - not taking this anymore.   zolmitriptan    Over 50% of this visit was spent in patient care and care coordination.     History obtained from patient    Total visit time was 25 minutes    PLAN:  Add 1-2 senokot S at bedtime  Continue the miralax every morning    Need clarification of her medications  1. mirapex dose and if she is taking   2. Is she taking donepezil  3. The lorazepam - color of medication and how frequent.     We may ultimately having her go off the donepezil if she is still having abdominal pain    Use drugs.com to help in identifying medications and showing images of medications    Return back in 3-6 months.       Jet Porter MD           Follow-ups after your visit        Follow-up notes from your care team     Return in about 3 months (around 10/17/2017).      Your next 10 appointments already scheduled     Oct 05, 2017  8:00 AM CDT   Return Visit with Jack Esposito MD   [facility]  ([facility])    [address] [address]   [phone]            Oct 23, 2017  9:30 AM CDT   Return Visit with Jet Porter MD   [facility] ([facility])    [address] [place] [id number] [phone]              Who to contact     If you have questions or need follow up information about today's clinic visit or your schedule please contact [facility] directly at [phone].  Normal or non-critical lab and imaging results will be communicated to you by StreamSpechart, letter or phone within 4 business days after the clinic has received the results. If you do not hear from us within 7 days, please contact the clinic through Sennarit or phone. If you have a critical or abnormal lab result, we will notify you by phone as soon as possible.  Submit refill requests through Cleverlize or call your pharmacy and they will forward the refill request to us. Please allow 3 business days for your refill to be completed.          Additional Information About Your Visit        MyChart Information     Cleverlize gives you secure access to your electronic health record. If you see a primary care provider, you can also send messages to your care team and make appointments. If you have questions, please call your primary care clinic.  If you do not have a primary care provider, please call [phone] and they will assist you.      Cleverlize is an electronic gateway that provides easy, online access to your medical records. With Cleverlize, you can request a clinic appointment, read your test results, renew a prescription or communicate with your care team.     To access your existing account, please contact your [facility] or call [phone] for assistance.        Care EveryWhere ID     This is your Care EveryWhere ID. This could be used by other organizations to access your [facility]  records  [id number]        Your Vitals Were     Pulse Temperature Last Period Pulse Oximetry BMI (Body Mass Index)       73 97.9  F (36.6  C) (Oral) 06/05/2006 98% 25.04 kg/m2        Blood Pressure from Last 3 Encounters:   07/17/17 104/69   07/10/17 122/86   07/07/17 118/88    Weight from Last 3 Encounters:   07/17/17 61.7 kg (136 lb)   07/10/17 59.9 kg (132 lb)   07/07/17 62.6 kg (138 lb)              Today, you had the following     No orders found for display         Today's Medication Changes          These changes are accurate as of: 7/17/17 10:32 AM.  If you have any questions, ask your nurse or doctor.               Stop taking these medicines if you haven't already. Please contact your care team if you have questions.     trospium 20 MG tablet   Commonly known as:  SANCTURA   Stopped by:  Jet Porter MD                    Primary Care Provider Office Phone # Fax #    Maxine Luda Monae [phone]       [address] [address]        Equal Access to Services     [facility] AH: Hadii irving andersono Sojacque, waaxda luqadaha, qaybta kaalmada adesanthoshyada, xi butts . So [facility] [phone].    ATENCIÓN: Si habla español, tiene a shoemaker disposición servicios gratuitos de asistencia lingüística. Ll[facility] [phone].    We comply with applicable federal civil rights laws and [place] laws. We do not discriminate on the basis of race, color, national origin, age, disability sex, sexual orientation or gender identity.            Thank you!     Thank you for choosing [facility]  for your care. Our goal is always to provide you with excellent care. Hearing back from our patients is one way we can continue to improve our services. Please take a few minutes to complete the written survey that you may receive in the mail after your visit with us. Thank you!             Your Updated Medication List - Protect others  around you: Learn how to safely use, store and throw away your medicines at www.disposemymeds.org.          This list is accurate as of: 7/17/17 10:32 AM.  Always use your most recent med list.                   Brand Name Dispense Instructions for use Diagnosis    carbidopa-levodopa  MG per tablet    SINEMET    540 tablet    2 tablets @ 8am, 11am/12noon, and 9pm    Paralysis agitans (H)       citalopram 20 MG tablet    celeXA    90 tablet    Take 1 tablet (20 mg) by mouth daily    Major depressive disorder, recurrent episode, mild (H)       donepezil 5 MG tablet    ARICEPT    30 tablet    5mg daily for one month then 10mg daily    Mild dementia, Paralysis agitans (H)       lisinopril-hydrochlorothiazide 10-12.5 MG per tablet    PRINZIDE/ZESTORETIC    30 tablet    TAKE ONE TABLET BY MOUTH ONCE DAILY DUE  FOR  LABS    HTN, goal below 140/90       LORazepam 0.5 MG tablet    ATIVAN    10 tablet    Take 1 tablet (0.5 mg) by mouth 2 times daily as needed for anxiety        melatonin 5 MG tablet      Take 10 mg by mouth nightly as needed Reported on 5/10/2017        omeprazole 40 MG capsule    priLOSEC    30 capsule    Take 1 capsule (40 mg) by mouth daily Take 30-60 minutes before a meal.    Gastroesophageal reflux disease, esophagitis presence not specified       polyethylene glycol powder    MIRALAX    510 g    Take 17 g (1 capful) by mouth daily Adjust as needed to TID prn in same ratio of 1 capful per 8 oz water    Other constipation       pramipexole 0.25 MG tablet    MIRAPEX    270 tablet    Take 1 tablet (0.25 mg) by mouth 3 times daily    Acute pain of left knee       TYLENOL PM EXTRA STRENGTH PO      Take 1 tablet by mouth as needed Reported on 4/24/2017        ZOLMitriptan 5 MG tablet    ZOMIG    6 tablet    Take 1 tablet (5 mg) by mouth at onset of headache for migraine MAY REPEAT ONCE AFTER 2 HOURS. DO NOT EXCEED 2 TABLETS IN 24 HOURS.    Variants of migraine, not elsewhere classified, without mention  of intractable migraine without mention of status migrainosus

## 2017-07-19 DIAGNOSIS — M25.562 ACUTE PAIN OF LEFT KNEE: ICD-10-CM

## 2017-07-19 RX ORDER — TROSPIUM CHLORIDE 20 MG/1
20 TABLET, FILM COATED ORAL EVERY MORNING
COMMUNITY
End: 2018-02-26

## 2017-07-19 RX ORDER — PRAMIPEXOLE DIHYDROCHLORIDE 0.25 MG/1
0.25 TABLET ORAL 3 TIMES DAILY
Qty: 270 TABLET | Refills: 3 | COMMUNITY
Start: 2017-07-19 | End: 2017-11-20

## 2017-07-19 RX ORDER — OXYCODONE HYDROCHLORIDE 5 MG/1
5 TABLET ORAL EVERY 4 HOURS PRN
COMMUNITY
End: 2017-11-13

## 2017-07-19 RX ORDER — SENNOSIDES A AND B 8.6 MG/1
1 TABLET, FILM COATED ORAL DAILY PRN
COMMUNITY
End: 2017-11-27

## 2017-07-20 NOTE — PROGRESS NOTES
She is not taking donepezil.  She is taking 1/2 tablet of pramipexole (dose is .25mg)  3/day   She  Is taking sinemet 25/100 2 tabs 3/day  She is on lorazepam as needed  She is on celexa 20mg     PLAN.   1. Use senokot as recommended  2. May need to take her sinemet with applesauce or another food that does not have protein. and to consider ginger tablets as constipation and sinemet can both cause gi pain.

## 2017-07-20 NOTE — PROGRESS NOTES
Jet Porter MD Diaz, Melissa A, RN                   Let's wait to see if we can sort out her gi symptoms as donepezil can cause other gi problems   May need to try this in the future

## 2017-07-20 NOTE — PROGRESS NOTES
Writer spoke to the pt and explained Dr Porter' recommendations stated below. No other questions or concerns at this time.  Jammie Traylor RN

## 2017-08-18 ENCOUNTER — OFFICE VISIT (OUTPATIENT)
Dept: ORTHOPEDICS | Facility: CLINIC | Age: 62
End: 2017-08-18
Payer: COMMERCIAL

## 2017-08-18 VITALS — WEIGHT: 136 LBS | HEIGHT: 61 IN | BODY MASS INDEX: 25.68 KG/M2 | TEMPERATURE: 96.3 F

## 2017-08-18 DIAGNOSIS — M17.32 POST-TRAUMATIC OSTEOARTHRITIS OF LEFT KNEE: Primary | ICD-10-CM

## 2017-08-18 DIAGNOSIS — M17.11 PRIMARY OSTEOARTHRITIS OF RIGHT KNEE: ICD-10-CM

## 2017-08-18 PROCEDURE — 99212 OFFICE O/P EST SF 10 MIN: CPT | Mod: 25 | Performed by: ORTHOPAEDIC SURGERY

## 2017-08-18 PROCEDURE — 20610 DRAIN/INJ JOINT/BURSA W/O US: CPT | Mod: 50 | Performed by: ORTHOPAEDIC SURGERY

## 2017-08-18 ASSESSMENT — PAIN SCALES - GENERAL: PAINLEVEL: MILD PAIN (2)

## 2017-08-18 NOTE — PROGRESS NOTES
"Office Visit-Follow up    Chief Complaint: Nathaly Farias is a 62 year old female who is being seen for   Chief Complaint   Patient presents with     RECHECK     left knee lov 6/14/17 inj given       History of Present Illness:   Mechanism of Injury: no recent injury.  Location: left knee greater than the right knee. Patient just had some right knee pain this morning otherwise the left knee has been doing  after the 6/14/2017 cortisone injection however it is slowly wearing off.  Duration of Pain: pain this morning from the right knee but the left knee has slowly had some pain over the last few weeks  Rating of Pain: 2 out of 10  Pain Quality: ache  Pain is better with: rest  Pain is worse with: activity  Treatment so far consists of: cortisone injection to the left knee on 6/14/2017, Tylenol and Aleve as needed.   Associated Features: none  Pain is Limiting: activity  Here to: get another cortisone injection in the left knee  Additional History: patient asked today if she can get a cortisone injection in the right knee also.    REVIEW OF SYSTEMS  General: negative for, night sweats, dizziness, fatigue  Resp: No shortness of breath and no cough  CV: negative for chest pain, syncope or near-syncope  GI: negative for nausea, vomiting and diarrhea  : negative for dysuria and hematuria  Musculoskeletal: as above  Neurologic: negative for syncope   Hematologic: negative for bleeding disorder    Physical Exam:  Vitals: Temp 96.3  F (35.7  C)  Ht 1.549 m (5' 1\")  Wt 61.7 kg (136 lb)  LMP 06/05/2006  BMI 25.7 kg/m2  BMI= Body mass index is 25.7 kg/(m^2).  Constitutional: healthy, alert and no acute distress   Psychiatric: mentation appears normal and affect normal/bright  NEURO: no focal deficits, CMS intact left lower extremity  RESP: Normal with easy respirations and no use of accessory muscles to breathe, no audible wheezing or retractions  CV: Calf soft and nontender to palpation, leg warm   SKIN: No erythema, " rashes, excoriation, or breakdown. No evidence of infection.   MUSCULOSKELETAL:    INSPECTION of left knee: No gross deformities, erythema, edema, ecchymosis, atrophy or fasciculations.     PALPATION: No tenderness on palpation of the  anterior and posterior portion of the knee. No specific joint line tenderness. No increased warmth. Patient has generalized medial and lateral tenderness on palpation and it is the medial greater than the lateral.    ROM: Extension full, flexion to approximately 120 . All range of motion without catching locking or pain other than slight pain with full flexion.      STRENGTH: 5 out of 5 quad and hamstring strength.     SPECIAL TEST: Patient has a negative Lachman's negative drawer sign. Patient's knee is stable to varus and valgus stress at 30  of flexion. Patient has a negative Kecia's.   GAIT: non-antalgic  Lymph: no palpable lymph nodes      Diagnostic Modalities:  Previous imaging reviewed.  No new x-rays needed today.  Independent visualization of the images was performed.      Impression: 1. Left knee degenerative joint disease, posttraumatic arthritis status post left tibial plateau open reduction internal fixation at [facility]. 2. Right knee pain.    Plan:  All of the above pertinent physical exam and imaging modalities findings was reviewed with Nathaly.       INJECTION PROCEDURE:  The patient was counseled about the injections, including discussion of risks (including infection), contents of the injection, rationale for performing the injection, and expected benefits of the injection. The skin was prepped with alcohol and betadine and then utilizing sterile technique injections of the bilateral knee joints from the anterolateral approach in the seated position was performed. I used ekta chloride spray prior to doing the injections. The injections consisted of 1ml of Kenalog (40mg per 1ml) with 8ml 1% lidocaine plain. The patient tolerated the  injections well, and there were no complications. The injection sites were covered with a Band-Aid. The injections were performed by RICHELLE Serna     Patient Instructions:   1. We're glad that your last cortisone injection done on 6/14/2017 worked very well for your left knee. It sounds like it is still working.  2. We are okay doing another injection interior left knee.  3. As far as your right knee it is hurting most likely because of compensation for the left knee. On x-ray you do not have much for arthritis in that right knee. We feel that the right knee will get better after the cortisone injection of the left.  4. You want to have a injection in the right knee and we are okay with that.  5. We decided to bilateral cortisone injections today.  6. You can followup with Christine Crowley MD in 6 weeks, if you are having pain at that time we can do a cortisone injection.  You can always cancel the appointment if you are doing really well and follow-up as needed.  Re-x-ray on return: No    Scribed by Jack Kaplan PA-C on 8/18/2017 at 11:06 AM, based on Dr. Christine Crowley's statements to me.    This note was dictated with Summize.    SARBJIT Ortiz MD

## 2017-08-18 NOTE — NURSING NOTE
"Chief Complaint   Patient presents with     RECHECK     left knee lov 6/14/17 inj given       Initial Temp 96.3  F (35.7  C)  Ht 1.549 m (5' 1\")  Wt 61.7 kg (136 lb)  LMP 06/05/2006  BMI 25.7 kg/m2 Estimated body mass index is 25.7 kg/(m^2) as calculated from the following:    Height as of this encounter: 1.549 m (5' 1\").    Weight as of this encounter: 61.7 kg (136 lb).  Medication Reconciliation: complete    BP completed using cuff size: NA (Not Taken)    Sindy Russell MA      "

## 2017-08-18 NOTE — PATIENT INSTRUCTIONS
Encounter Diagnoses   Name Primary?     Post-traumatic osteoarthritis of left knee Yes     Primary osteoarthritis of right knee, very mild      Rest, ice and elevate above heart level as needed for pain control  1. We're glad that your last cortisone injection done on 6/14/2017 worked very well for your left knee. It sounds like it is still working.  2. We are okay doing another injection interior left knee.  3. As far as your right knee it is hurting most likely because of compensation for the left knee. On x-ray you do not have much for arthritis in that right knee. We feel that the right knee will get better after the cortisone injection of the left.  4. You want to have a injection in the right knee and we are okay with that.  5. We decided to bilateral cortisone injections today.  6. You can followup with Christine Crowley MD in 6 weeks, if you are having pain at that time we can do a cortisone injection.  You can always cancel the appointment if you are doing really well and follow-up as needed.   Cortisone Instructions:     1. You received an injection of cortisone into your bilateral knees today.  2. The joint(s) may be more painful for the first 1-2 days.  3. We ask you to continue to rest the joint(s) for a few more days before resuming regular activities.  4. Pain Medications you can take (as long as your primary care provider allows these meds and you do not have kidney or liver conditions):  Tylenol  Take 1000 mg by mouth every 6 hours as needed; maximum dose 4000 mg a day  Ibuprofen  600 mg every 6 hours as needed; maximum 2400 mg a day  (OK to take tylenol and ibuprofen at the same time)  5. Rest, ice and elevate as needed for pain control  6. Watch for these signs of infection: redness, swelling, drainage, warmth to touch, increased pain, or fever. Call the clinic or make an appointment to be seen if you think you have an infection.  7. If you are diabetic, make sure you keep a close eye on your  blood sugars, they can get elevated with cortisone injections.   8. Sometimes it can take 1-2 weeks for it to reach its full effect.    Cortisone Injections  Cortisone is a type of steroid. It can greatly reduce swelling, redness, and irritation (inflammation) and pain. Being injected with cortisone is simple and doesn t take long. Your doctor may ask you questions about your health. Certain health conditions, such as diabetes, can be affected by cortisone.     Your pain may be relieved by a cortisone injection.   Why have a cortisone injection?  Injecting cortisone can relieve pain for anything from a sports injury to arthritis. Your doctor may suggest an injection if rest, splints, or oral medicine doesn t relieve your pain. Injecting cortisone is simpler than having surgery. And cortisone may provide the lasting pain relief that can help you get out and enjoy life again.  Getting the injection  Your doctor will start by cleaning and occasionally numbing your skin at the injection site. Next you ll be injected with local anesthetics (for short-term pain relief) and cortisone. The injection may last a few moments. A small bandage will be put over the injection site. You ll then be ready to go home.  After your injection  After being injected, make sure you don t injure the treated region. But stay active. Enjoy a walk or some other mild activity. Just be careful not to strain the region that gave you trouble.  The next day  Some people feel more pain after being injected. This is normal, and it will go away soon. Applying ice for 20 minutes at a time to your injury may reduce the increased pain. Rest for the first day or two. You don t need to stay in bed. But avoid tasks that may strain the injured region.  If you have diabetes  Cortisone injections can cause blood sugar to be increased for several days after the injection. If you have diabetes, you should follow your blood  sugar closely during this time. Follow  your regular plan for what to do when your blood sugar is elevated.     6226-6902 [employer]. [address] [place] [address], [place] [address]. All rights reserved. This information is not intended as a substitute for professional medical care. Always follow your healthcare professional's instructions.     [facility] and [employer] may offer reliable information regarding your diagnosis and treatment plan.    THANK YOU for coming in today. If you receive a survey via CorvisaCloud or mail please let us know if there was anything you especially appreciated today or if there is any way we can improve our clinic. We appreciate your input.    GENERAL INFORMATION:  Our hours are:  Monday :     Clinic 7:30 AM-430 PM ([facility])  Tuesday:      Operating Room All Day ([facility])  Wednesday: Clinic 7:30 AM - 11:15 AM ([facility])             Clinic 1:00 PM - 4:00PM ([facility])  Thursday:     Administrative Day  Friday:          Clinic 7:30 AM - 11:15 AM ([facility])            Clinic 1:00 PM - 4:00 PM ([facility])      Bone and Joint Service Line for any issues or concerns: [phone]      We are not in the office Thursdays. Therefore non- urgent calls and medical messages received on Thursday will be addressed when we are back in the office on Wednesday. Urgent matters will be reviewed and addressed by one of our partners in the office as needed.    If lab work was done today as part of your evaluation you will generally be contacted via CorvisaCloud, mail, or phone with the results within 1-5 days. If there is an alarming result we will contact you by phone. Lab results come back at varying times, I generally wait until all labs are resulted before making comments on results. Please note labs are automatically released to CorvisaCloud (if you have signed up for it) once available-at times you may  see these prior to my having a chance to review them as well.    If you need refills please contact your pharmacist. They will send a refill request to me to review. Please allow 3 business days for us to process all refill requests. All narcotic refills should be handled in the clinic at the time of your visit.

## 2017-08-18 NOTE — LETTER
"    8/18/2017         RE: Nathaly Farias  [address] [facility] [address]        Dear Colleague,    Thank you for referring your patient, Nathaly Farias, to the [facility]. Please see a copy of my visit note below.    Office Visit-Follow up    Chief Complaint: Nathaly Farias is a 62 year old female who is being seen for   Chief Complaint   Patient presents with     RECHECK     left knee lov 6/14/17 inj given       History of Present Illness:   Mechanism of Injury: no recent injury.  Location: left knee greater than the right knee. Patient just had some right knee pain this morning otherwise the left knee has been doing  after the 6/14/2017 cortisone injection however it is slowly wearing off.  Duration of Pain: pain this morning from the right knee but the left knee has slowly had some pain over the last few weeks  Rating of Pain: 2 out of 10  Pain Quality: ache  Pain is better with: rest  Pain is worse with: activity  Treatment so far consists of: cortisone injection to the left knee on 6/14/2017, Tylenol and Aleve as needed.   Associated Features: none  Pain is Limiting: activity  Here to: get another cortisone injection in the left knee  Additional History: patient asked today if she can get a cortisone injection in the right knee also.    REVIEW OF SYSTEMS  General: negative for, night sweats, dizziness, fatigue  Resp: No shortness of breath and no cough  CV: negative for chest pain, syncope or near-syncope  GI: negative for nausea, vomiting and diarrhea  : negative for dysuria and hematuria  Musculoskeletal: as above  Neurologic: negative for syncope   Hematologic: negative for bleeding disorder    Physical Exam:  Vitals: Temp 96.3  F (35.7  C)  Ht 1.549 m (5' 1\")  Wt 61.7 kg (136 lb)  LMP 06/05/2006  BMI 25.7 kg/m2  BMI= Body mass index is 25.7 kg/(m^2).  Constitutional: healthy, alert and no acute distress   Psychiatric: mentation appears normal and affect " normal/bright  NEURO: no focal deficits, CMS intact left lower extremity  RESP: Normal with easy respirations and no use of accessory muscles to breathe, no audible wheezing or retractions  CV: Calf soft and nontender to palpation, leg warm   SKIN: No erythema, rashes, excoriation, or breakdown. No evidence of infection.   MUSCULOSKELETAL:    INSPECTION of left knee: No gross deformities, erythema, edema, ecchymosis, atrophy or fasciculations.     PALPATION: No tenderness on palpation of the  anterior and posterior portion of the knee. No specific joint line tenderness. No increased warmth. Patient has generalized medial and lateral tenderness on palpation and it is the medial greater than the lateral.    ROM: Extension full, flexion to approximately 120 . All range of motion without catching locking or pain other than slight pain with full flexion.      STRENGTH: 5 out of 5 quad and hamstring strength.     SPECIAL TEST: Patient has a negative Lachman's negative drawer sign. Patient's knee is stable to varus and valgus stress at 30  of flexion. Patient has a negative Kecia's.   GAIT: non-antalgic  Lymph: no palpable lymph nodes      Diagnostic Modalities:  Previous imaging reviewed.  No new x-rays needed today.  Independent visualization of the images was performed.      Impression: 1. Left knee degenerative joint disease, posttraumatic arthritis status post left tibial plateau open reduction internal fixation at [facility]. 2. Right knee pain.    Plan:  All of the above pertinent physical exam and imaging modalities findings was reviewed with Nathaly.       INJECTION PROCEDURE:  The patient was counseled about the injections, including discussion of risks (including infection), contents of the injection, rationale for performing the injection, and expected benefits of the injection. The skin was prepped with alcohol and betadine and then utilizing sterile technique injections of the bilateral  knee joints from the anterolateral approach in the seated position was performed. I used ekta chloride spray prior to doing the injections. The injections consisted of 1ml of Kenalog (40mg per 1ml) with 8ml 1% lidocaine plain. The patient tolerated the injections well, and there were no complications. The injection sites were covered with a Band-Aid. The injections were performed by RICHELLE Serna     Patient Instructions:   1. We're glad that your last cortisone injection done on 6/14/2017 worked very well for your left knee. It sounds like it is still working.  2. We are okay doing another injection interior left knee.  3. As far as your right knee it is hurting most likely because of compensation for the left knee. On x-ray you do not have much for arthritis in that right knee. We feel that the right knee will get better after the cortisone injection of the left.  4. You want to have a injection in the right knee and we are okay with that.  5. We decided to bilateral cortisone injections today.  6. You can followup with Christine Crowley MD in 6 weeks, if you are having pain at that time we can do a cortisone injection.  You can always cancel the appointment if you are doing really well and follow-up as needed.  Re-x-ray on return: No    Scribed by Jack Kaplan PA-C on 8/18/2017 at 11:06 AM, based on Dr. Christine Crowley's statements to me.    This note was dictated with One[employer].    SARBJIT Otriz MD             Again, thank you for allowing me to participate in the care of your patient.        Sincerely,        Christine Crowley MD

## 2017-08-18 NOTE — MR AVS SNAPSHOT
After Visit Summary   8/18/2017    Nathaly Farias    MRN: [id number]           Patient Information     Date Of Birth          1955        Visit Information        Provider Department      8/18/2017 10:20 AM Christine Crowley MD [facility]        Today's Diagnoses     Post-traumatic osteoarthritis of left knee    -  1    Primary osteoarthritis of right knee, very mild          Care Instructions    Encounter Diagnoses   Name Primary?     Post-traumatic osteoarthritis of left knee Yes     Primary osteoarthritis of right knee, very mild      Rest, ice and elevate above heart level as needed for pain control  1. We're glad that your last cortisone injection done on 6/14/2017 worked very well for your left knee. It sounds like it is still working.  2. We are okay doing another injection interior left knee.  3. As far as your right knee it is hurting most likely because of compensation for the left knee. On x-ray you do not have much for arthritis in that right knee. We feel that the right knee will get better after the cortisone injection of the left.  4. You want to have a injection in the right knee and we are okay with that.  5. We decided to bilateral cortisone injections today.  6. You can followup with Christine Crowley MD in 6 weeks, if you are having pain at that time we can do a cortisone injection.  You can always cancel the appointment if you are doing really well and follow-up as needed.   Cortisone Instructions:     1. You received an injection of cortisone into your bilateral knees today.  2. The joint(s) may be more painful for the first 1-2 days.  3. We ask you to continue to rest the joint(s) for a few more days before resuming regular activities.  4. Pain Medications you can take (as long as your primary care provider allows these meds and you do not have kidney or liver conditions):  Tylenol  Take 1000 mg by mouth every 6 hours as needed; maximum dose 4000 mg a  day  Ibuprofen  600 mg every 6 hours as needed; maximum 2400 mg a day  (OK to take tylenol and ibuprofen at the same time)  5. Rest, ice and elevate as needed for pain control  6. Watch for these signs of infection: redness, swelling, drainage, warmth to touch, increased pain, or fever. Call the clinic or make an appointment to be seen if you think you have an infection.  7. If you are diabetic, make sure you keep a close eye on your blood sugars, they can get elevated with cortisone injections.   8. Sometimes it can take 1-2 weeks for it to reach its full effect.    Cortisone Injections  Cortisone is a type of steroid. It can greatly reduce swelling, redness, and irritation (inflammation) and pain. Being injected with cortisone is simple and doesn t take long. Your doctor may ask you questions about your health. Certain health conditions, such as diabetes, can be affected by cortisone.     Your pain may be relieved by a cortisone injection.   Why have a cortisone injection?  Injecting cortisone can relieve pain for anything from a sports injury to arthritis. Your doctor may suggest an injection if rest, splints, or oral medicine doesn t relieve your pain. Injecting cortisone is simpler than having surgery. And cortisone may provide the lasting pain relief that can help you get out and enjoy life again.  Getting the injection  Your doctor will start by cleaning and occasionally numbing your skin at the injection site. Next you ll be injected with local anesthetics (for short-term pain relief) and cortisone. The injection may last a few moments. A small bandage will be put over the injection site. You ll then be ready to go home.  After your injection  After being injected, make sure you don t injure the treated region. But stay active. Enjoy a walk or some other mild activity. Just be careful not to strain the region that gave you trouble.  The next day  Some people feel more pain after being injected. This is  normal, and it will go away soon. Applying ice for 20 minutes at a time to your injury may reduce the increased pain. Rest for the first day or two. You don t need to stay in bed. But avoid tasks that may strain the injured region.  If you have diabetes  Cortisone injections can cause blood sugar to be increased for several days after the injection. If you have diabetes, you should follow your blood  sugar closely during this time. Follow your regular plan for what to do when your blood sugar is elevated.     2004-6369 [employer]. [address] [place] [address]. All rights reserved. This information is not intended as a substitute for professional medical care. Always follow your healthcare professional's instructions.     [employer] and [employer] may offer reliable information regarding your diagnosis and treatment plan.    THANK YOU for coming in today. If you receive a survey via [employer] or mail please let us know if there was anything you especially appreciated today or if there is any way we can improve our clinic. We appreciate your input.    GENERAL INFORMATION:  Our hours are:  Monday :     Clinic 7:30 AM-430 PM ([facility])  Tuesday:      Operating Room All Day ([facility])  Wednesday: Clinic 7:30 AM - 11:15 AM ([facility])             Clinic 1:00 PM - 4:00PM ([facility])  Thursday:     Administrative Day  Friday:          Clinic 7:30 AM - 11:15 AM ([facility])            Clinic 1:00 PM - 4:00 PM ([facility])      Bone and Joint Service Line for any issues or concerns: [phone]      We are not in the office Thursdays. Therefore non- urgent calls and medical messages received on Thursday will be addressed when we are back in the office on Wednesday. Urgent matters will be reviewed and addressed by one of our partners in the office as needed.    If lab  work was done today as part of your evaluation you will generally be contacted via [email or web address], mail, or phone with the results within 1-5 days. If there is an alarming result we will contact you by phone. Lab results come back at varying times, I generally wait until all labs are resulted before making comments on results. Please note labs are automatically released to [employer] (if you have signed up for it) once available-at times you may see these prior to my having a chance to review them as well.    If you need refills please contact your pharmacist. They will send a refill request to me to review. Please allow 3 business days for us to process all refill requests. All narcotic refills should be handled in the clinic at the time of your visit.           Follow-ups after your visit        Your next 10 appointments already scheduled     Oct 05, 2017  8:00 AM CDT   [facility]    [address] [facility] [id number] [phone]            Oct 23, 2017  9:30 AM CDT   [facility] Visit with Jet Porter MD   [facility]    [address] [place] [id number] [phone]              Who to contact     If you have questions or need follow up information about today's clinic visit or your schedule please contact [facility] directly at [phone].  Normal or non-critical lab and imaging results will be communicated to you by MyChart, letter or phone within 4 business days after the clinic has received the results. If you do not hear from us within 7 days, please contact the clinic through [email or web address] or phone. If you have a critical or abnormal lab result, we will notify you by phone as soon as possible.  Submit refill requests through [employer] or call your pharmacy and they will forward the refill request to us. Please allow 3 business days for your refill  "to be completed.          Additional Information About Your Visit        [employer] Information     [employer] gives you secure access to your electronic health record. If you see a primary care provider, you can also send messages to your care team and make appointments. If you have questions, please call your primary care clinic.  If you do not have a primary care provider, please call [phone] and they will assist you.        Care EveryWhere ID     This is your Care EveryWhere ID. This could be used by other organizations to access your [facility] medical records  [id number]        Your Vitals Were     Temperature Height Last Period BMI (Body Mass Index)          96.3  F (35.7  C) 1.549 m (5' 1\") 06/05/2006 25.7 kg/m2         Blood Pressure from Last 3 Encounters:   07/17/17 104/69   07/10/17 122/86   07/07/17 118/88    Weight from Last 3 Encounters:   08/18/17 61.7 kg (136 lb)   07/17/17 61.7 kg (136 lb)   07/10/17 59.9 kg (132 lb)              Today, you had the following     No orders found for display       Primary Care Provider Office Phone # Fax #    Maxine Monae [phone]       [address] [place] [id number]        Equal Access to Services     ARACELY DAVENPORT : Hadii irving ku hadasho Soomaali, waaxda luqadaha, qaybta kaalmada adeegyada, waxay sonido davidsonn luz marina serrano. So [facility] [phone].    ATENCIÓN: Si habla español, tiene a shoemaker disposición servicios gratuitos de asistencia lingüística. Llame al [phone].    We comply with applicable federal civil rights laws and [place] laws. We do not discriminate on the basis of race, color, national origin, age, disability sex, sexual orientation or gender identity.            Thank you!     Thank you for choosing [facility]  for your care. Our goal is always to provide you with excellent care. Hearing back from our patients is one way we can continue to improve our services. Please take a few minutes to complete " the written survey that you may receive in the mail after your visit with us. Thank you!             Your Updated Medication List - Protect others around you: Learn how to safely use, store and throw away your medicines at www.disposemymeds.org.          This list is accurate as of: 8/18/17 10:54 AM.  Always use your most recent med list.                   Brand Name Dispense Instructions for use Diagnosis    carbidopa-levodopa  MG per tablet    SINEMET    540 tablet    2 tablets @ 8am, 11am/12noon, and 9pm    Paralysis agitans (H)       citalopram 20 MG tablet    celeXA    90 tablet    Take 1 tablet (20 mg) by mouth daily    Major depressive disorder, recurrent episode, mild (H)       lisinopril-hydrochlorothiazide 10-12.5 MG per tablet    PRINZIDE/ZESTORETIC    30 tablet    TAKE ONE TABLET BY MOUTH ONCE DAILY DUE  FOR  LABS    HTN, goal below 140/90       LORazepam 0.5 MG tablet    ATIVAN    10 tablet    Take 1 tablet (0.5 mg) by mouth 2 times daily as needed for anxiety        melatonin 5 MG tablet      Take 10 mg by mouth nightly as needed Reported on 5/10/2017        oxyCODONE 5 MG IR tablet    ROXICODONE     Take 5 mg by mouth every 4 hours as needed for moderate to severe pain 0.5-1 tab as needed        polyethylene glycol powder    MIRALAX    510 g    Take 17 g (1 capful) by mouth daily Adjust as needed to TID prn in same ratio of 1 capful per 8 oz water    Other constipation       pramipexole 0.25 MG tablet    MIRAPEX    270 tablet    Taking 1/2 tab 3/day. Was rxd 1 tab 3/day    Acute pain of left knee       SANCTURA 20 MG tablet   Generic drug:  trospium      Take 20 mg by mouth every morning        senna 8.6 MG tablet    SENOKOT     Take 1 tablet by mouth daily as needed for constipation        TYLENOL PM EXTRA STRENGTH PO      Take 1 tablet by mouth as needed Reported on 4/24/2017        ZOLMitriptan 5 MG tablet    ZOMIG    6 tablet    Take 1 tablet (5 mg) by mouth at onset of headache for migraine  MAY REPEAT ONCE AFTER 2 HOURS. DO NOT EXCEED 2 TABLETS IN 24 HOURS.    Variants of migraine, not elsewhere classified, without mention of intractable migraine without mention of status migrainosus

## 2017-09-05 DIAGNOSIS — F33.0 MAJOR DEPRESSIVE DISORDER, RECURRENT EPISODE, MILD (H): ICD-10-CM

## 2017-09-05 NOTE — TELEPHONE ENCOUNTER
celexa     Last Written Prescription Date: 12/05/16  Last Fill Quantity: 90, # refills: 1  Last Office Visit with [facility] primary care provider:  07/10/17   Next 5 appointments (look out 90 days)     Oct 05, 2017  8:00 AM CDT   [facility] Visit with Jack [facility] MD   [facility]    [address] [facility] [address]   [phone]            Oct 23, 2017  9:30 AM CDT   [facility]    [address] [place] [address]   [phone]                   Last PHQ-9 score on record=   PHQ-9 SCORE 7/10/2017   Total Score -   Total Score MyChart 17 (Moderately severe depression)   Total Score 17

## 2017-09-06 RX ORDER — CITALOPRAM HYDROBROMIDE 20 MG/1
20 TABLET ORAL DAILY
Qty: 30 TABLET | Refills: 0 | Status: SHIPPED | OUTPATIENT
Start: 2017-09-06 | End: 2017-10-26

## 2017-09-06 NOTE — TELEPHONE ENCOUNTER
Routing refill request to provider for review/approval because:  Appears to be a break in medication - should have been out around 6/5/17    Iveth Mckeon, RN, BSN

## 2017-09-06 NOTE — TELEPHONE ENCOUNTER
Had appt 7/10 and answered questions, but does not appear to be addressed.  Symptoms uncontrolled.  Should be directed to appt.  Aleida refill given.  Maxine Monae MD

## 2017-09-08 ENCOUNTER — HEALTH MAINTENANCE LETTER (OUTPATIENT)
Age: 62
End: 2017-09-08

## 2017-09-13 ENCOUNTER — OFFICE VISIT (OUTPATIENT)
Dept: ORTHOPEDICS | Facility: CLINIC | Age: 62
End: 2017-09-13
Payer: COMMERCIAL

## 2017-09-13 VITALS — TEMPERATURE: 97.6 F | BODY MASS INDEX: 26.81 KG/M2 | WEIGHT: 142 LBS | HEIGHT: 61 IN

## 2017-09-13 DIAGNOSIS — M17.32 POST-TRAUMATIC OSTEOARTHRITIS OF LEFT KNEE: Primary | ICD-10-CM

## 2017-09-13 PROCEDURE — 99214 OFFICE O/P EST MOD 30 MIN: CPT | Performed by: ORTHOPAEDIC SURGERY

## 2017-09-13 RX ORDER — MELOXICAM 15 MG/1
15 TABLET ORAL DAILY
Qty: 60 TABLET | Refills: 1 | Status: SHIPPED | OUTPATIENT
Start: 2017-09-13 | End: 2017-11-13

## 2017-09-13 NOTE — MR AVS SNAPSHOT
After Visit Summary   9/13/2017    Nathaly Farias    MRN: [id number]           Patient Information     Date Of Birth          1955        Visit Information        Provider Department      9/13/2017 1:50 PM Christine Crowley MD [facility]        Today's Diagnoses     Post-traumatic osteoarthritis of left knee    -  1      Care Instructions    Encounter Diagnosis   Name Primary?     Post-traumatic osteoarthritis of left knee Yes     Rest, ice and elevate above heart level as needed for pain control  1. it sounds like the last cortisone injection only lasted about one week maybe 2.  2.  I ordered Mobic 15mg once a day times 2 weeks, then take as needed.  Stop this medication if you have any abdominal pain with it.  I sent this to your pharmacy.      3. Right now you have had a cortisone injection in your left knee on 10/21/2016, 12/9/2016, 3/27/2017, 6/14/2017, 8/18/2017. So this is 5 injections so far.  This is ok because you do not have much cartilage in your knee yet anyway.  We would only do one more cortisone injection in 2017.   4. We decided to hold off for now and try the Mobic and save the cortisone for later in the year.  5.   [employer] and [employer] may offer reliable information regarding your diagnosis and treatment plan.    THANK YOU for coming in today. If you receive a survey via [employer] or mail please let us know if there was anything you especially appreciated today or if there is any way we can improve our clinic. We appreciate your input.    GENERAL INFORMATION:  Our hours are:  Monday :     Clinic 7:30 AM-430 PM ([facility])  Tuesday:      Operating Room All Day ([facility])  Wednesday: Clinic 7:30 AM - 11:15 AM ([facility])             Clinic 1:00 PM - 4:00PM ([facility])  Thursday:     Administrative Day  Friday:          Clinic 7:30 AM - 11:15 AM ([facility]  [facility]            Clinic 1:00 PM - 4:00 PM ([facility])      Bone and Joint Service Line for any issues or concerns: [phone]      We are not in the office Thursdays. Therefore non- urgent calls and medical messages received on Thursday will be addressed when we are back in the office on Wednesday. Urgent matters will be reviewed and addressed by one of our partners in the office as needed.    If lab work was done today as part of your evaluation you will generally be contacted via [employer], mail, or phone with the results within 1-5 days. If there is an alarming result we will contact you by phone. Lab results come back at varying times, I generally wait until all labs are resulted before making comments on results. Please note labs are automatically released to [employer] (if you have signed up for it) once available-at times you may see these prior to my having a chance to review them as well.    If you need refills please contact your pharmacist. They will send a refill request to me to review. Please allow 3 business days for us to process all refill requests. All narcotic refills should be handled in the clinic at the time of your visit.             Follow-ups after your visit        Your next 10 appointments already scheduled     Oct 05, 2017  8:00 AM CDT   [facility]    [address] [facility] [id number] [phone]            Oct 23, 2017  9:30 AM CDT   [facility] Visit with Jet Porter MD   [facility]    [address] [place] [id number] [phone]              Who to contact     If you have questions or need follow up information about today's clinic visit or your schedule please contact [facility] directly at [phone].  Normal or non-critical lab and imaging results will be communicated to you by  "MyChart, letter or phone within 4 business days after the clinic has received the results. If you do not hear from us within 7 days, please contact the clinic through [employer] or phone. If you have a critical or abnormal lab result, we will notify you by phone as soon as possible.  Submit refill requests through [employer] or call your pharmacy and they will forward the refill request to us. Please allow 3 business days for your refill to be completed.          Additional Information About Your Visit        [employer] Information     [employer] gives you secure access to your electronic health record. If you see a primary care provider, you can also send messages to your care team and make appointments. If you have questions, please call your primary care clinic.  If you do not have a primary care provider, please call [phone] and they will assist you.        Care EveryWhere ID     This is your Care EveryWhere ID. This could be used by other organizations to access your [place] medical records  [id number]        Your Vitals Were     Temperature Height Last Period BMI (Body Mass Index)          97.6  F (36.4  C) (Temporal) 1.549 m (5' 1\") 06/05/2006 26.83 kg/m2         Blood Pressure from Last 3 Encounters:   07/17/17 104/69   07/10/17 122/86   07/07/17 118/88    Weight from Last 3 Encounters:   09/13/17 64.4 kg (142 lb)   08/18/17 61.7 kg (136 lb)   07/17/17 61.7 kg (136 lb)              Today, you had the following     No orders found for display         Today's Medication Changes          These changes are accurate as of: 9/13/17  3:05 PM.  If you have any questions, ask your nurse or doctor.               Start taking these medicines.        Dose/Directions    meloxicam 15 MG tablet   Commonly known as:  MOBIC   Used for:  Post-traumatic osteoarthritis of left knee   Started by:  Christine Crowley MD        Dose:  15 mg   Take 1 tablet (15 mg) by mouth daily   Quantity:  60 tablet   Refills:  1            Where " to get your medicines      Information about where to get these medications is not yet available     ! Ask your nurse or doctor about these medications     meloxicam 15 MG tablet                Primary Care Provider Office Phone # Fax #    Maxine Monae [phone]       [address] [place] [id number]        Equal Access to Services     ARACELY DAVENPORT : Hadii irving ku hadasho Soomaali, waaxda luqadaha, qaybta kaalmada adesanthoshyada, xi lawrencerebelnba serrano. So [facility] [phone].    ATENCIÓN: Si habla español, tiene a shoemaker disposición servicios gratuitos de asistencia lingüística. [facility] [phone].    We comply with applicable federal civil rights laws and [place] laws. We do not discriminate on the basis of race, color, national origin, age, disability sex, sexual orientation or gender identity.            Thank you!     Thank you for choosing [facility]  for your care. Our goal is always to provide you with excellent care. Hearing back from our patients is one way we can continue to improve our services. Please take a few minutes to complete the written survey that you may receive in the mail after your visit with us. Thank you!             Your Updated Medication List - Protect others around you: Learn how to safely use, store and throw away your medicines at www.disposemymeds.org.          This list is accurate as of: 9/13/17  3:05 PM.  Always use your most recent med list.                   Brand Name Dispense Instructions for use Diagnosis    carbidopa-levodopa  MG per tablet    SINEMET    540 tablet    2 tablets @ 8am, 11am/12noon, and 9pm    Paralysis agitans (H)       citalopram 20 MG tablet    celeXA    30 tablet    Take 1 tablet (20 mg) by mouth daily DUE for follow up    Major depressive disorder, recurrent episode, mild (H)       lisinopril-hydrochlorothiazide 10-12.5 MG per tablet    PRINZIDE/ZESTORETIC    30 tablet    TAKE ONE TABLET BY MOUTH  ONCE DAILY DUE  FOR  LABS    HTN, goal below 140/90       LORazepam 0.5 MG tablet    ATIVAN    10 tablet    Take 1 tablet (0.5 mg) by mouth 2 times daily as needed for anxiety        melatonin 5 MG tablet      Take 10 mg by mouth nightly as needed Reported on 5/10/2017        meloxicam 15 MG tablet    MOBIC    60 tablet    Take 1 tablet (15 mg) by mouth daily    Post-traumatic osteoarthritis of left knee       oxyCODONE 5 MG IR tablet    ROXICODONE     Take 5 mg by mouth every 4 hours as needed for moderate to severe pain 0.5-1 tab as needed        polyethylene glycol powder    MIRALAX    510 g    Take 17 g (1 capful) by mouth daily Adjust as needed to TID prn in same ratio of 1 capful per 8 oz water    Other constipation       pramipexole 0.25 MG tablet    MIRAPEX    270 tablet    Taking 1/2 tab 3/day. Was rxd 1 tab 3/day    Acute pain of left knee       SANCTURA 20 MG tablet   Generic drug:  trospium      Take 20 mg by mouth every morning        senna 8.6 MG tablet    SENOKOT     Take 1 tablet by mouth daily as needed for constipation        TYLENOL PM EXTRA STRENGTH PO      Take 1 tablet by mouth as needed Reported on 4/24/2017        ZOLMitriptan 5 MG tablet    ZOMIG    6 tablet    Take 1 tablet (5 mg) by mouth at onset of headache for migraine MAY REPEAT ONCE AFTER 2 HOURS. DO NOT EXCEED 2 TABLETS IN 24 HOURS.    Variants of migraine, not elsewhere classified, without mention of intractable migraine without mention of status migrainosus

## 2017-09-13 NOTE — PROGRESS NOTES
"Office Visit-Follow up    Chief Complaint: Nathaly Farias is a 62 year old female who is being seen for   Chief Complaint   Patient presents with     RECHECK     left knee lov 8/18/17 inj given       History of Present Illness:   Mechanism of Injury: no recent injury  Location: left knee greater than the right knee  Duration of Pain: approximately 3 weeks recently. She has been dealing with this pain for years. But the cortisone injection wore off 3 weeks ago.  Rating of Pain: mild to moderate  Pain Quality: ache  Pain is better with: rest  Pain is worse with: activity  Treatment so far consists of: cortisone injections into the left knee on the following days 10/21/2016, 12/9/2016, 3/27/2017, 6/14/2017, 8/18/2017. Patient had seen this one injection on 2/27/2017 that did not help her. She has not tried Mobic yet.  Associated Features: none  Pain is Limiting: ambulation  Here to: get another injection if possible.  Additional History: she has not tried Mobic and states that she does not have any kidney issues. I did check her GFR and it was 44 recently. I did discuss this with Dr. Crowley and she is okay with us proceeding with the Mobic. She is followed closely by her primary.    REVIEW OF SYSTEMS  General: negative for, night sweats, dizziness, fatigue  Resp: No shortness of breath and no cough  CV: negative for chest pain, syncope or near-syncope  GI: negative for nausea, vomiting and diarrhea  : negative for dysuria and hematuria  Musculoskeletal: as above  Neurologic: negative for syncope   Hematologic: negative for bleeding disorder    Physical Exam:  Vitals: Temp 97.6  F (36.4  C) (Temporal)  Ht 1.549 m (5' 1\")  Wt 64.4 kg (142 lb)  LMP 06/05/2006  BMI 26.83 kg/m2  BMI= Body mass index is 26.83 kg/(m^2).  Constitutional: healthy, alert and no acute distress   Psychiatric: mentation appears normal and affect normal/bright  NEURO: no focal deficits, CMS intact left lower extremity  RESP: Normal with easy " respirations and no use of accessory muscles to breathe, no audible wheezing or retractions  CV: Calf soft and nontender to palpation, leg warm   SKIN: No erythema, rashes, excoriation, or breakdown. No evidence of infection. We do see some varicose veins.  MUSCULOSKELETAL:    INSPECTION of left knee: No gross deformities, erythema, edema, ecchymosis, atrophy or fasciculations.     PALPATION: No tenderness on palpation of the medial, lateral, anterior and posterior portion of the knee. No specific joint line tenderness. No increased warmth.     ROM: flexion and extension without touching or locking or major pain.     STRENGTH: able to flex and extend against gravity    SPECIAL TEST: none  GAIT: not observed  Lymph: no palpable lymph nodes      Diagnostic Modalities:  None today. She has known severe degenerative joint disease. She is status post ORIF of tibial plateau fracture on the left knee.  Independent visualization of the images was performed.      Impression: 1. Left knee posttraumatic degenerative joint disease severe, status post ORIF of tibial plateau fracture. 2. Right knee degenerative joint disease, mild    Plan:  All of the above pertinent physical exam and imaging modalities findings was reviewed with Nathaly.                                          CONSERVATIVE CARE:    Patient Instructions:   1. it sounds like the last cortisone injection only lasted about one week maybe 2.  2.  I ordered Mobic 15mg once a day times 2 weeks, then take as needed.  Stop this medication if you have any abdominal pain with it.  I sent this to your pharmacy.      3. Right now you have had a cortisone injection in your left knee on 10/21/2016, 12/9/2016, 3/27/2017, 6/14/2017, 8/18/2017. So this is 5 injections so far.  This is ok because you do not have much cartilage in your knee yet anyway.  We would only do one more cortisone injection in 2017.   4. We decided to hold off for now and try the Mobic and save the  cortisone for later in the year.  5. We mentioned you should watch your kidney function while taking th the Mobic medication. You let us know that Francisco Javierporsha watches you closely.  6. Follow up with Christine Crowley MD and/or Jack Kaplan PA-C on an as needed basis. Come back at any time for cortisone injection in the future, you can have one more in each knee for the year 2017.  Re-x-ray on return: No    Scribed by Jack Kaplan PA-C on 9/13/2017 at 3:13 PM, based on Dr. Christine Crowley's statements to me.    This note was dictated with [employer].    SARBJIT Ortiz MD

## 2017-09-13 NOTE — LETTER
"    9/13/2017         RE: Nathaly Farias  [address] [facility] [id number]        Dear Colleague,    Thank you for referring your patient, Nathaly Farias, to the [facility]. Please see a copy of my visit note below.    Office Visit-Follow up    Chief Complaint: Nathaly Farias is a 62 year old female who is being seen for   Chief Complaint   Patient presents with     RECHECK     left knee lov 8/18/17 inj given       History of Present Illness:   Mechanism of Injury: no recent injury  Location: left knee greater than the right knee  Duration of Pain: approximately 3 weeks recently. She has been dealing with this pain for years. But the cortisone injection wore off 3 weeks ago.  Rating of Pain: mild to moderate  Pain Quality: ache  Pain is better with: rest  Pain is worse with: activity  Treatment so far consists of: cortisone injections into the left knee on the following days 10/21/2016, 12/9/2016, 3/27/2017, 6/14/2017, 8/18/2017. Patient had seen this one injection on 2/27/2017 that did not help her. She has not tried Mobic yet.  Associated Features: none  Pain is Limiting: ambulation  Here to: get another injection if possible.  Additional History: she has not tried Mobic and states that she does not have any kidney issues. I did check her GFR and it was 44 recently. I did discuss this with Dr. Crowley and she is okay with us proceeding with the Mobic. She is followed closely by her primary.    REVIEW OF SYSTEMS  General: negative for, night sweats, dizziness, fatigue  Resp: No shortness of breath and no cough  CV: negative for chest pain, syncope or near-syncope  GI: negative for nausea, vomiting and diarrhea  : negative for dysuria and hematuria  Musculoskeletal: as above  Neurologic: negative for syncope   Hematologic: negative for bleeding disorder    Physical Exam:  Vitals: Temp 97.6  F (36.4  C) (Temporal)  Ht 1.549 m (5' 1\")  Wt 64.4 kg (142 lb)  LMP 06/05/2006  BMI 26.83 " kg/m2  BMI= Body mass index is 26.83 kg/(m^2).  Constitutional: healthy, alert and no acute distress   Psychiatric: mentation appears normal and affect normal/bright  NEURO: no focal deficits, CMS intact left lower extremity  RESP: Normal with easy respirations and no use of accessory muscles to breathe, no audible wheezing or retractions  CV: Calf soft and nontender to palpation, leg warm   SKIN: No erythema, rashes, excoriation, or breakdown. No evidence of infection. We do see some varicose veins.  MUSCULOSKELETAL:    INSPECTION of left knee: No gross deformities, erythema, edema, ecchymosis, atrophy or fasciculations.     PALPATION: No tenderness on palpation of the medial, lateral, anterior and posterior portion of the knee. No specific joint line tenderness. No increased warmth.     ROM: flexion and extension without touching or locking or major pain.     STRENGTH: able to flex and extend against gravity    SPECIAL TEST: none  GAIT: not observed  Lymph: no palpable lymph nodes      Diagnostic Modalities:  None today. She has known severe degenerative joint disease. She is status post ORIF of tibial plateau fracture on the left knee.  Independent visualization of the images was performed.      Impression: 1. Left knee posttraumatic degenerative joint disease severe, status post ORIF of tibial plateau fracture. 2. Right knee degenerative joint disease, mild    Plan:  All of the above pertinent physical exam and imaging modalities findings was reviewed with Nathaly.                                          CONSERVATIVE CARE:    Patient Instructions:   1. it sounds like the last cortisone injection only lasted about one week maybe 2.  2.  I ordered Mobic 15mg once a day times 2 weeks, then take as needed.  Stop this medication if you have any abdominal pain with it.  I sent this to your pharmacy.      3. Right now you have had a cortisone injection in your left knee on 10/21/2016, 12/9/2016, 3/27/2017, 6/14/2017,  8/18/2017. So this is 5 injections so far.  This is ok because you do not have much cartilage in your knee yet anyway.  We would only do one more cortisone injection in 2017.   4. We decided to hold off for now and try the Mobic and save the cortisone for later in the year.  5. We mentioned you should watch your kidney function while taking th the Mobic medication. You let us know that Francisco Javierporsha watches you closely.  6. Follow up with Christine Crowley MD and/or Jack Kaplan PA-C on an as needed basis. Come back at any time for cortisone injection in the future, you can have one more in each knee for the year 2017.  Re-x-ray on return: No    Scribed by Jack Kaplan PA-C on 9/13/2017 at 3:13 PM, based on Dr. Christine Crowley's statements to me.    This note was dictated with [employer].    SARBJIT Ortiz MD         Again, thank you for allowing me to participate in the care of your patient.        Sincerely,        Christine Crowley MD

## 2017-09-13 NOTE — NURSING NOTE
"Chief Complaint   Patient presents with     RECHECK     left knee lov 8/18/17 inj given       Initial Temp 97.6  F (36.4  C) (Temporal)  Ht 1.549 m (5' 1\")  Wt 64.4 kg (142 lb)  LMP 06/05/2006  BMI 26.83 kg/m2 Estimated body mass index is 26.83 kg/(m^2) as calculated from the following:    Height as of this encounter: 1.549 m (5' 1\").    Weight as of this encounter: 64.4 kg (142 lb).  Medication Reconciliation: complete      "

## 2017-09-13 NOTE — PATIENT INSTRUCTIONS
Encounter Diagnosis   Name Primary?     Post-traumatic osteoarthritis of left knee Yes     Rest, ice and elevate above heart level as needed for pain control  1. it sounds like the last cortisone injection only lasted about one week maybe 2.  2.  I ordered Mobic 15mg once a day times 2 weeks, then take as needed.  Stop this medication if you have any abdominal pain with it.  I sent this to your pharmacy.      3. Right now you have had a cortisone injection in your left knee on 10/21/2016, 12/9/2016, 3/27/2017, 6/14/2017, 8/18/2017. So this is 5 injections so far.  This is ok because you do not have much cartilage in your knee yet anyway.  We would only do one more cortisone injection in 2017.   4. We decided to hold off for now and try the Mobic and save the cortisone for later in the year.  5. We mentioned you should watch your kidney function while taking th the Mobic medication. You let us know that Dov watches you closely.  6. Follow up with Christine Crowley MD and/or Jack Kaplan PA-C on an as needed basis. Come back at any time for cortisone injection in the future, you can have one more in each knee for the year 2017.  [employer] may offer reliable information regarding your diagnosis and treatment plan.    THANK YOU for coming in today. If you receive a survey via [employer] or mail please let us know if there was anything you especially appreciated today or if there is any way we can improve our clinic. We appreciate your input.    GENERAL INFORMATION:  Our hours are:  Monday :     Clinic 7:30 AM-430 PM ([facility])  Tuesday:      Operating Room All Day ([facility])  Wednesday: Clinic 7:30 AM - 11:15 AM ([facility])             Clinic 1:00 PM - 4:00PM ([facility])  Thursday:     Administrative Day  Friday:          Clinic 7:30 AM - 11:15 AM ([facility])            Clinic 1:00  PM - 4:00 PM ([facility])      Bone and Joint Service Line for any issues or concerns: [phone]      We are not in the office Thursdays. Therefore non- urgent calls and medical messages received on Thursday will be addressed when we are back in the office on Wednesday. Urgent matters will be reviewed and addressed by one of our partners in the office as needed.    If lab work was done today as part of your evaluation you will generally be contacted via [employer], mail, or phone with the results within 1-5 days. If there is an alarming result we will contact you by phone. Lab results come back at varying times, I generally wait until all labs are resulted before making comments on results. Please note labs are automatically released to [employer] (if you have signed up for it) once available-at times you may see these prior to my having a chance to review them as well.    If you need refills please contact your pharmacist. They will send a refill request to me to review. Please allow 3 business days for us to process all refill requests. All narcotic refills should be handled in the clinic at the time of your visit.

## 2017-10-14 DIAGNOSIS — F33.0 MAJOR DEPRESSIVE DISORDER, RECURRENT EPISODE, MILD (H): ICD-10-CM

## 2017-10-16 NOTE — TELEPHONE ENCOUNTER
celexa     Last Written Prescription Date: 09/06/17  Last Fill Quantity: 30, # refills: 0  Last Office Visit with [facility] primary care provider:  07/10/17   Next 5 appointments (look out 90 days)     Oct 23, 2017  9:30 AM CDT   Return Visit with Jet Porter MD   [facility]    [address] [place] [id number]   [phone]                   Last PHQ-9 score on record=   PHQ-9 SCORE 7/10/2017   Total Score -   Total Score MyChart 17 (Moderately severe depression)   Total Score 17

## 2017-10-17 ENCOUNTER — TELEPHONE (OUTPATIENT)
Dept: FAMILY MEDICINE | Facility: OTHER | Age: 62
End: 2017-10-17

## 2017-10-17 NOTE — TELEPHONE ENCOUNTER
Routing refill request to provider for review/approval because:  Aleida given x1 and patient did not follow up  Overdue for appointment.     Iveth Mckeon, RN, BSN

## 2017-10-17 NOTE — TELEPHONE ENCOUNTER
Patient returned call and was given information below, patient states she will need to call back because she is not feeling well.    Thank you Alyssa

## 2017-10-17 NOTE — TELEPHONE ENCOUNTER
Summary:    Patient is due/failing the following:   PAP, PHQ9 and PHYSICAL    Action needed:   Patient needs office visit for PAP/PE. and Patient needs to do PHQ9.    Type of outreach:    Phone, left message for patient to call back.     Questions for provider review:    None                                                                                                                                    Astrid Castillo     Chart routed to Care Team .    Panel Management Review      Patient has the following on her problem list:     Depression / Dysthymia review    Measure:  Needs PHQ-9 score of 4 or less during index window.  Administer PHQ-9 and if score is 5 or more, send encounter to provider for next steps.        PHQ-9 SCORE 3/14/2016 12/5/2016 7/10/2017   Total Score - - -   Total Score MyChart - - 17 (Moderately severe depression)   Total Score 1 7 17       If PHQ-9 recheck is 5 or more, route to provider for next steps.    Patient is due for:  PHQ9    Hypertension   Last three blood pressure readings:  BP Readings from Last 3 Encounters:   07/17/17 104/69   07/10/17 122/86   07/07/17 118/88     Blood pressure: Passed    HTN Guidelines:  Age 18-59 BP range:  Less than 140/90  Age 60-85 with Diabetes:  Less than 140/90  Age 60-85 without Diabetes:  less than 150/90          Composite cancer screening  Chart review shows that this patient is due/due soon for the following Pap Smear

## 2017-10-17 NOTE — PROGRESS NOTES
Diagnosis/Summary/Recommendations:    PATIENT: Nathaly Farias  62 year old female     : 1955    MOISE:   No kassy  2017    Parkinson    Sinemet 25/100 2 tabs 3/day  Citalopram  Tylenol pm  Prinzide/zestorectic - lisinoprile- hctz  Lorazepam  Melatonin  meloxicam  Oxycodone  Polyethylene glycol  Pramipexole 0.25mg  Senna  trospium  zolmitriptan        Meds                   8am 11a/12p 9p                                      25/100                  2 2 2          mirap 0.25                      1 1 1                                                             Over 50% of this visit was spent in patient care and care coordination.     History obtained from patient    Total visit time was 0 minutes      Jet Porter MD     ______________________________________    Last visit date and details:     PATIENT: Nathaly Farias  62 year old female      : 1955     MOISE: 2017     Parkinson  Mild dementia  ED visit for abdominal pain     Did not get coverage for her rivastigmine due to cost  Had been started on donepezil   Not clear if gi problems related to donepezil        Sinemet 25/100 2 tabs 3/day  Citalopram 20mg - it is in her pill box.      aricept 5mg ? 10mg  - > ?taking this?     Lisinopril/hctz - is taking this.      Lorazepam - not clear what it looks like      Gi issues  Melatonin  Omeprazole  miralax - states she is taking miralax.      mirapex 0.25mg 3/day  - ? Taking this.   sanctura - not taking this anymore.   zolmitriptan     Over 50% of this visit was spent in patient care and care coordination.      History obtained from patient     Total visit time was 25 minutes     PLAN:  Add 1-2 senokot S at bedtime  Continue the miralax every morning     Need clarification of her medications  1. mirapex dose and if she is taking   2. Is she taking donepezil  3. The lorazepam - color of medication and how frequent.      We may ultimately having her go off the donepezil if she is still having  abdominal pain     Use drugs.com to help in identifying medications and showing images of medications     Return back in 3-6 months.         Jet Porter MD         ______________________________________      Patient was asked about 14 Review of systems including changes in vision (dry eyes, double vision), hearing, heart, lungs, musculoskeletal, depression, anxiety, snoring, RBD, insomnia, urinary frequency, urinary urgency, constipation, swallowing problems, hematological, ID, allergies, skin problems: seborrhea, endocrinological: thyroid, diabetes, cholesterol; balance, weight changes, and other neurological problems and these were not significant at this time except for   Patient Active Problem List   Diagnosis     Prolapse of vaginal wall     Migraine variant     Symptomatic menopausal or female climacteric states     Obesity     Malaise and fatigue     Other and unspecified adverse effect of drug, medicinal and biological substance     Other and unspecified disc disorder of cervical region     Sleep apnea     Essential hypertension, benign     Chronic cholecystitis     Overactive bladder     Neck pain     Headache     HYPERLIPIDEMIA LDL GOAL <160     Mild major depression (H)     Advanced directives, counseling/discussion     HTN, goal below 140/90     Paralysis agitans (H)     History of MRI of brain and brain stem     Wears glasses     Constipation     Incomplete RBBB     Heart murmur     Family history of tremor     Rosacea     Asymptomatic varicose veins     Seborrheic dermatitis     Post-traumatic osteoarthritis of left knee     Pain from implanted hardware, initial encounter     Cognitive disorder evalaution 2017     Dementia     Hiatal hernia          Allergies   Allergen Reactions     Sulfa Drugs      Tacchycardia, had to go to ED     Naproxen GI Disturbance     Oxycontin [Oxycodone] GI Disturbance     Made her feel funny/upset stomach     Rivastigmine      Patch was too expensive     Ropinirole      Did  not work     Past Surgical History:   Procedure Laterality Date     ------------OTHER-------------      left shoulder     ARTHRODESIS TOE(S)  3/2/2011    ARTHRODESIS TOE(S) performed by CLARA LUCAS at  OR     C LIGATE FALLOPIAN TUBE  1998     CHOLECYSTECTOMY, LAPOROSCOPIC  5/12/2008    Cholecystectomy, Laparoscopic     ESOPHAGOSCOPY, GASTROSCOPY, DUODENOSCOPY (EGD), COMBINED N/A 7/5/2017    Procedure: COMBINED ESOPHAGOSCOPY, GASTROSCOPY, DUODENOSCOPY (EGD), BIOPSY SINGLE OR MULTIPLE;  Esophagogastroduodenoscopy with Multiple Biopsies by Biopsy;  Surgeon: Tj Denney MD;  Location: [facility]     HC COLONOSCOPY W/WO BRUSH/WASH  08/30/06    normal     HC OPEN TX TIBIAL SHAFT FX W PLATE/SCREWS W/WO CERCLAGE  2000    Multiple operations on left leg     HC SHLDR ARTHROSCOP,PART ACROMIOPLAS  11/16/06    Right shoulder     HC SHLDR ARTHROSCOP,SURG,DIS CLAVICULECTOMY  11/16/06    Right shoulder     Past Medical History:   Diagnosis Date     Arthralgia of temporomandibular joint 10/22/1996     Arthritis      Cognitive disorder evalaution 2017 3/6/2017    She has marked attentional difficulties, impairments in memory including a rapid forgetting rate, mild anomia, and mild executive dysfunction. 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 She's required increasing assistance with finances, medications, driving, and cooking. I think this is a mild dementia, and     Constipation 2/23/2015     Dementia 3/13/2017    IMPRESSIONS AND RECOMMENDATIONS   Current results indicate marked attentional difficulties, impairments in memory, which in some cases reflected impairments in retention and in other cases reflected retrieval difficulties, and mild anomia as well as mild executive dysfunction. Premorbid intellectual functioning is estimated to fall in the borderline range. She denied experiencing significant depre     Depressive  "disorder      Heart murmur 2/23/2015     History of MRI of brain and brain stem 2/21/2015    MRI OF THE BRAIN WITHOUT CONTRAST 7/10/2014 1:21 PM  COMPARISON: None. HISTORY: Left-sided upper and lower extremity tremor. Balance issues. TECHNIQUE:  Brain: Axial diffusion-weighted with ADC map, T2-weighted with fat saturation, T1-weighted and turboFLAIR and coronal T1-weighted images of the brain were obtained without intravenous contrast.  FINDINGS: There is mild diffuse cerebral volume loss     Hypertension      Incomplete RBBB 2/23/2015     Motion sickness      Other and unspecified hyperlipidemia      Parkinson's disease (H)      Seborrheic dermatitis 2/29/2016     Unspecified arthropathy, hand 08/07/1997     Unspecified sleep apnea     moderate, sleep study 11/07, on CPAP, has daytime somnolence with this     Variants of migraine, not elsewhere classified, without mention of intractable migraine without mention of status migrainosus      Wears glasses 2/23/2015     Social History     Social History     Marital status:      Spouse name: violet     Number of children: 2     Years of education: N/A     Occupational History           Social History Main Topics     Smoking status: Never Smoker     Smokeless tobacco: Never Used      Comment: no smokers in the household     Alcohol use 0.0 oz/week     0 Standard drinks or equivalent per week      Comment: very occ     Drug use: No     Sexual activity: Yes     Partners: Male     Birth control/ protection: Surgical, Female Surgical      Comment: tubal ligation     Other Topics Concern     Caffeine Concern Yes     OCC     Exercise No     \"not faithfully\"     Seat Belt Yes     Self-Exams No     Social History Narrative    ALLERGIES:  She is allergic to sulfa, and has had gastrointestinal side effects from naproxen.               FAMILY HISTORY:  Strongly positive for headaches, including in her son and sister.  There is no family history of tremor.               " SOCIAL HISTORY:  She does not work outside the home.  She does not smoke.  She uses very little alcohol.  Lives in [place]. . Has 2 kids: son who is 35 yr s old. And daughter who is 30 yrs old. Her  is working as a  .        Drug and lactation database from the [place] [employer]:  http://toxnet.nlm.nih.gov/cgi-bin/sis/htmlgen?LACT                  B/P: Data Unavailable, T: Data Unavailable, P: Data Unavailable, R: Data Unavailable 0 lbs 0 oz  Last menstrual period 06/05/2006, not currently breastfeeding., There is no height or weight on file to calculate BMI.  Medications and Vitals not listed above were documented in the cart and reviewed by me.     Current Outpatient Prescriptions   Medication Sig Dispense Refill     meloxicam (MOBIC) 15 MG tablet Take 1 tablet (15 mg) by mouth daily 60 tablet 1     citalopram (CELEXA) 20 MG tablet Take 1 tablet (20 mg) by mouth daily DUE for follow up 30 tablet 0     trospium (SANCTURA) 20 MG tablet Take 20 mg by mouth every morning       oxyCODONE (ROXICODONE) 5 MG IR tablet Take 5 mg by mouth every 4 hours as needed for moderate to severe pain 0.5-1 tab as needed       senna (SENOKOT) 8.6 MG tablet Take 1 tablet by mouth daily as needed for constipation       pramipexole (MIRAPEX) 0.25 MG tablet Taking 1/2 tab 3/day. Was rxd 1 tab 3/day 270 tablet 3     LORazepam (ATIVAN) 0.5 MG tablet Take 1 tablet (0.5 mg) by mouth 2 times daily as needed for anxiety 10 tablet 0     lisinopril-hydrochlorothiazide (PRINZIDE/ZESTORETIC) 10-12.5 MG per tablet TAKE ONE TABLET BY MOUTH ONCE DAILY DUE  FOR  LABS 30 tablet 11     polyethylene glycol (MIRALAX) powder Take 17 g (1 capful) by mouth daily Adjust as needed to TID prn in same ratio of 1 capful per 8 oz water 510 g 1     Diphenhydramine-APAP, sleep, (TYLENOL PM EXTRA STRENGTH PO) Take 1 tablet by mouth as needed Reported on 4/24/2017       melatonin 5 MG tablet Take 10 mg by mouth  nightly as needed Reported on 5/10/2017       carbidopa-levodopa (SINEMET)  MG per tablet 2 tablets @ 8am, 11am/12noon, and 9pm 540 tablet 3     ZOLMitriptan (ZOMIG) 5 MG tablet Take 1 tablet (5 mg) by mouth at onset of headache for migraine MAY REPEAT ONCE AFTER 2 HOURS. DO NOT EXCEED 2 TABLETS IN 24 HOURS. 6 tablet 10         Jet Porter MD

## 2017-10-18 RX ORDER — CITALOPRAM HYDROBROMIDE 20 MG/1
TABLET ORAL
Qty: 30 TABLET | Refills: 0 | OUTPATIENT
Start: 2017-10-18

## 2017-10-18 NOTE — TELEPHONE ENCOUNTER
Called pt, she said she needs to find someone to give her a ride and she will call back to schedule.

## 2017-10-23 ENCOUNTER — OFFICE VISIT (OUTPATIENT)
Dept: NEUROLOGY | Facility: CLINIC | Age: 62
End: 2017-10-23
Payer: COMMERCIAL

## 2017-10-23 DIAGNOSIS — G20.A1 PARALYSIS AGITANS (H): Primary | ICD-10-CM

## 2017-10-23 PROCEDURE — 99207 ZZC NO CHARGE LOS: CPT | Performed by: PSYCHIATRY & NEUROLOGY

## 2017-10-23 NOTE — MR AVS SNAPSHOT
After Visit Summary   10/23/2017    Nathaly Farias    MRN: [id number]           Patient Information     Date Of Birth          1955        Today's Diagnoses     Paralysis agitans (H)    -  1       Follow-ups after your visit        Follow-up notes from your care team     Return in about 6 months (around 4/23/2018).      Your next 10 appointments already scheduled     Oct 26, 2017 11:45 AM CDT   PHYSICAL with Maxine Monae MD   [facility]    [address]  [facility] [id number] [phone]            Nov 13, 2017  8:30 AM CST   Return Visit with Jet Porter MD   [facility]    [address] [place] [id number] [phone]              Who to contact     If you have questions or need follow up information about today's clinic visit or your schedule please contact [facility] directly at [phone].  Normal or non-critical lab and imaging results will be communicated to you by [employer], letter or phone within 4 business days after the clinic has received the results. If you do not hear from us within 7 days, please contact the clinic through [employer] or phone. If you have a critical or abnormal lab result, we will notify you by phone as soon as possible.  Submit refill requests through [employer] or call your pharmacy and they will forward the refill request to us. Please allow 3 business days for your refill to be completed.          Additional Information About Your Visit        [employer] Information     [employer] gives you secure access to your electronic health record. If you see a primary care provider, you can also send messages to your care team and make appointments. If you have questions, please call your primary care clinic.  If you do not have a primary care provider, please call [phone] and they will assist you.      [employer] is an electronic  gateway that provides easy, online access to your medical records. With [employer], you can request a clinic appointment, read your test results, renew a prescription or communicate with your care team.     To access your existing account, please contact your [facility] or call [phone] for assistance.        Care EveryWhere ID     This is your Care EveryWhere ID. This could be used by other organizations to access your [place] medical records  [id number]        Your Vitals Were     Last Period                   06/05/2006            Blood Pressure from Last 3 Encounters:   07/17/17 104/69   07/10/17 122/86   07/07/17 118/88    Weight from Last 3 Encounters:   09/13/17 64.4 kg (142 lb)   08/18/17 61.7 kg (136 lb)   07/17/17 61.7 kg (136 lb)              Today, you had the following     No orders found for display       Primary Care Provider Office Phone # Fax #    Maxine Monae [phone]       [address] [place] [id number]        Equal Access to Services     [facility]: Hadii aad ku hadasho Soomaali, waaxda luqadaha, qaybta kaalmada adeegyada, xi head hayswathi butts . So [facility] [phone].    ATENCIÓN: Si habla español, tiene a shoemaker disposición servicios gratuitos de asistencia lingüística. Llame al [phone].    We comply with applicable federal civil rights laws and [place] laws. We do not discriminate on the basis of race, color, national origin, age, disability, sex, sexual orientation, or gender identity.            Thank you!     Thank you for choosing [facility]  for your care. Our goal is always to provide you with excellent care. Hearing back from our patients is one way we can continue to improve our services. Please take a few minutes to complete the written survey that you may receive in the mail after your visit with us. Thank you!             Your Updated Medication List - Protect others around you:  Learn how to safely use, store and throw away your medicines at www.disposemymeds.org.          This list is accurate as of: 10/23/17 11:59 PM.  Always use your most recent med list.                   Brand Name Dispense Instructions for use Diagnosis    carbidopa-levodopa  MG per tablet    SINEMET    540 tablet    2 tablets @ 8am, 11am/12noon, and 9pm    Paralysis agitans (H)       citalopram 20 MG tablet    celeXA    30 tablet    Take 1 tablet (20 mg) by mouth daily DUE for follow up    Major depressive disorder, recurrent episode, mild (H)       lisinopril-hydrochlorothiazide 10-12.5 MG per tablet    PRINZIDE/ZESTORETIC    30 tablet    TAKE ONE TABLET BY MOUTH ONCE DAILY DUE  FOR  LABS    HTN, goal below 140/90       LORazepam 0.5 MG tablet    ATIVAN    10 tablet    Take 1 tablet (0.5 mg) by mouth 2 times daily as needed for anxiety        melatonin 5 MG tablet      Take 10 mg by mouth nightly as needed Reported on 5/10/2017        meloxicam 15 MG tablet    MOBIC    60 tablet    Take 1 tablet (15 mg) by mouth daily    Post-traumatic osteoarthritis of left knee       oxyCODONE 5 MG IR tablet    ROXICODONE     Take 5 mg by mouth every 4 hours as needed for moderate to severe pain 0.5-1 tab as needed        polyethylene glycol powder    MIRALAX    510 g    Take 17 g (1 capful) by mouth daily Adjust as needed to TID prn in same ratio of 1 capful per 8 oz water    Other constipation       pramipexole 0.25 MG tablet    MIRAPEX    270 tablet    Taking 1/2 tab 3/day. Was rxd 1 tab 3/day    Acute pain of left knee       SANCTURA 20 MG tablet   Generic drug:  trospium      Take 20 mg by mouth every morning        senna 8.6 MG tablet    SENOKOT     Take 1 tablet by mouth daily as needed for constipation        TYLENOL PM EXTRA STRENGTH PO      Take 1 tablet by mouth as needed Reported on 4/24/2017        ZOLMitriptan 5 MG tablet    ZOMIG    6 tablet    Take 1 tablet (5 mg) by mouth at onset of headache for migraine  MAY REPEAT ONCE AFTER 2 HOURS. DO NOT EXCEED 2 TABLETS IN 24 HOURS.    Variants of migraine, not elsewhere classified, without mention of intractable migraine without mention of status migrainosus

## 2017-10-23 NOTE — PROGRESS NOTES
"   SUBJECTIVE:   CC: Nathaly Farias is an 62 year old woman who presents for preventive health visit.     Physical   Annual:     Getting at least 3 servings of Calcium per day::  Yes    Bi-annual eye exam::  NO    Dental care twice a year::  NO    Sleep apnea or symptoms of sleep apnea::  None    Diet::  Regular (no restrictions)    Frequency of exercise::  6-7 days/week    Duration of exercise::  15-30 minutes    Taking medications regularly::  Yes    Medication side effects::  None    Additional concerns today::  YES (Patient was bitten by two ticks. She brought the ticks in. )    Tick Bite: Pt reports being bitten by some deer ticks the weekend of 10/21/17. She states that she felt an itch and had help removing them. Pt cannot pinpoint when the ticks would have attached as she has a long wooded driveway she often has to walk down. She has noticed a rash on her left buttock where a tick was found.     HTN: Pt has been feeling increased fatigue, and hasn't been \"feeling herself\"    Mood: Pt states that she has sudden mood swings where she will cry suddenly. She often feels tired. She denies any suicidal ideation.     Today's PHQ-2 Score:   PHQ-2 ( 1999 [employer]) 10/26/2017   Q1: Little interest or pleasure in doing things 0   Q2: Feeling down, depressed or hopeless 0   PHQ-2 Score 0     Abuse: Current or Past(Physical, Sexual or Emotional)- No  Do you feel safe in your environment - Yes    Social History   Substance Use Topics     Smoking status: Never Smoker     Smokeless tobacco: Never Used      Comment: no smokers in the household     Alcohol use 0.0 oz/week     0 Standard drinks or equivalent per week      Comment: very occ     The patient does not drink >3 drinks per day nor >7 drinks per week.    Reviewed orders with patient.  Reviewed health maintenance and updated orders accordingly - Yes  Labs reviewed in [facility]    Patient over age 50, mutual decision to screen reflected in health maintenance.    Pertinent " mammograms are reviewed under the imaging tab.  History of abnormal Pap smear: NO - age 30- 65 PAP every 3 years recommended    Reviewed and updated as needed this visit by clinical staff       Reviewed and updated as needed this visit by Provider        Past Medical History:   Diagnosis Date     Arthralgia of temporomandibular joint 10/22/1996     Arthritis      Cognitive disorder evalaution 2017 3/6/2017    She has marked attentional difficulties, impairments in memory including a rapid forgetting rate, mild anomia, and mild executive dysfunction. 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 She's required increasing assistance with finances, medications, driving, and cooking. I think this is a mild dementia, and     Constipation 2/23/2015     Dementia 3/13/2017    IMPRESSIONS AND RECOMMENDATIONS   Current results indicate marked attentional difficulties, impairments in memory, which in some cases reflected impairments in retention and in other cases reflected retrieval difficulties, and mild anomia as well as mild executive dysfunction. Premorbid intellectual functioning is estimated to fall in the borderline range. She denied experiencing significant depre     Depressive disorder      Heart murmur 2/23/2015     History of MRI of brain and brain stem 2/21/2015    MRI OF THE BRAIN WITHOUT CONTRAST 7/10/2014 1:21 PM  COMPARISON: None. HISTORY: Left-sided upper and lower extremity tremor. Balance issues. TECHNIQUE:  Brain: Axial diffusion-weighted with ADC map, T2-weighted with fat saturation, T1-weighted and turboFLAIR and coronal T1-weighted images of the brain were obtained without intravenous contrast.  FINDINGS: There is mild diffuse cerebral volume loss     Hypertension      Incomplete RBBB 2/23/2015     Motion sickness      Other and unspecified hyperlipidemia      Parkinson's disease (H)      Seborrheic dermatitis  "2/29/2016     Unspecified arthropathy, hand 08/07/1997     Unspecified sleep apnea     moderate, sleep study 11/07, on CPAP, has daytime somnolence with this     Variants of migraine, not elsewhere classified, without mention of intractable migraine without mention of status migrainosus      Wears glasses 2/23/2015      Past Surgical History:   Procedure Laterality Date     ------------OTHER-------------      left shoulder     ARTHRODESIS TOE(S)  3/2/2011    ARTHRODESIS TOE(S) performed by CLARA LUCAS at  OR     C LIGATE FALLOPIAN TUBE  1998     CHOLECYSTECTOMY, LAPOROSCOPIC  5/12/2008    Cholecystectomy, Laparoscopic     ESOPHAGOSCOPY, GASTROSCOPY, DUODENOSCOPY (EGD), COMBINED N/A 7/5/2017    Procedure: COMBINED ESOPHAGOSCOPY, GASTROSCOPY, DUODENOSCOPY (EGD), BIOPSY SINGLE OR MULTIPLE;  Esophagogastroduodenoscopy with Multiple Biopsies by Biopsy;  Surgeon: Tj Denney MD;  Location: [facility]     HC COLONOSCOPY W/WO BRUSH/WASH  08/30/06    normal     HC OPEN TX TIBIAL SHAFT FX W PLATE/SCREWS W/WO CERCLAGE  2000    Multiple operations on left leg     HC SHLDR ARTHROSCOP,PART ACROMIOPLAS  11/16/06    Right shoulder     HC SHLDR ARTHROSCOP,SURG,DIS CLAVICULECTOMY  11/16/06    Right shoulder     Review of Systems  Constitutional, HEENT, cardiovascular, pulmonary, GI, , musculoskeletal, neuro, skin, endocrine and psych systems are negative, except as in HPI or otherwise noted.     This document serves as a record of the services and decisions personally performed and made by Maxine Monae MD. It was created on her behalf by Conchita Talbot, a trained medical scribe. The creation of this document is based the provider's statements to the medical scribe.  Conchita Talbot, October 26, 2017 12:09 PM     OBJECTIVE:   /60 (BP Location: Right arm, Patient Position: Sitting, Cuff Size: Adult Regular)  Pulse 68  Temp 97.2  F (36.2  C) (Temporal)  Ht 5' 1.81\" (1.57 m)  Wt 140 lb (63.5 kg)  LMP 06/05/2006  SpO2 99%  " BMI 25.76 kg/m2  Physical Exam  GENERAL APPEARANCE: healthy, alert and no distress  EYES: Eyes grossly normal to inspection, PERRL and conjunctivae and sclerae normal  HENT: ear canals and TM's normal, nose and mouth without ulcers or lesions, oropharynx clear and oral mucous membranes moist  NECK: no adenopathy, no asymmetry, masses, or scars and thyroid normal to palpation  RESP: lungs clear to auscultation - no rales, rhonchi or wheezes  BREAST: normal without masses, tenderness or nipple discharge and no palpable axillary masses or adenopathy  CV: regular rate and rhythm, normal S1 S2, no S3 or S4, no murmur, click or rub, no peripheral edema and peripheral pulses strong  ABDOMEN: soft, nontender, no hepatosplenomegaly, no masses and bowel sounds normal  MS: no musculoskeletal defects are noted and gait is age appropriate without ataxia  SKIN: R axillary region seborrheic keratosis, no notable rash from tick bite  NEURO: Normal strength and tone, sensory exam grossly normal, mentation intact and speech normal  PSYCH: mentation appears normal and affect normal/bright    ASSESSMENT/PLAN:       ICD-10-CM    1. Encounter for routine adult health examination without abnormal findings Z00.00    2. Screening for malignant neoplasm of cervix Z12.4 Pap imaged thin layer screen with HPV - recommended age 30 - 65 years (select HPV order below)     HPV High Risk Types DNA Cervical   3. Major depressive disorder, recurrent episode, mild (H) F33.0 citalopram (CELEXA) 20 MG tablet   4. HTN, goal below 140/90 I10 lisinopril-hydrochlorothiazide (PRINZIDE/ZESTORETIC) 10-12.5 MG per tablet     TICK: Pt bitten by male deer ticks. Advised that the ticks must be attached for 72 hours before transmission of Lyme's disease. Provided patient with Lyme's disease symptom list and advised to return for testing in 2 weeks. Low risk contact though due to itching symptoms and immediately removed and ticks are not swollen. Recommended daily  "checking for ticks after spending time outdoors.     HTN: Advised that pt blood pressure has been trending down. Her symptoms of fatigue     MOOD: Reviewed PHQ-2 with patient    Medications: Refilled medications, Celexa, Zomig    COUNSELING:  Reviewed preventive health counseling, as reflected in patient instructions     reports that she has never smoked. She has never used smokeless tobacco.    Estimated body mass index is 25.76 kg/(m^2) as calculated from the following:    Height as of this encounter: 5' 1.81\" (1.57 m).    Weight as of this encounter: 140 lb (63.5 kg).     Counseling Resources:  ATP IV Guidelines  Pooled Cohorts Equation Calculator  Breast Cancer Risk Calculator  FRAX Risk Assessment  ICSI Preventive Guidelines  Dietary Guidelines for Americans, 2010  USDA's MyPlate  ASA Prophylaxis  Lung CA Screening    The information in this document, created by the medical scribe for me, accurately reflects the services I personally performed and the decisions made by me. I have reviewed and approved this document for accuracy.   MD Maxine Brown MD, MD  [facility]  "

## 2017-10-26 ENCOUNTER — OFFICE VISIT (OUTPATIENT)
Dept: FAMILY MEDICINE | Facility: OTHER | Age: 62
End: 2017-10-26
Payer: COMMERCIAL

## 2017-10-26 VITALS
DIASTOLIC BLOOD PRESSURE: 60 MMHG | HEART RATE: 68 BPM | HEIGHT: 62 IN | BODY MASS INDEX: 25.76 KG/M2 | WEIGHT: 140 LBS | SYSTOLIC BLOOD PRESSURE: 100 MMHG | TEMPERATURE: 97.2 F | OXYGEN SATURATION: 99 %

## 2017-10-26 DIAGNOSIS — E78.5 HYPERLIPIDEMIA LDL GOAL <160: ICD-10-CM

## 2017-10-26 DIAGNOSIS — Z12.4 SCREENING FOR MALIGNANT NEOPLASM OF CERVIX: ICD-10-CM

## 2017-10-26 DIAGNOSIS — F33.0 MAJOR DEPRESSIVE DISORDER, RECURRENT EPISODE, MILD (H): ICD-10-CM

## 2017-10-26 DIAGNOSIS — I10 HTN, GOAL BELOW 140/90: ICD-10-CM

## 2017-10-26 DIAGNOSIS — G43.719 INTRACTABLE CHRONIC MIGRAINE WITHOUT AURA AND WITHOUT STATUS MIGRAINOSUS: ICD-10-CM

## 2017-10-26 DIAGNOSIS — Z00.00 ENCOUNTER FOR ROUTINE ADULT HEALTH EXAMINATION WITHOUT ABNORMAL FINDINGS: Primary | ICD-10-CM

## 2017-10-26 LAB
CHOLEST SERPL-MCNC: 214 MG/DL
HDLC SERPL-MCNC: 61 MG/DL
LDLC SERPL CALC-MCNC: 128 MG/DL
NONHDLC SERPL-MCNC: 153 MG/DL
TRIGL SERPL-MCNC: 123 MG/DL

## 2017-10-26 PROCEDURE — G0476 HPV COMBO ASSAY CA SCREEN: HCPCS | Performed by: FAMILY MEDICINE

## 2017-10-26 PROCEDURE — 80061 LIPID PANEL: CPT | Performed by: FAMILY MEDICINE

## 2017-10-26 PROCEDURE — G0145 SCR C/V CYTO,THINLAYER,RESCR: HCPCS | Performed by: FAMILY MEDICINE

## 2017-10-26 PROCEDURE — 36415 COLL VENOUS BLD VENIPUNCTURE: CPT | Performed by: FAMILY MEDICINE

## 2017-10-26 PROCEDURE — 99396 PREV VISIT EST AGE 40-64: CPT | Performed by: FAMILY MEDICINE

## 2017-10-26 RX ORDER — CITALOPRAM HYDROBROMIDE 20 MG/1
20 TABLET ORAL DAILY
Qty: 90 TABLET | Refills: 1 | Status: SHIPPED | OUTPATIENT
Start: 2017-10-26 | End: 2017-11-27

## 2017-10-26 RX ORDER — LISINOPRIL 10 MG/1
10 TABLET ORAL DAILY
Qty: 30 TABLET | Refills: 1 | Status: SHIPPED | OUTPATIENT
Start: 2017-10-26 | End: 2017-11-27

## 2017-10-26 RX ORDER — ZOLMITRIPTAN 5 MG/1
5 TABLET, FILM COATED ORAL
Qty: 6 TABLET | Refills: 10 | Status: SHIPPED | OUTPATIENT
Start: 2017-10-26 | End: 2019-12-19

## 2017-10-26 RX ORDER — LISINOPRIL/HYDROCHLOROTHIAZIDE 10-12.5 MG
TABLET ORAL
Qty: 30 TABLET | Refills: 11 | Status: CANCELLED | OUTPATIENT
Start: 2017-10-26

## 2017-10-26 ASSESSMENT — PAIN SCALES - GENERAL: PAINLEVEL: NO PAIN (0)

## 2017-10-26 ASSESSMENT — PATIENT HEALTH QUESTIONNAIRE - PHQ9: SUM OF ALL RESPONSES TO PHQ QUESTIONS 1-9: 2

## 2017-10-26 NOTE — MR AVS SNAPSHOT
After Visit Summary   10/26/2017    Nathaly Farias    MRN: [id number]           Patient Information     Date Of Birth          1955        Visit Information        Provider Department      10/26/2017 11:45 AM Maxine Monae MD [facility]        Today's Diagnoses     Encounter for routine adult health examination without abnormal findings    -  1    Screening for malignant neoplasm of cervix        Major depressive disorder, recurrent episode, mild (H)        HTN, goal below 140/90        Hyperlipidemia LDL goal <160        Intractable chronic migraine without aura and without status migrainosus          Care Instructions      Preventive Health Recommendations  Female Ages 50 - 64    Yearly exam: See your health care provider every year in order to  o Review health changes.   o Discuss preventive care.    o Review your medicines if your doctor has prescribed any.      Get a Pap test every three years (unless you have an abnormal result and your provider advises testing more often).    If you get Pap tests with HPV test, you only need to test every 5 years, unless you have an abnormal result.     You do not need a Pap test if your uterus was removed (hysterectomy) and you have not had cancer.    You should be tested each year for STDs (sexually transmitted diseases) if you're at risk.     Have a mammogram every 1 to 2 years.    Have a colonoscopy at age 50, or have a yearly FIT test (stool test). These exams screen for colon cancer.      Have a cholesterol test every 5 years, or more often if advised.    Have a diabetes test (fasting glucose) every three years. If you are at risk for diabetes, you should have this test more often.     If you are at risk for osteoporosis (brittle bone disease), think about having a bone density scan (DEXA).    Shots: Get a flu shot each year. Get a tetanus shot every 10 years.    Nutrition:     Eat at least 5 servings of fruits and vegetables each  day.    Eat whole-grain bread, whole-wheat pasta and brown rice instead of white grains and rice.    Talk to your provider about Calcium and Vitamin D.     Lifestyle    Exercise at least 150 minutes a week (30 minutes a day, 5 days a week). This will help you control your weight and prevent disease.    Limit alcohol to one drink per day.    No smoking.     Wear sunscreen to prevent skin cancer.     See your dentist every six months for an exam and cleaning.    See your eye doctor every 1 to 2 years.            Follow-ups after your visit        Your next 10 appointments already scheduled     Nov 13, 2017  8:30 AM CST   Return Visit with Jet Porter MD   [facility] ([facility])    [id number] [place] [id number] [phone]              Who to contact     If you have questions or need follow up information about today's clinic visit or your schedule please contact [facility] directly at [phone].  Normal or non-critical lab and imaging results will be communicated to you by [employer], letter or phone within 4 business days after the clinic has received the results. If you do not hear from us within 7 days, please contact the clinic through "[employer] or phone. If you have a critical or abnormal lab result, we will notify you by phone as soon as possible.  Submit refill requests through "[employer] or call your pharmacy and they will forward the refill request to us. Please allow 3 business days for your refill to be completed.          Additional Information About Your Visit        "[employer] Information     "[employer] gives you secure access to your electronic health record. If you see a primary care provider, you can also send messages to your care team and make appointments. If you have questions, please call your primary care clinic.  If you do not have a primary care provider, please call [phone] and they will assist you.        Care  "EveryWhere ID     This is your Care EveryWhere ID. This could be used by other organizations to access your [facility] medical records  [id number]        Your Vitals Were     Pulse Temperature Height Last Period Pulse Oximetry BMI (Body Mass Index)    68 97.2  F (36.2  C) (Temporal) 5' 1.81\" (1.57 m) 06/05/2006 99% 25.76 kg/m2       Blood Pressure from Last 3 Encounters:   10/26/17 100/60   07/17/17 104/69   07/10/17 122/86    Weight from Last 3 Encounters:   10/26/17 140 lb (63.5 kg)   09/13/17 142 lb (64.4 kg)   08/18/17 136 lb (61.7 kg)              We Performed the Following     HPV High Risk Types DNA Cervical     Lipid panel reflex to direct LDL Fasting     Pap imaged thin layer screen with HPV - recommended age 30 - 65 years (select HPV order below)          Today's Medication Changes          These changes are accurate as of: 10/26/17  1:17 PM.  If you have any questions, ask your nurse or doctor.               Start taking these medicines.        Dose/Directions    lisinopril 10 MG tablet   Commonly known as:  PRINIVIL/ZESTRIL   Used for:  HTN, goal below 140/90   Started by:  Maxine Monae MD        Dose:  10 mg   Take 1 tablet (10 mg) by mouth daily   Quantity:  30 tablet   Refills:  1         These medicines have changed or have updated prescriptions.        Dose/Directions    citalopram 20 MG tablet   Commonly known as:  celeXA   This may have changed:  additional instructions   Used for:  Major depressive disorder, recurrent episode, mild (H)   Changed by:  Maxine Monae MD        Dose:  20 mg   Take 1 tablet (20 mg) by mouth daily   Quantity:  90 tablet   Refills:  1         Stop taking these medicines if you haven't already. Please contact your care team if you have questions.     lisinopril-hydrochlorothiazide 10-12.5 MG per tablet   Commonly known as:  PRINZIDE/ZESTORETIC   Stopped by:  Maxine Monae MD                Where to get your medicines      These medications were sent to " [facility] [address] [place], [place] - [address]  [address], [facility] [address]     Phone:  [phone]     citalopram 20 MG tablet    lisinopril 10 MG tablet    ZOLMitriptan 5 MG tablet                Primary Care Provider Office Phone # Fax #    Maxine Luda Monae [phone]       [address] [place] [address]        Equal Access to Services     CHRIS DAVENPORT : Hadii aad ku hadasho Soomaali, waaxda luqadaha, qaybta kaalmada adeegyada, waxay idiin hayaan adeeg kharanba lastacy . So [facility] [phone].    ATENCIÓN: Si shubham kennedy, tiene a shoemaker disposición servicios gratuitos de asistencia lingüística. [facility] [phone].    We comply with applicable federal civil rights laws and [place] laws. We do not discriminate on the basis of race, color, national origin, age, disability, sex, sexual orientation, or gender identity.            Thank you!     Thank you for choosing [facility]  for your care. Our goal is always to provide you with excellent care. Hearing back from our patients is one way we can continue to improve our services. Please take a few minutes to complete the written survey that you may receive in the mail after your visit with us. Thank you!             Your Updated Medication List - Protect others around you: Learn how to safely use, store and throw away your medicines at www.disposemymeds.org.          This list is accurate as of: 10/26/17  1:17 PM.  Always use your most recent med list.                   Brand Name Dispense Instructions for use Diagnosis    carbidopa-levodopa  MG per tablet    SINEMET    540 tablet    2 tablets @ 8am, 11am/12noon, and 9pm    Paralysis agitans (H)       citalopram 20 MG tablet    celeXA    90 tablet    Take 1 tablet (20 mg) by mouth daily    Major depressive disorder, recurrent episode, mild (H)       lisinopril 10 MG tablet    PRINIVIL/ZESTRIL    30 tablet    Take 1 tablet (10 mg) by mouth daily    HTN, goal  below 140/90       LORazepam 0.5 MG tablet    ATIVAN    10 tablet    Take 1 tablet (0.5 mg) by mouth 2 times daily as needed for anxiety        melatonin 5 MG tablet      Take 10 mg by mouth nightly as needed Reported on 5/10/2017        meloxicam 15 MG tablet    MOBIC    60 tablet    Take 1 tablet (15 mg) by mouth daily    Post-traumatic osteoarthritis of left knee       oxyCODONE 5 MG IR tablet    ROXICODONE     Take 5 mg by mouth every 4 hours as needed for moderate to severe pain 0.5-1 tab as needed        polyethylene glycol powder    MIRALAX    510 g    Take 17 g (1 capful) by mouth daily Adjust as needed to TID prn in same ratio of 1 capful per 8 oz water    Other constipation       pramipexole 0.25 MG tablet    MIRAPEX    270 tablet    Taking 1/2 tab 3/day. Was rxd 1 tab 3/day    Acute pain of left knee       SANCTURA 20 MG tablet   Generic drug:  trospium      Take 20 mg by mouth every morning        senna 8.6 MG tablet    SENOKOT     Take 1 tablet by mouth daily as needed for constipation        TYLENOL PM EXTRA STRENGTH PO      Take 1 tablet by mouth as needed Reported on 4/24/2017        ZOLMitriptan 5 MG tablet    ZOMIG    6 tablet    Take 1 tablet (5 mg) by mouth at onset of headache for migraine MAY REPEAT ONCE AFTER 2 HOURS. DO NOT EXCEED 2 TABLETS IN 24 HOURS.    Intractable chronic migraine without aura and without status migrainosus

## 2017-10-26 NOTE — NURSING NOTE
"Chief Complaint   Patient presents with     Physical     Panel Management     height, pap, honoring choices, lipid, phq9       Initial /60 (BP Location: Right arm, Patient Position: Sitting, Cuff Size: Adult Regular)  Pulse 68  Temp 97.2  F (36.2  C) (Temporal)  Ht 5' 1.81\" (1.57 m)  Wt 140 lb (63.5 kg)  LMP 06/05/2006  SpO2 99%  BMI 25.76 kg/m2 Estimated body mass index is 25.76 kg/(m^2) as calculated from the following:    Height as of this encounter: 5' 1.81\" (1.57 m).    Weight as of this encounter: 140 lb (63.5 kg).  Medication Reconciliation: complete   Alejandrina Carroll MA  October 26, 2017      "

## 2017-10-27 NOTE — PROGRESS NOTES
Nathaly, your cholesterol is improving, though still a little high.  Please let me know if you have any questions.    Maxine Monae MD

## 2017-10-30 LAB
COPATH REPORT: NORMAL
PAP: NORMAL

## 2017-11-01 LAB
FINAL DIAGNOSIS: NORMAL
HPV HR 12 DNA CVX QL NAA+PROBE: NEGATIVE
HPV16 DNA SPEC QL NAA+PROBE: NEGATIVE
HPV18 DNA SPEC QL NAA+PROBE: NEGATIVE
SPECIMEN DESCRIPTION: NORMAL

## 2017-11-08 ENCOUNTER — TELEPHONE (OUTPATIENT)
Dept: FAMILY MEDICINE | Facility: OTHER | Age: 62
End: 2017-11-08

## 2017-11-08 NOTE — TELEPHONE ENCOUNTER
[facility] phone call message- patient requests results:    Name of test or procedure: labs  Date of test of procedure: last week  Location of the test or procedure: [facility]  OK to leave the result message on voice mail or with a family member? NO    Additional comments:     Call taken on 11/8/2017 at 9:03 AM by Claribel Reese

## 2017-11-08 NOTE — TELEPHONE ENCOUNTER
"Per result note: \"Nathaly, your cholesterol is improving, though still a little high.\"  Pap also normal per result note.    Patient informed. No further questions.  Ashwini Joiner, [facility]      "

## 2017-11-10 NOTE — PROGRESS NOTES
Diagnosis/Summary/Recommendations:    PATIENT: Nathaly Farias  62 year old female     : 1955    MOISE: 2017    Parkinson  Has some shaking   Has some dyskinesias.     She has rare hallucinations - may see a person on the side of her - this is intermittent  She has these at home.  She has been driving very limited in [place] - maximum 5 miles.   She drives when it is not busy.   They have a few grand kids.   She has had some wearing off but not that often  Her energy has been up and down. She did well yesterday with the grand kids  Bladder issues are an issue - will start again on medication - but states that it is not terrible.   She is managing her constipation - miralax/senokot/  She is using [facility] - it is in the fridge.  She has not had falls.   She has problems getting up too fast or else she is too dizzy.  Her tremors are okay  She has not explored     acetaminophen  Sinemet 25/100 2 tabs 3/day - taking this.   Citalopram 20mg at noon - not sure on this.   Tylenol pm as needed   Lisinopril 10mg/day   Lorazepam 0.5mg as needed   Melatonin dose unknown - at night.   meloxicam (MOBIC) 15MG -  NOT SURE IF TAKING THIS.   Oxycodone - NOT TAKING   Polyethylene glycol - AS NEEDED   Pramipexole 0.25mg 3 times per day   Senna - ONE OR TWO PER DAY.  trospium (sanctura) 20mg daily - but the pharmacy ran out of it so not taking.   Zolmitriptan (zomig) as needed for headache       Did not get coverage for her rivastigmine due to cost  gi problems related to donepezil?  Continuing to have memory problems    Has lost the prescription part of her insurance   They are paying out of pocket for her medications.  They can go to a library in [facility] for internet access.       Meds                   8am 11a/12p 9p                                       sinemet 25/100                  2 2 2              mirap 0.25                      1 1 1                                                                            Over 50% of this visit was spent in patient care and care coordination.     History obtained from patient    Total visit time was 25 minutes    PLAN  No change in medication  Discussed paper copy prescriptions for mail order pharmacies in [place] or elsewhere if she wants to save money as she does not have prescription coverage presently  She was given information about silver sneakers in [place]  Return back in 6 months  Hopefully will have prescription coverage by then - has not tried namenda (memantine)      Jet Porter MD     ______________________________________    Last visit date and details:     PATIENT: Nathaly Farias  62 year old female      : 1955     MOISE:   No kassy  2017     Parkinson     Sinemet 25/100 2 tabs 3/day  Citalopram  Tylenol pm  Prinzide/zestorectic - lisinoprile- hctz  Lorazepam  Melatonin  meloxicam  Oxycodone  Polyethylene glycol  Pramipexole 0.25mg  Senna  trospium  zolmitriptan         Meds                   8am 11a/12p 9p                                          25/100                  2 2 2              mirap 0.25                      1 1 1                                                                            Over 50% of this visit was spent in patient care and care coordination.      History obtained from patient     Total visit time was 0 minutes        Jet Porter MD      ______________________________________     Last visit date and details:      PATIENT: Nathaly Farias  62 year old female       : 1955      MOISE: 2017      Parkinson  Mild dementia  ED visit for abdominal pain      Did not get coverage for her rivastigmine due to cost  Had been started on donepezil   Not clear if gi problems related to donepezil          Sinemet 25/100 2 tabs 3/day  Citalopram 20mg - it is in her pill box.       aricept 5mg ? 10mg  - > ?taking this?      Lisinopril/hctz - is taking this.       Lorazepam - not clear what it looks like       Gi  issues  Melatonin  Omeprazole  miralax - states she is taking miralax.       mirapex 0.25mg 3/day  - ? Taking this.   sanctura - not taking this anymore.   zolmitriptan      Over 50% of this visit was spent in patient care and care coordination.       History obtained from patient      Total visit time was 25 minutes      PLAN:  Add 1-2 senokot S at bedtime  Continue the miralax every morning      Need clarification of her medications  1. mirapex dose and if she is taking   2. Is she taking donepezil  3. The lorazepam - color of medication and how frequent.       We may ultimately having her go off the donepezil if she is still having abdominal pain      Use drugs.com to help in identifying medications and showing images of medications      Return back in 3-6 months.           Jet Porter MD           ______________________________________      Patient was asked about 14 Review of systems including changes in vision (dry eyes, double vision), hearing, heart, lungs, musculoskeletal, depression, anxiety, snoring, RBD, insomnia, urinary frequency, urinary urgency, constipation, swallowing problems, hematological, ID, allergies, skin problems: seborrhea, endocrinological: thyroid, diabetes, cholesterol; balance, weight changes, and other neurological problems and these were not significant at this time except for   Patient Active Problem List   Diagnosis     Prolapse of vaginal wall     Migraine variant     Symptomatic menopausal or female climacteric states     Obesity     Malaise and fatigue     Other and unspecified adverse effect of drug, medicinal and biological substance     Other and unspecified disc disorder of cervical region     Sleep apnea     Essential hypertension, benign     Chronic cholecystitis     Overactive bladder     Neck pain     Headache     HYPERLIPIDEMIA LDL GOAL <160     Mild major depression (H)     Advanced directives, counseling/discussion     HTN, goal below 140/90     Paralysis agitans (H)      History of MRI of brain and brain stem     Wears glasses     Constipation     Incomplete RBBB     Heart murmur     Family history of tremor     Rosacea     Asymptomatic varicose veins     Seborrheic dermatitis     Post-traumatic osteoarthritis of left knee     Pain from implanted hardware, initial encounter     Cognitive disorder evalaution 2017     Dementia     Hiatal hernia          Allergies   Allergen Reactions     Sulfa Drugs      Tacchycardia, had to go to ED     Naproxen GI Disturbance     Oxycontin [Oxycodone] GI Disturbance     Made her feel funny/upset stomach     Rivastigmine      Patch was too expensive     Ropinirole      Did not work     Past Surgical History:   Procedure Laterality Date     ------------OTHER-------------      left shoulder     ARTHRODESIS TOE(S)  3/2/2011    ARTHRODESIS TOE(S) performed by CLARA LUCAS at  OR     C LIGATE FALLOPIAN TUBE  1998     CHOLECYSTECTOMY, LAPOROSCOPIC  5/12/2008    Cholecystectomy, Laparoscopic     ESOPHAGOSCOPY, GASTROSCOPY, DUODENOSCOPY (EGD), COMBINED N/A 7/5/2017    Procedure: COMBINED ESOPHAGOSCOPY, GASTROSCOPY, DUODENOSCOPY (EGD), BIOPSY SINGLE OR MULTIPLE;  Esophagogastroduodenoscopy with Multiple Biopsies by Biopsy;  Surgeon: Tj Denney MD;  Location: [facility]     HC COLONOSCOPY W/WO BRUSH/WASH  08/30/06    normal     HC OPEN TX TIBIAL SHAFT FX W PLATE/SCREWS W/WO CERCLAGE  2000    Multiple operations on left leg     HC SHLDR ARTHROSCOP,PART ACROMIOPLAS  11/16/06    Right shoulder     HC SHLDR ARTHROSCOP,SURG,DIS CLAVICULECTOMY  11/16/06    Right shoulder     Past Medical History:   Diagnosis Date     Arthralgia of temporomandibular joint 10/22/1996     Arthritis      Cognitive disorder evalaution 2017 3/6/2017    She has marked attentional difficulties, impairments in memory including a rapid forgetting rate, mild anomia, and mild executive dysfunction. 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 She's required increasing assistance with finances, medications, driving, and cooking. I think this is a mild dementia, and     Constipation 2/23/2015     Dementia 3/13/2017    IMPRESSIONS AND RECOMMENDATIONS   Current results indicate marked attentional difficulties, impairments in memory, which in some cases reflected impairments in retention and in other cases reflected retrieval difficulties, and mild anomia as well as mild executive dysfunction. Premorbid intellectual functioning is estimated to fall in the borderline range. She denied experiencing significant depre     Depressive disorder      Heart murmur 2/23/2015     History of MRI of brain and brain stem 2/21/2015    MRI OF THE BRAIN WITHOUT CONTRAST 7/10/2014 1:21 PM  COMPARISON: None. HISTORY: Left-sided upper and lower extremity tremor. Balance issues. TECHNIQUE:  Brain: Axial diffusion-weighted with ADC map, T2-weighted with fat saturation, T1-weighted and turboFLAIR and coronal T1-weighted images of the brain were obtained without intravenous contrast.  FINDINGS: There is mild diffuse cerebral volume loss     Hypertension      Incomplete RBBB 2/23/2015     Motion sickness      Other and unspecified hyperlipidemia      Parkinson's disease (H)      Seborrheic dermatitis 2/29/2016     Unspecified arthropathy, hand 08/07/1997     Unspecified sleep apnea     moderate, sleep study 11/07, on CPAP, has daytime somnolence with this     Variants of migraine, not elsewhere classified, without mention of intractable migraine without mention of status migrainosus      Wears glasses 2/23/2015     Social History     Social History     Marital status:      Spouse name: violet     Number of children: 2     Years of education: N/A     Occupational History           Social History Main Topics     Smoking status: Never Smoker     Smokeless tobacco: Never Used      Comment: no smokers in the  "household     Alcohol use 0.0 oz/week     0 Standard drinks or equivalent per week      Comment: very occ     Drug use: No     Sexual activity: Yes     Partners: Male     Birth control/ protection: Surgical, Female Surgical      Comment: tubal ligation     Other Topics Concern     Caffeine Concern Yes     OCC     Exercise No     \"not faithfully\"     Seat Belt Yes     Self-Exams No     Social History Narrative    ALLERGIES:  She is allergic to sulfa, and has had gastrointestinal side effects from naproxen.               FAMILY HISTORY:  Strongly positive for headaches, including in her son and sister.  There is no family history of tremor.               SOCIAL HISTORY:  She does not work outside the home.  She does not smoke.  She uses very little alcohol.  Lives in [place]. . Has 2 kids: son who is 35 yr s old. And daughter who is 30 yrs old. Her  is working as a  .        Drug and lactation database from the [place] [employer]:  http://toxnet.nlm.nih.gov/cgi-bin/sis/htmlgen?LACT                  B/P: Data Unavailable, T: Data Unavailable, P: Data Unavailable, R: Data Unavailable 0 lbs 0 oz  Last menstrual period 06/05/2006, not currently breastfeeding., There is no height or weight on file to calculate BMI.  Medications and Vitals not listed above were documented in the cart and reviewed by me.     Current Outpatient Prescriptions   Medication Sig Dispense Refill     citalopram (CELEXA) 20 MG tablet Take 1 tablet (20 mg) by mouth daily 90 tablet 1     ZOLMitriptan (ZOMIG) 5 MG tablet Take 1 tablet (5 mg) by mouth at onset of headache for migraine MAY REPEAT ONCE AFTER 2 HOURS. DO NOT EXCEED 2 TABLETS IN 24 HOURS. 6 tablet 10     lisinopril (PRINIVIL/ZESTRIL) 10 MG tablet Take 1 tablet (10 mg) by mouth daily 30 tablet 1     meloxicam (MOBIC) 15 MG tablet Take 1 tablet (15 mg) by mouth daily 60 tablet 1     trospium (SANCTURA) 20 MG tablet Take 20 mg by mouth every " morning       oxyCODONE (ROXICODONE) 5 MG IR tablet Take 5 mg by mouth every 4 hours as needed for moderate to severe pain 0.5-1 tab as needed       senna (SENOKOT) 8.6 MG tablet Take 1 tablet by mouth daily as needed for constipation       pramipexole (MIRAPEX) 0.25 MG tablet Taking 1/2 tab 3/day. Was rxd 1 tab 3/day 270 tablet 3     LORazepam (ATIVAN) 0.5 MG tablet Take 1 tablet (0.5 mg) by mouth 2 times daily as needed for anxiety 10 tablet 0     polyethylene glycol (MIRALAX) powder Take 17 g (1 capful) by mouth daily Adjust as needed to TID prn in same ratio of 1 capful per 8 oz water 510 g 1     Diphenhydramine-APAP, sleep, (TYLENOL PM EXTRA STRENGTH PO) Take 1 tablet by mouth as needed Reported on 4/24/2017       melatonin 5 MG tablet Take 10 mg by mouth nightly as needed Reported on 5/10/2017       carbidopa-levodopa (SINEMET)  MG per tablet 2 tablets @ 8am, 11am/12noon, and 9pm 540 tablet 3         Jet Porter MD

## 2017-11-12 DIAGNOSIS — F33.0 MAJOR DEPRESSIVE DISORDER, RECURRENT EPISODE, MILD (H): ICD-10-CM

## 2017-11-13 ENCOUNTER — OFFICE VISIT (OUTPATIENT)
Dept: NEUROLOGY | Facility: CLINIC | Age: 62
End: 2017-11-13
Payer: COMMERCIAL

## 2017-11-13 VITALS
HEART RATE: 62 BPM | HEIGHT: 61 IN | WEIGHT: 140.7 LBS | OXYGEN SATURATION: 95 % | SYSTOLIC BLOOD PRESSURE: 96 MMHG | BODY MASS INDEX: 26.56 KG/M2 | DIASTOLIC BLOOD PRESSURE: 59 MMHG

## 2017-11-13 DIAGNOSIS — K59.09 OTHER CONSTIPATION: ICD-10-CM

## 2017-11-13 DIAGNOSIS — G20.A1 PARALYSIS AGITANS (H): Primary | ICD-10-CM

## 2017-11-13 DIAGNOSIS — M17.32 POST-TRAUMATIC OSTEOARTHRITIS OF LEFT KNEE: ICD-10-CM

## 2017-11-13 PROCEDURE — 99214 OFFICE O/P EST MOD 30 MIN: CPT | Performed by: PSYCHIATRY & NEUROLOGY

## 2017-11-13 RX ORDER — POLYETHYLENE GLYCOL 3350 17 G/17G
1 POWDER, FOR SOLUTION ORAL DAILY PRN
Qty: 510 G | Refills: 1 | COMMUNITY
Start: 2017-11-13 | End: 2017-11-27

## 2017-11-13 RX ORDER — MELOXICAM 15 MG/1
TABLET ORAL
Qty: 60 TABLET | Refills: 1 | COMMUNITY
Start: 2017-11-13 | End: 2017-11-27

## 2017-11-13 ASSESSMENT — PAIN SCALES - GENERAL: PAINLEVEL: NO PAIN (0)

## 2017-11-13 NOTE — PATIENT INSTRUCTIONS
Diagnosis/Summary/Recommendations:    PATIENT: Nathaly Farias  62 year old female     : 1955    MOISE: 2017    Parkinson  Has some shaking   Has some dyskinesias.     She has rare hallucinations - may see a person on the side of her - this is intermittent  She has these at home.  She has been driving very limited in [place] - maximum 5 miles.   She drives when it is not busy.   They have a few grand kids.   She has had some wearing off but not that often  Her energy has been up and down. She did well yesterday with the grand kids  Bladder issues are an issue - will start again on medication - but states that it is not terrible.   She is managing her constipation - miralax/senokot/  She is using [facility] - it is in the fridge.  She has not had falls.   She has problems getting up too fast or else she is too dizzy.  Her tremors are okay  She has not explored     acetaminophen  Sinemet 25/100 2 tabs 3/day - taking this.   Citalopram 20mg at noon - not sure on this.   Tylenol pm as needed   Lisinopril 10mg/day   Lorazepam 0.5mg as needed   Melatonin dose unknown - at night.   meloxicam (MOBIC) 15MG -  NOT SURE IF TAKING THIS.   Oxycodone - NOT TAKING   Polyethylene glycol - AS NEEDED   Pramipexole 0.25mg 3 times per day   Senna - ONE OR TWO PER DAY.  trospium (sanctura) 20mg daily - but the pharmacy ran out of it so not taking.   Zolmitriptan (zomig) as needed for headache       Did not get coverage for her rivastigmine due to cost  gi problems related to donepezil?  Continuing to have memory problems    Has lost the prescription part of her insurance   They are paying out of pocket for her medications.  They can go to a library in [facility] for internet access.       Meds                   8am 11a/12p 9p                                       sinemet 25/100                  2 2 2              mirap 0.25                      1 1 1                                                                            Over 50% of this visit was spent in patient care and care coordination.     History obtained from patient    Total visit time was 25 minutes    PLAN  No change in medication  Discussed paper copy prescriptions for mail order pharmacies in [place] or elsewhere if she wants to save money as she does not have prescription coverage presently  She was given information about silver sneakers in [place]  Return back in 6 months  Hopefully will have prescription coverage by then - has not tried namenda (memantine)      Jet Porter MD

## 2017-11-13 NOTE — MR AVS SNAPSHOT
After Visit Summary   2017    Nathaly Farias    MRN: [id number]           Patient Information     Date Of Birth          1955        Visit Information        Provider Department      2017 8:30 AM Jet Porter MD [facility]        Today's Diagnoses     Paralysis agitans (H)    -  1    Post-traumatic osteoarthritis of left knee        Other constipation          Care Instructions    Diagnosis/Summary/Recommendations:    PATIENT: Nathaly Farias  62 year old female     : 1955    MOISE: 2017    Parkinson  Has some shaking   Has some dyskinesias.     She has rare hallucinations - may see a person on the side of her - this is intermittent  She has these at home.  She has been driving very limited in [place] - maximum 5 miles.   She drives when it is not busy.   They have a few grand kids.   She has had some wearing off but not that often  Her energy has been up and down. She did well yesterday with the grand kids  Bladder issues are an issue - will start again on medication - but states that it is not terrible.   She is managing her constipation - miralax/senokot/  She is using [facility] from [facility] - it is in the fridge.  She has not had falls.   She has problems getting up too fast or else she is too dizzy.  Her tremors are okay  She has not explored     acetaminophen  Sinemet 25/100 2 tabs 3/day - taking this.   Citalopram 20mg at noon - not sure on this.   Tylenol pm as needed   Lisinopril 10mg/day   Lorazepam 0.5mg as needed   Melatonin dose unknown - at night.   meloxicam (MOBIC) 15MG -  NOT SURE IF TAKING THIS.   Oxycodone - NOT TAKING   Polyethylene glycol - AS NEEDED   Pramipexole 0.25mg 3 times per day   Senna - ONE OR TWO PER DAY.  trospium (sanctura) 20mg daily - but the pharmacy ran out of it so not taking.   Zolmitriptan (zomig) as needed for headache       Did not get coverage for her rivastigmine due to cost  gi problems related  to donepezil?  Continuing to have memory problems    Has lost the prescription part of her insurance   They are paying out of pocket for her medications.  They can go to a library in [facility] for internet access.       Meds                   8am 11a/12p 9p                                       sinemet 25/100                  2 2 2              mirap 0.25                      1 1 1                                                                           Over 50% of this visit was spent in patient care and care coordination.     History obtained from patient    Total visit time was 25 minutes    PLAN  No change in medication  Discussed paper copy prescriptions for mail order pharmacies in [place] or elsewhere if she wants to save money as she does not have prescription coverage presently  She was given information about silver sneakers in [place]  Return back in 6 months  Hopefully will have prescription coverage by then - has not tried namenda (memantine)      Jet Porter MD             Follow-ups after your visit        Follow-up notes from your care team     Return in about 6 months (around 5/13/2018).      Your next 10 appointments already scheduled     May 14, 2018  9:00 AM CDT   [facility] with Jet Porter MD   [facility]    [address] [place] [id number] [phone]              Who to contact     If you have questions or need follow up information about today's clinic visit or your schedule please contact [facility] directly at [phone].  Normal or non-critical lab and imaging results will be communicated to you by MyChart, letter or phone within 4 business days after the clinic has received the results. If you do not hear from us within 7 days, please contact the clinic through MyChart or phone. If you have a critical or abnormal lab result, we will notify you by phone as soon as possible.  Submit refill requests  "through PPTV or call your pharmacy and they will forward the refill request to us. Please allow 3 business days for your refill to be completed.          Additional Information About Your Visit        Publictivityhart Information     PPTV gives you secure access to your electronic health record. If you see a primary care provider, you can also send messages to your care team and make appointments. If you have questions, please call your primary care clinic.  If you do not have a primary care provider, please call [phone] and they will assist you.      PPTV is an electronic gateway that provides easy, online access to your medical records. With PPTV, you can request a clinic appointment, read your test results, renew a prescription or communicate with your care team.     To access your existing account, please contact your [facility] or call [phone] for assistance.        Care EveryWhere ID     This is your Care EveryWhere ID. This could be used by other organizations to access your [place] medical records  [id number]        Your Vitals Were     Pulse Height Last Period Pulse Oximetry BMI (Body Mass Index)       62 1.549 m (5' 1\") 06/05/2006 95% 26.59 kg/m2        Blood Pressure from Last 3 Encounters:   11/13/17 96/59   10/26/17 100/60   07/17/17 104/69    Weight from Last 3 Encounters:   11/13/17 63.8 kg (140 lb 11.2 oz)   10/26/17 63.5 kg (140 lb)   09/13/17 64.4 kg (142 lb)              Today, you had the following     No orders found for display         Today's Medication Changes          These changes are accurate as of: 11/13/17  9:01 AM.  If you have any questions, ask your nurse or doctor.               These medicines have changed or have updated prescriptions.        Dose/Directions    MIRALAX powder   This may have changed:    - when to take this  - reasons to take this   Used for:  Other constipation   Generic drug:  polyethylene glycol   Changed by:  Charlotte " Jet Murray MD        Dose:  1 capful   Take 17 g (1 capful) by mouth daily as needed for constipation Adjust as needed to TID prn in same ratio of 1 capful per 8 oz water   Quantity:  510 g   Refills:  1       MOBIC 15 MG tablet   This may have changed:    - how much to take  - how to take this  - when to take this  - additional instructions   Used for:  Post-traumatic osteoarthritis of left knee   Generic drug:  meloxicam   Changed by:  Jet Porter MD        NOT SURE IF TAKING   Quantity:  60 tablet   Refills:  1         Stop taking these medicines if you haven't already. Please contact your care team if you have questions.     oxyCODONE IR 5 MG tablet   Commonly known as:  ROXICODONE   Stopped by:  Jet Porter MD                    Primary Care Provider Office Phone # Fax #    Maxine Luda Monae [phone]       [address] [place] [id number]        Equal Access to Services     [facility]: Hadii aad ku hadasho Soomaali, waaxda luqadaha, qaybta kaalmada adeegyada, waxay alexandrain haysammin luz marina butts . So [facility] [phone].    ATENCIÓN: Si habla español, tiene a shoemaker disposición servicios gratuitos de asistencia lingüística. [facility] [phone].    We comply with applicable federal civil rights laws and [place] laws. We do not discriminate on the basis of race, color, national origin, age, disability, sex, sexual orientation, or gender identity.            Thank you!     Thank you for choosing [facility]  for your care. Our goal is always to provide you with excellent care. Hearing back from our patients is one way we can continue to improve our services. Please take a few minutes to complete the written survey that you may receive in the mail after your visit with us. Thank you!             Your Updated Medication List - Protect others around you: Learn how to safely use, store and throw away your medicines at www.disposemymeds.org.          This  list is accurate as of: 11/13/17  9:01 AM.  Always use your most recent med list.                   Brand Name Dispense Instructions for use Diagnosis    carbidopa-levodopa  MG per tablet    SINEMET    540 tablet    2 tablets @ 8am, 11am/12noon, and 9pm    Paralysis agitans (H)       citalopram 20 MG tablet    celeXA    90 tablet    Take 1 tablet (20 mg) by mouth daily    Major depressive disorder, recurrent episode, mild (H)       lisinopril 10 MG tablet    PRINIVIL/ZESTRIL    30 tablet    Take 1 tablet (10 mg) by mouth daily    HTN, goal below 140/90       LORazepam 0.5 MG tablet    ATIVAN    10 tablet    Take 1 tablet (0.5 mg) by mouth 2 times daily as needed for anxiety        melatonin 5 MG tablet      Take 10 mg by mouth nightly as needed Reported on 5/10/2017        MIRALAX powder   Generic drug:  polyethylene glycol     510 g    Take 17 g (1 capful) by mouth daily as needed for constipation Adjust as needed to TID prn in same ratio of 1 capful per 8 oz water    Other constipation       MOBIC 15 MG tablet   Generic drug:  meloxicam     60 tablet    NOT SURE IF TAKING    Post-traumatic osteoarthritis of left knee       pramipexole 0.25 MG tablet    MIRAPEX    270 tablet    Take 0.25 mg by mouth 3 times daily    Acute pain of left knee       SANCTURA 20 MG tablet   Generic drug:  trospium      Take 20 mg by mouth every morning        senna 8.6 MG tablet    SENOKOT     Take 1 tablet by mouth daily as needed for constipation        TYLENOL PM EXTRA STRENGTH PO      Take 1 tablet by mouth as needed Reported on 4/24/2017        ZOLMitriptan 5 MG tablet    ZOMIG    6 tablet    Take 1 tablet (5 mg) by mouth at onset of headache for migraine MAY REPEAT ONCE AFTER 2 HOURS. DO NOT EXCEED 2 TABLETS IN 24 HOURS.    Intractable chronic migraine without aura and without status migrainosus

## 2017-11-13 NOTE — NURSING NOTE
"Nathaly Farias's goals for this visit include: return  She requests these members of her care team be copied on today's visit information:     PCP: Maxine Monae    Referring Provider:  No referring provider defined for this encounter.    Chief Complaint   Patient presents with     RECHECK       Initial BP 96/59  Pulse 62  Ht 1.549 m (5' 1\")  Wt 63.8 kg (140 lb 11.2 oz)  LMP 06/05/2006  SpO2 95%  BMI 26.59 kg/m2 Estimated body mass index is 26.59 kg/(m^2) as calculated from the following:    Height as of this encounter: 1.549 m (5' 1\").    Weight as of this encounter: 63.8 kg (140 lb 11.2 oz).  Medication Reconciliation: complete    Do you need any medication refills at today's visit? ?  "

## 2017-11-13 NOTE — LETTER
2017      RE: Nathaly Farias  [address] [facility] [address]       Diagnosis/Summary/Recommendations:    PATIENT: Nathaly Farias  62 year old female     : 1955    MOISE: 2017    Parkinson  Has some shaking   Has some dyskinesias.     She has rare hallucinations - may see a person on the side of her - this is intermittent  She has these at home.  She has been driving very limited in [place] - maximum 5 miles.   She drives when it is not busy.   They have a few grand kids.   She has had some wearing off but not that often  Her energy has been up and down. She did well yesterday with the grand kids  Bladder issues are an issue - will start again on medication - but states that it is not terrible.   She is managing her constipation - miralax/senokot/  She [place] [facility] - it is in the fridge.  She has not had falls.   She has problems getting up too fast or else she is too dizzy.  Her tremors are okay  She has not explored     acetaminophen  Sinemet 25/100 2 tabs 3/day - taking this.   Citalopram 20mg at noon - not sure on this.   Tylenol pm as needed   Lisinopril 10mg/day   Lorazepam 0.5mg as needed   Melatonin dose unknown - at night.   meloxicam (MOBIC) 15MG -  NOT SURE IF TAKING THIS.   Oxycodone - NOT TAKING   Polyethylene glycol - AS NEEDED   Pramipexole 0.25mg 3 times per day   Senna - ONE OR TWO PER DAY.  trospium (sanctura) 20mg daily - but the pharmacy ran out of it so not taking.   Zolmitriptan (zomig) as needed for headache       Did not get coverage for her rivastigmine due to cost  gi problems related to donepezil?  Continuing to have memory problems    Has lost the prescription part of her insurance   They are paying out of pocket for her medications.  They can go to a library in [facility] for internet access.       Meds                   8am 11a/12p 9p                                       sinemet 25/100                  2 2 2              mirap  0.25                      1 1 1                                                                           Over 50% of this visit was spent in patient care and care coordination.     History obtained from patient    Total visit time was 25 minutes    PLAN  No change in medication  Discussed paper copy prescriptions for mail order pharmacies in [place] or elsewhere if she wants to save money as she does not have prescription coverage presently  She was given information about silver sneakers in [place]  Return back in 6 months  Hopefully will have prescription coverage by then - has not tried namenda (memantine)      Jet Porter MD     ______________________________________    Last visit date and details:     PATIENT: Nathaly Farias  62 year old female      : 1955     MOISE:   No kassy  2017     Parkinson     Sinemet 25/100 2 tabs 3/day  Citalopram  Tylenol pm  Prinzide/zestorectic - lisinoprile- hctz  Lorazepam  Melatonin  meloxicam  Oxycodone  Polyethylene glycol  Pramipexole 0.25mg  Senna  trospium  zolmitriptan         Meds                   8am 11a/12p 9p                                          25/100                  2 2 2              mirap 0.25                      1 1 1                                                                            Over 50% of this visit was spent in patient care and care coordination.      History obtained from patient     Total visit time was 0 minutes        Jet Porter MD      ______________________________________     Last visit date and details:      PATIENT: Nathaly Fraias  62 year old female       : 1955      MOISE: 2017      Parkinson  Mild dementia  ED visit for abdominal pain      Did not get coverage for her rivastigmine due to cost  Had been started on donepezil   Not clear if gi problems related to donepezil          Sinemet 25/100 2 tabs 3/day  Citalopram 20mg - it is in her pill box.       aricept 5mg ? 10mg  - > ?taking  this?      Lisinopril/hctz - is taking this.       Lorazepam - not clear what it looks like       Gi issues  Melatonin  Omeprazole  miralax - states she is taking miralax.       mirapex 0.25mg 3/day  - ? Taking this.   sanctura - not taking this anymore.   zolmitriptan      Over 50% of this visit was spent in patient care and care coordination.       History obtained from patient      Total visit time was 25 minutes      PLAN:  Add 1-2 senokot S at bedtime  Continue the miralax every morning      Need clarification of her medications  1. mirapex dose and if she is taking   2. Is she taking donepezil  3. The lorazepam - color of medication and how frequent.       We may ultimately having her go off the donepezil if she is still having abdominal pain      Use drugs.com to help in identifying medications and showing images of medications      Return back in 3-6 months.           Jet Porter MD           ______________________________________      Patient was asked about 14 Review of systems including changes in vision (dry eyes, double vision), hearing, heart, lungs, musculoskeletal, depression, anxiety, snoring, RBD, insomnia, urinary frequency, urinary urgency, constipation, swallowing problems, hematological, ID, allergies, skin problems: seborrhea, endocrinological: thyroid, diabetes, cholesterol; balance, weight changes, and other neurological problems and these were not significant at this time except for   Patient Active Problem List   Diagnosis     Prolapse of vaginal wall     Migraine variant     Symptomatic menopausal or female climacteric states     Obesity     Malaise and fatigue     Other and unspecified adverse effect of drug, medicinal and biological substance     Other and unspecified disc disorder of cervical region     Sleep apnea     Essential hypertension, benign     Chronic cholecystitis     Overactive bladder     Neck pain     Headache     HYPERLIPIDEMIA LDL GOAL <160     Mild major depression (H)      Advanced directives, counseling/discussion     HTN, goal below 140/90     Paralysis agitans (H)     History of MRI of brain and brain stem     Wears glasses     Constipation     Incomplete RBBB     Heart murmur     Family history of tremor     Rosacea     Asymptomatic varicose veins     Seborrheic dermatitis     Post-traumatic osteoarthritis of left knee     Pain from implanted hardware, initial encounter     Cognitive disorder evalaution 2017     Dementia     Hiatal hernia          Allergies   Allergen Reactions     Sulfa Drugs      Tacchycardia, had to go to ED     Naproxen GI Disturbance     Oxycontin [Oxycodone] GI Disturbance     Made her feel funny/upset stomach     Rivastigmine      Patch was too expensive     Ropinirole      Did not work     Past Surgical History:   Procedure Laterality Date     ------------OTHER-------------      left shoulder     ARTHRODESIS TOE(S)  3/2/2011    ARTHRODESIS TOE(S) performed by CLARA LUCAS at  OR     C LIGATE FALLOPIAN TUBE  1998     CHOLECYSTECTOMY, LAPOROSCOPIC  5/12/2008    Cholecystectomy, Laparoscopic     ESOPHAGOSCOPY, GASTROSCOPY, DUODENOSCOPY (EGD), COMBINED N/A 7/5/2017    Procedure: COMBINED ESOPHAGOSCOPY, GASTROSCOPY, DUODENOSCOPY (EGD), BIOPSY SINGLE OR MULTIPLE;  Esophagogastroduodenoscopy with Multiple Biopsies by Biopsy;  Surgeon: Tj Denney MD;  Location: [facility]     HC COLONOSCOPY W/WO BRUSH/WASH  08/30/06    normal     HC OPEN TX TIBIAL SHAFT FX W PLATE/SCREWS W/WO CERCLAGE  2000    [facility] operations on left [facility]    [facility]     [facility]  11/16/06    Right shoulder     Past Medical History:   Diagnosis Date     Arthralgia of temporomandibular joint 10/22/1996     Arthritis      Cognitive disorder evalaution 2017 3/6/2017    She has marked attentional difficulties, impairments in memory including a rapid forgetting rate, mild anomia, and mild  executive dysfunction. 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 She's required increasing assistance with finances, medications, driving, and cooking. I think this is a mild dementia, and     Constipation 2/23/2015     Dementia 3/13/2017    IMPRESSIONS AND RECOMMENDATIONS   Current results indicate marked attentional difficulties, impairments in memory, which in some cases reflected impairments in retention and in other cases reflected retrieval difficulties, and mild anomia as well as mild executive dysfunction. Premorbid intellectual functioning is estimated to fall in the borderline range. She denied experiencing significant depre     Depressive disorder      Heart murmur 2/23/2015     History of MRI of brain and brain stem 2/21/2015    MRI OF THE BRAIN WITHOUT CONTRAST 7/10/2014 1:21 PM  COMPARISON: None. HISTORY: Left-sided upper and lower extremity tremor. Balance issues. TECHNIQUE:  Brain: Axial diffusion-weighted with ADC map, T2-weighted with fat saturation, T1-weighted and turboFLAIR and coronal T1-weighted images of the brain were obtained without intravenous contrast.  FINDINGS: There is mild diffuse cerebral volume loss     Hypertension      Incomplete RBBB 2/23/2015     Motion sickness      Other and unspecified hyperlipidemia      Parkinson's disease (H)      Seborrheic dermatitis 2/29/2016     Unspecified arthropathy, hand 08/07/1997     Unspecified sleep apnea     moderate, sleep study 11/07, on CPAP, has daytime somnolence with this     Variants of migraine, not elsewhere classified, without mention of intractable migraine without mention of status migrainosus      Wears glasses 2/23/2015     Social History     Social History     Marital status:      Spouse name: violet     Number of children: 2     Years of education: N/A     Occupational History           Social History Main Topics      "Smoking status: Never Smoker     Smokeless tobacco: Never Used      Comment: no smokers in the household     Alcohol use 0.0 oz/week     0 Standard drinks or equivalent per week      Comment: very occ     Drug use: No     Sexual activity: Yes     Partners: Male     Birth control/ protection: Surgical, Female Surgical      Comment: tubal ligation     Other Topics Concern     Caffeine Concern Yes     OCC     Exercise No     \"not faithfully\"     Seat Belt Yes     Self-Exams No     Social History Narrative    ALLERGIES:  She is allergic to sulfa, and has had gastrointestinal side effects from naproxen.               FAMILY HISTORY:  Strongly positive for headaches, including in her son and sister.  There is no family history of tremor.               SOCIAL HISTORY:  She does not work outside the home.  She does not smoke.  She uses very little alcohol.  Lives in [place]. . Has 2 kids: son who is 35 yr s old. And daughter who is 30 yrs old. Her  is working as a  .        Drug and lactation database from the [place] [employer] of Medicine:  http://toxnet.nlm.nih.gov/cgi-bin/sis/htmlgen?LACT                  B/P: Data Unavailable, T: Data Unavailable, P: Data Unavailable, R: Data Unavailable 0 lbs 0 oz  Last menstrual period 06/05/2006, not currently breastfeeding., There is no height or weight on file to calculate BMI.  Medications and Vitals not listed above were documented in the cart and reviewed by me.     Current Outpatient Prescriptions   Medication Sig Dispense Refill     citalopram (CELEXA) 20 MG tablet Take 1 tablet (20 mg) by mouth daily 90 tablet 1     ZOLMitriptan (ZOMIG) 5 MG tablet Take 1 tablet (5 mg) by mouth at onset of headache for migraine MAY REPEAT ONCE AFTER 2 HOURS. DO NOT EXCEED 2 TABLETS IN 24 HOURS. 6 tablet 10     lisinopril (PRINIVIL/ZESTRIL) 10 MG tablet Take 1 tablet (10 mg) by mouth daily 30 tablet 1     meloxicam (MOBIC) 15 MG tablet Take 1 tablet " (15 mg) by mouth daily 60 tablet 1     trospium (SANCTURA) 20 MG tablet Take 20 mg by mouth every morning       oxyCODONE (ROXICODONE) 5 MG IR tablet Take 5 mg by mouth every 4 hours as needed for moderate to severe pain 0.5-1 tab as needed       senna (SENOKOT) 8.6 MG tablet Take 1 tablet by mouth daily as needed for constipation       pramipexole (MIRAPEX) 0.25 MG tablet Taking 1/2 tab 3/day. Was rxd 1 tab 3/day 270 tablet 3     LORazepam (ATIVAN) 0.5 MG tablet Take 1 tablet (0.5 mg) by mouth 2 times daily as needed for anxiety 10 tablet 0     polyethylene glycol (MIRALAX) powder Take 17 g (1 capful) by mouth daily Adjust as needed to TID prn in same ratio of 1 capful per 8 oz water 510 g 1     Diphenhydramine-APAP, sleep, (TYLENOL PM EXTRA STRENGTH PO) Take 1 tablet by mouth as needed Reported on 4/24/2017       melatonin 5 MG tablet Take 10 mg by mouth nightly as needed Reported on 5/10/2017       carbidopa-levodopa (SINEMET)  MG per tablet 2 tablets @ 8am, 11am/12noon, and 9pm 540 tablet 3         Jet Porter MD

## 2017-11-14 RX ORDER — CITALOPRAM HYDROBROMIDE 20 MG/1
TABLET ORAL
Qty: 30 TABLET | Refills: 0 | OUTPATIENT
Start: 2017-11-14

## 2017-11-14 NOTE — TELEPHONE ENCOUNTER
Celexa:  Sent 10/26/17  with 6 month supply. Refill not appropriate at this time.     Iveth Mckeon, RN, BSN

## 2017-11-17 ENCOUNTER — TELEPHONE (OUTPATIENT)
Dept: FAMILY MEDICINE | Facility: OTHER | Age: 62
End: 2017-11-17

## 2017-11-17 NOTE — TELEPHONE ENCOUNTER
I spoke with patient.   She states she has been having an increase in her hallucinations for the past three days.   She sees people about 3 times a day, but the people don't have the lower half of their body.   I recommend she contact neurology in regards to her concerns - I gave her the number to Dr. Porter  [phone] and she recognized the name.   She will call them now.   Please call patient back later to ensure she calls their office (history of dementia).     Iveth Mckeon, RN, BSN

## 2017-11-17 NOTE — TELEPHONE ENCOUNTER
Patient reports she was able to contact neurology and does not need anything further from us at this time.     Iveth Mckeon, RN, BSN

## 2017-11-17 NOTE — TELEPHONE ENCOUNTER
Reason for call:  Symptom  Reason for call:  Patient reporting a symptom    Symptom or request: hallucinations    Duration (how long have symptoms been present): 3-4 days    Have you been treated for this before? No    Additional comments: pt would like to be worked in with Dr. Dov walker    Phone Number patient can be reached at:  Home number on file [phone] (home)    Best Time:  asap    Can we leave a detailed message on this number:  YES    Call taken on 11/17/2017 at 9:10 AM by Orin Live

## 2017-11-18 NOTE — PROGRESS NOTES
Diagnosis/Summary/Recommendations:    PATIENT: Nathaly Farias  62 year old female     : 1955    MOISE: 2017    Acetaminophen   Still taking AS NEEDED  Sinemet 25/100 2 tabs 3/day - 8am 11/12noon and 9pm   Citalopram 20mg at noon   Tylenol pm as needed   Lisinopril 10mg/day   Lorazepam 0.5mg twice daily as needed - NOT TAKING REGULARLY.  Melatonin dose unknown - at night.  5mg or 10mg?  meloxicam (MOBIC) 15MG -            NOT SURE IF TAKING THIS.  Not taking.  Polyethylene glycol - AS NEEDED IN MORNING.  Pramipexole 0.25mg 3 times per day - STOPPED LAST WEDNESDAY  Senna - ONE OR TWO PER DAY IN EVENING AS NEEDED   trospium (sanctura) 20mg daily - BACK ON THIS.   Zolmitriptan (zomig) as needed for headache       Did not get coverage for her rivastigmine due to cost  gi problems related to donepezil?  Continuing to have memory problems     Has lost the prescription part of her insurance   They are paying out of pocket for her medications.  They can go to a [employer] in [facility] for internet access.    Here with her daughter and her daughter's mother in law         Meds                   8am 11a/12p 9p                                           sinemet 25/100                  2 2 2                  mirap 0.25                      0 0 0                                                                      Over 50% of this visit was spent in patient care and care coordination.     History obtained from patient    Total visit time was 25 minutes    Plan  REVIEWED medications  She is not taking seroquel (quetiapine) at this time  She has not tried rivastigmine (exelon) patch  [email or web address]  Senior linkage line  Discussed sanctura (trospium) and cognitive changes  Will need to continue to monitor   Paper copies of the rivastigmine patch provided for first two months.   4.6 mg patch daily x 1month then 9.5mg patch daily x 1 month then 13.3mg patch daily    Jet Porter MD     ______________________________________    Last  visit date and details:     PATIENT: Nathaly Farias  62 year old female      : 1955     MOISE: 2017     Parkinson  Has some shaking   Has some dyskinesias.      She has rare hallucinations - may see a person on the side of her - this is intermittent  She has these at home.  She has been driving very limited in [place] - maximum 5 miles.   She drives when it is not busy.   They have a few grand kids.   She has had some wearing off but not that often  Her energy has been up and down. She did well yesterday with the grand kids  Bladder issues are an issue - will start again on medication - but states that it is not terrible.   She is managing her constipation - miralax/senokot/  She is using [employer] [facility] - it is in the fridge.  She has not had falls.   She has problems getting up too fast or else she is too dizzy.  Her tremors are okay  She has not explored      acetaminophen  Sinemet 25/100 2 tabs 3/day - taking this.   Citalopram 20mg at noon - not sure on this.   Tylenol pm as needed   Lisinopril 10mg/day   Lorazepam 0.5mg as needed   Melatonin dose unknown - at night.   meloxicam (MOBIC) 15MG -            NOT SURE IF TAKING THIS.   Oxycodone - NOT TAKING   Polyethylene glycol - AS NEEDED   Pramipexole 0.25mg 3 times per day   Senna - ONE OR TWO PER DAY.  trospium (sanctura) 20mg daily - but the pharmacy ran out of it so not taking.   Zolmitriptan (zomig) as needed for headache       Did not get coverage for her rivastigmine due to cost  gi problems related to donepezil?  Continuing to have memory problems     Has lost the prescription part of her insurance   They are paying out of pocket for her medications.  They can go to a library in [facility] for internet access.         Meds                   8am 11a/12p 9p                                           sinemet 25/100                  2 2 2                  mirap 0.25                      1 1 1                                                                       Over 50% of this visit was spent in patient care and care coordination.      History obtained from patient     Total visit time was 25 minutes     PLAN  No change in medication  Discussed paper copy prescriptions for mail order pharmacies in [place] or elsewhere if she wants to save money as she does not have prescription coverage presently  She was given information about silver sneakers in [place]  Return back in 6 months  Hopefully will have prescription coverage by then - has not tried namenda (memantine)        Jet Porter MD              ______________________________________      Patient was asked about 14 Review of systems including changes in vision (dry eyes, double vision), hearing, heart, lungs, musculoskeletal, depression, anxiety, snoring, RBD, insomnia, urinary frequency, urinary urgency, constipation, swallowing problems, hematological, ID, allergies, skin problems: seborrhea, endocrinological: thyroid, diabetes, cholesterol; balance, weight changes, and other neurological problems and these were not significant at this time except for   Patient Active Problem List   Diagnosis     Prolapse of vaginal wall     Migraine variant     Symptomatic menopausal or female climacteric states     Obesity     Malaise and fatigue     Other and unspecified adverse effect of drug, medicinal and biological substance     Other and unspecified disc disorder of cervical region     Sleep apnea     Essential hypertension, benign     Chronic cholecystitis     Overactive bladder     Neck pain     Headache     HYPERLIPIDEMIA LDL GOAL <160     Mild major depression (H)     Advanced directives, counseling/discussion     HTN, goal below 140/90     Paralysis agitans (H)     History of MRI of brain and brain stem     Wears glasses     Constipation     Incomplete RBBB     Heart murmur     Family history of tremor     Rosacea     Asymptomatic varicose veins     Seborrheic dermatitis     Post-traumatic  osteoarthritis of left knee     Pain from implanted hardware, initial encounter     Cognitive disorder evalaution 2017     Dementia     Hiatal hernia          Allergies   Allergen Reactions     Sulfa Drugs      Tacchycardia, had to go to ED     Naproxen GI Disturbance     Oxycontin [Oxycodone] GI Disturbance     Made her feel funny/upset stomach     Rivastigmine      Patch was too expensive     Ropinirole      Did not work     Past Surgical History:   Procedure Laterality Date     ------------OTHER-------------      left shoulder     ARTHRODESIS TOE(S)  3/2/2011    ARTHRODESIS TOE(S) performed by CLARA LUCAS at  OR     C LIGATE FALLOPIAN TUBE  1998     CHOLECYSTECTOMY, LAPOROSCOPIC  5/12/2008    Cholecystectomy, Laparoscopic     ESOPHAGOSCOPY, GASTROSCOPY, DUODENOSCOPY (EGD), COMBINED N/A 7/5/2017    Procedure: COMBINED ESOPHAGOSCOPY, GASTROSCOPY, DUODENOSCOPY (EGD), BIOPSY SINGLE OR MULTIPLE;  Esophagogastroduodenoscopy with Multiple Biopsies by Biopsy;  Surgeon: Tj Denney MD;  Location: [facility]     HC COLONOSCOPY W/WO BRUSH/WASH  08/30/06    normal     HC OPEN TX TIBIAL SHAFT FX W PLATE/SCREWS W/WO CERCLAGE  2000    Multiple operations on left leg     HC SHLDR ARTHROSCOP,PART ACROMIOPLAS  11/16/06    Right shoulder     HC SHLDR ARTHROSCOP,SURG,DIS CLAVICULECTOMY  11/16/06    Right shoulder     Past Medical History:   Diagnosis Date     Arthralgia of temporomandibular joint 10/22/1996     Arthritis      Cognitive disorder evalaution 2017 3/6/2017    She has marked attentional difficulties, impairments in memory including a rapid forgetting rate, mild anomia, and mild executive dysfunction. 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 She's required increasing assistance with finances, medications, driving, and cooking. I think this is a mild dementia, and     Constipation 2/23/2015     Dementia 3/13/2017     IMPRESSIONS AND RECOMMENDATIONS   Current results indicate marked attentional difficulties, impairments in memory, which in some cases reflected impairments in retention and in other cases reflected retrieval difficulties, and mild anomia as well as mild executive dysfunction. Premorbid intellectual functioning is estimated to fall in the borderline range. She denied experiencing significant depre     Depressive disorder      Heart murmur 2/23/2015     History of MRI of brain and brain stem 2/21/2015    MRI OF THE BRAIN WITHOUT CONTRAST 7/10/2014 1:21 PM  COMPARISON: None. HISTORY: Left-sided upper and lower extremity tremor. Balance issues. TECHNIQUE:  Brain: Axial diffusion-weighted with ADC map, T2-weighted with fat saturation, T1-weighted and turboFLAIR and coronal T1-weighted images of the brain were obtained without intravenous contrast.  FINDINGS: There is mild diffuse cerebral volume loss     Hypertension      Incomplete RBBB 2/23/2015     Motion sickness      Other and unspecified hyperlipidemia      Parkinson's disease (H)      Seborrheic dermatitis 2/29/2016     Unspecified arthropathy, hand 08/07/1997     Unspecified sleep apnea     moderate, sleep study 11/07, on CPAP, has daytime somnolence with this     Variants of migraine, not elsewhere classified, without mention of intractable migraine without mention of status migrainosus      Wears glasses 2/23/2015     Social History     Social History     Marital status:      Spouse name: violet     Number of children: 2     Years of education: N/A     Occupational History           Social History Main Topics     Smoking status: Never Smoker     Smokeless tobacco: Never Used      Comment: no smokers in the household     Alcohol use 0.0 oz/week     0 Standard drinks or equivalent per week      Comment: very occ     Drug use: No     Sexual activity: Yes     Partners: Male     Birth control/ protection: Surgical, Female Surgical      Comment: tubal  "ligation     Other Topics Concern     Caffeine Concern Yes     OCC     Exercise No     \"not faithfully\"     Seat Belt Yes     Self-Exams No     Social History Narrative    ALLERGIES:  She is allergic to sulfa, and has had gastrointestinal side effects from naproxen.               FAMILY HISTORY:  Strongly positive for headaches, including in her son and sister.  There is no family history of tremor.               SOCIAL HISTORY:  She does not work outside the home.  She does not smoke.  She uses very little alcohol.  Lives in [place]. . Has 2 kids: son who is 35 yr s old. And daughter who is 30 yrs old. Her  is working as a  .        Drug and lactation database from the [place] [employer]:  http://toxnet.nlm.nih.gov/cgi-bin/sis/htmlgen?LACT                  B/P: Data Unavailable, T: Data Unavailable, P: Data Unavailable, R: Data Unavailable 0 lbs 0 oz  Last menstrual period 06/05/2006, not currently breastfeeding., There is no height or weight on file to calculate BMI.  Medications and Vitals not listed above were documented in the cart and reviewed by me.     Current Outpatient Prescriptions   Medication Sig Dispense Refill     meloxicam (MOBIC) 15 MG tablet NOT SURE IF TAKING 60 tablet 1     polyethylene glycol (MIRALAX) powder Take 17 g (1 capful) by mouth daily as needed for constipation Adjust as needed to TID prn in same ratio of 1 capful per 8 oz water 510 g 1     citalopram (CELEXA) 20 MG tablet Take 1 tablet (20 mg) by mouth daily 90 tablet 1     ZOLMitriptan (ZOMIG) 5 MG tablet Take 1 tablet (5 mg) by mouth at onset of headache for migraine MAY REPEAT ONCE AFTER 2 HOURS. DO NOT EXCEED 2 TABLETS IN 24 HOURS. 6 tablet 10     lisinopril (PRINIVIL/ZESTRIL) 10 MG tablet Take 1 tablet (10 mg) by mouth daily 30 tablet 1     trospium (SANCTURA) 20 MG tablet Take 20 mg by mouth every morning       senna (SENOKOT) 8.6 MG tablet Take 1 tablet by mouth daily as " needed for constipation       pramipexole (MIRAPEX) 0.25 MG tablet Take 0.25 mg by mouth 3 times daily  270 tablet 3     LORazepam (ATIVAN) 0.5 MG tablet Take 1 tablet (0.5 mg) by mouth 2 times daily as needed for anxiety 10 tablet 0     Diphenhydramine-APAP, sleep, (TYLENOL PM EXTRA STRENGTH PO) Take 1 tablet by mouth as needed Reported on 4/24/2017       melatonin 5 MG tablet Take 10 mg by mouth nightly as needed Reported on 5/10/2017       carbidopa-levodopa (SINEMET)  MG per tablet 2 tablets @ 8am, 11am/12noon, and 9pm 540 tablet 3         Jet Porter MD

## 2017-11-20 ENCOUNTER — TELEPHONE (OUTPATIENT)
Dept: NEUROLOGY | Facility: CLINIC | Age: 62
End: 2017-11-20

## 2017-11-20 DIAGNOSIS — R44.3 HALLUCINATIONS: Primary | ICD-10-CM

## 2017-11-20 DIAGNOSIS — M25.562 ACUTE PAIN OF LEFT KNEE: ICD-10-CM

## 2017-11-20 RX ORDER — QUETIAPINE FUMARATE 25 MG/1
TABLET, FILM COATED ORAL
Qty: 30 TABLET | Refills: 3 | Status: SHIPPED | OUTPATIENT
Start: 2017-11-20 | End: 2018-02-26

## 2017-11-20 RX ORDER — PRAMIPEXOLE DIHYDROCHLORIDE 0.25 MG/1
TABLET ORAL
Qty: 270 TABLET | Refills: 3 | COMMUNITY
Start: 2017-11-20 | End: 2017-11-27

## 2017-11-20 NOTE — TELEPHONE ENCOUNTER
Catherine, patients sister calling back about medication, possible change, per Dr Porter.  Catherine phone number: [phone]  Said she is calling care team back.

## 2017-11-20 NOTE — TELEPHONE ENCOUNTER
"Received a call from Catherine, pt's sister. H [phone] C [phone]. We do not have ea current consent to communicate on file for Catherine but she was able to provide me with information. Stated since last Tuesday, pt has been experiencing hallucinations, which are a new symptom for her. She continues to see \"people\" in her house. Catherine did state pt will admit that she is seeing things, but that they are \"clear as day\" to her. Catherine stated they ended up bringing pt into Urgent Care on Saturday. UTI, CT and labs were all negative. The doctor recommended she not be left alone until she could follow up with her regular MD. Catherine states pt usually takes medications consistently but that they do make her feel terrible and she may miss a dose \"here and there\".     Current Outpatient Prescriptions   Medication Sig Dispense Refill     meloxicam (MOBIC) 15 MG tablet NOT SURE IF TAKING 60 tablet 1     polyethylene glycol (MIRALAX) powder Take 17 g (1 capful) by mouth daily as needed for constipation Adjust as needed to TID prn in same ratio of 1 capful per 8 oz water 510 g 1     citalopram (CELEXA) 20 MG tablet Take 1 tablet (20 mg) by mouth daily 90 tablet 1     ZOLMitriptan (ZOMIG) 5 MG tablet Take 1 tablet (5 mg) by mouth at onset of headache for migraine MAY REPEAT ONCE AFTER 2 HOURS. DO NOT EXCEED 2 TABLETS IN 24 HOURS. 6 tablet 10     lisinopril (PRINIVIL/ZESTRIL) 10 MG tablet Take 1 tablet (10 mg) by mouth daily 30 tablet 1     trospium (SANCTURA) 20 MG tablet Take 20 mg by mouth every morning       senna (SENOKOT) 8.6 MG tablet Take 1 tablet by mouth daily as needed for constipation       pramipexole (MIRAPEX) 0.25 MG tablet Take 0.25 mg by mouth 3 times daily  270 tablet 3     LORazepam (ATIVAN) 0.5 MG tablet Take 1 tablet (0.5 mg) by mouth 2 times daily as needed for anxiety 10 tablet 0     Diphenhydramine-APAP, sleep, (TYLENOL PM EXTRA STRENGTH PO) Take 1 tablet by mouth as needed Reported on 4/24/2017       " "melatonin 5 MG tablet Take 10 mg by mouth nightly as needed Reported on 5/10/2017       carbidopa-levodopa (SINEMET)  MG per tablet 2 tablets @ 8am, 11am/12noon, and 9pm 540 tablet 3         I did call pt's daughter Veronica (conset to communicate on file) and she did confirm this information. Stated that the hallucinations Nathaly was having were getting worse and worse (she saw someone slit their wrist and they showed her the inside of their wrist), which is why they brought her in. She did state that she felt Nathaly may have mixed up her medications because she received a call from Nathaly who stated she was confused about \"which way\" to take them.     Will route to Dr. Porter for review. Katie Dumont RN    "

## 2017-11-20 NOTE — TELEPHONE ENCOUNTER
Called and spoke with Catherine, Nathaly's sister. Did obtain verbal authorization from Nathaly to communicate with Catherine until she can come in and sign a Consent to Communicate. Catherine wanted to confirm medication changes and we made a plan for me to call her tomorrow afternoon and touch base. Katie Dumont, RN

## 2017-11-20 NOTE — TELEPHONE ENCOUNTER
Called and spoke with Veronica, pt's daughter. Communicated Dr. Porter's orders below:        Disp Refills Start End NIKKO   pramipexole (MIRAPEX) 0.25 MG tablet 270 tablet 3 2017  No   Sig: Because of hallucinations wean off from 1 tab 3/day  over a few days. If too much tremor/stiffness can use 1/2 tab 1-3 times per day     QUEtiapine (SEROQUEL) 25 MG tablet 30 tablet 3 2017  --   Si/2 to 1 tab at night as needed for hallucinations     I will call Veronica tomorrow afternoon for an update on how Nathaly is doing. Katie Dumont RN

## 2017-11-21 NOTE — TELEPHONE ENCOUNTER
Called and spoke with Catherine, pt's sister. She reports pt is noticing increased dyskinesias, but they aren't terrible. She also reports pt is not having hallucinations anymore. We discussed stopping the Mirapex altogether and monitoring pt over the next few days. If they are noticing an increase in stiffness, tremor, or any other PD symptoms, they should call and discuss going back on 1/2 tab of Mirapex 1-3 times a day (see Rx). Our clinic is open Wednesday and Friday this week and pt has appointment on Monday. I also gave Catherine the on-call number in case they have questions or issues on Thursday. Catherine in agreement with plan- she will pass along message to pt's  and patient. Katie Dumont RN

## 2017-11-24 ENCOUNTER — OFFICE VISIT (OUTPATIENT)
Dept: ORTHOPEDICS | Facility: CLINIC | Age: 62
End: 2017-11-24
Payer: COMMERCIAL

## 2017-11-24 VITALS — TEMPERATURE: 96.5 F | BODY MASS INDEX: 27 KG/M2 | WEIGHT: 143 LBS | HEIGHT: 61 IN

## 2017-11-24 DIAGNOSIS — M17.32 POST-TRAUMATIC OSTEOARTHRITIS OF LEFT KNEE: Primary | ICD-10-CM

## 2017-11-24 PROCEDURE — 20610 DRAIN/INJ JOINT/BURSA W/O US: CPT | Mod: LT | Performed by: ORTHOPAEDIC SURGERY

## 2017-11-24 ASSESSMENT — PAIN SCALES - GENERAL: PAINLEVEL: MILD PAIN (2)

## 2017-11-24 NOTE — LETTER
"    11/24/2017         RE: Nathaly Farias  [address] [facility] [address]        Dear Colleague,    Thank you for referring your patient, Nathaly Farias, to the [facility]. Please see a copy of my visit note below.    Office Visit-Follow up    Chief Complaint: Nathaly Farias is a 62 year old female who is being seen for   Chief Complaint   Patient presents with     RECHECK     f/u left knee pain        History of Present Illness:   Would like another cortisone injection in her left knee, has has synvisc in the past that didn't help      REVIEW OF SYSTEMS  General: negative for, night sweats, dizziness, fatigue  Resp: No shortness of breath and no cough  CV: negative for chest pain, syncope or near-syncope  GI: negative for nausea, vomiting and diarrhea  : negative for dysuria and hematuria  Musculoskeletal: as above  Neurologic: negative for syncope   Hematologic: negative for bleeding disorder    Physical Exam:  Vitals: Temp 96.5  F (35.8  C)  Ht 1.549 m (5' 1\")  Wt 64.9 kg (143 lb)  LMP 06/05/2006  BMI 27.02 kg/m2  BMI= Body mass index is 27.02 kg/(m^2).  Constitutional: healthy, alert and no acute distress   Psychiatric: mentation appears normal and affect normal/bright  NEURO: no focal deficits  RESP: Normal with easy respirations and no use of accessory muscles to breathe, no audible wheezing or retractions  CV: No peripheral edema  SKIN: No erythema, rashes, excoriation, or breakdown. No evidence of infection.   JOINT/EXTREMITIES:left Knee Exam: Inspection: No effusion, No quad atrophy  Tender: lateral joint line, medial joint line  Active Range of Motion: pain with flexion  Strength: normal    GAIT: antalgic            Diagnostic Modalities:  None today.        Impression: left knee posttraumatic arthritis    Plan:  All of the above pertinent physical exam and imaging modalities findings was reviewed with Nathaly.                                          CONSERVATIVE " CARE:  I recommend conservative care for the patient to include NSAIDs, Tylenol, focused self directed physical therapy, steroid injections, activity modifications.                                         INJECTION PROCEDURE:  The patient was counseled about an  injection, including discussion of risks (including infection), contents of the injection, rationale for performing the injection, and expected benefits of the injection. The skin was prepped with alcohol and betadine and then utilizing sterile technique an injection of the left knee joint from the superior lateral approach in the seated position was performed. The injection consisted 1ml of Kenalog (40mg per 1ml) with 8ml 1% lidocaine plain. The patient tolerated the injection well, and there were no complications. The injection site was covered with a Band-Aid. The injection was performed by Christine Crowley M.D.                                                FUTURE PLAN:  On their return if they still have symptoms we will consider injection of steroids.        Return to clinic 2-3 months, or sooner as needed for changes.  Re-x-ray on return: No    Christine Crowley M.D.        Again, thank you for allowing me to participate in the care of your patient.        Sincerely,        Christine Crowley MD

## 2017-11-24 NOTE — PROGRESS NOTES
"Office Visit-Follow up    Chief Complaint: Nathaly Farias is a 62 year old female who is being seen for   Chief Complaint   Patient presents with     RECHECK     f/u left knee pain        History of Present Illness:   Would like another cortisone injection in her left knee, has has synvisc in the past that didn't help      REVIEW OF SYSTEMS  General: negative for, night sweats, dizziness, fatigue  Resp: No shortness of breath and no cough  CV: negative for chest pain, syncope or near-syncope  GI: negative for nausea, vomiting and diarrhea  : negative for dysuria and hematuria  Musculoskeletal: as above  Neurologic: negative for syncope   Hematologic: negative for bleeding disorder    Physical Exam:  Vitals: Temp 96.5  F (35.8  C)  Ht 1.549 m (5' 1\")  Wt 64.9 kg (143 lb)  LMP 06/05/2006  BMI 27.02 kg/m2  BMI= Body mass index is 27.02 kg/(m^2).  Constitutional: healthy, alert and no acute distress   Psychiatric: mentation appears normal and affect normal/bright  NEURO: no focal deficits  RESP: Normal with easy respirations and no use of accessory muscles to breathe, no audible wheezing or retractions  CV: No peripheral edema  SKIN: No erythema, rashes, excoriation, or breakdown. No evidence of infection.   JOINT/EXTREMITIES:left Knee Exam: Inspection: No effusion, No quad atrophy  Tender: lateral joint line, medial joint line  Active Range of Motion: pain with flexion  Strength: normal    GAIT: antalgic            Diagnostic Modalities:  None today.        Impression: left knee posttraumatic arthritis    Plan:  All of the above pertinent physical exam and imaging modalities findings was reviewed with Nathaly.                                          CONSERVATIVE CARE:  I recommend conservative care for the patient to include NSAIDs, Tylenol, focused self directed physical therapy, steroid injections, activity modifications.                                         INJECTION PROCEDURE:  The patient was counseled " about an  injection, including discussion of risks (including infection), contents of the injection, rationale for performing the injection, and expected benefits of the injection. The skin was prepped with alcohol and betadine and then utilizing sterile technique an injection of the left knee joint from the superior lateral approach in the seated position was performed. The injection consisted 1ml of Kenalog (40mg per 1ml) with 8ml 1% lidocaine plain. The patient tolerated the injection well, and there were no complications. The injection site was covered with a Band-Aid. The injection was performed by Christine Crowley M.D.                                                FUTURE PLAN:  On their return if they still have symptoms we will consider injection of steroids.        Return to clinic 2-3 months, or sooner as needed for changes.  Re-x-ray on return: No    Christine Crowley M.D.

## 2017-11-24 NOTE — MR AVS SNAPSHOT
After Visit Summary   11/24/2017    Nathaly Farias    MRN: [id number]           Patient Information     Date Of Birth          1955        Visit Information        Provider Department      11/24/2017 9:10 AM Christine Crowley MD [facility]        Today's Diagnoses     Post-traumatic osteoarthritis of left knee    -  1       Follow-ups after your visit        Your next 10 appointments already scheduled     Nov 27, 2017 10:00 AM CST   [facility] Visit with Jet Porter MD   [facility]    [address] [place] [id number] [phone]            May 14, 2018  9:00 AM CDT   Return Visit with Jet Porter MD   [facility])    [address] [place] [address] [phone]              Who to contact     If you have questions or need follow up information about today's clinic visit or your schedule please contact [facility] directly at [phone].  Normal or non-critical lab and imaging results will be communicated to you by Entitlehart, letter or phone within 4 business days after the clinic has received the results. If you do not hear from us within 7 days, please contact the clinic through Entitlehart or phone. If you have a critical or abnormal lab result, we will notify you by phone as soon as possible.  Submit refill requests through [employer] or call your pharmacy and they will forward the refill request to us. Please allow 3 business days for your refill to be completed.          Additional Information About Your Visit        Entitlehart Information     [employer] gives you secure access to your electronic health record. If you see a primary care provider, you can also send messages to your care team and make appointments. If you have questions, please call your primary care clinic.  If you do not have a primary care provider, please call  "[phone] and they will assist you.        Care EveryWhere ID     This is your Care EveryWhere ID. This could be used by other organizations to access your [place] medical records  [id number]        Your Vitals Were     Temperature Height Last Period BMI (Body Mass Index)          96.5  F (35.8  C) 1.549 m (5' 1\") 06/05/2006 27.02 kg/m2         Blood Pressure from Last 3 Encounters:   11/13/17 96/59   10/26/17 100/60   07/17/17 104/69    Weight from Last 3 Encounters:   11/24/17 64.9 kg (143 lb)   11/13/17 63.8 kg (140 lb 11.2 oz)   10/26/17 63.5 kg (140 lb)              We Performed the Following     Kenalog 40 MG  []     Large Joint/Bursa injection and/or drainage (Shoulder, Knee)        Primary Care Provider Office Phone # Fax #    Maxine Luda Monae [phone]       [address] [place] [id number]        Equal Access to Services     [facility]: Hadii irving kearns hadasho Sojacque, waaxda luqadaha, qaybta kaalmada joseph, xi butts . So [facility] [phone].    ATENCIÓN: Si habla español, tiene a shoemaker disposición servicios gratuitos de asistencia lingüística. [facility] [phone].    We comply with applicable federal civil rights laws and [place] laws. We do not discriminate on the basis of race, color, national origin, age, disability, sex, sexual orientation, or gender identity.            Thank you!     Thank you for choosing [facility]  for your care. Our goal is always to provide you with excellent care. Hearing back from our patients is one way we can continue to improve our services. Please take a few minutes to complete the written survey that you may receive in the mail after your visit with us. Thank you!             Your Updated Medication List - Protect others around you: Learn how to safely use, store and throw away your medicines at www.disposemymeds.org.          This list is accurate as of: 11/24/17  4:44 PM.  Always use " your most recent med list.                   Brand Name Dispense Instructions for use Diagnosis    carbidopa-levodopa  MG per tablet    SINEMET    540 tablet    2 tablets @ 8am, 11am/12noon, and 9pm    Paralysis agitans (H)       citalopram 20 MG tablet    celeXA    90 tablet    Take 1 tablet (20 mg) by mouth daily    Major depressive disorder, recurrent episode, mild (H)       lisinopril 10 MG tablet    PRINIVIL/ZESTRIL    30 tablet    Take 1 tablet (10 mg) by mouth daily    HTN, goal below 140/90       LORazepam 0.5 MG tablet    ATIVAN    10 tablet    Take 1 tablet (0.5 mg) by mouth 2 times daily as needed for anxiety        melatonin 5 MG tablet      Take 10 mg by mouth nightly as needed Reported on 5/10/2017        MIRALAX powder   Generic drug:  polyethylene glycol     510 g    Take 17 g (1 capful) by mouth daily as needed for constipation Adjust as needed to TID prn in same ratio of 1 capful per 8 oz water    Other constipation       MOBIC 15 MG tablet   Generic drug:  meloxicam     60 tablet    NOT SURE IF TAKING    Post-traumatic osteoarthritis of left knee       pramipexole 0.25 MG tablet    MIRAPEX    270 tablet    Because of hallucinations wean off from 1 tab 3/day  over a few days. If too much tremor/stiffness can use 1/2 tab 1-3 times per day    Acute pain of left knee       QUEtiapine 25 MG tablet    SEROQUEL    30 tablet    1/2 to 1 tab at night as needed for hallucinations    Hallucinations       SANCTURA 20 MG tablet   Generic drug:  trospium      Take 20 mg by mouth every morning        senna 8.6 MG tablet    SENOKOT     Take 1 tablet by mouth daily as needed for constipation        TYLENOL PM EXTRA STRENGTH PO      Take 1 tablet by mouth as needed Reported on 4/24/2017        ZOLMitriptan 5 MG tablet    ZOMIG    6 tablet    Take 1 tablet (5 mg) by mouth at onset of headache for migraine MAY REPEAT ONCE AFTER 2 HOURS. DO NOT EXCEED 2 TABLETS IN 24 HOURS.    Intractable chronic migraine  without aura and without status migrainosus

## 2017-11-24 NOTE — NURSING NOTE
"Chief Complaint   Patient presents with     RECHECK     f/u left knee pain        Initial Temp 96.5  F (35.8  C)  Ht 1.549 m (5' 1\")  Wt 64.9 kg (143 lb)  LMP 06/05/2006  BMI 27.02 kg/m2 Estimated body mass index is 27.02 kg/(m^2) as calculated from the following:    Height as of this encounter: 1.549 m (5' 1\").    Weight as of this encounter: 64.9 kg (143 lb).  Medication Reconciliation: complete    BP completed using cuff size: NA (Not Taken)    Sindy Russell MA      "

## 2017-11-27 ENCOUNTER — OFFICE VISIT (OUTPATIENT)
Dept: NEUROLOGY | Facility: CLINIC | Age: 62
End: 2017-11-27
Payer: COMMERCIAL

## 2017-11-27 VITALS — OXYGEN SATURATION: 99 % | DIASTOLIC BLOOD PRESSURE: 76 MMHG | HEART RATE: 65 BPM | SYSTOLIC BLOOD PRESSURE: 146 MMHG

## 2017-11-27 DIAGNOSIS — I10 HTN, GOAL BELOW 140/90: ICD-10-CM

## 2017-11-27 DIAGNOSIS — F03.A0 MILD DEMENTIA (H): ICD-10-CM

## 2017-11-27 DIAGNOSIS — F33.0 MAJOR DEPRESSIVE DISORDER, RECURRENT EPISODE, MILD (H): ICD-10-CM

## 2017-11-27 DIAGNOSIS — G20.A1 PARALYSIS AGITANS (H): Primary | ICD-10-CM

## 2017-11-27 DIAGNOSIS — K59.09 OTHER CONSTIPATION: ICD-10-CM

## 2017-11-27 PROCEDURE — 99214 OFFICE O/P EST MOD 30 MIN: CPT | Performed by: PSYCHIATRY & NEUROLOGY

## 2017-11-27 RX ORDER — LISINOPRIL 10 MG/1
TABLET ORAL
Qty: 90 TABLET | Refills: 3 | COMMUNITY
Start: 2017-11-27 | End: 2018-02-26

## 2017-11-27 RX ORDER — CITALOPRAM HYDROBROMIDE 20 MG/1
TABLET ORAL
Qty: 90 TABLET | Refills: 3 | COMMUNITY
Start: 2017-11-27 | End: 2018-05-03

## 2017-11-27 RX ORDER — POLYETHYLENE GLYCOL 3350 17 G/17G
POWDER, FOR SOLUTION ORAL
Qty: 510 G | Refills: 1 | COMMUNITY
Start: 2017-11-27 | End: 2018-11-05

## 2017-11-27 RX ORDER — RIVASTIGMINE 9.5 MG/24H
PATCH, EXTENDED RELEASE TRANSDERMAL
Qty: 30 PATCH | Refills: 11 | Status: SHIPPED | OUTPATIENT
Start: 2017-11-27 | End: 2018-02-26

## 2017-11-27 RX ORDER — RIVASTIGMINE 4.6 MG/24H
PATCH, EXTENDED RELEASE TRANSDERMAL
Qty: 30 PATCH | Refills: 11 | Status: SHIPPED | OUTPATIENT
Start: 2017-11-27 | End: 2018-02-26

## 2017-11-27 RX ORDER — SENNOSIDES A AND B 8.6 MG/1
TABLET, FILM COATED ORAL
Qty: 120 TABLET | COMMUNITY
Start: 2017-11-27 | End: 2018-11-05

## 2017-11-27 NOTE — LETTER
2017      RE: Nathaly Farias  [address] [facility] [address]       Diagnosis/Summary/Recommendations:    PATIENT: Nathaly Farias  62 year old female     : 1955    MOISE: 2017    Acetaminophen   Still taking AS NEEDED  Sinemet 25/100 2 tabs 3/day - 8am 11/12noon and 9pm   Citalopram 20mg at noon   Tylenol pm as needed   Lisinopril 10mg/day   Lorazepam 0.5mg twice daily as needed - NOT TAKING REGULARLY.  Melatonin dose unknown - at night.  5mg or 10mg?  meloxicam (MOBIC) 15MG -            NOT SURE IF TAKING THIS.  Not taking.  Polyethylene glycol - AS NEEDED IN MORNING.  Pramipexole 0.25mg 3 times per day - STOPPED LAST WEDNESDAY  Senna - ONE OR TWO PER DAY IN EVENING AS NEEDED   trospium (sanctura) 20mg daily - BACK ON THIS.   Zolmitriptan (zomig) as needed for headache       Did not get coverage for her rivastigmine due to cost  gi problems related to donepezil?  Continuing to have memory problems     Has lost the prescription part of her insurance   They are paying out of pocket for her medications.  They can go to a library in [facility] for internet access.    Here with her daughter and her daughter's mother in law         Meds                   8am 11a/12p 9p                                           sinemet 25/100                  2 2 2                  mirap 0.25                      0 0 0                                                                      Over 50% of this visit was spent in patient care and care coordination.     History obtained from patient    Total visit time was 25 minutes    Plan  REVIEWED medications  She is not taking seroquel (quetiapine) at this time  She has not tried rivastigmine (exelon) patch  [email or web address]  Senior linkage line  Discussed sanctura (trospium) and cognitive changes  Will need to continue to monitor   Paper copies of the rivastigmine patch provided for first two months.   4.6 mg patch daily x 1month then 9.5mg patch daily x 1 month  then 13.3mg patch daily    eJt Porter MD     ______________________________________    Last visit date and details:     PATIENT: Nathaly Farias  62 year old female      : 1955     MOISE: 2017     Parkinson  Has some shaking   Has some dyskinesias.      She has rare hallucinations - may see a person on the side of her - this is intermittent  She has these at home.  She has been driving very limited in [place] - maximum 5 miles.   She drives when it is not busy.   They have a few grand kids.   She has had some wearing off but not that often  Her energy has been up and down. She did well yesterday with the grand kids  Bladder issues are an issue - will start again on medication - but states that it is not terrible.   She is managing her constipation - miralax/senokot/  She is using [employer] from [facility] [employer] - it is in the fridge.  She has not had falls.   She has problems getting up too fast or else she is too dizzy.  Her tremors are okay  She has not explored      acetaminophen  Sinemet 25/100 2 tabs 3/day - taking this.   Citalopram 20mg at noon - not sure on this.   Tylenol pm as needed   Lisinopril 10mg/day   Lorazepam 0.5mg as needed   Melatonin dose unknown - at night.   meloxicam (MOBIC) 15MG -            NOT SURE IF TAKING THIS.   Oxycodone - NOT TAKING   Polyethylene glycol - AS NEEDED   Pramipexole 0.25mg 3 times per day   Senna - ONE OR TWO PER DAY.  trospium (sanctura) 20mg daily - but the pharmacy ran out of it so not taking.   Zolmitriptan (zomig) as needed for headache       Did not get coverage for her rivastigmine due to cost  gi problems related to donepezil?  Continuing to have memory problems     Has lost the prescription part of her insurance   They are paying out of pocket for her medications.  They can go to a library in [facility] for internet access.         Meds                   8am 11a/12p 9p                                           sinemet 25/100                  2 2 2                   mirap 0.25                      1 1 1                                                                      Over 50% of this visit was spent in patient care and care coordination.      History obtained from patient     Total visit time was 25 minutes     PLAN  No change in medication  Discussed paper copy prescriptions for mail order pharmacies in [place] or elsewhere if she wants to save money as she does not have prescription coverage presently  She was given information about silver sneakers in [place]  Return back in 6 months  Hopefully will have prescription coverage by then - has not tried namenda (memantine)        Jet Porter MD              ______________________________________      Patient was asked about 14 Review of systems including changes in vision (dry eyes, double vision), hearing, heart, lungs, musculoskeletal, depression, anxiety, snoring, RBD, insomnia, urinary frequency, urinary urgency, constipation, swallowing problems, hematological, ID, allergies, skin problems: seborrhea, endocrinological: thyroid, diabetes, cholesterol; balance, weight changes, and other neurological problems and these were not significant at this time except for   Patient Active Problem List   Diagnosis     Prolapse of vaginal wall     Migraine variant     Symptomatic menopausal or female climacteric states     Obesity     Malaise and fatigue     Other and unspecified adverse effect of drug, medicinal and biological substance     Other and unspecified disc disorder of cervical region     Sleep apnea     Essential hypertension, benign     Chronic cholecystitis     Overactive bladder     Neck pain     Headache     HYPERLIPIDEMIA LDL GOAL <160     Mild major depression (H)     Advanced directives, counseling/discussion     HTN, goal below 140/90     Paralysis agitans (H)     History of MRI of brain and brain stem     Wears glasses     Constipation     Incomplete RBBB     Heart murmur     Family history of tremor      Rosacea     Asymptomatic varicose veins     Seborrheic dermatitis     Post-traumatic osteoarthritis of left knee     Pain from implanted hardware, initial encounter     Cognitive disorder evalaution 2017     Dementia     Hiatal hernia          Allergies   Allergen Reactions     Sulfa Drugs      Tacchycardia, had to go to ED     Naproxen GI Disturbance     Oxycontin [Oxycodone] GI Disturbance     Made her feel funny/upset stomach     Rivastigmine      Patch was too expensive     Ropinirole      Did not work     Past Surgical History:   Procedure Laterality Date     ------------OTHER-------------      left shoulder     ARTHRODESIS TOE(S)  3/2/2011    ARTHRODESIS TOE(S) performed by CLARA LUCAS at  OR     C LIGATE FALLOPIAN TUBE  1998     CHOLECYSTECTOMY, LAPOROSCOPIC  5/12/2008    Cholecystectomy, Laparoscopic     ESOPHAGOSCOPY, GASTROSCOPY, DUODENOSCOPY (EGD), COMBINED N/A 7/5/2017    Procedure: COMBINED ESOPHAGOSCOPY, GASTROSCOPY, DUODENOSCOPY (EGD), BIOPSY SINGLE OR MULTIPLE;  Esophagogastroduodenoscopy with Multiple Biopsies by Biopsy;  Surgeon: Tj Denney MD;  Location: [facility]     HC COLONOSCOPY W/WO BRUSH/WASH  08/30/06    normal     HC OPEN TX TIBIAL SHAFT FX W PLATE/SCREWS W/WO CERCLAGE  2000    Multiple operations on left leg     HC SHLDR ARTHROSCOP,PART ACROMIOPLAS  11/16/06    Right shoulder     HC SHLDR ARTHROSCOP,SURG,DIS CLAVICULECTOMY  11/16/06    Right shoulder     Past Medical History:   Diagnosis Date     Arthralgia of temporomandibular joint 10/22/1996     Arthritis      Cognitive disorder evalaution 2017 3/6/2017    She has marked attentional difficulties, impairments in memory including a rapid forgetting rate, mild anomia, and mild executive dysfunction. 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 She's required increasing assistance with finances, medications, driving, and cooking. I  think this is a mild dementia, and     Constipation 2/23/2015     Dementia 3/13/2017    IMPRESSIONS AND RECOMMENDATIONS   Current results indicate marked attentional difficulties, impairments in memory, which in some cases reflected impairments in retention and in other cases reflected retrieval difficulties, and mild anomia as well as mild executive dysfunction. Premorbid intellectual functioning is estimated to fall in the borderline range. She denied experiencing significant depre     Depressive disorder      Heart murmur 2/23/2015     History of MRI of brain and brain stem 2/21/2015    MRI OF THE BRAIN WITHOUT CONTRAST 7/10/2014 1:21 PM  COMPARISON: None. HISTORY: Left-sided upper and lower extremity tremor. Balance issues. TECHNIQUE:  Brain: Axial diffusion-weighted with ADC map, T2-weighted with fat saturation, T1-weighted and turboFLAIR and coronal T1-weighted images of the brain were obtained without intravenous contrast.  FINDINGS: There is mild diffuse cerebral volume loss     Hypertension      Incomplete RBBB 2/23/2015     Motion sickness      Other and unspecified hyperlipidemia      Parkinson's disease (H)      Seborrheic dermatitis 2/29/2016     Unspecified arthropathy, hand 08/07/1997     Unspecified sleep apnea     moderate, sleep study 11/07, on CPAP, has daytime somnolence with this     Variants of migraine, not elsewhere classified, without mention of intractable migraine without mention of status migrainosus      Wears glasses 2/23/2015     Social History     Social History     Marital status:      Spouse name: violet     Number of children: 2     Years of education: N/A     Occupational History           Social History Main Topics     Smoking status: Never Smoker     Smokeless tobacco: Never Used      Comment: no smokers in the household     Alcohol use 0.0 oz/week     0 Standard drinks or equivalent per week      Comment: very occ     Drug use: No     Sexual activity: Yes      "Partners: Male     Birth control/ protection: Surgical, Female Surgical      Comment: tubal ligation     Other Topics Concern     Caffeine Concern Yes     OCC     Exercise No     \"not faithfully\"     Seat Belt Yes     Self-Exams No     Social History Narrative    ALLERGIES:  She is allergic to sulfa, and has had gastrointestinal side effects from naproxen.               FAMILY HISTORY:  Strongly positive for headaches, including in her son and sister.  There is no family history of tremor.               SOCIAL HISTORY:  She does not work outside the home.  She does not smoke.  She uses very little alcohol.  Lives in [place]. . Has 2 kids: son who is 35 yr s old. And daughter who is 30 yrs old. Her  is working as a  .        Drug and lactation database from the [place] [employer]:  http://toxnet.nlm.nih.gov/cgi-bin/sis/htmlgen?LACT                  B/P: Data Unavailable, T: Data Unavailable, P: Data Unavailable, R: Data Unavailable 0 lbs 0 oz  Last menstrual period 06/05/2006, not currently breastfeeding., There is no height or weight on file to calculate BMI.  Medications and Vitals not listed above were documented in the cart and reviewed by me.     Current Outpatient Prescriptions   Medication Sig Dispense Refill     meloxicam (MOBIC) 15 MG tablet NOT SURE IF TAKING 60 tablet 1     polyethylene glycol (MIRALAX) powder Take 17 g (1 capful) by mouth daily as needed for constipation Adjust as needed to TID prn in same ratio of 1 capful per 8 oz water 510 g 1     citalopram (CELEXA) 20 MG tablet Take 1 tablet (20 mg) by mouth daily 90 tablet 1     ZOLMitriptan (ZOMIG) 5 MG tablet Take 1 tablet (5 mg) by mouth at onset of headache for migraine MAY REPEAT ONCE AFTER 2 HOURS. DO NOT EXCEED 2 TABLETS IN 24 HOURS. 6 tablet 10     lisinopril (PRINIVIL/ZESTRIL) 10 MG tablet Take 1 tablet (10 mg) by mouth daily 30 tablet 1     trospium (SANCTURA) 20 MG tablet Take 20 mg by " mouth every morning       senna (SENOKOT) 8.6 MG tablet Take 1 tablet by mouth daily as needed for constipation       pramipexole (MIRAPEX) 0.25 MG tablet Take 0.25 mg by mouth 3 times daily  270 tablet 3     LORazepam (ATIVAN) 0.5 MG tablet Take 1 tablet (0.5 mg) by mouth 2 times daily as needed for anxiety 10 tablet 0     Diphenhydramine-APAP, sleep, (TYLENOL PM EXTRA STRENGTH PO) Take 1 tablet by mouth as needed Reported on 4/24/2017       melatonin 5 MG tablet Take 10 mg by mouth nightly as needed Reported on 5/10/2017       carbidopa-levodopa (SINEMET)  MG per tablet 2 tablets @ 8am, 11am/12noon, and 9pm 540 tablet 3         Jet Porter MD

## 2017-11-27 NOTE — PATIENT INSTRUCTIONS
Diagnosis/Summary/Recommendations:    PATIENT: Nathaly Farias  62 year old female     : 1955    MOISE: 2017    Acetaminophen   Still taking AS NEEDED  Sinemet 25/100 2 tabs 3/day - 8am 11/12noon and 9pm   Citalopram 20mg at noon   Tylenol pm as needed   Lisinopril 10mg/day   Lorazepam 0.5mg twice daily as needed - NOT TAKING REGULARLY.  Melatonin dose unknown - at night.  5mg or 10mg?  meloxicam (MOBIC) 15MG -            NOT SURE IF TAKING THIS.  Not taking.  Polyethylene glycol - AS NEEDED IN MORNING.  Pramipexole 0.25mg 3 times per day - STOPPED LAST WEDNESDAY  Senna - ONE OR TWO PER DAY IN EVENING AS NEEDED   trospium (sanctura) 20mg daily - BACK ON THIS.   Zolmitriptan (zomig) as needed for headache       Did not get coverage for her rivastigmine due to cost  gi problems related to donepezil?  Continuing to have memory problems     Has lost the prescription part of her insurance   They are paying out of pocket for her medications.  They can go to a [employer] in [facility] for internet access.    Here with her daughter and her daughter's mother in law         Meds                   8am 11a/12p 9p                                           sinemet 25/100                  2 2 2                  mirap 0.25                      0 0 0                                                                      Over 50% of this visit was spent in patient care and care coordination.     History obtained from patient    Total visit time was 25 minutes    Plan  REVIEWED medications  She is not taking seroquel (quetiapine) at this time  She has not tried rivastigmine (exelon) patch  [email or web address]  Senior linkage line  Discussed sanctura (trospium) and cognitive changes  Will need to continue to monitor   Paper copies of the rivastigmine patch provided for first two months.   4.6 mg patch daily x 1month then 9.5mg patch daily x 1 month then 13.3mg patch daily    Jet Porter MD

## 2017-11-27 NOTE — MR AVS SNAPSHOT
After Visit Summary   2017    Nathaly Farias    MRN: [id number]           Patient Information     Date Of Birth          1955        Visit Information        Provider Department      2017 10:00 AM Jet Porter MD [facility]        Today's Diagnoses     Paralysis agitans (H)    -  1    Mild dementia        Other constipation        Major depressive disorder, recurrent episode, mild (H)        HTN, goal below 140/90          Care Instructions    Diagnosis/Summary/Recommendations:    PATIENT: Nathaly Farias  62 year old female     : 1955    MOISE: 2017    Acetaminophen   Still taking AS NEEDED  Sinemet 25/100 2 tabs 3/day - 8am 11/12noon and 9pm   Citalopram 20mg at noon   Tylenol pm as needed   Lisinopril 10mg/day   Lorazepam 0.5mg twice daily as needed - NOT TAKING REGULARLY.  Melatonin dose unknown - at night.  5mg or 10mg?  meloxicam (MOBIC) 15MG -            NOT SURE IF TAKING THIS.  Not taking.  Polyethylene glycol - AS NEEDED IN MORNING.  Pramipexole 0.25mg 3 times per day - STOPPED LAST WEDNESDAY  Senna - ONE OR TWO PER DAY IN EVENING AS NEEDED   trospium (sanctura) 20mg daily - BACK ON THIS.   Zolmitriptan (zomig) as needed for headache       Did not get coverage for her rivastigmine due to cost  gi problems related to donepezil?  Continuing to have memory problems     Has lost the prescription part of her insurance   They are paying out of pocket for her medications.  They can go to a library in town for internet access.    Here with her daughter and her daughter's mother in law         Meds                   8am 11a/12p 9p                                           sinemet 25/100                  2 2 2                  mirap 0.25                      0 0 0                                                                      Over 50% of this visit was spent in patient care and care coordination.     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   4.6 mg patch daily x 1month then 9.5mg patch daily x 1 month then 13.3mg patch daily    Jet Porter MD           Follow-ups after your visit        Additional Services     OCCUPATIONAL THERAPY REFERRAL       Cognitive impairment - pill taking, etc.            PHYSICAL THERAPY REFERRAL       *This order will print in the [facility] Central Scheduling Office*    [facility] provides Physical Therapy evaluation and treatment and many specialty services across the [facility] system.  If requesting a specialty program, please choose from the list below.    Call one number to schedule at any [facility] location   [phone].    Treatment: Evaluation & Treatment  Special Instructions/Modalities: evaluate and treat  Special Programs: None    Please be aware that coverage of these services is subject to the terms and limitations of your health insurance plan.  Call member services at your health plan with any benefit or coverage questions.      **Note to Provider** To refer patients to therapy outside of the location list, change the order class to External Referral in the order composer.                  Follow-up notes from your care team     Return in about 3 months (around 2/27/2018).      Your next 10 appointments already scheduled     Feb 26, 2018  8:30 AM CST   Return Visit with Jet Porter MD   [facility] ([facility])    [address] [place] [id number]   [phone]            May 14, 2018  9:00 AM CDT   Return Visit with MD TOMMIE Mckoy  [facility]    [address] [place] [id number] [phone]              Future tests that were ordered for you today     Open Future Orders        Priority Expected Expires Ordered    OCCUPATIONAL THERAPY REFERRAL Routine  11/27/2018 11/27/2017    PHYSICAL THERAPY REFERRAL Routine  11/27/2018 11/27/2017            Who to contact     If you have questions or need follow up information about today's clinic visit or your schedule please contact [facility] directly at [phone].  Normal or non-critical lab and imaging results will be communicated to you by EndoShapehart, letter or phone within 4 business days after the clinic has received the results. If you do not hear from us within 7 days, please contact the clinic through OmniForcet or phone. If you have a critical or abnormal lab result, we will notify you by phone as soon as possible.  Submit refill requests through APTwater or call your pharmacy and they will forward the refill request to us. Please allow 3 business days for your refill to be completed.          Additional Information About Your Visit        EndoShapehart Information     [employer] gives you secure access to your electronic health record. If you see a primary care provider, you can also send messages to your care team and make appointments. If you have questions, please call your primary care clinic.  If you do not have a primary care provider, please call [phone] and they will assist you.      APTwater is an electronic gateway that provides easy, online access to your medical records. With APTwater, you can request a clinic appointment, read your test results, renew a prescription or communicate with your care team.     To access your existing account, please contact your [facility] or call [phone] for assistance.        Care EveryWhere ID     This is your Care EveryWhere ID. This could be  used by other organizations to access your [facility] medical records  [id number]        Your Vitals Were     Pulse Last Period Pulse Oximetry             65 06/05/2006 99%          Blood Pressure from Last 3 Encounters:   11/27/17 146/76   11/13/17 96/59   10/26/17 100/60    Weight from Last 3 Encounters:   11/24/17 64.9 kg (143 lb)   11/13/17 63.8 kg (140 lb 11.2 oz)   10/26/17 63.5 kg (140 lb)                 Today's Medication Changes          These changes are accurate as of: 11/27/17 10:49 AM.  If you have any questions, ask your nurse or doctor.               Start taking these medicines.        Dose/Directions    * rivastigmine 4.6 MG/24HR 24 hr patch   Commonly known as:  EXELON   Used for:  Paralysis agitans (H), Mild dementia   Started by:  Jet Porter MD        4.6 mg patch daily x 1month then 9.5mg patch daily x 1 month then 13.3mg patch daily   Quantity:  30 patch   Refills:  11       * rivastigmine 9.5 MG/24HR 24 hr patch   Commonly known as:  EXELON   Used for:  Paralysis agitans (H), Mild dementia   Started by:  Jet Porter MD        4.6 mg patch daily x 1month then 9.5mg patch daily x 1 month then 13.3mg patch daily   Quantity:  30 patch   Refills:  11       * Notice:  This list has 2 medication(s) that are the same as other medications prescribed for you. Read the directions carefully, and ask your doctor or other care provider to review them with you.      These medicines have changed or have updated prescriptions.        Dose/Directions    citalopram 20 MG tablet   Commonly known as:  celeXA   This may have changed:    - how much to take  - how to take this  - when to take this  - additional instructions   Used for:  Major depressive disorder, recurrent episode, mild (H)   Changed by:  Jet Porter MD        1 tab @ 11am/noon   Quantity:  90 tablet   Refills:  3       lisinopril 10 MG tablet   Commonly known as:  PRINIVIL/ZESTRIL   This may have changed:    - how much to  take  - how to take this  - when to take this  - additional instructions   Used for:  HTN, goal below 140/90   Changed by:  Jet Porter MD        1 tab @ 11am/noon   Quantity:  90 tablet   Refills:  3       MIRALAX powder   This may have changed:    - how much to take  - how to take this  - when to take this  - reasons to take this  - additional instructions   Used for:  Other constipation   Generic drug:  polyethylene glycol   Changed by:  Jet Porter MD        Once daily as needed in morning (1 capful per 8 oz water)   Quantity:  510 g   Refills:  1       senna 8.6 MG tablet   Commonly known as:  SENOKOT   This may have changed:    - how much to take  - how to take this  - when to take this  - reasons to take this  - additional instructions   Changed by:  Jet Porter MD        1 tab @ 9pm as needed   Quantity:  120 tablet   Refills:  0       TYLENOL PM EXTRA STRENGTH  MG tablet   This may have changed:    - medication strength  - when to take this   Generic drug:  diphenhydrAMINE-acetaminophen   Changed by:  Jet Porter MD        Dose:  1 tablet   Take 1 tablet by mouth nightly as needed Reported on 4/24/2017   Quantity:  14 tablet   Refills:  0         Stop taking these medicines if you haven't already. Please contact your care team if you have questions.     MOBIC 15 MG tablet   Generic drug:  meloxicam   Stopped by:  Jet Porter MD           pramipexole 0.25 MG tablet   Commonly known as:  MIRAPEX   Stopped by:  Jet Porter MD                Where to get your medicines      Some of these will need a paper prescription and others can be bought over the counter.  Ask your nurse if you have questions.     Bring a paper prescription for each of these medications     rivastigmine 4.6 MG/24HR 24 hr patch    rivastigmine 9.5 MG/24HR 24 hr patch                Primary Care Provider Office Phone # Fax #    Maxine Monae [phone]  [facility] [address]        Equal Access to Services     ARACELY DAVENPORT : Hadii aad ku hadsheldonandrea Sojacque, waaxda luqadaha, qaybta juanlizkarl ruiz, xi lawrencerebelnba serrano. So [facility] [place] [phone].    ATENCIÓN: Si habla español, tiene a shoemaker disposición servicios gratuitos de asistencia lingüística. Llame al [phone].    We comply with applicable federal civil rights laws and [place] laws. We do not discriminate on the basis of race, color, national origin, age, disability, sex, sexual orientation, or gender identity.            Thank you!     Thank you for choosing [facility]  for your care. Our goal is always to provide you with excellent care. Hearing back from our patients is one way we can continue to improve our services. Please take a few minutes to complete the written survey that you may receive in the mail after your visit with us. Thank you!             Your Updated Medication List - Protect others around you: Learn how to safely use, store and throw away your medicines at www.disposemymeds.org.          This list is accurate as of: 11/27/17 10:49 AM.  Always use your most recent med list.                   Brand Name Dispense Instructions for use Diagnosis    Acetaminophen 325 MG Caps     100 capsule         carbidopa-levodopa  MG per tablet    SINEMET    540 tablet    2 tablets @ 8am, 11am/12noon, and 9pm    Paralysis agitans (H)       citalopram 20 MG tablet    celeXA    90 tablet    1 tab @ 11am/noon    Major depressive disorder, recurrent episode, mild (H)       lisinopril 10 MG tablet    PRINIVIL/ZESTRIL    90 tablet    1 tab @ 11am/noon    HTN, goal below 140/90       LORazepam 0.5 MG tablet    ATIVAN    10 tablet    Take 1 tablet (0.5 mg) by mouth 2 times daily as needed for anxiety        melatonin 5 MG tablet      Take 10 mg by mouth nightly as needed Reported on 5/10/2017        MIRALAX powder   Generic drug:  polyethylene glycol     510 g    Once  daily as needed in morning (1 capful per 8 oz water)    Other constipation       QUEtiapine 25 MG tablet    SEROQUEL    30 tablet    1/2 to 1 tab at night as needed for hallucinations    Hallucinations       * rivastigmine 4.6 MG/24HR 24 hr patch    EXELON    30 patch    4.6 mg patch daily x 1month then 9.5mg patch daily x 1 month then 13.3mg patch daily    Paralysis agitans (H), Mild dementia       * rivastigmine 9.5 MG/24HR 24 hr patch    EXELON    30 patch    4.6 mg patch daily x 1month then 9.5mg patch daily x 1 month then 13.3mg patch daily    Paralysis agitans (H), Mild dementia       SANCTURA 20 MG tablet   Generic drug:  trospium      Take 20 mg by mouth every morning        senna 8.6 MG tablet    SENOKOT    120 tablet    1 tab @ 9pm as needed        TYLENOL PM EXTRA STRENGTH  MG tablet   Generic drug:  diphenhydrAMINE-acetaminophen     14 tablet    Take 1 tablet by mouth nightly as needed Reported on 4/24/2017        ZOLMitriptan 5 MG tablet    ZOMIG    6 tablet    Take 1 tablet (5 mg) by mouth at onset of headache for migraine MAY REPEAT ONCE AFTER 2 HOURS. DO NOT EXCEED 2 TABLETS IN 24 HOURS.    Intractable chronic migraine without aura and without status migrainosus       * Notice:  This list has 2 medication(s) that are the same as other medications prescribed for you. Read the directions carefully, and ask your doctor or other care provider to review them with you.

## 2017-12-17 DIAGNOSIS — R35.0 URINARY FREQUENCY: ICD-10-CM

## 2017-12-19 RX ORDER — TROSPIUM CHLORIDE 20 MG/1
TABLET, FILM COATED ORAL
Qty: 90 TABLET | Refills: 2 | Status: SHIPPED | OUTPATIENT
Start: 2017-12-19 | End: 2018-02-26

## 2018-02-06 DIAGNOSIS — I10 HTN, GOAL BELOW 140/90: ICD-10-CM

## 2018-02-09 RX ORDER — LISINOPRIL 10 MG/1
TABLET ORAL
Qty: 30 TABLET | Refills: 0 | Status: SHIPPED | OUTPATIENT
Start: 2018-02-09 | End: 2018-02-26

## 2018-02-09 NOTE — TELEPHONE ENCOUNTER
Chart reviewed, it appears Neurology discontinued her Rx by Dr. Monae and then put it back in as a historical medication.  Dr. Monae had changed her fromlisinopril/HCTZ in November, but now her BP is elevated again.  I gave one month, but would recommend that she follow up with Dr. Monae within this month to recheck her BP.

## 2018-02-09 NOTE — TELEPHONE ENCOUNTER
lisinopril (PRINIVIL/ZESTRIL) 10 MG tablet  Routing refill request to provider for review/approval because:  Medication is reported/historical    Codie Hamm RN, BSN

## 2018-02-11 DIAGNOSIS — I10 HTN, GOAL BELOW 140/90: ICD-10-CM

## 2018-02-13 RX ORDER — LISINOPRIL 10 MG/1
TABLET ORAL
Qty: 30 TABLET | Refills: 1 | OUTPATIENT
Start: 2018-02-13

## 2018-02-13 NOTE — TELEPHONE ENCOUNTER
Contacted patient and relayed message below.  Offered to help schedule follow up appointment but patient stated she needed to check on getting a ride to the clinic before scheduling.  She will call back to set this up in the next few weeks.

## 2018-02-13 NOTE — TELEPHONE ENCOUNTER
Lisinopril:  Sent 2/9/18 with 1 month supply. Refill not appropriate at this time.     Iveth Mckeon, RN, BSN

## 2018-02-22 NOTE — PROGRESS NOTES
Diagnosis/Summary/Recommendations:    PATIENT: Nathaly Farias  62 year old female     : 1955    MOISE: 2018    Parkinson  Dementia    Wondering about hallucinations  Taking sinemet at 9pm and has tremors that may affect her sleep  She goes to bed at 9pm  Her tremors are increased with anxiety/excited  If she is worked up her shaking is worse  If she does not take a nap during the day she has a mental fog.         Medications     7am 11am/12noon 9pm     Acetaminophen 325mg        Carbidopa/levodopa Sinemet 25/100 2 2 2     Citalopram celexa 20mg  1      Diphenhydramine acetaminophen tylenol PM   As needed     Lisinopril prinivil/zestril 10mg  1      Lorazepam ativan 0.5mg As needed, rarely using       Melatonin 5mg or 10mg   dose     Polyethylene glycol miralax As needed       Quetiapine seroquel 25mg Not taking       Rivastigmine exelon patch mg Has not tried       Senna senokot 8.6mg        Trospium sanctura 20mg Not taking       Zolmitriptan zomig 5mg As needed                         History obtained from patient      Coding statement:   Duration of  Services: patient care and care coordination was 25 minutes  Greater than 50% of this visit was spent in counseling and coordination of care.    PLAN  1. Encouraged her to take a nap after her mid day pill so that she is clearer mentally and functions better.  2. She was encouraged to do therapy at [facility]. - she is dealing with some motivational issues as well as fatigue/lack of energy  3. Presently there is not clear wearing off from her medication but spent time discussing this.  4. Return back in 6 months.   5. She will remain off seroquel and/or rivastigmine       Jet Porter MD     ______________________________________    Last visit date and details:     PATIENT: Nathaly Farias  62 year old female      : 1955     MOISE: 2017     Acetaminophen   Still taking AS NEEDED  Sinemet 25/100 2 tabs 3/day - 8am 11/12noon and 9pm    Citalopram 20mg at noon   Tylenol pm as needed   Lisinopril 10mg/day   Lorazepam 0.5mg twice daily as needed - NOT TAKING REGULARLY.  Melatonin dose unknown - at night.  5mg or 10mg?  meloxicam (MOBIC) 15MG -            NOT SURE IF TAKING THIS.  Not taking.  Polyethylene glycol - AS NEEDED IN MORNING.  Pramipexole 0.25mg 3 times per day - STOPPED LAST WEDNESDAY  Senna - ONE OR TWO PER DAY IN EVENING AS NEEDED   trospium (sanctura) 20mg daily - BACK ON THIS.   Zolmitriptan (zomig) as needed for headache       Did not get coverage for her rivastigmine due to cost  gi problems related to donepezil?  Continuing to have memory problems      Has lost the prescription part of her insurance   They are paying out of pocket for her medications.  They can go to a [employer] in [facility] for internet access.     Here with her daughter and her daughter's mother in law         Meds                   8am 11a/12p 9p                                           sinemet 25/100                  2 2 2                  mirap 0.25                      0 0 0                                                                       Over 50% of this visit was spent in patient care and care coordination.      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   4.6 mg patch daily x 1month then 9.5mg patch daily x 1 month then 13.3mg patch daily     Jet Porter MD           ______________________________________      Patient was asked about 14 Review of systems including changes in vision (dry eyes, double vision), hearing, heart, lungs, musculoskeletal, depression, anxiety, snoring, RBD, insomnia, urinary frequency, urinary  urgency, constipation, swallowing problems, hematological, ID, allergies, skin problems: seborrhea, endocrinological: thyroid, diabetes, cholesterol; balance, weight changes, and other neurological problems and these were not significant at this time except for   Patient Active Problem List   Diagnosis     Prolapse of vaginal wall     Migraine variant     Symptomatic menopausal or female climacteric states     Obesity     Malaise and fatigue     Other and unspecified adverse effect of drug, medicinal and biological substance     Other and unspecified disc disorder of cervical region     Sleep apnea     Essential hypertension, benign     Chronic cholecystitis     Overactive bladder     Neck pain     Headache     HYPERLIPIDEMIA LDL GOAL <160     Mild major depression (H)     Advanced directives, counseling/discussion     HTN, goal below 140/90     Paralysis agitans (H)     History of MRI of brain and brain stem     Wears glasses     Constipation     Incomplete RBBB     Heart murmur     Family history of tremor     Rosacea     Asymptomatic varicose veins     Seborrheic dermatitis     Post-traumatic osteoarthritis of left knee     Pain from implanted hardware, initial encounter     Cognitive disorder evalaution 2017     Dementia     Hiatal hernia          Allergies   Allergen Reactions     Sulfa Drugs      Tacchycardia, had to go to ED     Naproxen GI Disturbance     Oxycontin [Oxycodone] GI Disturbance     Made her feel funny/upset stomach     Rivastigmine      Patch was too expensive     Ropinirole      Did not work     Past Surgical History:   Procedure Laterality Date     ------------OTHER-------------      left shoulder     ARTHRODESIS TOE(S)  3/2/2011    ARTHRODESIS TOE(S) performed by CLARA LUCAS at [facility]     C LIGATE FALLOPIAN TUBE  1998     CHOLECYSTECTOMY, LAPOROSCOPIC  5/12/2008    Cholecystectomy, Laparoscopic     ESOPHAGOSCOPY, GASTROSCOPY, DUODENOSCOPY (EGD), COMBINED N/A 7/5/2017    Procedure:  COMBINED ESOPHAGOSCOPY, GASTROSCOPY, DUODENOSCOPY (EGD), BIOPSY SINGLE OR MULTIPLE;  Esophagogastroduodenoscopy with Multiple Biopsies by Biopsy;  Surgeon: Tj Denney MD;  Location: [facility]     HC COLONOSCOPY W/WO BRUSH/WASH  08/30/06    normal     HC OPEN TX TIBIAL SHAFT FX W PLATE/SCREWS W/WO CERCLAGE  2000    Multiple operations on left leg     HC SHLDR ARTHROSCOP,PART ACROMIOPLAS  11/16/06    Right shoulder     HC SHLDR ARTHROSCOP,SURG,DIS CLAVICULECTOMY  11/16/06    Right shoulder     Past Medical History:   Diagnosis Date     Arthralgia of temporomandibular joint 10/22/1996     Arthritis      Cognitive disorder evalaution 2017 3/6/2017    She has marked attentional difficulties, impairments in memory including a rapid forgetting rate, mild anomia, and mild executive dysfunction. 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 She's required increasing assistance with finances, medications, driving, and cooking. I think this is a mild dementia, and     Constipation 2/23/2015     Dementia 3/13/2017    IMPRESSIONS AND RECOMMENDATIONS   Current results indicate marked attentional difficulties, impairments in memory, which in some cases reflected impairments in retention and in other cases reflected retrieval difficulties, and mild anomia as well as mild executive dysfunction. Premorbid intellectual functioning is estimated to fall in the borderline range. She denied experiencing significant depre     Depressive disorder      Heart murmur 2/23/2015     History of MRI of brain and brain stem 2/21/2015    MRI OF THE BRAIN WITHOUT CONTRAST 7/10/2014 1:21 PM  COMPARISON: None. HISTORY: Left-sided upper and lower extremity tremor. Balance issues. TECHNIQUE:  Brain: Axial diffusion-weighted with ADC map, T2-weighted with fat saturation, T1-weighted and turboFLAIR and coronal T1-weighted images of the brain were obtained without  "intravenous contrast.  FINDINGS: There is mild diffuse cerebral volume loss     Hypertension      Incomplete RBBB 2/23/2015     Motion sickness      Other and unspecified hyperlipidemia      Parkinson's disease (H)      Seborrheic dermatitis 2/29/2016     Unspecified arthropathy, hand 08/07/1997     Unspecified sleep apnea     moderate, sleep study 11/07, on CPAP, has daytime somnolence with this     Variants of migraine, not elsewhere classified, without mention of intractable migraine without mention of status migrainosus      Wears glasses 2/23/2015     Social History     Social History     Marital status:      Spouse name: violet     Number of children: 2     Years of education: N/A     Occupational History           Social History Main Topics     Smoking status: Never Smoker     Smokeless tobacco: Never Used      Comment: no smokers in the household     Alcohol use 0.0 oz/week     0 Standard drinks or equivalent per week      Comment: very occ     Drug use: No     Sexual activity: Yes     Partners: Male     Birth control/ protection: Surgical, Female Surgical      Comment: tubal ligation     Other Topics Concern     Caffeine Concern Yes     OCC     Exercise No     \"not faithfully\"     Seat Belt Yes     Self-Exams No     Social History Narrative    ALLERGIES:  She is allergic to sulfa, and has had gastrointestinal side effects from naproxen.               FAMILY HISTORY:  Strongly positive for headaches, including in her son and sister.  There is no family history of tremor.               SOCIAL HISTORY:  She does not work outside the home.  She does not smoke.  She uses very little alcohol.  Lives in [place]. . Has 2 kids: son who is 35 yr s old. And daughter who is 30 yrs old. Her  is working as a  .        Drug and lactation database from the [place] [employer]:  http://toxnet.nlm.nih.gov/cgi-bin/sis/htmlgen?LACT      B/P: Data Unavailable, T: " Data Unavailable, P: Data Unavailable, R: Data Unavailable 0 lbs 0 oz  Last menstrual period 06/05/2006, not currently breastfeeding., There is no height or weight on file to calculate BMI.  Medications and Vitals not listed above were documented in the cart and reviewed by me.     Current Outpatient Prescriptions   Medication Sig Dispense Refill     lisinopril (PRINIVIL/ZESTRIL) 10 MG tablet TAKE ONE TABLET BY MOUTH ONCE DAILY 30 tablet 0     trospium (SANCTURA) 20 MG tablet TAKE ONE TABLET BY MOUTH ONCE DAILY 90 tablet 2     Acetaminophen 325 MG CAPS  100 capsule      polyethylene glycol (MIRALAX) powder Once daily as needed in morning (1 capful per 8 oz water) 510 g 1     citalopram (CELEXA) 20 MG tablet 1 tab @ 11am/noon 90 tablet 3     lisinopril (PRINIVIL/ZESTRIL) 10 MG tablet 1 tab @ 11am/noon 90 tablet 3     senna (SENOKOT) 8.6 MG tablet 1 tab @ 9pm as needed 120 tablet      diphenhydrAMINE-acetaminophen (TYLENOL PM EXTRA STRENGTH)  MG tablet Take 1 tablet by mouth nightly as needed Reported on 4/24/2017 14 tablet      rivastigmine (EXELON) 4.6 MG/24HR 24 hr patch 4.6 mg patch daily x 1month then 9.5mg patch daily x 1 month then 13.3mg patch daily 30 patch 11     rivastigmine (EXELON) 9.5 MG/24HR 24 hr patch 4.6 mg patch daily x 1month then 9.5mg patch daily x 1 month then 13.3mg patch daily 30 patch 11     QUEtiapine (SEROQUEL) 25 MG tablet 1/2 to 1 tab at night as needed for hallucinations 30 tablet 3     ZOLMitriptan (ZOMIG) 5 MG tablet Take 1 tablet (5 mg) by mouth at onset of headache for migraine MAY REPEAT ONCE AFTER 2 HOURS. DO NOT EXCEED 2 TABLETS IN 24 HOURS. 6 tablet 10     trospium (SANCTURA) 20 MG tablet Take 20 mg by mouth every morning       LORazepam (ATIVAN) 0.5 MG tablet Take 1 tablet (0.5 mg) by mouth 2 times daily as needed for anxiety 10 tablet 0     melatonin 5 MG tablet Take 10 mg by mouth nightly as needed Reported on 5/10/2017       carbidopa-levodopa (SINEMET)  MG per  tablet 2 tablets @ 8am, 11am/12noon, and 9pm 540 tablet 3         Jet Porter MD

## 2018-02-26 ENCOUNTER — OFFICE VISIT (OUTPATIENT)
Dept: NEUROLOGY | Facility: CLINIC | Age: 63
End: 2018-02-26
Payer: COMMERCIAL

## 2018-02-26 VITALS
BODY MASS INDEX: 26.11 KG/M2 | HEART RATE: 72 BPM | OXYGEN SATURATION: 97 % | WEIGHT: 138.2 LBS | SYSTOLIC BLOOD PRESSURE: 122 MMHG | DIASTOLIC BLOOD PRESSURE: 77 MMHG

## 2018-02-26 DIAGNOSIS — R35.0 URINARY FREQUENCY: ICD-10-CM

## 2018-02-26 DIAGNOSIS — F03.A0 MILD DEMENTIA (H): ICD-10-CM

## 2018-02-26 DIAGNOSIS — I10 HTN, GOAL BELOW 140/90: ICD-10-CM

## 2018-02-26 DIAGNOSIS — G20.A1 PARALYSIS AGITANS (H): Primary | ICD-10-CM

## 2018-02-26 DIAGNOSIS — F33.0 MAJOR DEPRESSIVE DISORDER, RECURRENT EPISODE, MILD (H): ICD-10-CM

## 2018-02-26 PROCEDURE — 99214 OFFICE O/P EST MOD 30 MIN: CPT | Performed by: PSYCHIATRY & NEUROLOGY

## 2018-02-26 RX ORDER — TROSPIUM CHLORIDE 20 MG/1
TABLET, FILM COATED ORAL
Qty: 90 TABLET | Refills: 2 | COMMUNITY
Start: 2018-02-26 | End: 2018-05-03

## 2018-02-26 RX ORDER — RIVASTIGMINE 9.5 MG/24H
PATCH, EXTENDED RELEASE TRANSDERMAL
Qty: 30 PATCH | Refills: 11 | Status: SHIPPED | OUTPATIENT
Start: 2018-02-26 | End: 2018-05-14

## 2018-02-26 RX ORDER — RIVASTIGMINE 4.6 MG/24H
PATCH, EXTENDED RELEASE TRANSDERMAL
Qty: 30 PATCH | Refills: 11 | Status: SHIPPED | OUTPATIENT
Start: 2018-02-26 | End: 2018-05-03

## 2018-02-26 RX ORDER — CARBIDOPA AND LEVODOPA 25; 100 MG/1; MG/1
TABLET ORAL
Qty: 540 TABLET | Refills: 3 | COMMUNITY
Start: 2018-02-26 | End: 2018-05-09

## 2018-02-26 RX ORDER — LISINOPRIL 10 MG/1
TABLET ORAL
Qty: 30 TABLET | Refills: 11 | COMMUNITY
Start: 2018-02-26 | End: 2018-03-07

## 2018-02-26 ASSESSMENT — PAIN SCALES - GENERAL: PAINLEVEL: NO PAIN (0)

## 2018-02-26 NOTE — NURSING NOTE
"Nathaly Farias's goals for this visit include: return  She requests these members of her care team be copied on today's visit information:     PCP: Maxine Monae    Referring Provider:  No referring provider defined for this encounter.    Chief Complaint   Patient presents with     RECHECK       Initial /77  Pulse 72  Wt 62.7 kg (138 lb 3.2 oz)  LMP 06/05/2006  SpO2 97%  BMI 26.11 kg/m2 Estimated body mass index is 26.11 kg/(m^2) as calculated from the following:    Height as of 11/24/17: 1.549 m (5' 1\").    Weight as of this encounter: 62.7 kg (138 lb 3.2 oz).  Medication Reconciliation: complete    Do you need any medication refills at today's visit? ?  "

## 2018-02-26 NOTE — LETTER
2018      RE: Nathaly Farias  [address] [facility] [address]       Diagnosis/Summary/Recommendations:    PATIENT: Nathaly Farias  62 year old female     : 1955    MOISE: 2018    Parkinson  Dementia    Wondering about hallucinations  Taking sinemet at 9pm and has tremors that may affect her sleep  She goes to bed at 9pm  Her tremors are increased with anxiety/excited  If she is worked up her shaking is worse  If she does not take a nap during the day she has a mental fog.         Medications     7am 11am/12noon 9pm     Acetaminophen 325mg        Carbidopa/levodopa Sinemet 25/100 2 2 2     Citalopram celexa 20mg  1      Diphenhydramine acetaminophen tylenol PM   As needed     Lisinopril prinivil/zestril 10mg  1      Lorazepam ativan 0.5mg As needed, rarely using       Melatonin 5mg or 10mg   dose     Polyethylene glycol miralax As needed       Quetiapine seroquel 25mg Not taking       Rivastigmine exelon patch mg Has not tried       Senna senokot 8.6mg        Trospium sanctura 20mg Not taking       Zolmitriptan zomig 5mg As needed                         History obtained from patient      Coding statement:   Duration of  Services: patient care and care coordination was 25 minutes  Greater than 50% of this visit was spent in counseling and coordination of care.    PLAN  1. Encouraged her to take a nap after her mid day pill so that she is clearer mentally and functions better.  2. She was encouraged to do therapy at [facility]. - she is dealing with some motivational issues as well as fatigue/lack of energy  3. Presently there is not clear wearing off from her medication but spent time discussing this.  4. Return back in 6 months.   5. She will remain off seroquel and/or rivastigmine       Jet Porter MD     ______________________________________    Last visit date and details:     PATIENT: Nathaly Farias  62 year old female      : 1955     MOISE:   2017     Acetaminophen   Still taking AS NEEDED  Sinemet 25/100 2 tabs 3/day - 8am 11/12noon and 9pm   Citalopram 20mg at noon   Tylenol pm as needed   Lisinopril 10mg/day   Lorazepam 0.5mg twice daily as needed - NOT TAKING REGULARLY.  Melatonin dose unknown - at night.  5mg or 10mg?  meloxicam (MOBIC) 15MG -            NOT SURE IF TAKING THIS.  Not taking.  Polyethylene glycol - AS NEEDED IN MORNING.  Pramipexole 0.25mg 3 times per day - STOPPED LAST WEDNESDAY  Senna - ONE OR TWO PER DAY IN EVENING AS NEEDED   trospium (sanctura) 20mg daily - BACK ON THIS.   Zolmitriptan (zomig) as needed for headache       Did not get coverage for her rivastigmine due to cost  gi problems related to donepezil?  Continuing to have memory problems      Has lost the prescription part of her insurance   They are paying out of pocket for her medications.  They can go to a library in town for internet access.     Here with her daughter and her daughter's mother in law         Meds                   8am 11a/12p 9p                                           sinemet 25/100                  2 2 2                  mirap 0.25                      0 0 0                                                                       Over 50% of this visit was spent in patient care and care coordination.      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   4.6 mg patch daily x 1month then 9.5mg patch daily x 1 month then 13.3mg patch daily     Jet Porter MD           ______________________________________      Patient was asked about 14 Review of systems including changes in vision (dry eyes, double vision), hearing,  heart, lungs, musculoskeletal, depression, anxiety, snoring, RBD, insomnia, urinary frequency, urinary urgency, constipation, swallowing problems, hematological, ID, allergies, skin problems: seborrhea, endocrinological: thyroid, diabetes, cholesterol; balance, weight changes, and other neurological problems and these were not significant at this time except for   Patient Active Problem List   Diagnosis     Prolapse of vaginal wall     Migraine variant     Symptomatic menopausal or female climacteric states     Obesity     Malaise and fatigue     Other and unspecified adverse effect of drug, medicinal and biological substance     Other and unspecified disc disorder of cervical region     Sleep apnea     Essential hypertension, benign     Chronic cholecystitis     Overactive bladder     Neck pain     Headache     HYPERLIPIDEMIA LDL GOAL <160     Mild major depression (H)     Advanced directives, counseling/discussion     HTN, goal below 140/90     Paralysis agitans (H)     History of MRI of brain and brain stem     Wears glasses     Constipation     Incomplete RBBB     Heart murmur     Family history of tremor     Rosacea     Asymptomatic varicose veins     Seborrheic dermatitis     Post-traumatic osteoarthritis of left knee     Pain from implanted hardware, initial encounter     Cognitive disorder evalaution 2017     Dementia     Hiatal hernia          Allergies   Allergen Reactions     Sulfa Drugs      Tacchycardia, had to go to ED     Naproxen GI Disturbance     Oxycontin [Oxycodone] GI Disturbance     Made her feel funny/upset stomach     Rivastigmine      Patch was too expensive     Ropinirole      Did not work     Past Surgical History:   Procedure Laterality Date     ------------OTHER-------------      left shoulder     ARTHRODESIS TOE(S)  3/2/2011    ARTHRODESIS TOE(S) performed by CLARA LUCAS at [facility] OR     C LIGATE FALLOPIAN TUBE  1998     CHOLECYSTECTOMY, LAPOROSCOPIC  5/12/2008    Cholecystectomy,  Laparoscopic     ESOPHAGOSCOPY, GASTROSCOPY, DUODENOSCOPY (EGD), COMBINED N/A 7/5/2017    Procedure: COMBINED ESOPHAGOSCOPY, GASTROSCOPY, DUODENOSCOPY (EGD), BIOPSY SINGLE OR MULTIPLE;  Esophagogastroduodenoscopy with Multiple Biopsies by Biopsy;  Surgeon: Tj Denney MD;  Location: [facility]     HC COLONOSCOPY W/WO BRUSH/WASH  08/30/06    normal     HC OPEN TX TIBIAL SHAFT FX W PLATE/SCREWS W/WO CERCLAGE  2000    Multiple operations on left leg     HC SHLDR ARTHROSCOP,PART ACROMIOPLAS  11/16/06    Right shoulder     HC SHLDR ARTHROSCOP,SURG,DIS CLAVICULECTOMY  11/16/06    Right shoulder     Past Medical History:   Diagnosis Date     Arthralgia of temporomandibular joint 10/22/1996     Arthritis      Cognitive disorder evalaution 2017 3/6/2017    She has marked attentional difficulties, impairments in memory including a rapid forgetting rate, mild anomia, and mild executive dysfunction. 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 She's required increasing assistance with finances, medications, driving, and cooking. I think this is a mild dementia, and     Constipation 2/23/2015     Dementia 3/13/2017    IMPRESSIONS AND RECOMMENDATIONS   Current results indicate marked attentional difficulties, impairments in memory, which in some cases reflected impairments in retention and in other cases reflected retrieval difficulties, and mild anomia as well as mild executive dysfunction. Premorbid intellectual functioning is estimated to fall in the borderline range. She denied experiencing significant depre     Depressive disorder      Heart murmur 2/23/2015     History of MRI of brain and brain stem 2/21/2015    MRI OF THE BRAIN WITHOUT CONTRAST 7/10/2014 1:21 PM  COMPARISON: None. HISTORY: Left-sided upper and lower extremity tremor. Balance issues. TECHNIQUE:  Brain: Axial diffusion-weighted with ADC map, T2-weighted with fat  "saturation, T1-weighted and turboFLAIR and coronal T1-weighted images of the brain were obtained without intravenous contrast.  FINDINGS: There is mild diffuse cerebral volume loss     Hypertension      Incomplete RBBB 2/23/2015     Motion sickness      Other and unspecified hyperlipidemia      Parkinson's disease (H)      Seborrheic dermatitis 2/29/2016     Unspecified arthropathy, hand 08/07/1997     Unspecified sleep apnea     moderate, sleep study 11/07, on CPAP, has daytime somnolence with this     Variants of migraine, not elsewhere classified, without mention of intractable migraine without mention of status migrainosus      Wears glasses 2/23/2015     Social History     Social History     Marital status:      Spouse name: violet     Number of children: 2     Years of education: N/A     Occupational History           Social History Main Topics     Smoking status: Never Smoker     Smokeless tobacco: Never Used      Comment: no smokers in the household     Alcohol use 0.0 oz/week     0 Standard drinks or equivalent per week      Comment: very occ     Drug use: No     Sexual activity: Yes     Partners: Male     Birth control/ protection: Surgical, Female Surgical      Comment: tubal ligation     Other Topics Concern     Caffeine Concern Yes     OCC     Exercise No     \"not faithfully\"     Seat Belt Yes     Self-Exams No     Social History Narrative    ALLERGIES:  She is allergic to sulfa, and has had gastrointestinal side effects from naproxen.               FAMILY HISTORY:  Strongly positive for headaches, including in her son and sister.  There is no family history of tremor.               SOCIAL HISTORY:  She does not work outside the home.  She does not smoke.  She uses very little alcohol.  Lives in [place]. . Has 2 kids: son who is 35 yr s old. And daughter who is 30 yrs old. Her  is working as a  .        Drug and lactation database from the [place] " National Library of Medicine:  http://toxnet.nlm.nih.gov/cgi-bin/sis/htmlgen?LACT      B/P: Data Unavailable, T: Data Unavailable, P: Data Unavailable, R: Data Unavailable 0 lbs 0 oz  Last menstrual period 06/05/2006, not currently breastfeeding., There is no height or weight on file to calculate BMI.  Medications and Vitals not listed above were documented in the cart and reviewed by me.     Current Outpatient Prescriptions   Medication Sig Dispense Refill     lisinopril (PRINIVIL/ZESTRIL) 10 MG tablet TAKE ONE TABLET BY MOUTH ONCE DAILY 30 tablet 0     trospium (SANCTURA) 20 MG tablet TAKE ONE TABLET BY MOUTH ONCE DAILY 90 tablet 2     Acetaminophen 325 MG CAPS  100 capsule      polyethylene glycol (MIRALAX) powder Once daily as needed in morning (1 capful per 8 oz water) 510 g 1     citalopram (CELEXA) 20 MG tablet 1 tab @ 11am/noon 90 tablet 3     lisinopril (PRINIVIL/ZESTRIL) 10 MG tablet 1 tab @ 11am/noon 90 tablet 3     senna (SENOKOT) 8.6 MG tablet 1 tab @ 9pm as needed 120 tablet      diphenhydrAMINE-acetaminophen (TYLENOL PM EXTRA STRENGTH)  MG tablet Take 1 tablet by mouth nightly as needed Reported on 4/24/2017 14 tablet      rivastigmine (EXELON) 4.6 MG/24HR 24 hr patch 4.6 mg patch daily x 1month then 9.5mg patch daily x 1 month then 13.3mg patch daily 30 patch 11     rivastigmine (EXELON) 9.5 MG/24HR 24 hr patch 4.6 mg patch daily x 1month then 9.5mg patch daily x 1 month then 13.3mg patch daily 30 patch 11     QUEtiapine (SEROQUEL) 25 MG tablet 1/2 to 1 tab at night as needed for hallucinations 30 tablet 3     ZOLMitriptan (ZOMIG) 5 MG tablet Take 1 tablet (5 mg) by mouth at onset of headache for migraine MAY REPEAT ONCE AFTER 2 HOURS. DO NOT EXCEED 2 TABLETS IN 24 HOURS. 6 tablet 10     trospium (SANCTURA) 20 MG tablet Take 20 mg by mouth every morning       LORazepam (ATIVAN) 0.5 MG tablet Take 1 tablet (0.5 mg) by mouth 2 times daily as needed for anxiety 10 tablet 0     melatonin 5 MG  tablet Take 10 mg by mouth nightly as needed Reported on 5/10/2017       carbidopa-levodopa (SINEMET)  MG per tablet 2 tablets @ 8am, 11am/12noon, and 9pm 540 tablet 3         Jet Porter MD

## 2018-02-26 NOTE — MR AVS SNAPSHOT
After Visit Summary   2018    Nathaly Farias    MRN: [id number]           Patient Information     Date Of Birth          1955        Visit Information        Provider Department      2018 8:30 AM Jet Porter MD [facility]        Today's Diagnoses     Paralysis agitans (H)    -  1    Mild dementia        Urinary frequency        HTN, goal below 140/90          Care Instructions    Diagnosis/Summary/Recommendations:    PATIENT: Nathaly Farias  62 year old female     : 1955    MOISE: 2018    Parkinson  Dementia    Wondering about hallucinations  Taking sinemet at 9pm and has tremors that may affect her sleep  She goes to bed at 9pm  Her tremors are increased with anxiety/excited  If she is worked up her shaking is worse  If she does not take a nap during the day she has a mental fog.         Medications     7am 11am/12noon 9pm     Acetaminophen 325mg        Carbidopa/levodopa Sinemet 25/100 2 2 2     Citalopram celexa 20mg  1      Diphenhydramine acetaminophen tylenol PM   As needed     Lisinopril prinivil/zestril 10mg  1      Lorazepam ativan 0.5mg As needed, rarely using       Melatonin 5mg or 10mg   dose     Polyethylene glycol miralax As needed       Quetiapine seroquel 25mg Not taking       Rivastigmine exelon patch mg Has not tried       Senna senokot 8.6mg        Trospium sanctura 20mg Not taking       Zolmitriptan zomig 5mg As needed                         History obtained from patient      Coding statement:   Duration of  Services: patient care and care coordination was 25 minutes  Greater than 50% of this visit was spent in counseling and coordination of care.    PLAN  1. Encouraged her to take a nap after her mid day pill so that she is clearer mentally and functions better.  2. She was encouraged to do therapy at [facility]. - she is dealing with some motivational issues as well as fatigue/lack of energy  3. Presently there is not  "clear wearing off from her medication but spent time discussing this.  4. Return back in 6 months.   5. She will remain off seroquel and/or rivastigmine       Jet Porter MD             Follow-ups after your visit        Additional Services     PHYSICAL THERAPY REFERRAL       *This therapy referral will be filtered to a centralized scheduling office at [facility] and the patient will receive a call to schedule an appointment at a [place] location most convenient for them. *     [facility] provides Physical Therapy evaluation and treatment and many specialty services across the [place] system.  If requesting a specialty program, please choose from the list below.    If you have not heard from the scheduling office within 2 business days, please call [phone] for all locations, with the exception of [place], please call [phone] and [facility], please call [phone]  Treatment: Evaluation & Treatment  Special Instructions/Modalities: evaluate and treat - parkinson  Special Programs: None    Please be aware that coverage of these services is subject to the terms and limitations of your health insurance plan.  Call member services at your health plan with any benefit or coverage questions.      **Note to Provider:  If you are referring outside of [place] for the therapy appointment, please list the name of the location in the \"special instructions\" above, print the referral and give to the patient to schedule the appointment.                  Follow-up notes from your care team     Return in about 6 months (around 8/26/2018).      Your next 10 appointments already scheduled     May 14, 2018  9:00 AM CDT   Return Visit with Jet Porter MD   [facility] ([facility])    [address] [place] [id number] [phone]              Who to contact     If you have questions or need follow up information about " today's clinic visit or your schedule please contact [facility] directly at [phone].  Normal or non-critical lab and imaging results will be communicated to you by [employer], letter or phone within 4 business days after the clinic has received the results. If you do not hear from us within 7 days, please contact the clinic through [employer] or phone. If you have a critical or abnormal lab result, we will notify you by phone as soon as possible.  Submit refill requests through [employer] or call your pharmacy and they will forward the refill request to us. Please allow 3 business days for your refill to be completed.          Additional Information About Your Visit        [employer] Information     [employer] gives you secure access to your electronic health record. If you see a primary care provider, you can also send messages to your care team and make appointments. If you have questions, please call your primary care clinic.  If you do not have a primary care provider, please call [phone] and they will assist you.      [employer] is an electronic gateway that provides easy, online access to your medical records. With [employer], you can request a clinic appointment, read your test results, renew a prescription or communicate with your care team.     To access your existing account, please contact your [facility] or call [phone] for assistance.        Care EveryWhere ID     This is your Care EveryWhere ID. This could be used by other organizations to access your [facility] medical records  [id number]        Your Vitals Were     Pulse Last Period Pulse Oximetry BMI (Body Mass Index)          72 06/05/2006 97% 26.11 kg/m2         Blood Pressure from Last 3 Encounters:   02/26/18 122/77   11/27/17 146/76   11/13/17 96/59    Weight from Last 3 Encounters:   02/26/18 62.7 kg (138 lb 3.2 oz)   11/24/17 64.9 kg (143 lb)   11/13/17 63.8 kg (140 lb 11.2 oz)              We  Performed the Following     PHYSICAL THERAPY REFERRAL          Today's Medication Changes          These changes are accurate as of 2/26/18  9:24 AM.  If you have any questions, ask your nurse or doctor.               These medicines have changed or have updated prescriptions.        Dose/Directions    lisinopril 10 MG tablet   Commonly known as:  PRINIVIL/ZESTRIL   This may have changed:  See the new instructions.   Used for:  HTN, goal below 140/90   Changed by:  Jet Porter MD        1 tab by mouth at 11am   Quantity:  30 tablet   Refills:  11       melatonin 5 MG tablet   This may have changed:    - how much to take  - how to take this  - when to take this  - reasons to take this  - additional instructions   Changed by:  Jet Porter MD        Not sure of dose and taking 1 tab by mouth @ 9pm   Refills:  0       SINEMET  MG per tablet   This may have changed:  additional instructions   Used for:  Paralysis agitans (H)   Generic drug:  carbidopa-levodopa   Changed by:  Jet Porter MD        2 tablets @ 7am, 11am/12noon, and 9pm   Quantity:  540 tablet   Refills:  3       trospium 20 MG tablet   Commonly known as:  SANCTURA   This may have changed:  See the new instructions.   Used for:  Urinary frequency   Changed by:  Jet Porter MD        Not taking   Quantity:  90 tablet   Refills:  2         Stop taking these medicines if you haven't already. Please contact your care team if you have questions.     QUEtiapine 25 MG tablet   Commonly known as:  SEROQUEL   Stopped by:  Jet Porter MD           rivastigmine 4.6 MG/24HR 24 hr patch   Commonly known as:  EXELON   Stopped by:  Jet Porter MD           rivastigmine 9.5 MG/24HR 24 hr patch   Commonly known as:  EXELON   Stopped by:  Jet Porter MD                    Primary Care Provider Office Phone # Fax #    Maxine Monae [phone]       [address] [place] [id number]         Equal Access to Services     [facility] Hospital[facility] : Hadii irving kearns monicaandrea Jordanali, waaxda luqadaha, qaybta kaalmakarl ruiz, xi serrano. So [facility] [phone].    ATENCIÓN: Si habla español, tiene a shoemaker disposición servicios gratuitos de asistencia lingüística. Veronicaame al [phone].    We comply with applicable federal civil rights laws and [place] laws. We do not discriminate on the basis of race, color, national origin, age, disability, sex, sexual orientation, or gender identity.            Thank you!     Thank you for choosing [facility]  for your care. Our goal is always to provide you with excellent care. Hearing back from our patients is one way we can continue to improve our services. Please take a few minutes to complete the written survey that you may receive in the mail after your visit with us. Thank you!             Your Updated Medication List - Protect others around you: Learn how to safely use, store and throw away your medicines at www.disposemymeds.org.          This list is accurate as of 2/26/18  9:24 AM.  Always use your most recent med list.                   Brand Name Dispense Instructions for use Diagnosis    Acetaminophen 325 MG Caps     100 capsule    Take 325 mg by mouth as needed        citalopram 20 MG tablet    celeXA    90 tablet    1 tab @ 11am/noon    Major depressive disorder, recurrent episode, mild (H)       lisinopril 10 MG tablet    PRINIVIL/ZESTRIL    30 tablet    1 tab by mouth at 11am    HTN, goal below 140/90       LORazepam 0.5 MG tablet    ATIVAN    10 tablet    Take 1 tablet (0.5 mg) by mouth 2 times daily as needed for anxiety        melatonin 5 MG tablet      Not sure of dose and taking 1 tab by mouth @ 9pm        MIRALAX powder   Generic drug:  polyethylene glycol     510 g    Once daily as needed in morning (1 capful per 8 oz water)    Other constipation       senna 8.6 MG tablet    SENOKOT    120 tablet    1 tab @ 9pm as  needed        SINEMET  MG per tablet   Generic drug:  carbidopa-levodopa     540 tablet    2 tablets @ 7am, 11am/12noon, and 9pm    Paralysis agitans (H)       trospium 20 MG tablet    SANCTURA    90 tablet    Not taking    Urinary frequency       TYLENOL PM EXTRA STRENGTH  MG tablet   Generic drug:  diphenhydrAMINE-acetaminophen     14 tablet    Take 1 tablet by mouth nightly as needed Reported on 4/24/2017        ZOLMitriptan 5 MG tablet    ZOMIG    6 tablet    Take 1 tablet (5 mg) by mouth at onset of headache for migraine MAY REPEAT ONCE AFTER 2 HOURS. DO NOT EXCEED 2 TABLETS IN 24 HOURS.    Intractable chronic migraine without aura and without status migrainosus

## 2018-02-26 NOTE — Clinical Note
2/26/2018         RE: Nathaly Farias  [address] [facility] [address]        Dear Colleague,    Thank you for referring your patient, Nathaly Farias, to the [facility]. Please see a copy of my visit note below.    No notes on file    Again, thank you for allowing me to participate in the care of your patient.        Sincerely,        Jet Porter MD

## 2018-02-26 NOTE — PATIENT INSTRUCTIONS
Diagnosis/Summary/Recommendations:    PATIENT: Nathaly Farias  62 year old female     : 1955    MOISE: 2018    Parkinson  Dementia    Wondering about hallucinations  Taking sinemet at 9pm and has tremors that may affect her sleep  She goes to bed at 9pm  Her tremors are increased with anxiety/excited  If she is worked up her shaking is worse  If she does not take a nap during the day she has a mental fog.         Medications     7am 11am/12noon 9pm     Acetaminophen 325mg        Carbidopa/levodopa Sinemet 25/100 2 2 2     Citalopram celexa 20mg  1      Diphenhydramine acetaminophen tylenol PM   As needed     Lisinopril prinivil/zestril 10mg  1      Lorazepam ativan 0.5mg As needed, rarely using       Melatonin 5mg or 10mg   dose     Polyethylene glycol miralax As needed       Quetiapine seroquel 25mg Not taking       Rivastigmine exelon patch mg Has not tried       Senna senokot 8.6mg        Trospium sanctura 20mg Not taking       Zolmitriptan zomig 5mg As needed                         History obtained from patient      Coding statement:   Duration of  Services: patient care and care coordination was 25 minutes  Greater than 50% of this visit was spent in counseling and coordination of care.    PLAN  1. Encouraged her to take a nap after her mid day pill so that she is clearer mentally and functions better.  2. She was encouraged to do therapy at [facility]. - she is dealing with some motivational issues as well as fatigue/lack of energy  3. Presently there is not clear wearing off from her medication but spent time discussing this.  4. Return back in 6 months.   5. She will remain off seroquel and/or rivastigmine       Jet Porter MD

## 2018-02-27 RX ORDER — CITALOPRAM HYDROBROMIDE 20 MG/1
TABLET ORAL
Qty: 90 TABLET | Refills: 0 | Status: SHIPPED | OUTPATIENT
Start: 2018-02-27 | End: 2018-05-03

## 2018-02-27 RX ORDER — LISINOPRIL 10 MG/1
TABLET ORAL
Qty: 30 TABLET | Refills: 0 | OUTPATIENT
Start: 2018-02-27

## 2018-02-27 NOTE — TELEPHONE ENCOUNTER
Pending Prescriptions:                       Disp   Refills    lisinopril (PRINIVIL/ZESTRIL) 10 MG table*30 tab*0            Sig: TAKE 1 TABLET BY MOUTH ONCE DAILY    BP Readings from Last 3 Encounters:   02/26/18 122/77   11/27/17 146/76   11/13/17 96/59     Last ov 10/26/2017    Was sent 02/26/2018 with refills.    Arash Duran, RN, BSN

## 2018-03-07 RX ORDER — LISINOPRIL 10 MG/1
TABLET ORAL
Qty: 30 TABLET | Refills: 0 | Status: SHIPPED | OUTPATIENT
Start: 2018-03-07 | End: 2018-04-18

## 2018-03-13 DIAGNOSIS — M17.32 POST-TRAUMATIC OSTEOARTHRITIS OF LEFT KNEE: ICD-10-CM

## 2018-03-13 NOTE — TELEPHONE ENCOUNTER
I sent a message to Dr. Monae, her Primary provider to see if she is ok with this medication as her GFR is 44.  I will wait to hear back.  Jack Kaplan PA-C

## 2018-03-14 ENCOUNTER — DOCUMENTATION ONLY (OUTPATIENT)
Dept: LAB | Facility: CLINIC | Age: 63
End: 2018-03-14

## 2018-03-14 RX ORDER — MELOXICAM 15 MG/1
15 TABLET ORAL DAILY
Qty: 60 TABLET | Refills: 1 | OUTPATIENT
Start: 2018-03-14

## 2018-03-14 NOTE — TELEPHONE ENCOUNTER
I received a message back from Dr. Monae, she agrees that it would be best to get another lab value before we prescribe his medication again.  I called and left a voicemail with the patient stating that if she wants to have the Mobic again that she would need to get lab values done first.  I put in an order for those lab values.  We will see if she wants to continue the medication or not.  Her kidney function would have to be acceptable prior to that.

## 2018-03-15 DIAGNOSIS — M17.32 POST-TRAUMATIC OSTEOARTHRITIS OF LEFT KNEE: ICD-10-CM

## 2018-03-15 LAB
ANION GAP SERPL CALCULATED.3IONS-SCNC: 4 MMOL/L (ref 3–14)
BUN SERPL-MCNC: 28 MG/DL (ref 7–30)
CALCIUM SERPL-MCNC: 8.9 MG/DL (ref 8.5–10.1)
CHLORIDE SERPL-SCNC: 105 MMOL/L (ref 94–109)
CO2 SERPL-SCNC: 30 MMOL/L (ref 20–32)
CREAT SERPL-MCNC: 0.94 MG/DL (ref 0.52–1.04)
GFR SERPL CREATININE-BSD FRML MDRD: 60 ML/MIN/1.7M2
GLUCOSE SERPL-MCNC: 99 MG/DL (ref 70–99)
POTASSIUM SERPL-SCNC: 4.4 MMOL/L (ref 3.4–5.3)
SODIUM SERPL-SCNC: 139 MMOL/L (ref 133–144)

## 2018-03-15 PROCEDURE — 80048 BASIC METABOLIC PNL TOTAL CA: CPT | Performed by: PHYSICIAN ASSISTANT

## 2018-03-15 PROCEDURE — 36415 COLL VENOUS BLD VENIPUNCTURE: CPT | Performed by: PHYSICIAN ASSISTANT

## 2018-03-15 NOTE — PROGRESS NOTES
This patient did not show up for the labs that were ordered today.  I have canceled and reordered as future for one week.  Please contact the patient to come back in.  Thank you! -Eneida CARTER, Lab

## 2018-03-15 NOTE — PROGRESS NOTES
Called patient and informed her that she still needs labs drawn. She will come in today or tomorrow for them.  Maxine Harrington RN on 3/15/2018 at 9:32 AM

## 2018-03-19 ENCOUNTER — TELEPHONE (OUTPATIENT)
Dept: NEUROLOGY | Facility: CLINIC | Age: 63
End: 2018-03-19

## 2018-03-19 RX ORDER — MELOXICAM 15 MG/1
15 TABLET ORAL DAILY
Qty: 60 TABLET | Refills: 1 | Status: SHIPPED | OUTPATIENT
Start: 2018-03-19 | End: 2018-05-03

## 2018-03-19 NOTE — TELEPHONE ENCOUNTER
Health Call Center    Phone Message    May a detailed message be left on voicemail: no    Reason for Call: Other: Pt requesting a call back today.  Is patient of Dr Khan.  States her hair is falling out.  Please call back.    [phone]  Action Taken: Message routed to:  Adult Clinics: Neurology p [phone]

## 2018-03-19 NOTE — TELEPHONE ENCOUNTER
GFR now much better.  Rx signed and sent to [facility] pharmacy.  Dr. Monae is ok with this med now, she told me.

## 2018-03-19 NOTE — PROGRESS NOTES
Thanks, She did get them done, Labs look good and Dr. Monae agrees we will do the Mobic.  I will sign it.    Alvaro

## 2018-03-19 NOTE — TELEPHONE ENCOUNTER
Jet Porter MD   You; Christine Allen RPH 37 minutes ago (10:56 AM)                 May be female pattern hair loss - needs to see pcp to discuss this or dermatology as well thyroid conditions are associated with hair loss - again should see pcp     I dont her present medications are to blame for hair loss. Will see if J.R has thoughts on this. (Routing comment)                        You   Christine Allen RPH; Jet Porter MD 2 hours ago (8:51 AM)                  Hi Dr. Porter and Christine,   In reviewing her medications, I'm not aware of any of them possibly causing hair loss but you both are the experts. Can you take a peek and let me know if there is anything glaring with her meds that may be causing hair loss? Thanks, Katie  (Routing comment)               Called pt and let her know to follow up with PCP. I did let her know I would give her a call if I heard anything differently about her medications from our Pharmacist but I did communicate Dr. Porter's message. Katie Dumont, RN

## 2018-03-19 NOTE — PROGRESS NOTES
I called the patient this AM and let her know about the med being sent and her lab values.  I also answered some general questions she had about any side effects.  She is happy to have the prescription and will pick it up soon.

## 2018-04-16 DIAGNOSIS — I10 HTN, GOAL BELOW 140/90: ICD-10-CM

## 2018-04-17 RX ORDER — LISINOPRIL 10 MG/1
TABLET ORAL
Qty: 30 TABLET | Refills: 0 | OUTPATIENT
Start: 2018-04-17

## 2018-04-17 NOTE — TELEPHONE ENCOUNTER
Routing refill request to provider for review/approval because:  Aleida given x1 and patient did not follow up, please advise    Claribel Robles RN

## 2018-04-18 RX ORDER — LISINOPRIL 10 MG/1
TABLET ORAL
Qty: 30 TABLET | Refills: 0 | Status: SHIPPED | OUTPATIENT
Start: 2018-04-18 | End: 2018-05-03

## 2018-04-18 NOTE — TELEPHONE ENCOUNTER
Spoke to patient to inform her of message below. The message was cut off she hung up. Patient does have an appointment scheduled 05/03/2018    Will you renew enough medication  to get her through until her appointment with you?    Radha Russell MA

## 2018-04-22 DIAGNOSIS — G20.A1 PARALYSIS AGITANS (H): ICD-10-CM

## 2018-04-23 RX ORDER — CARBIDOPA AND LEVODOPA 25; 100 MG/1; MG/1
TABLET ORAL
Qty: 540 TABLET | Refills: 3 | Status: SHIPPED | OUTPATIENT
Start: 2018-04-23 | End: 2018-05-14

## 2018-04-30 ENCOUNTER — OFFICE VISIT (OUTPATIENT)
Dept: ORTHOPEDICS | Facility: CLINIC | Age: 63
End: 2018-04-30
Payer: COMMERCIAL

## 2018-04-30 VITALS
DIASTOLIC BLOOD PRESSURE: 83 MMHG | BODY MASS INDEX: 27.21 KG/M2 | RESPIRATION RATE: 14 BRPM | SYSTOLIC BLOOD PRESSURE: 136 MMHG | WEIGHT: 144 LBS

## 2018-04-30 DIAGNOSIS — M17.32 POST-TRAUMATIC OSTEOARTHRITIS OF LEFT KNEE: Primary | ICD-10-CM

## 2018-04-30 PROCEDURE — 20610 DRAIN/INJ JOINT/BURSA W/O US: CPT | Mod: LT | Performed by: ORTHOPAEDIC SURGERY

## 2018-04-30 ASSESSMENT — PAIN SCALES - GENERAL: PAINLEVEL: NO PAIN (0)

## 2018-04-30 NOTE — MR AVS SNAPSHOT
After Visit Summary   4/30/2018    Nathaly Farias    MRN: [id number]           Patient Information     Date Of Birth          1955        Visit Information        Provider Department      4/30/2018 8:30 AM Christine Crowley MD [facility]        Today's Diagnoses     Post-traumatic osteoarthritis of left knee    -  1       Follow-ups after your visit        Your next 10 appointments already scheduled     May 03, 2018  9:15 AM CDT   [facility] with [facility]ine [facility] MD   [facility]    [address] [id number] [phone]           Bring a current list of meds and any records pertaining to this visit. For Physicals, please bring immunization records and any forms needing to be filled out. Please arrive 10 minutes early to complete paperwork.            May 14, 2018  9:00 AM CDT   Return Visit with Jet Porter MD   [facility]    [address] [place] [id number] [phone]              Who to contact     If you have questions or need follow up information about today's clinic visit or your schedule please contact [facility] directly at [phone].  Normal or non-critical lab and imaging results will be communicated to you by [employer], letter or phone within 4 business days after the clinic has received the results. If you do not hear from us within 7 days, please contact the clinic through [employer] or phone. If you have a critical or abnormal lab result, we will notify you by phone as soon as possible.  Submit refill requests through [employer] or call your pharmacy and they will forward the refill request to us. Please allow 3 business days for your refill to be completed.          Additional Information About Your Visit        [employer] Information     [employer] gives you secure access to your electronic health record. If you see  a primary care provider, you can also send messages to your care team and make appointments. If you have questions, please call your primary care clinic.  If you do not have a primary care provider, please call [phone] and they will assist you.        Care EveryWhere ID     This is your Care EveryWhere ID. This could be used by other organizations to access your [facility] medical records  [id number]        Your Vitals Were     Respirations Last Period BMI (Body Mass Index)             14 06/05/2006 27.21 kg/m2          Blood Pressure from Last 3 Encounters:   04/30/18 136/83   02/26/18 122/77   11/27/17 146/76    Weight from Last 3 Encounters:   04/30/18 65.3 kg (144 lb)   02/26/18 62.7 kg (138 lb 3.2 oz)   11/24/17 64.9 kg (143 lb)              We Performed the Following     Kenalog 40 MG  []     Large Joint/Bursa injection and/or drainage (Shoulder, Knee)        Primary Care Provider Office Phone # Fax #    Maxine Luda Monae [phone]       [address] [place] [id number]        Equal Access to Services     [facility]: Hadii aad ku hadasho Sochelseaali, waaxda luqadaha, qaybta kaalmada adeegyada, xi butts . So [facility] [phone].    ATENCIÓN: Si habla español, tiene a shoemaker disposición servicios gratuitos de asistencia lingüística. Llame al [phone].    We comply with applicable federal civil rights laws and [place] laws. We do not discriminate on the basis of race, color, national origin, age, disability, sex, sexual orientation, or gender identity.            Thank you!     Thank you for choosing [facility]  for your care. Our goal is always to provide you with excellent care. Hearing back from our patients is one way we can continue to improve our services. Please take a few minutes to complete the written survey that you may receive in the mail after your visit with us. Thank you!             Your Updated Medication List - Protect  others around you: Learn how to safely use, store and throw away your medicines at www.disposemymeds.org.          This list is accurate as of 4/30/18 10:49 AM.  Always use your most recent med list.                   Brand Name Dispense Instructions for use Diagnosis    Acetaminophen 325 MG Caps     100 capsule    Take 325 mg by mouth as needed        * citalopram 20 MG tablet    celeXA    90 tablet    1 tab @ 11am/noon    Major depressive disorder, recurrent episode, mild (H)       * citalopram 20 MG tablet    celeXA    90 tablet    TAKE ONE TABLET BY MOUTH ONCE DAILY    Major depressive disorder, recurrent episode, mild (H)       lisinopril 10 MG tablet    PRINIVIL/ZESTRIL    30 tablet    1 tab by mouth at 11am, DUE for follow up    HTN, goal below 140/90       LORazepam 0.5 MG tablet    ATIVAN    10 tablet    Take 1 tablet (0.5 mg) by mouth 2 times daily as needed for anxiety        melatonin 5 MG tablet      Not sure of dose and taking 1 tab by mouth @ 9pm        meloxicam 15 MG tablet    MOBIC    60 tablet    Take 1 tablet (15 mg) by mouth daily    Post-traumatic osteoarthritis of left knee       MIRALAX powder   Generic drug:  polyethylene glycol     510 g    Once daily as needed in morning (1 capful per 8 oz water)    Other constipation       * rivastigmine 4.6 MG/24HR 24 hr patch    EXELON    30 patch    4.6 mg patch daily x 1month then 9.5mg patch daily x 1 month then 13.3mg patch daily    Paralysis agitans (H), Mild dementia       * rivastigmine 9.5 MG/24HR 24 hr patch    EXELON    30 patch    4.6 mg patch daily x 1month then 9.5mg patch daily x 1 month then 13.3mg patch daily    Paralysis agitans (H), Mild dementia       senna 8.6 MG tablet    SENOKOT    120 tablet    1 tab @ 9pm as needed        * SINEMET  MG per tablet   Generic drug:  carbidopa-levodopa     540 tablet    2 tablets @ 7am, 11am/12noon, and 9pm    Paralysis agitans (H)       * carbidopa-levodopa  MG per tablet    SINEMET     540 tablet    TAKE 2 TABLETS BY MOUTH AT 8 AM, 11AM/NOON, AND 9 PM    Paralysis agitans (H)       trospium 20 MG tablet    SANCTURA    90 tablet    Not taking    Urinary frequency       TYLENOL PM EXTRA STRENGTH  MG tablet   Generic drug:  diphenhydrAMINE-acetaminophen     14 tablet    Take 1 tablet by mouth nightly as needed Reported on 4/24/2017        ZOLMitriptan 5 MG tablet    ZOMIG    6 tablet    Take 1 tablet (5 mg) by mouth at onset of headache for migraine MAY REPEAT ONCE AFTER 2 HOURS. DO NOT EXCEED 2 TABLETS IN 24 HOURS.    Intractable chronic migraine without aura and without status migrainosus       * Notice:  This list has 6 medication(s) that are the same as other medications prescribed for you. Read the directions carefully, and ask your doctor or other care provider to review them with you.

## 2018-04-30 NOTE — PROGRESS NOTES
SUBJECTIVE:   Nathaly Farias is a 63 year old female who presents to clinic today for the following health issues:    History of Present Illness     Depression & Anxiety Follow-up:     Depression/Anxiety:  Depression & Anxiety    Status since last visit::  Stable    Other associated symptoms of depression and anxiety::  None    Significant life event::  No    Current substance use::  None       Today's PHQ-9         PHQ-9 Total Score:         PHQ-9 Q9 Suicidal ideation:       Thoughts of suicide or self harm:      Self-harm Plan:        Self-harm Action:          Safety concerns for self or others:            Hyperlipidemia:     Low fat/chol diet rating::  Poor    Taking Statins::  No    Lipid Medications or Supplements::  None    Hypertension:     Outpatient blood pressures:  Are not being checked    Dietary sodium intake::  Not monitoring salt intake    Diet:  Regular (no restrictions)  Frequency of exercise:  2-3 days/week  Duration of exercise:  Less than 15 minutes  Taking medications regularly:  Yes  Medication side effects:  None  Additional concerns today:  No    -------------------------------------  Problem list and histories reviewed & adjusted, as indicated.  Additional history: as documented        Patient Active Problem List   Diagnosis     Prolapse of vaginal wall     Migraine variant     Symptomatic menopausal or female climacteric states     Obesity     Malaise and fatigue     Other and unspecified adverse effect of drug, medicinal and biological substance     Other and unspecified disc disorder of cervical region     Sleep apnea     Essential hypertension, benign     Chronic cholecystitis     Overactive bladder     Neck pain     Headache     HYPERLIPIDEMIA LDL GOAL <160     Mild major depression (H)     Advanced directives, counseling/discussion     HTN, goal below 140/90     Paralysis agitans (H)     History of MRI of brain and brain stem     Wears glasses     Constipation     Incomplete RBBB      Heart murmur     Family history of tremor     Rosacea     Asymptomatic varicose veins     Seborrheic dermatitis     Post-traumatic osteoarthritis of left knee     Pain from implanted hardware, initial encounter     Cognitive disorder evalaution 2017     Dementia     Hiatal hernia     Past Surgical History:   Procedure Laterality Date     ------------OTHER-------------      left shoulder     ARTHRODESIS TOE(S)  3/2/2011    ARTHRODESIS TOE(S) performed by CLARA LUCAS at  OR     C LIGATE FALLOPIAN TUBE  1998     CHOLECYSTECTOMY, LAPOROSCOPIC  5/12/2008    Cholecystectomy, Laparoscopic     ESOPHAGOSCOPY, GASTROSCOPY, DUODENOSCOPY (EGD), COMBINED N/A 7/5/2017    Procedure: COMBINED ESOPHAGOSCOPY, GASTROSCOPY, DUODENOSCOPY (EGD), BIOPSY SINGLE OR MULTIPLE;  Esophagogastroduodenoscopy with Multiple Biopsies by Biopsy;  Surgeon: Tj Denney MD;  Location: [facility]     HC COLONOSCOPY W/WO BRUSH/WASH  08/30/06    normal     HC OPEN TX TIBIAL SHAFT FX W PLATE/SCREWS W/WO CERCLAGE  2000    Multiple operations on left leg     HC SHLDR ARTHROSCOP,PART ACROMIOPLAS  11/16/06    Right shoulder     HC SHLDR ARTHROSCOP,SURG,DIS CLAVICULECTOMY  11/16/06    Right shoulder       Social History   Substance Use Topics     Smoking status: Never Smoker     Smokeless tobacco: Never Used      Comment: no smokers in the household     Alcohol use 0.0 oz/week     0 Standard drinks or equivalent per week      Comment: very occ     Family History   Problem Relation Age of Onset     DIABETES Father      type II, diagnosed in late 60's     Hypertension Father      CANCER Father      rare kidney cancer had kidney removed, 75 y.o. at onset     CEREBROVASCULAR DISEASE Father      Rashes/Skin Problems Father      rosacea     Neurologic Disorder Mother      head tremor     HEART DISEASE Brother      Valvular disease corrected with surgery     HEART DISEASE Paternal Grandfather      MI     Migraines Son      Migraines Sister      Rashes/Skin  "Problems Sister      rosacea           ROS:  Constitutional, HEENT, cardiovascular, pulmonary, GI, , musculoskeletal, neuro, skin, endocrine and psych systems are negative, except as in HPI or otherwise noted      OBJECTIVE:                                                    /70 (BP Location: Left arm)  Pulse 66  Temp 97.4  F (36.3  C) (Temporal)  Ht 5' 3\" (1.6 m)  Wt 145 lb (65.8 kg)  LMP 06/05/2006  SpO2 100%  Breastfeeding? No  BMI 25.69 kg/m2  Body mass index is 25.69 kg/(m^2).   GENERAL: healthy, alert, well nourished, well hydrated, no distress  RESP: lungs clear to auscultation - no rales, no rhonchi, no wheezes  CV: regular rates and rhythm, normal S1 S2, no S3 or S4 and no murmur, no click or rub -  ABDOMEN: mild discomfort in the lower ab with fullness, worst in the LLQ, soft, no  hepatosplenomegaly, no masses, normal bowel sounds  MS: extremities- no gross deformities noted, no edema         ASSESSMENT/PLAN:                                                        ICD-10-CM    1. Essential hypertension, benign I10    2. HTN, goal below 140/90 I10 lisinopril (PRINIVIL/ZESTRIL) 10 MG tablet   3. Major depressive disorder, recurrent episode, mild (H) F33.0 citalopram (CELEXA) 20 MG tablet   4. Post-traumatic osteoarthritis of left knee M17.32 meloxicam (MOBIC) 15 MG tablet   5. Cystocele, midline N81.11 [facility]   6. Other constipation K59.09        Patient needs to be working to maintain her bowels as she has been having more issues with cystocele and constipation recently. Discussed need for regular use of miralax until easy to pass stools (was missing at times). Can use the senna to catch up if behind. The discomfort does not appear related to the mobic, so reassured her and refilled. BP meds are also refilled as doing well after initial rest.   Lastly set up PT for patient for her cystocele as well.  Mood was a little more down this winter, feels ok now. Will " maintain meds and plan f/u 6 months.    Maxine Monae MD, MD  [facility]

## 2018-04-30 NOTE — NURSING NOTE
"Chief Complaint   Patient presents with     RECHECK     f/u left knee pain lov 11/24/17 inj given        Initial /83  Resp 14  Wt 65.3 kg (144 lb)  LMP 06/05/2006  BMI 27.21 kg/m2 Estimated body mass index is 27.21 kg/(m^2) as calculated from the following:    Height as of 11/24/17: 1.549 m (5' 1\").    Weight as of this encounter: 65.3 kg (144 lb).  Medication Reconciliation: complete    BP completed using cuff size: manoj Russell MA      "

## 2018-04-30 NOTE — LETTER
4/30/2018         RE: Nathaly Farias  [address] [facility] [id number]        Dear Colleague,    Thank you for referring your patient, Nathaly Farias, to the [facility]. Please see a copy of my visit note below.    Office Visit-Follow up      Chief Complaint: Nathaly Farias is a 63 year old female who is being seen for   Chief Complaint   Patient presents with     RECHECK     f/u left knee pain lov 11/24/17 inj given          History of Present Illness:   Last injection helped, she would like another today      Past medical history reviewed and there is no significant change    Patient does not use Tobacco products.    REVIEW OF SYSTEMS  General: negative for, night sweats, dizziness, fatigue  Resp: No shortness of breath and no cough  CV: negative for chest pain, syncope or near-syncope  GI: negative for nausea, vomiting and diarrhea  : negative for dysuria and hematuria  Musculoskeletal: as above  Neurologic: negative for syncope   Hematologic: negative for bleeding disorder      Physical Exam:  Vitals: /83  Resp 14  Wt 65.3 kg (144 lb)  LMP 06/05/2006  BMI 27.21 kg/m2  BMI= Body mass index is 27.21 kg/(m^2).  Constitutional: healthy, alert and no acute distress   Psychiatric: mentation appears normal and affect normal/bright  NEURO: no focal deficits  JOINT/EXTREMITIES:left Knee Exam: Inspection: No effusion, No quad atrophy, prominent hardware and osteophytes  Tender: lateral joint line, medial joint line  Active Range of Motion: pain with flexion, otherwise impressive ROM      Diagnostic Modalities:  None today.      Impression: left knee posttraumatic degenerative joint disease    Plan:  All of the above pertinent physical exam and imaging modalities findings was reviewed with Nathaly.                                          INJECTION PROCEDURE:  The patient was counseled about an  injection, including discussion of risks (including infection), contents of the  injection, rationale for performing the injection, and expected benefits of the injection. The skin was prepped with alcohol and betadine and then utilizing sterile technique an injection of the left knee joint from the superior lateral approach in the supine position was performed. The injection consisted 1ml of Kenalog (40mg per 1ml) with 8ml 1% lidocaine plain. The patient tolerated the injection well, and there were no complications. The injection site was covered with a Band-Aid. The injection was performed by Christine Crowley M.D.    BP Readings from Last 1 Encounters:   04/30/18 136/83       BP noted to be well controlled today in office.     Return to clinic PRN, or sooner as needed for changes.  Re-x-ray on return: No    Christine Crowley M.D.          Again, thank you for allowing me to participate in the care of your patient.        Sincerely,        Christine Crowley MD

## 2018-04-30 NOTE — PROGRESS NOTES
Office Visit-Follow up      Chief Complaint: Nathaly Farias is a 63 year old female who is being seen for   Chief Complaint   Patient presents with     RECHECK     f/u left knee pain lov 11/24/17 inj given          History of Present Illness:   Last injection helped, she would like another today      Past medical history reviewed and there is no significant change    Patient does not use Tobacco products.    REVIEW OF SYSTEMS  General: negative for, night sweats, dizziness, fatigue  Resp: No shortness of breath and no cough  CV: negative for chest pain, syncope or near-syncope  GI: negative for nausea, vomiting and diarrhea  : negative for dysuria and hematuria  Musculoskeletal: as above  Neurologic: negative for syncope   Hematologic: negative for bleeding disorder      Physical Exam:  Vitals: /83  Resp 14  Wt 65.3 kg (144 lb)  LMP 06/05/2006  BMI 27.21 kg/m2  BMI= Body mass index is 27.21 kg/(m^2).  Constitutional: healthy, alert and no acute distress   Psychiatric: mentation appears normal and affect normal/bright  NEURO: no focal deficits  JOINT/EXTREMITIES:left Knee Exam: Inspection: No effusion, No quad atrophy, prominent hardware and osteophytes  Tender: lateral joint line, medial joint line  Active Range of Motion: pain with flexion, otherwise impressive ROM      Diagnostic Modalities:  None today.      Impression: left knee posttraumatic degenerative joint disease    Plan:  All of the above pertinent physical exam and imaging modalities findings was reviewed with Ntahaly.                                          INJECTION PROCEDURE:  The patient was counseled about an  injection, including discussion of risks (including infection), contents of the injection, rationale for performing the injection, and expected benefits of the injection. The skin was prepped with alcohol and betadine and then utilizing sterile technique an injection of the left knee joint from the superior lateral approach in the  supine position was performed. The injection consisted 1ml of Kenalog (40mg per 1ml) with 8ml 1% lidocaine plain. The patient tolerated the injection well, and there were no complications. The injection site was covered with a Band-Aid. The injection was performed by Christine Crowley M.D.    BP Readings from Last 1 Encounters:   04/30/18 136/83       BP noted to be well controlled today in office.     Return to clinic PRN, or sooner as needed for changes.  Re-x-ray on return: No    Christine Crowley M.D.

## 2018-05-03 ENCOUNTER — OFFICE VISIT (OUTPATIENT)
Dept: FAMILY MEDICINE | Facility: OTHER | Age: 63
End: 2018-05-03
Payer: COMMERCIAL

## 2018-05-03 VITALS
BODY MASS INDEX: 25.69 KG/M2 | WEIGHT: 145 LBS | DIASTOLIC BLOOD PRESSURE: 70 MMHG | SYSTOLIC BLOOD PRESSURE: 132 MMHG | TEMPERATURE: 97.4 F | HEIGHT: 63 IN | OXYGEN SATURATION: 100 % | HEART RATE: 66 BPM

## 2018-05-03 DIAGNOSIS — F33.0 MAJOR DEPRESSIVE DISORDER, RECURRENT EPISODE, MILD (H): ICD-10-CM

## 2018-05-03 DIAGNOSIS — K59.09 OTHER CONSTIPATION: ICD-10-CM

## 2018-05-03 DIAGNOSIS — M17.32 POST-TRAUMATIC OSTEOARTHRITIS OF LEFT KNEE: ICD-10-CM

## 2018-05-03 DIAGNOSIS — I10 HTN, GOAL BELOW 140/90: ICD-10-CM

## 2018-05-03 DIAGNOSIS — I10 ESSENTIAL HYPERTENSION, BENIGN: Primary | ICD-10-CM

## 2018-05-03 DIAGNOSIS — N81.11 CYSTOCELE, MIDLINE: ICD-10-CM

## 2018-05-03 PROCEDURE — 99214 OFFICE O/P EST MOD 30 MIN: CPT | Performed by: FAMILY MEDICINE

## 2018-05-03 RX ORDER — MELOXICAM 15 MG/1
15 TABLET ORAL DAILY
Qty: 180 TABLET | Refills: 3 | Status: SHIPPED | OUTPATIENT
Start: 2018-05-03 | End: 2018-11-05

## 2018-05-03 RX ORDER — LISINOPRIL 10 MG/1
TABLET ORAL
Qty: 90 TABLET | Refills: 3 | Status: SHIPPED | OUTPATIENT
Start: 2018-05-03 | End: 2018-05-14

## 2018-05-03 RX ORDER — CITALOPRAM HYDROBROMIDE 20 MG/1
20 TABLET ORAL DAILY
Qty: 90 TABLET | Refills: 1 | Status: SHIPPED | OUTPATIENT
Start: 2018-05-03 | End: 2018-05-09

## 2018-05-03 RX ORDER — OFLOXACIN 3 MG/ML
SOLUTION/ DROPS OPHTHALMIC
COMMUNITY
Start: 2018-04-12 | End: 2018-05-03

## 2018-05-03 ASSESSMENT — ANXIETY QUESTIONNAIRES
4. TROUBLE RELAXING: NOT AT ALL
7. FEELING AFRAID AS IF SOMETHING AWFUL MIGHT HAPPEN: NOT AT ALL
5. BEING SO RESTLESS THAT IT IS HARD TO SIT STILL: NOT AT ALL
6. BECOMING EASILY ANNOYED OR IRRITABLE: NOT AT ALL
2. NOT BEING ABLE TO STOP OR CONTROL WORRYING: NOT AT ALL
IF YOU CHECKED OFF ANY PROBLEMS ON THIS QUESTIONNAIRE, HOW DIFFICULT HAVE THESE PROBLEMS MADE IT FOR YOU TO DO YOUR WORK, TAKE CARE OF THINGS AT HOME, OR GET ALONG WITH OTHER PEOPLE: NOT DIFFICULT AT ALL
3. WORRYING TOO MUCH ABOUT DIFFERENT THINGS: NOT AT ALL
1. FEELING NERVOUS, ANXIOUS, OR ON EDGE: NOT AT ALL
GAD7 TOTAL SCORE: 0

## 2018-05-03 NOTE — LETTER
My Depression Action Plan  Name: Nathaly Farias   Date of Birth 1955  Date: 4/30/2018    My doctor: Maxine Monae   My clinic: [address]  [facility] [address] [phone]          GREEN    ZONE   Good Control    What it looks like:     Things are going generally well. You have normal up s and down s. You may even feel depressed from time to time, but bad moods usually last less than a day.   What you need to do:  1. Continue to care for yourself (see self care plan)  2. Check your depression survival kit and update it as needed  3. Follow your physician s recommendations including any medication.  4. Do not stop taking medication unless you consult with your physician first.           YELLOW         ZONE Getting Worse    What it looks like:     Depression is starting to interfere with your life.     It may be hard to get out of bed; you may be starting to isolate yourself from others.    Symptoms of depression are starting to last most all day and this has happened for several days.     You may have suicidal thoughts but they are not constant.   What you need to do:     1. Call your care team, your response to treatment will improve if you keep your care team informed of your progress. Yellow periods are signs an adjustment may need to be made.     2. Continue your self-care, even if you have to fake it!    3. Talk to someone in your support network    4. Open up your depression survival kit           RED    ZONE Medical Alert - Get Help    What it looks like:     Depression is seriously interfering with your life.     You may experience these or other symptoms: You can t get out of bed most days, can t work or engage in other necessary activities, you have trouble taking care of basic hygiene, or basic responsibilities, thoughts of suicide or death that will not go away, self-injurious behavior.     What you need to do:  1. Call your care team and  request a same-day appointment. If they are not available (weekends or after hours) call your local crisis line, emergency room or 911.            Depression Self Care Plan / Survival Kit    Self-Care for Depression  Here s the deal. Your body and mind are really not as separate as most people think.  What you do and think affects how you feel and how you feel influences what you do and think. This means if you do things that people who feel good do, it will help you feel better.  Sometimes this is all it takes.  There is also a place for medication and therapy depending on how severe your depression is, so be sure to consult with your medical provider and/ or Behavioral Health Consultant if your symptoms are worsening or not improving.     In order to better manage my stress, I will:    Exercise  Get some form of exercise, every day. This will help reduce pain and release endorphins, the  feel good  chemicals in your brain. This is almost as good as taking antidepressants!  This is not the same as joining a gym and then never going! (they count on that by the way ) It can be as simple as just going for a walk or doing some gardening, anything that will get you moving.      Hygiene   Maintain good hygiene (Get out of bed in the morning, Make your bed, Brush your teeth, Take a shower, and Get dressed like you were going to work, even if you are unemployed).  If your clothes don't fit try to get ones that do.    Diet  I will strive to eat foods that are good for me, drink plenty of water, and avoid excessive sugar, caffeine, alcohol, and other mood-altering substances.  Some foods that are helpful in depression are: complex carbohydrates, B vitamins, flaxseed, fish or fish oil, fresh fruits and vegetables.    Psychotherapy  I agree to participate in Individual Therapy (if recommended).    Medication  If prescribed medications, I agree to take them.  Missing doses can result in serious side effects.  I understand that  drinking alcohol, or other illicit drug use, may cause potential side effects.  I will not stop my medication abruptly without first discussing it with my provider.    Staying Connected With Others  I will stay in touch with my friends, family members, and my primary care provider/team.    Use your imagination  Be creative.  We all have a creative side; it doesn t matter if it s oil painting, sand castles, or mud pies! This will also kick up the endorphins.    Witness Beauty  (AKA stop and smell the roses) Take a look outside, even in mid-winter. Notice colors, textures. Watch the squirrels and birds.     Service to others  Be of service to others.  There is always someone else in need.  By helping others we can  get out of ourselves  and remember the really important things.  This also provides opportunities for practicing all the other parts of the program.    Humor  Laugh and be silly!  Adjust your TV habits for less news and crime-drama and more comedy.    Control your stress  Try breathing deep, massage therapy, biofeedback, and meditation. Find time to relax each day.     My support system    Clinic Contact:  Phone number:    Contact 1:  Phone number:    Contact 2:  Phone number:    Rastafarian/:  Phone number:    Therapist:  Phone number:    Local crisis center:    Phone number:    Other community support:  Phone number:

## 2018-05-03 NOTE — MR AVS SNAPSHOT
After Visit Summary   5/3/2018    Nathaly Farias    MRN: [id number]           Patient Information     Date Of Birth          1955        Visit Information        Provider Department      5/3/2018 9:15 AM Maxine Monae MD [facility]'s Diagnoses     Essential hypertension, benign    -  1    HTN, goal below 140/90        Major depressive disorder, recurrent episode, mild (H)        Post-traumatic osteoarthritis of left knee        Cystocele, midline           Follow-ups after your visit        Additional Services     [facility], HAND, AND CHIROPRACTIC REFERRAL       **This order will print in the [facility] Scheduling Office**    Physical Therapy, Hand Therapy and Chiropractic Care are available through:    *[facility]  *[facility]  *[facility]    Call one number to schedule at any of the above locations: [phone].    Your provider has referred you to: Physical Therapy at [facility] or [facility]    Indication/Reason for Referral: Women's Health (Please Complete Special Programs SmartList)  Onset of Illness: years  Therapy Orders: Evaluate and Treat  Special Programs: None and Women's Health: Pelvic Floor Weakness: Cystocele: also has parkinson's and    Special Request: None    Shankar Chaparro      Additional Comments for the Therapist or Chiropractor:     Please be aware that coverage of these services is subject to the terms and limitations of your health insurance plan.  Call member services at your health plan with any benefit or coverage questions.      Please bring the following to your appointment:    *Your personal calendar for scheduling future appointments  *Comfortable clothing                  Follow-up notes from your care team     Return in about 6 months (around 11/3/2018).      Your next 10 appointments already scheduled     May 14, 2018  9:00 AM CDT   Return Visit with Jet Porter MD   [facility]  "[facility])    [address] [address] [place] [address] [phone]              Who to contact     If you have questions or need follow up information about today's clinic visit or your schedule please contact [facility] directly at [phone].  Normal or non-critical lab and imaging results will be communicated to you by MyChart, letter or phone within 4 business days after the clinic has received the results. If you do not hear from us within 7 days, please contact the clinic through MyChart or phone. If you have a critical or abnormal lab result, we will notify you by phone as soon as possible.  Submit refill requests through [employer] or call your pharmacy and they will forward the refill request to us. Please allow 3 business days for your refill to be completed.          Additional Information About Your Visit        MyChart Information     [employer] gives you secure access to your electronic health record. If you see a primary care provider, you can also send messages to your care team and make appointments. If you have questions, please call your primary care clinic.  If you do not have a primary care provider, please call [phone] and they will assist you.        Care EveryWhere ID     This is your Care EveryWhere ID. This could be used by other organizations to access your [place] medical records  [id number]        Your Vitals Were     Pulse Temperature Height Last Period Pulse Oximetry Breastfeeding?    66 97.4  F (36.3  C) (Temporal) 5' 3\" (1.6 m) 06/05/2006 100% No    BMI (Body Mass Index)                   25.69 kg/m2            Blood Pressure from Last 3 Encounters:   05/03/18 132/70   04/30/18 136/83   02/26/18 122/77    Weight from Last 3 Encounters:   05/03/18 145 lb (65.8 kg)   04/30/18 144 lb (65.3 kg)   02/26/18 138 lb 3.2 oz (62.7 kg)              We Performed the Following     [facility], [facility]RACTIC REFERRAL          Today's " Medication Changes          These changes are accurate as of 5/3/18  9:55 AM.  If you have any questions, ask your nurse or doctor.               These medicines have changed or have updated prescriptions.        Dose/Directions    citalopram 20 MG tablet   Commonly known as:  celeXA   This may have changed:  See the new instructions.   Used for:  Major depressive disorder, recurrent episode, mild (H)   Changed by:  Maxine Monae MD        Dose:  20 mg   Take 1 tablet (20 mg) by mouth daily   Quantity:  90 tablet   Refills:  1       lisinopril 10 MG tablet   Commonly known as:  PRINIVIL/ZESTRIL   This may have changed:  additional instructions   Used for:  HTN, goal below 140/90   Changed by:  Maxine Monae MD        1 tab by mouth at 11am   Quantity:  90 tablet   Refills:  3       rivastigmine 9.5 MG/24HR 24 hr patch   Commonly known as:  EXELON   This may have changed:  Another medication with the same name was removed. Continue taking this medication, and follow the directions you see here.   Used for:  Paralysis agitans (H), Mild dementia   Changed by:  Maxine Monae MD        4.6 mg patch daily x 1month then 9.5mg patch daily x 1 month then 13.3mg patch daily   Quantity:  30 patch   Refills:  11         Stop taking these medicines if you haven't already. Please contact your care team if you have questions.     LORazepam 0.5 MG tablet   Commonly known as:  ATIVAN   Stopped by:  Maxine Monae MD           ofloxacin 0.3 % ophthalmic solution   Commonly known as:  OCUFLOX   Stopped by:  Maxine Monae MD           trospium 20 MG tablet   Commonly known as:  SANCTURA   Stopped by:  Maxine Monae MD                Where to get your medicines      These medications were sent to [facility] [address] [place] [address] - [address]  [address] [facility] [address]     Phone:  [phone]     citalopram 20 MG tablet    lisinopril 10 MG tablet    meloxicam 15 MG tablet                Primary  Care Provider Office Phone # Fax #    Maxine Monae [phone]       [address] [place] [id number]        Equal Access to Services     ARACELY DAVENPORT : Hadii aad ku hadsheldonandrea Rodgers, waaxda luqadaha, qaybta kaalmada joseph, xi alexandrain hayaajia josesanthosh melindawojciech beckie serrano. So [facility] [phone].    ATENCIÓN: Si habla español, tiene a shoemaker disposición servicios gratuitos de asistencia lingüística. Llame al [phone].    We comply with applicable federal civil rights laws and [place] laws. We do not discriminate on the basis of race, color, national origin, age, disability, sex, sexual orientation, or gender identity.            Thank you!     Thank you for choosing [facility]  for your care. Our goal is always to provide you with excellent care. Hearing back from our patients is one way we can continue to improve our services. Please take a few minutes to complete the written survey that you may receive in the mail after your visit with us. Thank you!             Your Updated Medication List - Protect others around you: Learn how to safely use, store and throw away your medicines at www.disposemymeds.org.          This list is accurate as of 5/3/18  9:55 AM.  Always use your most recent med list.                   Brand Name Dispense Instructions for use Diagnosis    citalopram 20 MG tablet    celeXA    90 tablet    Take 1 tablet (20 mg) by mouth daily    Major depressive disorder, recurrent episode, mild (H)       lisinopril 10 MG tablet    PRINIVIL/ZESTRIL    90 tablet    1 tab by mouth at 11am    HTN, goal below 140/90       melatonin 5 MG tablet      Not sure of dose and taking 1 tab by mouth @ 9pm        meloxicam 15 MG tablet    MOBIC    180 tablet    Take 1 tablet (15 mg) by mouth daily    Post-traumatic osteoarthritis of left knee       MIRALAX powder   Generic drug:  polyethylene glycol     510 g    Once daily as needed in morning (1 capful per 8 oz water)    Other  constipation       rivastigmine 9.5 MG/24HR 24 hr patch    EXELON    30 patch    4.6 mg patch daily x 1month then 9.5mg patch daily x 1 month then 13.3mg patch daily    Paralysis agitans (H), Mild dementia       senna 8.6 MG tablet    SENOKOT    120 tablet    1 tab @ 9pm as needed        * SINEMET  MG per tablet   Generic drug:  carbidopa-levodopa     540 tablet    2 tablets @ 7am, 11am/12noon, and 9pm    Paralysis agitans (H)       * carbidopa-levodopa  MG per tablet    SINEMET    540 tablet    TAKE 2 TABLETS BY MOUTH AT 8 AM, 11AM/NOON, AND 9 PM    Paralysis agitans (H)       TYLENOL PM EXTRA STRENGTH  MG tablet   Generic drug:  diphenhydrAMINE-acetaminophen     14 tablet    Take 1 tablet by mouth nightly as needed Reported on 4/24/2017        ZOLMitriptan 5 MG tablet    ZOMIG    6 tablet    Take 1 tablet (5 mg) by mouth at onset of headache for migraine MAY REPEAT ONCE AFTER 2 HOURS. DO NOT EXCEED 2 TABLETS IN 24 HOURS.    Intractable chronic migraine without aura and without status migrainosus       * Notice:  This list has 2 medication(s) that are the same as other medications prescribed for you. Read the directions carefully, and ask your doctor or other care provider to review them with you.

## 2018-05-04 ASSESSMENT — PATIENT HEALTH QUESTIONNAIRE - PHQ9: SUM OF ALL RESPONSES TO PHQ QUESTIONS 1-9: 4

## 2018-05-04 ASSESSMENT — ANXIETY QUESTIONNAIRES: GAD7 TOTAL SCORE: 0

## 2018-05-09 RX ORDER — CITALOPRAM HYDROBROMIDE 20 MG/1
TABLET ORAL
Qty: 90 TABLET | Refills: 3 | COMMUNITY
Start: 2018-05-09 | End: 2018-05-14

## 2018-05-09 NOTE — PROGRESS NOTES
Diagnosis/Summary/Recommendations:    PATIENT: Nathaly Farias  63 year old female     : 1955    MOISE: May 14, 2018    Parkinson  Cognitive impairment    Had grand kids  Son and his wife and kids visited  And went to daughters yesterday    Takes a nap for 2 hours - from 1pm till around 3pm  She may have some impact on her sleep at night  She gets tired walking in the grocery store  She feels better pushing a cart  She has not had falls  She has dyskinesias today in the office.   She has noticed more dyskinesias but not clear if there is a time that it is worth  She has an alarm on her phone to remind her to take her pills  She has her feet turn in.  She was unable to get to [facility]  She walks in the yard and to get the mail.  She has not had falls  She carries her phone with her  Rosa helps her a lot.     She has not had any hallucinations since she went off a medication in the past.      Medications     7am 12noon 9pm       Acetaminophen 325mg tylenol As needed           Carbidopa/levodopa Sinemet 25/100 2 2 2       Citalopram celexa 20mg   1         Diphenhydramine acetaminophen tylenol PM     As needed for sleep       Lisinopril prinivil/zestril 10mg   1         Lorazepam ativan 0.5mg As needed, rarely using           Melatonin 5mg or 10mg     Dose unknown. Taking as needed       Meloxicam mobic 15mg Not sure if taking this.        Polyethylene glycol miralax As needed    as needed       Quetiapine seroquel 25mg Not taking           Rivastigmine exelon patch mg Not taking           Senna senokot 8.6mg     As needed        Trospium sanctura 20mg Not taking           Zolmitriptan zomig 5mg As needed                                         PLAN  No change in medication  She will need to review her medication schedule with family to make sure we have an accurate list of what she is taking.     History obtained from patient      Coding statement:   Duration of  Services: patient care and care coordination  was 25 minutes  Greater than 50% of this visit was spent in counseling and coordination of care.     Jet Porter MD     ______________________________________    Last visit date and details:      PATIENT: Nathaly Farias  62 year old female      : 1955     MOISE: 2018     Parkinson  Dementia     Wondering about hallucinations  Taking sinemet at 9pm and has tremors that may affect her sleep  She goes to bed at 9pm  Her tremors are increased with anxiety/excited  If she is worked up her shaking is worse  If she does not take a nap during the day she has a mental fog.            Medications     7am 11am/12noon 9pm       Acetaminophen 325mg             Carbidopa/levodopa Sinemet 25/100 2 2 2       Citalopram celexa 20mg   1         Diphenhydramine acetaminophen tylenol PM     As needed       Lisinopril prinivil/zestril 10mg   1         Lorazepam ativan 0.5mg As needed, rarely using           Melatonin 5mg or 10mg     dose       Polyethylene glycol miralax As needed           Quetiapine seroquel 25mg Not taking           Rivastigmine exelon patch mg Has not tried           Senna senokot 8.6mg             Trospium sanctura 20mg Not taking           Zolmitriptan zomig 5mg As needed                                          History obtained from patient        Coding statement:   Duration of  Services: patient care and care coordination was 25 minutes  Greater than 50% of this visit was spent in counseling and coordination of care.     PLAN  1. Encouraged her to take a nap after her mid day pill so that she is clearer mentally and functions better.  2. She was encouraged to do therapy at [facility]. - she is dealing with some motivational issues as well as fatigue/lack of energy  3. Presently there is not clear wearing off from her medication but spent time discussing this.  4. Return back in 6 months.   5. She will remain off seroquel and/or rivastigmine         Jet Porter MD               ______________________________________      Patient was asked about 14 Review of systems including changes in vision (dry eyes, double vision), hearing, heart, lungs, musculoskeletal, depression, anxiety, snoring, RBD, insomnia, urinary frequency, urinary urgency, constipation, swallowing problems, hematological, ID, allergies, skin problems: seborrhea, endocrinological: thyroid, diabetes, cholesterol; balance, weight changes, and other neurological problems and these were not significant at this time except for   Patient Active Problem List   Diagnosis     Prolapse of vaginal wall     Migraine variant     Symptomatic menopausal or female climacteric states     Obesity     Malaise and fatigue     Other and unspecified adverse effect of drug, medicinal and biological substance     Other and unspecified disc disorder of cervical region     Sleep apnea     Essential hypertension, benign     Chronic cholecystitis     Overactive bladder     Neck pain     Headache     HYPERLIPIDEMIA LDL GOAL <160     Mild major depression (H)     Advanced directives, counseling/discussion     HTN, goal below 140/90     Paralysis agitans (H)     History of MRI of brain and brain stem     Wears glasses     Constipation     Incomplete RBBB     Heart murmur     Family history of tremor     Rosacea     Asymptomatic varicose veins     Seborrheic dermatitis     Post-traumatic osteoarthritis of left knee     Pain from implanted hardware, initial encounter     Cognitive disorder evalaution 2017     Dementia     Hiatal hernia          Allergies   Allergen Reactions     Sulfa Drugs      Tacchycardia, had to go to ED     Naproxen GI Disturbance     Oxycontin [Oxycodone] GI Disturbance     Made her feel funny/upset stomach     Rivastigmine      Patch was too expensive     Ropinirole      Did not work     Past Surgical History:   Procedure Laterality Date     ------------OTHER-------------      left shoulder     ARTHRODESIS TOE(S)   3/2/2011    ARTHRODESIS TOE(S) performed by CLARA LUCAS at  OR     C LIGATE FALLOPIAN TUBE  1998     CHOLECYSTECTOMY, LAPOROSCOPIC  5/12/2008    Cholecystectomy, Laparoscopic     ESOPHAGOSCOPY, GASTROSCOPY, DUODENOSCOPY (EGD), COMBINED N/A 7/5/2017    Procedure: COMBINED ESOPHAGOSCOPY, GASTROSCOPY, DUODENOSCOPY (EGD), BIOPSY SINGLE OR MULTIPLE;  Esophagogastroduodenoscopy with Multiple Biopsies by Biopsy;  Surgeon: Tj Denney MD;  Location: [facility]     HC COLONOSCOPY W/WO BRUSH/WASH  08/30/06    normal     HC OPEN TX TIBIAL SHAFT FX W PLATE/SCREWS W/WO CERCLAGE  2000    Multiple operations on left leg     HC SHLDR ARTHROSCOP,PART ACROMIOPLAS  11/16/06    Right shoulder     HC SHLDR ARTHROSCOP,SURG,DIS CLAVICULECTOMY  11/16/06    Right shoulder     Past Medical History:   Diagnosis Date     Arthralgia of temporomandibular joint 10/22/1996     Arthritis      Cognitive disorder evalaution 2017 3/6/2017    She has marked attentional difficulties, impairments in memory including a rapid forgetting rate, mild anomia, and mild executive dysfunction. 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 She's required increasing assistance with finances, medications, driving, and cooking. I think this is a mild dementia, and     Constipation 2/23/2015     Dementia 3/13/2017    IMPRESSIONS AND RECOMMENDATIONS   Current results indicate marked attentional difficulties, impairments in memory, which in some cases reflected impairments in retention and in other cases reflected retrieval difficulties, and mild anomia as well as mild executive dysfunction. Premorbid intellectual functioning is estimated to fall in the borderline range. She denied experiencing significant depre     Depressive disorder      Heart murmur 2/23/2015     History of MRI of brain and brain stem 2/21/2015    MRI OF THE BRAIN WITHOUT CONTRAST 7/10/2014 1:21 PM   "COMPARISON: None. HISTORY: Left-sided upper and lower extremity tremor. Balance issues. TECHNIQUE:  Brain: Axial diffusion-weighted with ADC map, T2-weighted with fat saturation, T1-weighted and turboFLAIR and coronal T1-weighted images of the brain were obtained without intravenous contrast.  FINDINGS: There is mild diffuse cerebral volume loss     Hypertension      Incomplete RBBB 2/23/2015     Motion sickness      Other and unspecified hyperlipidemia      Parkinson's disease (H)      Seborrheic dermatitis 2/29/2016     Unspecified arthropathy, hand 08/07/1997     Unspecified sleep apnea     moderate, sleep study 11/07, on CPAP, has daytime somnolence with this     Variants of migraine, not elsewhere classified, without mention of intractable migraine without mention of status migrainosus      Wears glasses 2/23/2015     Social History     Social History     Marital status:      Spouse name: violet     Number of children: 2     Years of education: N/A     Occupational History           Social History Main Topics     Smoking status: Never Smoker     Smokeless tobacco: Never Used      Comment: no smokers in the household     Alcohol use 0.0 oz/week     0 Standard drinks or equivalent per week      Comment: very occ     Drug use: No     Sexual activity: Yes     Partners: Male     Birth control/ protection: Surgical, Female Surgical      Comment: tubal ligation     Other Topics Concern     Caffeine Concern Yes     OCC     Exercise No     \"not faithfully\"     Seat Belt Yes     Self-Exams No     Social History Narrative    ALLERGIES:  She is allergic to sulfa, and has had gastrointestinal side effects from naproxen.               FAMILY HISTORY:  Strongly positive for headaches, including in her son and sister.  There is no family history of tremor.               SOCIAL HISTORY:  She does not work outside the home.  She does not smoke.  She uses very little alcohol.  Lives in [place]. . Has 2 " kids: son who is 35 yr s old. And daughter who is 30 yrs old. Her  is working as a  .        Drug and lactation database from the [place] [employer] of Medicine:  http://toxnet.nlm.nih.gov/cgi-bin/sis/htmlgen?LACT      B/P: Data Unavailable, T: Data Unavailable, P: Data Unavailable, R: Data Unavailable 0 lbs 0 oz  Last menstrual period 06/05/2006, not currently breastfeeding., There is no height or weight on file to calculate BMI.  Medications and Vitals not listed above were documented in the cart and reviewed by me.     Current Outpatient Prescriptions   Medication Sig Dispense Refill     carbidopa-levodopa (SINEMET)  MG per tablet 2 tablets @ 7am, 11am/12noon, and 9pm 540 tablet 3     carbidopa-levodopa (SINEMET)  MG per tablet TAKE 2 TABLETS BY MOUTH AT 8 AM, 11AM/NOON, AND 9  tablet 3     citalopram (CELEXA) 20 MG tablet Take 1 tablet (20 mg) by mouth daily 90 tablet 1     diphenhydrAMINE-acetaminophen (TYLENOL PM EXTRA STRENGTH)  MG tablet Take 1 tablet by mouth nightly as needed Reported on 4/24/2017 14 tablet      lisinopril (PRINIVIL/ZESTRIL) 10 MG tablet 1 tab by mouth at 11am 90 tablet 3     melatonin 5 MG tablet Not sure of dose and taking 1 tab by mouth @ 9pm       meloxicam (MOBIC) 15 MG tablet Take 1 tablet (15 mg) by mouth daily 180 tablet 3     polyethylene glycol (MIRALAX) powder Once daily as needed in morning (1 capful per 8 oz water) 510 g 1     rivastigmine (EXELON) 9.5 MG/24HR 24 hr patch 4.6 mg patch daily x 1month then 9.5mg patch daily x 1 month then 13.3mg patch daily (Patient not taking: Reported on 5/3/2018) 30 patch 11     senna (SENOKOT) 8.6 MG tablet 1 tab @ 9pm as needed 120 tablet      ZOLMitriptan (ZOMIG) 5 MG tablet Take 1 tablet (5 mg) by mouth at onset of headache for migraine MAY REPEAT ONCE AFTER 2 HOURS. DO NOT EXCEED 2 TABLETS IN 24 HOURS. 6 tablet 10         Jet Porter MD

## 2018-05-14 ENCOUNTER — OFFICE VISIT (OUTPATIENT)
Dept: NEUROLOGY | Facility: CLINIC | Age: 63
End: 2018-05-14
Payer: COMMERCIAL

## 2018-05-14 VITALS
WEIGHT: 141.1 LBS | OXYGEN SATURATION: 100 % | HEART RATE: 67 BPM | BODY MASS INDEX: 24.99 KG/M2 | SYSTOLIC BLOOD PRESSURE: 114 MMHG | DIASTOLIC BLOOD PRESSURE: 65 MMHG

## 2018-05-14 DIAGNOSIS — I10 HTN, GOAL BELOW 140/90: ICD-10-CM

## 2018-05-14 DIAGNOSIS — G20.A1 DEMENTIA DUE TO PARKINSON'S DISEASE WITHOUT BEHAVIORAL DISTURBANCE (H): Primary | ICD-10-CM

## 2018-05-14 DIAGNOSIS — G20.A1 PARALYSIS AGITANS (H): ICD-10-CM

## 2018-05-14 DIAGNOSIS — F33.0 MAJOR DEPRESSIVE DISORDER, RECURRENT EPISODE, MILD (H): ICD-10-CM

## 2018-05-14 DIAGNOSIS — F02.80 DEMENTIA DUE TO PARKINSON'S DISEASE WITHOUT BEHAVIORAL DISTURBANCE (H): Primary | ICD-10-CM

## 2018-05-14 PROCEDURE — 99214 OFFICE O/P EST MOD 30 MIN: CPT | Performed by: PSYCHIATRY & NEUROLOGY

## 2018-05-14 RX ORDER — CITALOPRAM HYDROBROMIDE 20 MG/1
TABLET ORAL
Qty: 90 TABLET | Refills: 3 | COMMUNITY
Start: 2018-05-14 | End: 2018-11-05

## 2018-05-14 RX ORDER — LISINOPRIL 10 MG/1
TABLET ORAL
Qty: 90 TABLET | Refills: 3 | COMMUNITY
Start: 2018-05-14 | End: 2018-11-05

## 2018-05-14 RX ORDER — CARBIDOPA AND LEVODOPA 25; 100 MG/1; MG/1
TABLET ORAL
Qty: 540 TABLET | Refills: 3 | Status: SHIPPED | OUTPATIENT
Start: 2018-05-14 | End: 2018-11-05

## 2018-05-14 ASSESSMENT — PAIN SCALES - GENERAL: PAINLEVEL: NO PAIN (0)

## 2018-05-14 NOTE — MR AVS SNAPSHOT
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   She has noticed more dyskinesias but not clear if there is a time that it is worth  She has an alarm on her phone to remind her to take her pills  She has her feet turn in.  She was unable to get to [facility]  She walks in the yard and to get the mail.  She has not had falls  She carries her phone with her  Rosa helps her a lot.     She has not had any hallucinations since she went off a medication in the past.      Medications     7am 12noon 9pm       Acetaminophen 325mg tylenol As needed           Carbidopa/levodopa Sinemet 25/100 2 2 2       Citalopram celexa 20mg   1         Diphenhydramine acetaminophen tylenol PM     As needed for sleep       Lisinopril prinivil/zestril 10mg   1         Lorazepam ativan 0.5mg As needed, rarely using           Melatonin 5mg or 10mg     Dose unknown. Taking as needed       Meloxicam mobic 15mg Not sure if taking this.        Polyethylene glycol miralax As needed    as needed       Quetiapine seroquel 25mg Not  taking           Rivastigmine exelon patch mg Not taking           Senna senokot 8.6mg     As needed        Trospium sanctura 20mg Not taking           Zolmitriptan zomig 5mg As needed                                         PLAN  No change in medication  She will need to review her medication schedule with family to make sure we have an accurate list of what she is taking.     History obtained from patient          Follow-ups after your visit        Follow-up notes from your care team     Return in about 6 months (around 11/14/2018).      Your next 10 appointments already scheduled     Nov 05, 2018  9:30 AM CST   [facility] Porter [facility]    [address] [place] [id number] [phone]              Who to contact     If you have questions or need follow up information about today's clinic visit or your schedule please contact [facility] directly at [phone].  Normal or non-critical lab and imaging results will be communicated to you by [employer], letter or phone within 4 business days after the clinic has received the results. If you do not hear from us within 7 days, please contact the clinic through [employer] or phone. If you have a critical or abnormal lab result, we will notify you by phone as soon as possible.  Submit refill requests through [employer] or call your pharmacy and they will forward the refill request to us. Please allow 3 business days for your refill to be completed.          Additional Information About Your Visit        [employer] Information     [employer] gives you secure access to your electronic health record. If you see a primary care provider, you can also send messages to your care team and make appointments. If you have questions, please call your primary care clinic.  If you do not have a primary care provider, please call [phone] and they will assist you.      [employer] is an  electronic gateway that provides easy, online access to your medical records. With [employer], you can request a clinic appointment, read your test results, renew a prescription or communicate with your care team.     To access your existing account, please contact your [facility] or call [phone] for assistance.        Care EveryWhere ID     This is your Care EveryWhere ID. This could be used by other organizations to access your [place] medical records  [id number]        Your Vitals Were     Pulse Last Period Pulse Oximetry BMI (Body Mass Index)          67 06/05/2006 100% 24.99 kg/m2         Blood Pressure from Last 3 Encounters:   05/14/18 114/65   05/03/18 132/70   04/30/18 136/83    Weight from Last 3 Encounters:   05/14/18 64 kg (141 lb 1.6 oz)   05/03/18 65.8 kg (145 lb)   04/30/18 65.3 kg (144 lb)              Today, you had the following     No orders found for display         Today's Medication Changes          These changes are accurate as of 5/14/18  9:34 AM.  If you have any questions, ask your nurse or doctor.               Start taking these medicines.        Dose/Directions    citalopram 20 MG tablet   Commonly known as:  celeXA   Used for:  Major depressive disorder, recurrent episode, mild (H)   Started by:  Jet Porter MD        20mg tab by mouth @12noon   Quantity:  90 tablet   Refills:  3         These medicines have changed or have updated prescriptions.        Dose/Directions    carbidopa-levodopa  MG per tablet   Commonly known as:  SINEMET   This may have changed:  See the new instructions.   Used for:  Paralysis agitans (H)   Changed by:  Jet Porter MD        2 tabs by mouth @ 7am, noon, and 9pm   Quantity:  540 tablet   Refills:  3       lisinopril 10 MG tablet   Commonly known as:  PRINIVIL/ZESTRIL   This may have changed:  additional instructions   Used for:  HTN, goal below 140/90   Changed by:  Jet Porter MD        1  tab by mouth at 12 noon   Quantity:  90 tablet   Refills:  3       TYLENOL PM EXTRA STRENGTH  MG tablet   This may have changed:    - how much to take  - how to take this  - when to take this  - reasons to take this  - additional instructions   Generic drug:  diphenhydrAMINE-acetaminophen   Changed by:  Jet Porter MD        1 tab by mouth @ 9/930pm as needed   Quantity:  14 tablet   Refills:  0         Stop taking these medicines if you haven't already. Please contact your care team if you have questions.     rivastigmine 9.5 MG/24HR 24 hr patch   Commonly known as:  EXELON   Stopped by:  Jet Porter MD                Where to get your medicines      These medications were sent to [facility] [address] [place] [address] [address]  [address] [facility] [address]     Phone:  [phone]     carbidopa-levodopa  MG per tablet                Primary Care Provider Office Phone # Fax #    Maxine Monae [phone]       [address] [place] [address]        Equal Access to Services     [facility]: Hadii aad ku hadasho Soomaali, waaxda luqadaha, qaybta kaalmada adeegyakarl, xi butts . So [facility] [phone].    ATENCIÓN: Si habla español, tiene a shoemaker disposición servicios gratuitos de asistencia lingüística. [facility] [phone].    We comply with applicable federal civil rights laws and [place] laws. We do not discriminate on the basis of race, color, national origin, age, disability, sex, sexual orientation, or gender identity.            Thank you!     Thank you for choosing [facility]  for your care. Our goal is always to provide you with excellent care. Hearing back from our patients is one way we can continue to improve our services. Please take a few minutes to complete the written survey that you may receive in the mail after your visit with us. Thank you!             Your Updated Medication List -  Protect others around you: Learn how to safely use, store and throw away your medicines at www.disposemymeds.org.          This list is accurate as of 5/14/18  9:34 AM.  Always use your most recent med list.                   Brand Name Dispense Instructions for use Diagnosis    carbidopa-levodopa  MG per tablet    SINEMET    540 tablet    2 tabs by mouth @ 7am, noon, and 9pm    Paralysis agitans (H)       citalopram 20 MG tablet    celeXA    90 tablet    20mg tab by mouth @12noon    Major depressive disorder, recurrent episode, mild (H)       lisinopril 10 MG tablet    PRINIVIL/ZESTRIL    90 tablet    1 tab by mouth at 12 noon    HTN, goal below 140/90       melatonin 5 MG tablet      Not sure of dose and taking 1 tab by mouth @ 9pm        meloxicam 15 MG tablet    MOBIC    180 tablet    Take 1 tablet (15 mg) by mouth daily    Post-traumatic osteoarthritis of left knee       MIRALAX powder   Generic drug:  polyethylene glycol     510 g    Once daily as needed in morning (1 capful per 8 oz water)    Other constipation       senna 8.6 MG tablet    SENOKOT    120 tablet    1 tab @ 9pm as needed        TYLENOL PM EXTRA STRENGTH  MG tablet   Generic drug:  diphenhydrAMINE-acetaminophen     14 tablet    1 tab by mouth @ 9/930pm as needed        ZOLMitriptan 5 MG tablet    ZOMIG    6 tablet    Take 1 tablet (5 mg) by mouth at onset of headache for migraine MAY REPEAT ONCE AFTER 2 HOURS. DO NOT EXCEED 2 TABLETS IN 24 HOURS.    Intractable chronic migraine without aura and without status migrainosus

## 2018-05-14 NOTE — NURSING NOTE
Nathaly Farias's goals for this visit include: return  She requests these members of her care team be copied on today's visit information:     PCP: Maxine Monae    Referring Provider:  No referring provider defined for this encounter.    /65  Pulse 67  Wt 64 kg (141 lb 1.6 oz)  LMP 06/05/2006  SpO2 100%  BMI 24.99 kg/m2    Do you need any medication refills at today's visit? y

## 2018-05-14 NOTE — Clinical Note
2018         RE: Nathaly Farias  [id number] [facility] [id number]        Dear Colleague,    Thank you for referring your patient, Nathaly Farias, to the [facility]. Please see a copy of my visit note below.    Diagnosis/Summary/Recommendations:    PATIENT: Nathaly Farias  63 year old female     : 1955    MOISE: May 14, 2018    Parkinson  Cognitive impairment    Had grand kids  Son and his wife and kids visited  And went to daughters yesterday    Takes a nap for 2 hours - from 1pm till around 3pm  She may have some impact on her sleep at night  She gets tired walking in the grocery store  She feels better pushing a cart  She has not had falls  She has dyskinesias today in the office.   She has noticed more dyskinesias but not clear if there is a time that it is worth  She has an alarm on her phone to remind her to take her pills  She has her feet turn in.  She was unable to get to [facility]  She walks in the yard and to get the mail.  She has not had falls  She carries her phone with her  Rosa helps her a lot.     She has not had any hallucinations since she went off a medication in the past.      Medications     7am 12noon 9pm       Acetaminophen 325mg tylenol As needed           Carbidopa/levodopa Sinemet 25/100 2 2 2       Citalopram celexa 20mg   1         Diphenhydramine acetaminophen tylenol PM     As needed for sleep       Lisinopril prinivil/zestril 10mg   1         Lorazepam ativan 0.5mg As needed, rarely using           Melatonin 5mg or 10mg     Dose unknown. Taking as needed       Meloxicam mobic 15mg Not sure if taking this.        Polyethylene glycol miralax As needed    as needed       Quetiapine seroquel 25mg Not taking           Rivastigmine exelon patch mg Not taking           Senna senokot 8.6mg     As needed        Trospium sanctura 20mg Not taking           Zolmitriptan zomig 5mg As needed                                         PLAN  No change in  medication  She will need to review her medication schedule with family to make sure we have an accurate list of what she is taking.     History obtained from patient      Coding statement:   Duration of  Services: patient care and care coordination was 25 minutes  Greater than 50% of this visit was spent in counseling and coordination of care.     Jet Porter MD     ______________________________________    Last visit date and details:      PATIENT: Nathaly Farias  62 year old female      : 1955     MOISE: 2018     Parkinson  Dementia     Wondering about hallucinations  Taking sinemet at 9pm and has tremors that may affect her sleep  She goes to bed at 9pm  Her tremors are increased with anxiety/excited  If she is worked up her shaking is worse  If she does not take a nap during the day she has a mental fog.            Medications     7am 11am/12noon 9pm       Acetaminophen 325mg             Carbidopa/levodopa Sinemet 25/100 2 2 2       Citalopram celexa 20mg   1         Diphenhydramine acetaminophen tylenol PM     As needed       Lisinopril prinivil/zestril 10mg   1         Lorazepam ativan 0.5mg As needed, rarely using           Melatonin 5mg or 10mg     dose       Polyethylene glycol miralax As needed           Quetiapine seroquel 25mg Not taking           Rivastigmine exelon patch mg Has not tried           Senna senokot 8.6mg             Trospium sanctura 20mg Not taking           Zolmitriptan zomig 5mg As needed                                          History obtained from patient        Coding statement:   Duration of  Services: patient care and care coordination was 25 minutes  Greater than 50% of this visit was spent in counseling and coordination of care.     PLAN  1. Encouraged her to take a nap after her mid day pill so that she is clearer mentally and functions better.  2. She was encouraged to do therapy at [facility]. - she is dealing with some motivational issues as well as  fatigue/lack of energy  3. Presently there is not clear wearing off from her medication but spent time discussing this.  4. Return back in 6 months.   5. She will remain off seroquel and/or rivastigmine         Jet Porter MD              ______________________________________      Patient was asked about 14 Review of systems including changes in vision (dry eyes, double vision), hearing, heart, lungs, musculoskeletal, depression, anxiety, snoring, RBD, insomnia, urinary frequency, urinary urgency, constipation, swallowing problems, hematological, ID, allergies, skin problems: seborrhea, endocrinological: thyroid, diabetes, cholesterol; balance, weight changes, and other neurological problems and these were not significant at this time except for   Patient Active Problem List   Diagnosis     Prolapse of vaginal wall     Migraine variant     Symptomatic menopausal or female climacteric states     Obesity     Malaise and fatigue     Other and unspecified adverse effect of drug, medicinal and biological substance     Other and unspecified disc disorder of cervical region     Sleep apnea     Essential hypertension, benign     Chronic cholecystitis     Overactive bladder     Neck pain     Headache     HYPERLIPIDEMIA LDL GOAL <160     Mild major depression (H)     Advanced directives, counseling/discussion     HTN, goal below 140/90     Paralysis agitans (H)     History of MRI of brain and brain stem     Wears glasses     Constipation     Incomplete RBBB     Heart murmur     Family history of tremor     Rosacea     Asymptomatic varicose veins     Seborrheic dermatitis     Post-traumatic osteoarthritis of left knee     Pain from implanted hardware, initial encounter     Cognitive disorder evalaution 2017     Dementia     Hiatal hernia          Allergies   Allergen Reactions     Sulfa Drugs      Tacchycardia, had to go to ED     Naproxen GI Disturbance     Oxycontin [Oxycodone] GI Disturbance     Made her feel funny/upset  stomach     Rivastigmine      Patch was too expensive     Ropinirole      Did not work     Past Surgical History:   Procedure Laterality Date     ------------OTHER-------------      left shoulder     ARTHRODESIS TOE(S)  3/2/2011    ARTHRODESIS TOE(S) performed by CLARA LUCAS at  OR     C LIGATE FALLOPIAN TUBE  1998     CHOLECYSTECTOMY, LAPOROSCOPIC  5/12/2008    Cholecystectomy, Laparoscopic     ESOPHAGOSCOPY, GASTROSCOPY, DUODENOSCOPY (EGD), COMBINED N/A 7/5/2017    Procedure: COMBINED ESOPHAGOSCOPY, GASTROSCOPY, DUODENOSCOPY (EGD), BIOPSY SINGLE OR MULTIPLE;  Esophagogastroduodenoscopy with Multiple Biopsies by Biopsy;  Surgeon: Tj Denney MD;  Location:  GI     HC COLONOSCOPY W/WO BRUSH/WASH  08/30/06    normal     HC OPEN TX TIBIAL SHAFT FX W PLATE/SCREWS W/WO CERCLAGE  2000    Multiple operations on left leg     HC SHLDR ARTHROSCOP,PART ACROMIOPLAS  11/16/06    Right shoulder     HC SHLDR ARTHROSCOP,SURG,DIS CLAVICULECTOMY  11/16/06    Right shoulder     Past Medical History:   Diagnosis Date     Arthralgia of temporomandibular joint 10/22/1996     Arthritis      Cognitive disorder evalaution 2017 3/6/2017    She has marked attentional difficulties, impairments in memory including a rapid forgetting rate, mild anomia, and mild executive dysfunction. 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 She's required increasing assistance with finances, medications, driving, and cooking. I think this is a mild dementia, and     Constipation 2/23/2015     Dementia 3/13/2017    IMPRESSIONS AND RECOMMENDATIONS   Current results indicate marked attentional difficulties, impairments in memory, which in some cases reflected impairments in retention and in other cases reflected retrieval difficulties, and mild anomia as well as mild executive dysfunction. Premorbid intellectual functioning is estimated to fall in the  "borderline range. She denied experiencing significant depre     Depressive disorder      Heart murmur 2/23/2015     History of MRI of brain and brain stem 2/21/2015    MRI OF THE BRAIN WITHOUT CONTRAST 7/10/2014 1:21 PM  COMPARISON: None. HISTORY: Left-sided upper and lower extremity tremor. Balance issues. TECHNIQUE:  Brain: Axial diffusion-weighted with ADC map, T2-weighted with fat saturation, T1-weighted and turboFLAIR and coronal T1-weighted images of the brain were obtained without intravenous contrast.  FINDINGS: There is mild diffuse cerebral volume loss     Hypertension      Incomplete RBBB 2/23/2015     Motion sickness      Other and unspecified hyperlipidemia      Parkinson's disease (H)      Seborrheic dermatitis 2/29/2016     Unspecified arthropathy, hand 08/07/1997     Unspecified sleep apnea     moderate, sleep study 11/07, on CPAP, has daytime somnolence with this     Variants of migraine, not elsewhere classified, without mention of intractable migraine without mention of status migrainosus      Wears glasses 2/23/2015     Social History     Social History     Marital status:      Spouse name: violet     Number of children: 2     Years of education: N/A     Occupational History           Social History Main Topics     Smoking status: Never Smoker     Smokeless tobacco: Never Used      Comment: no smokers in the household     Alcohol use 0.0 oz/week     0 Standard drinks or equivalent per week      Comment: very occ     Drug use: No     Sexual activity: Yes     Partners: Male     Birth control/ protection: Surgical, Female Surgical      Comment: tubal ligation     Other Topics Concern     Caffeine Concern Yes     OCC     Exercise No     \"not faithfully\"     Seat Belt Yes     Self-Exams No     Social History Narrative    ALLERGIES:  She is allergic to sulfa, and has had gastrointestinal side effects from naproxen.               FAMILY HISTORY:  Strongly positive for headaches, including " in her son and sister.  There is no family history of tremor.               SOCIAL HISTORY:  She does not work outside the home.  She does not smoke.  She uses very little alcohol.  Lives in [place]. . Has 2 kids: son who is 35 yr s old. And daughter who is 30 yrs old. Her  is working as a  .        Drug and lactation database from the [place] [employer]:  http://toxnet.nlm.nih.gov/cgi-bin/sis/htmlgen?LACT      B/P: Data Unavailable, T: Data Unavailable, P: Data Unavailable, R: Data Unavailable 0 lbs 0 oz  Last menstrual period 06/05/2006, not currently breastfeeding., There is no height or weight on file to calculate BMI.  Medications and Vitals not listed above were documented in the cart and reviewed by me.     Current Outpatient Prescriptions   Medication Sig Dispense Refill     carbidopa-levodopa (SINEMET)  MG per tablet 2 tablets @ 7am, 11am/12noon, and 9pm 540 tablet 3     carbidopa-levodopa (SINEMET)  MG per tablet TAKE 2 TABLETS BY MOUTH AT 8 AM, 11AM/NOON, AND 9  tablet 3     citalopram (CELEXA) 20 MG tablet Take 1 tablet (20 mg) by mouth daily 90 tablet 1     diphenhydrAMINE-acetaminophen (TYLENOL PM EXTRA STRENGTH)  MG tablet Take 1 tablet by mouth nightly as needed Reported on 4/24/2017 14 tablet      lisinopril (PRINIVIL/ZESTRIL) 10 MG tablet 1 tab by mouth at 11am 90 tablet 3     melatonin 5 MG tablet Not sure of dose and taking 1 tab by mouth @ 9pm       meloxicam (MOBIC) 15 MG tablet Take 1 tablet (15 mg) by mouth daily 180 tablet 3     polyethylene glycol (MIRALAX) powder Once daily as needed in morning (1 capful per 8 oz water) 510 g 1     rivastigmine (EXELON) 9.5 MG/24HR 24 hr patch 4.6 mg patch daily x 1month then 9.5mg patch daily x 1 month then 13.3mg patch daily (Patient not taking: Reported on 5/3/2018) 30 patch 11     senna (SENOKOT) 8.6 MG tablet 1 tab @ 9pm as needed 120 tablet      ZOLMitriptan (ZOMIG) 5 MG  tablet Take 1 tablet (5 mg) by mouth at onset of headache for migraine MAY REPEAT ONCE AFTER 2 HOURS. DO NOT EXCEED 2 TABLETS IN 24 HOURS. 6 tablet 10         Jet Porter MD    Again, thank you for allowing me to participate in the care of your patient.        Sincerely,        Jet Porter MD

## 2018-05-14 NOTE — PATIENT INSTRUCTIONS
PATIENT: Nathaly Farias  63 year old female     : 1955    MOISE: May 14, 2018    Parkinson  Cognitive impairment    Had grand kids  Son and his wife and kids visited  And went to daughters yesterday    Takes a nap for 2 hours - from 1pm till around 3pm  She may have some impact on her sleep at night  She gets tired walking in the grocery store  She feels better pushing a cart  She has not had falls  She has dyskinesias today in the office.   She has noticed more dyskinesias but not clear if there is a time that it is worth  She has an alarm on her phone to remind her to take her pills  She has her feet turn in.  She was unable to get to [facility]  She walks in the yard and to get the mail.  She has not had falls  She carries her phone with her  Rosa helps her a lot.     She has not had any hallucinations since she went off a medication in the past.      Medications     7am 12noon 9pm       Acetaminophen 325mg tylenol As needed           Carbidopa/levodopa Sinemet 25/100 2 2 2       Citalopram celexa 20mg   1         Diphenhydramine acetaminophen tylenol PM     As needed for sleep       Lisinopril prinivil/zestril 10mg   1         Lorazepam ativan 0.5mg As needed, rarely using           Melatonin 5mg or 10mg     Dose unknown. Taking as needed       Meloxicam mobic 15mg Not sure if taking this.        Polyethylene glycol miralax As needed    as needed       Quetiapine seroquel 25mg Not taking           Rivastigmine exelon patch mg Not taking           Senna senokot 8.6mg     As needed        Trospium sanctura 20mg Not taking           Zolmitriptan zomig 5mg As needed                                         PLAN  No change in medication  She will need to review her medication schedule with family to make sure we have an accurate list of what she is taking.     History obtained from patient

## 2018-05-14 NOTE — LETTER
2018      RE: Nathaly Farias  [id number] [facility] [id number]       Diagnosis/Summary/Recommendations:    PATIENT: Nathaly Farias  63 year old female     : 1955    MOISE: May 14, 2018    Parkinson  Cognitive impairment    Had grand kids  Son and his wife and kids visited  And went to daughters yesterday    Takes a nap for 2 hours - from 1pm till around 3pm  She may have some impact on her sleep at night  She gets tired walking in the grocery store  She feels better pushing a cart  She has not had falls  She has dyskinesias today in the office.   She has noticed more dyskinesias but not clear if there is a time that it is worth  She has an alarm on her phone to remind her to take her pills  She has her feet turn in.  She was unable to get to [facility]  She walks in the yard and to get the mail.  She has not had falls  She carries her phone with her  Rosa helps her a lot.     She has not had any hallucinations since she went off a medication in the past.      Medications     7am 12noon 9pm       Acetaminophen 325mg tylenol As needed           Carbidopa/levodopa Sinemet 25/100 2 2 2       Citalopram celexa 20mg   1         Diphenhydramine acetaminophen tylenol PM     As needed for sleep       Lisinopril prinivil/zestril 10mg   1         Lorazepam ativan 0.5mg As needed, rarely using           Melatonin 5mg or 10mg     Dose unknown. Taking as needed       Meloxicam mobic 15mg Not sure if taking this.        Polyethylene glycol miralax As needed    as needed       Quetiapine seroquel 25mg Not taking           Rivastigmine exelon patch mg Not taking           Senna senokot 8.6mg     As needed        Trospium sanctura 20mg Not taking           Zolmitriptan zomig 5mg As needed                                         PLAN  No change in medication  She will need to review her medication schedule with family to make sure we have an accurate list of what she is taking.     History obtained from  patient      Coding statement:   Duration of  Services: patient care and care coordination was 25 minutes  Greater than 50% of this visit was spent in counseling and coordination of care.     Jet Porter MD     ______________________________________    Last visit date and details:      PATIENT: Nathaly Farias  62 year old female      : 1955     MOISE: 2018     Parkinson  Dementia     Wondering about hallucinations  Taking sinemet at 9pm and has tremors that may affect her sleep  She goes to bed at 9pm  Her tremors are increased with anxiety/excited  If she is worked up her shaking is worse  If she does not take a nap during the day she has a mental fog.            Medications     7am 11am/12noon 9pm       Acetaminophen 325mg             Carbidopa/levodopa Sinemet 25/100 2 2 2       Citalopram celexa 20mg   1         Diphenhydramine acetaminophen tylenol PM     As needed       Lisinopril prinivil/zestril 10mg   1         Lorazepam ativan 0.5mg As needed, rarely using           Melatonin 5mg or 10mg     dose       Polyethylene glycol miralax As needed           Quetiapine seroquel 25mg Not taking           Rivastigmine exelon patch mg Has not tried           Senna senokot 8.6mg             Trospium sanctura 20mg Not taking           Zolmitriptan zomig 5mg As needed                                          History obtained from patient        Coding statement:   Duration of  Services: patient care and care coordination was 25 minutes  Greater than 50% of this visit was spent in counseling and coordination of care.     PLAN  1. Encouraged her to take a nap after her mid day pill so that she is clearer mentally and functions better.  2. She was encouraged to do therapy at [facility]. - she is dealing with some motivational issues as well as fatigue/lack of energy  3. Presently there is not clear wearing off from her medication but spent time discussing this.  4. Return back in 6 months.   5. She will  remain off seroquel and/or rivastigmine         Jet Porter MD              ______________________________________      Patient was asked about 14 Review of systems including changes in vision (dry eyes, double vision), hearing, heart, lungs, musculoskeletal, depression, anxiety, snoring, RBD, insomnia, urinary frequency, urinary urgency, constipation, swallowing problems, hematological, ID, allergies, skin problems: seborrhea, endocrinological: thyroid, diabetes, cholesterol; balance, weight changes, and other neurological problems and these were not significant at this time except for   Patient Active Problem List   Diagnosis     Prolapse of vaginal wall     Migraine variant     Symptomatic menopausal or female climacteric states     Obesity     Malaise and fatigue     Other and unspecified adverse effect of drug, medicinal and biological substance     Other and unspecified disc disorder of cervical region     Sleep apnea     Essential hypertension, benign     Chronic cholecystitis     Overactive bladder     Neck pain     Headache     HYPERLIPIDEMIA LDL GOAL <160     Mild major depression (H)     Advanced directives, counseling/discussion     HTN, goal below 140/90     Paralysis agitans (H)     History of MRI of brain and brain stem     Wears glasses     Constipation     Incomplete RBBB     Heart murmur     Family history of tremor     Rosacea     Asymptomatic varicose veins     Seborrheic dermatitis     Post-traumatic osteoarthritis of left knee     Pain from implanted hardware, initial encounter     Cognitive disorder evalaution 2017     Dementia     Hiatal hernia          Allergies   Allergen Reactions     Sulfa Drugs      Tacchycardia, had to go to ED     Naproxen GI Disturbance     Oxycontin [Oxycodone] GI Disturbance     Made her feel funny/upset stomach     Rivastigmine      Patch was too expensive     Ropinirole      Did not work     Past Surgical History:   Procedure Laterality Date      ------------OTHER-------------      left shoulder     ARTHRODESIS TOE(S)  3/2/2011    ARTHRODESIS TOE(S) performed by CLARA LUCAS at  OR     C LIGATE FALLOPIAN TUBE  1998     CHOLECYSTECTOMY, LAPOROSCOPIC  5/12/2008    Cholecystectomy, Laparoscopic     ESOPHAGOSCOPY, GASTROSCOPY, DUODENOSCOPY (EGD), COMBINED N/A 7/5/2017    Procedure: COMBINED ESOPHAGOSCOPY, GASTROSCOPY, DUODENOSCOPY (EGD), BIOPSY SINGLE OR MULTIPLE;  Esophagogastroduodenoscopy with Multiple Biopsies by Biopsy;  Surgeon: Tj Denney MD;  Location:  GI     HC COLONOSCOPY W/WO BRUSH/WASH  08/30/06    normal     HC OPEN TX TIBIAL SHAFT FX W PLATE/SCREWS W/WO CERCLAGE  2000    Multiple operations on left leg     HC SHLDR ARTHROSCOP,PART ACROMIOPLAS  11/16/06    Right shoulder     HC SHLDR ARTHROSCOP,SURG,DIS CLAVICULECTOMY  11/16/06    Right shoulder     Past Medical History:   Diagnosis Date     Arthralgia of temporomandibular joint 10/22/1996     Arthritis      Cognitive disorder evalaution 2017 3/6/2017    She has marked attentional difficulties, impairments in memory including a rapid forgetting rate, mild anomia, and mild executive dysfunction. 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 She's required increasing assistance with finances, medications, driving, and cooking. I think this is a mild dementia, and     Constipation 2/23/2015     Dementia 3/13/2017    IMPRESSIONS AND RECOMMENDATIONS   Current results indicate marked attentional difficulties, impairments in memory, which in some cases reflected impairments in retention and in other cases reflected retrieval difficulties, and mild anomia as well as mild executive dysfunction. Premorbid intellectual functioning is estimated to fall in the borderline range. She denied experiencing significant depre     Depressive disorder      Heart murmur 2/23/2015     History of MRI of brain and  "brain stem 2/21/2015    MRI OF THE BRAIN WITHOUT CONTRAST 7/10/2014 1:21 PM  COMPARISON: None. HISTORY: Left-sided upper and lower extremity tremor. Balance issues. TECHNIQUE:  Brain: Axial diffusion-weighted with ADC map, T2-weighted with fat saturation, T1-weighted and turboFLAIR and coronal T1-weighted images of the brain were obtained without intravenous contrast.  FINDINGS: There is mild diffuse cerebral volume loss     Hypertension      Incomplete RBBB 2/23/2015     Motion sickness      Other and unspecified hyperlipidemia      Parkinson's disease (H)      Seborrheic dermatitis 2/29/2016     Unspecified arthropathy, hand 08/07/1997     Unspecified sleep apnea     moderate, sleep study 11/07, on CPAP, has daytime somnolence with this     Variants of migraine, not elsewhere classified, without mention of intractable migraine without mention of status migrainosus      Wears glasses 2/23/2015     Social History     Social History     Marital status:      Spouse name: violet     Number of children: 2     Years of education: N/A     Occupational History           Social History Main Topics     Smoking status: Never Smoker     Smokeless tobacco: Never Used      Comment: no smokers in the household     Alcohol use 0.0 oz/week     0 Standard drinks or equivalent per week      Comment: very occ     Drug use: No     Sexual activity: Yes     Partners: Male     Birth control/ protection: Surgical, Female Surgical      Comment: tubal ligation     Other Topics Concern     Caffeine Concern Yes     OCC     Exercise No     \"not faithfully\"     Seat Belt Yes     Self-Exams No     Social History Narrative    ALLERGIES:  She is allergic to sulfa, and has had gastrointestinal side effects from naproxen.               FAMILY HISTORY:  Strongly positive for headaches, including in her son and sister.  There is no family history of tremor.               SOCIAL HISTORY:  She does not work outside the home.  She does not " smoke.  She uses very little alcohol.  Lives in [place]. . Has 2 kids: son who is 35 yr s old. And daughter who is 30 yrs old. Her  is working as a  .        Drug and lactation database from the [place] [employer] of [employer]:  http://toxnet.nlm.nih.gov/cgi-bin/sis/htmlgen?LACT      B/P: Data Unavailable, T: Data Unavailable, P: Data Unavailable, R: Data Unavailable 0 lbs 0 oz  Last menstrual period 06/05/2006, not currently breastfeeding., There is no height or weight on file to calculate BMI.  Medications and Vitals not listed above were documented in the cart and reviewed by me.     Current Outpatient Prescriptions   Medication Sig Dispense Refill     carbidopa-levodopa (SINEMET)  MG per tablet 2 tablets @ 7am, 11am/12noon, and 9pm 540 tablet 3     carbidopa-levodopa (SINEMET)  MG per tablet TAKE 2 TABLETS BY MOUTH AT 8 AM, 11AM/NOON, AND 9  tablet 3     citalopram (CELEXA) 20 MG tablet Take 1 tablet (20 mg) by mouth daily 90 tablet 1     diphenhydrAMINE-acetaminophen (TYLENOL PM EXTRA STRENGTH)  MG tablet Take 1 tablet by mouth nightly as needed Reported on 4/24/2017 14 tablet      lisinopril (PRINIVIL/ZESTRIL) 10 MG tablet 1 tab by mouth at 11am 90 tablet 3     melatonin 5 MG tablet Not sure of dose and taking 1 tab by mouth @ 9pm       meloxicam (MOBIC) 15 MG tablet Take 1 tablet (15 mg) by mouth daily 180 tablet 3     polyethylene glycol (MIRALAX) powder Once daily as needed in morning (1 capful per 8 oz water) 510 g 1     rivastigmine (EXELON) 9.5 MG/24HR 24 hr patch 4.6 mg patch daily x 1month then 9.5mg patch daily x 1 month then 13.3mg patch daily (Patient not taking: Reported on 5/3/2018) 30 patch 11     senna (SENOKOT) 8.6 MG tablet 1 tab @ 9pm as needed 120 tablet      ZOLMitriptan (ZOMIG) 5 MG tablet Take 1 tablet (5 mg) by mouth at onset of headache for migraine MAY REPEAT ONCE AFTER 2 HOURS. DO NOT EXCEED 2 TABLETS IN 24 HOURS. 6  tablet 10         Jet Porter MD

## 2018-09-29 ENCOUNTER — APPOINTMENT (OUTPATIENT)
Dept: CT IMAGING | Facility: CLINIC | Age: 63
End: 2018-09-29
Attending: EMERGENCY MEDICINE
Payer: MEDICARE

## 2018-09-29 ENCOUNTER — HOSPITAL ENCOUNTER (EMERGENCY)
Facility: CLINIC | Age: 63
Discharge: HOME | End: 2018-09-29
Attending: EMERGENCY MEDICINE | Admitting: EMERGENCY MEDICINE
Payer: MEDICARE

## 2018-09-29 VITALS
BODY MASS INDEX: 23.74 KG/M2 | WEIGHT: 134 LBS | SYSTOLIC BLOOD PRESSURE: 99 MMHG | HEART RATE: 77 BPM | OXYGEN SATURATION: 100 % | DIASTOLIC BLOOD PRESSURE: 79 MMHG | TEMPERATURE: 98.4 F | RESPIRATION RATE: 20 BRPM

## 2018-09-29 DIAGNOSIS — R55 SYNCOPE AND COLLAPSE: ICD-10-CM

## 2018-09-29 LAB
ANION GAP SERPL CALCULATED.3IONS-SCNC: 8 MMOL/L (ref 3–14)
BASOPHILS # BLD AUTO: 0 10E9/L (ref 0–0.2)
BASOPHILS NFR BLD AUTO: 0.3 %
BUN SERPL-MCNC: 25 MG/DL (ref 7–30)
CALCIUM SERPL-MCNC: 9.5 MG/DL (ref 8.5–10.1)
CHLORIDE SERPL-SCNC: 104 MMOL/L (ref 94–109)
CO2 SERPL-SCNC: 28 MMOL/L (ref 20–32)
CREAT SERPL-MCNC: 1.17 MG/DL (ref 0.52–1.04)
DIFFERENTIAL METHOD BLD: NORMAL
EOSINOPHIL NFR BLD AUTO: 0.3 %
ERYTHROCYTE [DISTWIDTH] IN BLOOD BY AUTOMATED COUNT: 12.6 % (ref 10–15)
GFR SERPL CREATININE-BSD FRML MDRD: 47 ML/MIN/1.7M2
GLUCOSE SERPL-MCNC: 119 MG/DL (ref 70–99)
HCT VFR BLD AUTO: 42 % (ref 35–47)
HGB BLD-MCNC: 13.8 G/DL (ref 11.7–15.7)
IMM GRANULOCYTES # BLD: 0 10E9/L (ref 0–0.4)
IMM GRANULOCYTES NFR BLD: 0.4 %
LYMPHOCYTES # BLD AUTO: 0.9 10E9/L (ref 0.8–5.3)
LYMPHOCYTES NFR BLD AUTO: 9.4 %
MCH RBC QN AUTO: 30.2 PG (ref 26.5–33)
MCHC RBC AUTO-ENTMCNC: 32.9 G/DL (ref 31.5–36.5)
MCV RBC AUTO: 92 FL (ref 78–100)
MONOCYTES # BLD AUTO: 0.4 10E9/L (ref 0–1.3)
MONOCYTES NFR BLD AUTO: 4.3 %
NEUTROPHILS # BLD AUTO: 8.2 10E9/L (ref 1.6–8.3)
NEUTROPHILS NFR BLD AUTO: 85.3 %
NRBC # BLD AUTO: 0 10*3/UL
NRBC BLD AUTO-RTO: 0 /100
PLATELET # BLD AUTO: 299 10E9/L (ref 150–450)
POTASSIUM SERPL-SCNC: 4.3 MMOL/L (ref 3.4–5.3)
RBC # BLD AUTO: 4.57 10E12/L (ref 3.8–5.2)
SODIUM SERPL-SCNC: 140 MMOL/L (ref 133–144)
TROPONIN I SERPL-MCNC: <0.015 UG/L (ref 0–0.04)
WBC # BLD AUTO: 9.6 10E9/L (ref 4–11)

## 2018-09-29 PROCEDURE — 96360 HYDRATION IV INFUSION INIT: CPT | Performed by: EMERGENCY MEDICINE

## 2018-09-29 PROCEDURE — 25000128 H RX IP 250 OP 636: Performed by: EMERGENCY MEDICINE

## 2018-09-29 PROCEDURE — 84484 ASSAY OF TROPONIN QUANT: CPT | Performed by: EMERGENCY MEDICINE

## 2018-09-29 PROCEDURE — 70450 CT HEAD/BRAIN W/O DYE: CPT

## 2018-09-29 PROCEDURE — 93010 ELECTROCARDIOGRAM REPORT: CPT | Mod: Z6 | Performed by: EMERGENCY MEDICINE

## 2018-09-29 PROCEDURE — 99285 EMERGENCY DEPT VISIT HI MDM: CPT | Mod: 25 | Performed by: EMERGENCY MEDICINE

## 2018-09-29 PROCEDURE — 93005 ELECTROCARDIOGRAM TRACING: CPT | Performed by: EMERGENCY MEDICINE

## 2018-09-29 PROCEDURE — 85025 COMPLETE CBC W/AUTO DIFF WBC: CPT | Performed by: EMERGENCY MEDICINE

## 2018-09-29 PROCEDURE — 80048 BASIC METABOLIC PNL TOTAL CA: CPT | Performed by: EMERGENCY MEDICINE

## 2018-09-29 RX ORDER — ONDANSETRON 2 MG/ML
4 INJECTION INTRAMUSCULAR; INTRAVENOUS EVERY 30 MIN PRN
Status: DISCONTINUED | OUTPATIENT
Start: 2018-09-29 | End: 2018-09-29 | Stop reason: HOSPADM

## 2018-09-29 RX ORDER — SODIUM CHLORIDE 9 MG/ML
1000 INJECTION, SOLUTION INTRAVENOUS CONTINUOUS
Status: DISCONTINUED | OUTPATIENT
Start: 2018-09-29 | End: 2018-09-29 | Stop reason: HOSPADM

## 2018-09-29 RX ADMIN — SODIUM CHLORIDE 1000 ML: 9 INJECTION, SOLUTION INTRAVENOUS at 13:31

## 2018-09-29 ASSESSMENT — ENCOUNTER SYMPTOMS
HEADACHES: 1
NAUSEA: 1
HEMATURIA: 0
SHORTNESS OF BREATH: 0

## 2018-09-29 NOTE — DISCHARGE INSTRUCTIONS
I am not certain as the cause of why he passed out today.  I think certainly think is possible could be related to taking a hot shower and your Parkinson's disease with your blood pressure medications.  Other possibilities would be due to a cardiac arrhythmia as we discussed.  You were given the option to be admitted to the hospital for observation versus close observation at home and he decided to go home which I think is reasonable.  Return to the ER if you develop new or worsening symptoms.    Causes of Syncope  Syncope (fainting) has many causes. Sometimes it is not serious. In other cases, syncope is a sign of a heart problem. But treatment can help    When syncope is not serious  Your healthcare provider may call your problem vasovagal syncope, reflex syncope, or orthostatic hypotension. These types of syncope are generally not serious. They can be caused by:    Strong feelings, such as anxiety or fear. A nerve signal may briefly change your heart rate and lower your blood pressure too much.    Standing for too long. Standing may cause blood to pool in your legs. When this happens, your brain may not receive all the blood it needs.    Standing up too quickly. Your blood pressure may not adjust fast enough to changes in posture and may drop too low. Certain medicines can also cause this problem. Examples of medicines that can cause a drop in blood pressure include diuretics, blood pressure medicines, and medicines for chest pain. Your pharmacist or healthcare provider can discuss these with you.    Reaction to normal body functions. When you go to the bathroom, have gastrointestinal discomfort, nausea, or pain, your heart may have a natural reflex to slow down and lower blood pressure. This can result in syncope. This may also follow exercise, eating, laughter, weight lifting, or playing musical instruments like the trumpet or trombone.  When heart trouble causes syncope  A heart problem can decrease the amount  of oxygen-rich blood that reaches the brain. Heart trouble can be serious and even life threatening if not treated:    A slow heart rate. Electrical signals tell the chambers of the heart when to pump. But the signals may be slowed or blocked (heart block) as they travel on the heart s electrical pathways. This can be caused by aging, scarred heart tissue, or damage from heart disease. When the heart rate slows, not enough blood is pumped.    A fast heart rate. Certain problems can make the heart race. For instance, after a heart attack, also known as acute myocardial infarction, or AMI, abnormal electrical signals may be created. These signals can make the heart suddenly beat very fast. The heart pumps before the chambers can fill with blood. So less blood reaches the brain and other parts of the body. Illegal drugs, certain medicines, heart disease, or an inherited condition can also cause this.    A heart valve problem. Blood travels through the chambers of the heart as it is pumped. Heart valves open and close to help move blood in the right direction. But a valve may not open or close fully, if it s hardened or scarred. As a result, less blood is pumped through the heart to the brain and body. Most often, syncope occurs when a person's aortic valve is critically narrowed and he or she participates in  a strenuous activity.    A heart muscle problem. Some people develop a thickened heart muscle that blocks blood flow out of the heart to the body. This is called hypertrophic cardiomyopathy. Being dehydrated and having hypertrophic cardiomyopathy can increase the risk for syncope.  Whatever the cause of syncope, it is important to be evaluated by your healthcare provider. You may need to be seen by a cardiologist, neurologist, or an ear, nose, and throat specialist. Do not drive, operate heavy machinery, or participate in activities in which you would be at risk for falls and injury if you have syncope and have  not been evaluated.  Date Last Reviewed: 5/1/2016 2000-2017 [employer] [address] [place] [address], [place] [address]. All rights reserved. This information is not intended as a substitute for professional medical care. Always follow your healthcare professional's instructions.

## 2018-09-29 NOTE — ED AVS SNAPSHOT
[facility] Emergency Department    [address] [place] [address]    Phone:  [phone]    Fax:  [phone]                                       Nathaly Farias   MRN: [id number]    Department:  [facility] Emergency Department   Date of Visit:  9/29/2018           Patient Information     Date Of Birth          1955        Your diagnoses for this visit were:     Syncope and collapse        You were seen by Hermann Rosado MD.      Follow-up Information     Follow up with Maxine Monae MD In 3 days.    Specialty:  Family Practice    Why:  ER follow up    Contact information:    [address] [facility] [address]  [phone]          Discharge Instructions       I am not certain as the cause of why he passed out today.  I think certainly think is possible could be related to taking a hot shower and your Parkinson's disease with your blood pressure medications.  Other possibilities would be due to a cardiac arrhythmia as we discussed.  You were given the option to be admitted to the hospital for observation versus close observation at home and he decided to go home which I think is reasonable.  Return to the ER if you develop new or worsening symptoms.    Causes of Syncope  Syncope (fainting) has many causes. Sometimes it is not serious. In other cases, syncope is a sign of a heart problem. But treatment can help    When syncope is not serious  Your healthcare provider may call your problem vasovagal syncope, reflex syncope, or orthostatic hypotension. These types of syncope are generally not serious. They can be caused by:    Strong feelings, such as anxiety or fear. A nerve signal may briefly change your heart rate and lower your blood pressure too much.    Standing for too long. Standing may cause blood to pool in your legs. When this happens, your brain may not receive all the blood it needs.    Standing up too quickly. Your blood pressure may not adjust fast enough  to changes in posture and may drop too low. Certain medicines can also cause this problem. Examples of medicines that can cause a drop in blood pressure include diuretics, blood pressure medicines, and medicines for chest pain. Your pharmacist or healthcare provider can discuss these with you.    Reaction to normal body functions. When you go to the bathroom, have gastrointestinal discomfort, nausea, or pain, your heart may have a natural reflex to slow down and lower blood pressure. This can result in syncope. This may also follow exercise, eating, laughter, weight lifting, or playing musical instruments like the trumpet or trombone.  When heart trouble causes syncope  A heart problem can decrease the amount of oxygen-rich blood that reaches the brain. Heart trouble can be serious and even life threatening if not treated:    A slow heart rate. Electrical signals tell the chambers of the heart when to pump. But the signals may be slowed or blocked (heart block) as they travel on the heart s electrical pathways. This can be caused by aging, scarred heart tissue, or damage from heart disease. When the heart rate slows, not enough blood is pumped.    A fast heart rate. Certain problems can make the heart race. For instance, after a heart attack, also known as acute myocardial infarction, or AMI, abnormal electrical signals may be created. These signals can make the heart suddenly beat very fast. The heart pumps before the chambers can fill with blood. So less blood reaches the brain and other parts of the body. Illegal drugs, certain medicines, heart disease, or an inherited condition can also cause this.    A heart valve problem. Blood travels through the chambers of the heart as it is pumped. Heart valves open and close to help move blood in the right direction. But a valve may not open or close fully, if it s hardened or scarred. As a result, less blood is pumped through the heart to the brain and body. Most often,  syncope occurs when a person's aortic valve is critically narrowed and he or she participates in  a strenuous activity.    A heart muscle problem. Some people develop a thickened heart muscle that blocks blood flow out of the heart to the body. This is called hypertrophic cardiomyopathy. Being dehydrated and having hypertrophic cardiomyopathy can increase the risk for syncope.  Whatever the cause of syncope, it is important to be evaluated by your healthcare provider. You may need to be seen by a cardiologist, neurologist, or an ear, nose, and throat specialist. Do not drive, operate heavy machinery, or participate in activities in which you would be at risk for falls and injury if you have syncope and have not been evaluated.  Date Last Reviewed: 5/1/2016 2000-2017 [employer]. [address] [place] [address], [place] [address]. All rights reserved. This information is not intended as a substitute for professional medical care. Always follow your healthcare professional's instructions.          Your next 10 appointments already scheduled     Nov 05, 2018  9:30 AM CST   Return Visit with Jet Porter MD   [facility]    [address] [place] [id number] [phone]              24 Hour Appointment Hotline       To make an appointment at any [facility], call [phone] ([phone]). If you don't have a family doctor or clinic, we will help you find one. [facility] are conveniently located to serve the needs of you and your family.             Review of your medicines      Our records show that you are taking the medicines listed below. If these are incorrect, please call your family doctor or clinic.        Dose / Directions Last dose taken    carbidopa-levodopa  MG per tablet   Commonly known as:  SINEMET   Quantity:  540 tablet        2 tabs by mouth @ 7am, noon, and 9pm   Refills:  3        citalopram 20 MG  tablet   Commonly known as:  celeXA   Quantity:  90 tablet        20mg tab by mouth @12noon   Refills:  3        lisinopril 10 MG tablet   Commonly known as:  PRINIVIL/ZESTRIL   Quantity:  90 tablet        1 tab by mouth at 12 noon   Refills:  3        melatonin 5 MG tablet        Not sure of dose and taking 1 tab by mouth @ 9pm   Refills:  0        meloxicam 15 MG tablet   Commonly known as:  MOBIC   Dose:  15 mg   Quantity:  180 tablet        Take 1 tablet (15 mg) by mouth daily   Refills:  3        MIRALAX powder   Quantity:  510 g   Generic drug:  polyethylene glycol        Once daily as needed in morning (1 capful per 8 oz water)   Refills:  1        senna 8.6 MG tablet   Commonly known as:  SENOKOT   Quantity:  120 tablet        1 tab @ 9pm as needed   Refills:  0        TYLENOL PM EXTRA STRENGTH  MG tablet   Quantity:  14 tablet   Generic drug:  diphenhydrAMINE-acetaminophen        1 tab by mouth @ 9/930pm as needed   Refills:  0        ZOLMitriptan 5 MG tablet   Commonly known as:  ZOMIG   Dose:  5 mg   Quantity:  6 tablet        Take 1 tablet (5 mg) by mouth at onset of headache for migraine MAY REPEAT ONCE AFTER 2 HOURS. DO NOT EXCEED 2 TABLETS IN 24 HOURS.   Refills:  10                Procedures and tests performed during your visit     Basic metabolic panel    CBC with platelets differential    CT Head w/o Contrast    EKG 12 lead    Peripheral IV catheter    Troponin I      Orders Needing Specimen Collection     None      Pending Results     Date and Time Order Name Status Description    9/29/2018 1235 CT Head w/o Contrast Preliminary             Pending Culture Results     No orders found from 9/27/2018 to 9/30/2018.            Pending Results Instructions     If you had any lab results that were not finalized at the time of your Discharge, you can call the ED Lab Result RN at [phone]. You will be contacted by this team for any positive Lab results or changes in treatment. The nurses are  available 7 days a week from 10A to 6:30P.  You can leave a message 24 hours per day and they will return your call.        Thank you for choosing [facility]       Thank you for choosing [facility] for your care. Our goal is always to provide you with excellent care. Hearing back from our patients is one way we can continue to improve our services. Please take a few minutes to complete the written survey that you may receive in the mail after you visit with us. Thank you!        [employer] Information     [employer] gives you secure access to your electronic health record. If you see a primary care provider, you can also send messages to your care team and make appointments. If you have questions, please call your primary care clinic.  If you do not have a primary care provider, please call [phone] and they will assist you.        Care EveryWhere ID     This is your Care EveryWhere ID. This could be used by other organizations to access your [facility] medical records  [id number]        Equal Access to Services     ARACELY DAVENPORT : Jn Rodgers, wasanjana samuel, atilio kaalmakarl ruiz, xi butts . So [facility] [phone].    ATENCIÓN: Si habla español, tiene a shoemaker disposición servicios gratuitos de asistencia lingüística. Llame al [phone].    We comply with applicable federal civil rights laws and [place] laws. We do not discriminate on the basis of race, color, national origin, age, disability, sex, sexual orientation, or gender identity.            After Visit Summary       This is your record. Keep this with you and show to your community pharmacist(s) and doctor(s) at your next visit.

## 2018-09-29 NOTE — ED PROVIDER NOTES
History     Chief Complaint   Patient presents with     Loss of Consciousness     The history is provided by the patient and the spouse.     Nathaly Farias is a 63 year old female who presents to the ED for loss of consciousness at 11:00am this morning. She has a medical history of Parkinson's dementia. Patient passed out once before when she fell down stairs due to possible excitement. She is also experiencing nausea.  was called to help her as she felt weird.  caught her before she fell. He covered her and sat her down on the stool in the shower. She had labored breathing at that time. She went to lay down in bed, and then vomited as she felt weird. Patient then experienced a headache, but she always has a headache.  thought that the water might have been too hot.  relates that she gets confused when she is tired. Patient denies chest pain, shortness of breath, and hematuria. Patient was brought in Today in a wheelchair.     Patient Active Problem List   Diagnosis     Prolapse of vaginal wall     Migraine variant     Symptomatic menopausal or female climacteric states     Obesity     Malaise and fatigue     Other and unspecified adverse effect of drug, medicinal and biological substance     Other and unspecified disc disorder of cervical region     Sleep apnea     Essential hypertension, benign     Chronic cholecystitis     Overactive bladder     Neck pain     Headache     HYPERLIPIDEMIA LDL GOAL <160     Mild major depression (H)     Advanced directives, counseling/discussion     HTN, goal below 140/90     Paralysis agitans (H)     History of MRI of brain and brain stem     Wears glasses     Constipation     Incomplete RBBB     Heart murmur     Family history of tremor     Rosacea     Asymptomatic varicose veins     Seborrheic dermatitis     Post-traumatic osteoarthritis of left knee     Pain from implanted hardware, initial encounter     Cognitive disorder evalaution 2017      Dementia     Hiatal hernia     Past Medical History:   Diagnosis Date     Arthralgia of temporomandibular joint 10/22/1996     Arthritis      Cognitive disorder evalaution 2017 3/6/2017    She has marked attentional difficulties, impairments in memory including a rapid forgetting rate, mild anomia, and mild executive dysfunction. 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 She's required increasing assistance with finances, medications, driving, and cooking. I think this is a mild dementia, and     Constipation 2/23/2015     Dementia 3/13/2017    IMPRESSIONS AND RECOMMENDATIONS   Current results indicate marked attentional difficulties, impairments in memory, which in some cases reflected impairments in retention and in other cases reflected retrieval difficulties, and mild anomia as well as mild executive dysfunction. Premorbid intellectual functioning is estimated to fall in the borderline range. She denied experiencing significant depre     Depressive disorder      Heart murmur 2/23/2015     History of MRI of brain and brain stem 2/21/2015    MRI OF THE BRAIN WITHOUT CONTRAST 7/10/2014 1:21 PM  COMPARISON: None. HISTORY: Left-sided upper and lower extremity tremor. Balance issues. TECHNIQUE:  Brain: Axial diffusion-weighted with ADC map, T2-weighted with fat saturation, T1-weighted and turboFLAIR and coronal T1-weighted images of the brain were obtained without intravenous contrast.  FINDINGS: There is mild diffuse cerebral volume loss     Hypertension      Incomplete RBBB 2/23/2015     Motion sickness      Other and unspecified hyperlipidemia      Parkinson's disease (H)      Seborrheic dermatitis 2/29/2016     Unspecified arthropathy, hand 08/07/1997     Unspecified sleep apnea     moderate, sleep study 11/07, on CPAP, has daytime somnolence with this     Variants of migraine, not elsewhere classified, without mention of  intractable migraine without mention of status migrainosus      Wears glasses 2/23/2015       Past Surgical History:   Procedure Laterality Date     ------------OTHER-------------      left shoulder     ARTHRODESIS TOE(S)  3/2/2011    ARTHRODESIS TOE(S) performed by CLARA LUCAS at [facility] OR     C LIGATE FALLOPIAN TUBE  1998     CHOLECYSTECTOMY, LAPOROSCOPIC  5/12/2008    Cholecystectomy, Laparoscopic     ESOPHAGOSCOPY, GASTROSCOPY, DUODENOSCOPY (EGD), COMBINED N/A 7/5/2017    Procedure: COMBINED ESOPHAGOSCOPY, GASTROSCOPY, DUODENOSCOPY (EGD), BIOPSY SINGLE OR MULTIPLE;  Esophagogastroduodenoscopy with Multiple Biopsies by Biopsy;  Surgeon: Tj Denney MD;  Location: [facility]     HC COLONOSCOPY W/WO BRUSH/WASH  08/30/06    normal     HC OPEN TX TIBIAL SHAFT FX W PLATE/SCREWS W/WO CERCLAGE  2000    Multiple operations on left leg     HC SHLDR ARTHROSCOP,PART ACROMIOPLAS  11/16/06    Right shoulder     HC SHLDR ARTHROSCOP,SURG,DIS CLAVICULECTOMY  11/16/06    Right shoulder       Family History   Problem Relation Age of Onset     Diabetes Father      type II, diagnosed in late 60's     Hypertension Father      Cancer Father      rare kidney cancer had kidney removed, 75 y.o. at onset     Cerebrovascular Disease Father      Rashes/Skin Problems Father      rosacea     Neurologic Disorder Mother      head tremor     HEART DISEASE Brother      Valvular disease corrected with surgery     HEART DISEASE Paternal Grandfather      MI     Migraines Son      Migraines Sister      Rashes/Skin Problems Sister      rosacea       Social History   Substance Use Topics     Smoking status: Never Smoker     Smokeless tobacco: Never Used      Comment: no smokers in the household     Alcohol use 0.0 oz/week     0 Standard drinks or equivalent per week      Comment: very occ        Immunization History   Administered Date(s) Administered     Influenza (H1N1) 01/12/2010     Influenza (IIV3) PF 10/15/1996, 10/14/1997, 10/15/1998,  10/06/1999, 12/29/2000, 11/15/2001, 11/11/2002, 10/16/2003, 11/01/2004, 11/02/2005, 11/12/2007, 11/10/2008, 11/10/2010, 10/04/2011, 01/04/2013, 09/03/2014     Influenza Vaccine IM 3yrs+ 4 Valent IIV4 08/22/2016, 09/03/2017     Influenza Vaccine, 3 YRS +, IM (QUADRIVALENT W/PRESERVATIVES) 09/08/2015     Pneumococcal 23 valent 11/01/2004     TD (ADULT, 7+) 10/05/1999     TDAP Vaccine (Adacel) 10/04/2011     Zoster vaccine, live 11/29/2014          Allergies   Allergen Reactions     Sulfa Drugs      Tacchycardia, had to go to ED     Naproxen GI Disturbance     Oxycontin [Oxycodone] GI Disturbance     Made her feel funny/upset stomach     Rivastigmine      Patch was too expensive     Ropinirole      Did not work       Current Outpatient Prescriptions   Medication Sig Dispense Refill     carbidopa-levodopa (SINEMET)  MG per tablet 2 tabs by mouth @ 7am, noon, and 9pm 540 tablet 3     citalopram (CELEXA) 20 MG tablet 20mg tab by mouth @12noon 90 tablet 3     diphenhydrAMINE-acetaminophen (TYLENOL PM EXTRA STRENGTH)  MG tablet 1 tab by mouth @ 9/930pm as needed 14 tablet      lisinopril (PRINIVIL/ZESTRIL) 10 MG tablet 1 tab by mouth at 12 noon 90 tablet 3     melatonin 5 MG tablet Not sure of dose and taking 1 tab by mouth @ 9pm       meloxicam (MOBIC) 15 MG tablet Take 1 tablet (15 mg) by mouth daily 180 tablet 3     polyethylene glycol (MIRALAX) powder Once daily as needed in morning (1 capful per 8 oz water) 510 g 1     senna (SENOKOT) 8.6 MG tablet 1 tab @ 9pm as needed 120 tablet      ZOLMitriptan (ZOMIG) 5 MG tablet Take 1 tablet (5 mg) by mouth at onset of headache for migraine MAY REPEAT ONCE AFTER 2 HOURS. DO NOT EXCEED 2 TABLETS IN 24 HOURS. 6 tablet 10       Review of Systems   Respiratory: Negative for shortness of breath.         POSITIVE labored breathing   Cardiovascular: Negative for chest pain.   Gastrointestinal: Positive for nausea.   Genitourinary: Negative for hematuria.   Neurological:  Positive for headaches.        POSITIVE loss of consciousness     All other systems reviewed and are negative.      Physical Exam   BP: 117/57  Pulse: 77  Temp: 98.4  F (36.9  C)  Resp: 20  Weight: 60.8 kg (134 lb)  SpO2: 100 %      Physical Exam   Constitutional: She appears well-developed and well-nourished. No distress.   HENT:   Head: Normocephalic and atraumatic.   Eyes: No scleral icterus.   Neck: Normal range of motion. Neck supple.   Cardiovascular: Normal rate and normal heart sounds.    Pulmonary/Chest: Effort normal. No respiratory distress.   Abdominal: She exhibits no distension.   Musculoskeletal: Normal range of motion. She exhibits no edema or tenderness.   Neurological: She is alert. She exhibits abnormal muscle tone. Coordination abnormal.   Pill-rolling, resting tremor   Skin: Skin is warm and dry. No rash noted. She is not diaphoretic. No erythema. No pallor.   Psychiatric: She has a normal mood and affect. Her behavior is normal.   Nursing note and vitals reviewed.      ED Course     ED Course     Procedures               EKG Interpretation:      Interpreted by Hermann Rosado  Time reviewed:1250   Symptoms at time of EKG: syncopal episode   Rhythm: normal sinus   Rate: normal  Axis: NORMAL  Ectopy: none  Conduction: normal  ST Segments/ T Waves: No ST-T wave changes  Q Waves: none  Comparison to prior: No old EKG available    Clinical Impression: normal EKG           Results for orders placed or performed during the hospital encounter of 09/29/18 (from the past 24 hour(s))   CBC with platelets differential   Result Value Ref Range    WBC 9.6 4.0 - 11.0 10e9/L    RBC Count 4.57 3.8 - 5.2 10e12/L    Hemoglobin 13.8 11.7 - 15.7 g/dL    Hematocrit 42.0 35.0 - 47.0 %    MCV 92 78 - 100 fl    MCH 30.2 26.5 - 33.0 pg    MCHC 32.9 31.5 - 36.5 g/dL    RDW 12.6 10.0 - 15.0 %    Platelet Count 299 150 - 450 10e9/L    Diff Method Automated Method     % Neutrophils 85.3 %    % Lymphocytes 9.4 %    %  Monocytes 4.3 %    % Eosinophils 0.3 %    % Basophils 0.3 %    % Immature Granulocytes 0.4 %    Nucleated RBCs 0 0 /100    Absolute Neutrophil 8.2 1.6 - 8.3 10e9/L    Absolute Lymphocytes 0.9 0.8 - 5.3 10e9/L    Absolute Monocytes 0.4 0.0 - 1.3 10e9/L    Absolute Basophils 0.0 0.0 - 0.2 10e9/L    Abs Immature Granulocytes 0.0 0 - 0.4 10e9/L    Absolute Nucleated RBC 0.0    Basic metabolic panel   Result Value Ref Range    Sodium 140 133 - 144 mmol/L    Potassium 4.3 3.4 - 5.3 mmol/L    Chloride 104 94 - 109 mmol/L    Carbon Dioxide 28 20 - 32 mmol/L    Anion Gap 8 3 - 14 mmol/L    Glucose 119 (H) 70 - 99 mg/dL    Urea Nitrogen 25 7 - 30 mg/dL    Creatinine 1.17 (H) 0.52 - 1.04 mg/dL    GFR Estimate 47 (L) >60 mL/min/1.7m2    GFR Estimate If Black 57 (L) >60 mL/min/1.7m2    Calcium 9.5 8.5 - 10.1 mg/dL   Troponin I   Result Value Ref Range    Troponin I ES <0.015 0.000 - 0.045 ug/L   CT Head w/o Contrast    Narrative    CT OF THE HEAD WITHOUT CONTRAST  9/29/2018 1:23 PM     COMPARISON: Brain MRI 12/12/2016    HISTORY:  Syncope.     TECHNIQUE: 5 mm thick axial CT images of the head were acquired  without IV contrast material.    FINDINGS:  There is mild diffuse cerebral volume loss. There are  subtle patchy areas of decreased density in the cerebral white matter  bilaterally that are consistent with sequela of chronic small vessel  ischemic disease.     The ventricles and basal cisterns are within normal limits in  configuration given the degree of cerebral volume loss.  There is no  midline shift. There are no extra-axial fluid collections.     No intracranial hemorrhage, mass or recent infarct.    The visualized paranasal sinuses are well aerated. There is no  mastoiditis. There are no fractures of the visualized bones.       Impression    IMPRESSION:  Diffuse cerebral volume loss and cerebral white matter  changes consistent with chronic small vessel ischemic disease. No  evidence for acute intracranial pathology.      Radiation dose for this scan was reduced using automated exposure  control, adjustment of the mA and/or kV according to patient size, or  iterative reconstruction technique.    MYRA VAZ MD       Medications   0.9% sodium chloride BOLUS (0 mLs Intravenous Stopped 9/29/18 1411)         Assessments & Plan (with Medical Decision Making)  I had a long discussion with the patient and her  and offered admission for observation given that this was syncope.  I think the most likely etiology of this is vasovagal secondary to being in a hot shower, having Parkinson's disease, being on blood pressure medications.  I discussed the differential diagnosis with him including a malignant ventricular rhythm.  Stroke and other neurologic pathology is quite unlikely as the etiology given a normal head CT scan and that she is back at her baseline.  They would like to go home and return should things worsen.  The differential diagnosis, treatment options, risks and follow-up discussed with a competent patient who agrees with the plan.  Discussed with her  present who agrees.     I have reviewed the nursing notes.    I have reviewed the findings, diagnosis, plan and need for follow up with the patient.      Discharge Medication List as of 9/29/2018  2:20 PM          Final diagnoses:   Syncope and collapse     This document serves as a record of services personally performed by Hermann Rosado MD. It was created on their behalf by Sharon Martinez, a trained medical scribe. The creation of this record is based on the provider's personal observations and the statements of the patient. This document has been checked and approved by the attending provider.    Note: Chart documentation done in part with Dragon Voice Recognition software. Although reviewed after completion, some word and grammatical errors may remain.    9/29/2018   [facility] EMERGENCY DEPARTMENT     Hermann Rosado MD  09/29/18  1858

## 2018-09-29 NOTE — ED AVS SNAPSHOT
[facility] Emergency Department    [address] [place] [address]    Phone:  [phone]    Fax:  [phone]                                       Nathaly Farias   MRN: [id number]    Department:  [facility] Emergency Department   Date of Visit:  9/29/2018           After Visit Summary Signature Page     I have received my discharge instructions, and my questions have been answered. I have discussed any challenges I see with this plan with the nurse or doctor.    ..........................................................................................................................................  Patient/Patient Representative Signature      ..........................................................................................................................................  Patient Representative Print Name and Relationship to Patient    ..................................................               ................................................  Date                                   Time    ..........................................................................................................................................  Reviewed by Signature/Title    ...................................................              ..............................................  Date                                               Time          22EPIC Rev 08/18

## 2018-10-29 NOTE — PROGRESS NOTES
Diagnosis/Summary/Recommendations:    PATIENT: Nathaly Farias  63 year old female     : 1955    MOISE: 2018    Parkinson  Cognitive impairment    Emergency visit for collapse on 2018  Had normal ct of head.    Passed out twice.   She states she gets light headed/dizzy  114/80  She had one episode while taking the shower and was feeling dizzy/light headed and passed out in the shower when seated and was out for 3-4 minutes and came to and was disoriented.     The other episode she had fallen down the steps and when she got up she walked up the steps and felt weird and passed.       Medications     7am 12noon 9pm         Acetaminophen 325mg tylenol As needed              Carbidopa/levodopa Sinemet 25/100 2 2 2         Citalopram celexa 20mg    1            Diphenhydramine acetaminophen tylenol PM       As needed for sleep         Lisinopril prinivil/zestril 10mg    1            Lorazepam ativan 0.5mg As needed, rarely using               Melatonin 5mg or 10mg       Dose unknown. Taking as needed        Meloxicam mobic 15mg Not sure if taking this.            Polyethylene glycol miralax Drinking throughout the day            Senna senokot 8.6mg       1          Zolmitriptan zomig 5mg As needed                                                          History obtained from patient     Family wanted to get another scan  Memory issues are worse  Tremors are worse   She has dyskinesias and tremors that may be present at the same time.   She is taking her pills on an empty stomach.   Not clear if she has wearing off.   She is having more frequent headaches. These are different from her usual headaches.   She has some pain in her right side of her abdomen - she has pain only if she pushes there. She is having a bowel movement every 2-3 days. She wonders if there is something there.   She has had more depression and cries for no clear reason.     SUMMARY  ORTHOSTATISM - PROBABLY THE CAUSE OF THE TWO  SPELLS AND HAS WEIRD FEELINGS WHEN WALKING AT [employer]. SHE HAS DIZZY SPELLS AND EXHAUSTED.     WOULD HAVE HER REDUCE HER DOSE OF LISINOPRIL IF HER PCP AGREES. SHE IS TAKING 10MG NOW AND MAY WANT TO TAKE 5MG AND GO FROM THERE. MAY NEED TO GO TO 2.5MG AT SOME POINT.     Hold off on using proamatine (midodrine) or florinef (fludrocortisone) or mestinon (pyridostigmine) to elevate blood pressure.     Discussed changing around her sinemet from 2 tabs 3/day to 1.5 tabs 4 times per day.     Brain mri - expect to find increased atrophy and nonspecific white matter changes. Doubtful we will see a subdural or a stroke.     When she sees her pcp and gets routine/ananual physical may be worth getting a sed rate/crp as well as thyroid, cbc,     Not sure what is the abdominal pain but suspect she is significantly constipated and taking miralax once daily and one senokot per day. May need to increase the miralax to twice daily as well as the senokot twice daily    She has been having more mood issues.   ECG QTC was 453 ms  in females, 451-470 ms is intermediate.   It is no significantly prolonged   She is on citalopram 20mg per day.     PLAN  Brain mri  Visit with pcp today to review and possibly reduce the dose of lisinopril  May need some blood work eg sed rate, crp if getting blood work.   Could conceivably increase the citalopram from 20 to 30mg to see if helps   The abdominal pain may be related to obstipation/constipation and may need more aggressive bowel regimen  Such as twice daily senokot and miralax  Pharmacy consultation if covered with Corazon Dhaliwal, Pharm D to review mood and cognitive issues  She has not been on donepezil (aricept) or namenda/memantine.   Return back in three months.       Coding statement:   Duration of  Services: patient care and care coordination was 25 minutes  Greater than 50% of this visit was spent in counseling and coordination of care.     Jet Porter MD      ______________________________________    Last visit date and details:     Chief Complaint   Patient presents with     Loss of Consciousness      The history is provided by the patient and the spouse.      Nathaly Farias is a 63 year old female who presents to the ED for loss of consciousness at 11:00am this morning. She has a medical history of Parkinson's dementia. Patient passed out once before when she fell down stairs due to possible excitement. She is also experiencing nausea.  was called to help her as she felt weird.  caught her before she fell. He covered her and sat her down on the stool in the shower. She had labored breathing at that time. She went to lay down in bed, and then vomited as she felt weird. Patient then experienced a headache, but she always has a headache.  thought that the water might have been too hot.  relates that she gets confused when she is tired. Patient denies chest pain, shortness of breath, and hematuria. Patient was brought in Today in a wheelchair.          Patient Active Problem List   Diagnosis     Prolapse of vaginal wall     Migraine variant     Symptomatic menopausal or female climacteric states     Obesity     Malaise and fatigue     Other and unspecified adverse effect of drug, medicinal and biological substance     Other and unspecified disc disorder of cervical region     Sleep apnea     Essential hypertension, benign     Chronic cholecystitis     Overactive bladder     Neck pain     Headache     HYPERLIPIDEMIA LDL GOAL <160     Mild major depression (H)     Advanced directives, counseling/discussion     HTN, goal below 140/90     Paralysis agitans (H)     History of MRI of brain and brain stem     Wears glasses     Constipation     Incomplete RBBB     Heart murmur     Family history of tremor     Rosacea     Asymptomatic varicose veins     Seborrheic dermatitis     Post-traumatic osteoarthritis of left knee     Pain from implanted  hardware, initial encounter     Cognitive disorder evalaution 2017     Dementia     Hiatal hernia       Past Medical History         Past Medical History:   Diagnosis Date     Arthralgia of temporomandibular joint 10/22/1996     Arthritis       Cognitive disorder evalaution 2017 3/6/2017     She has marked attentional difficulties, impairments in memory including a rapid forgetting rate, mild anomia, and mild executive dysfunction. 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 She's required increasing assistance with finances, medications, driving, and cooking. I think this is a mild dementia, and     Constipation 2/23/2015     Dementia 3/13/2017     IMPRESSIONS AND RECOMMENDATIONS   Current results indicate marked attentional difficulties, impairments in memory, which in some cases reflected impairments in retention and in other cases reflected retrieval difficulties, and mild anomia as well as mild executive dysfunction. Premorbid intellectual functioning is estimated to fall in the borderline range. She denied experiencing significant depre     Depressive disorder       Heart murmur 2/23/2015     History of MRI of brain and brain stem 2/21/2015     MRI OF THE BRAIN WITHOUT CONTRAST 7/10/2014 1:21 PM  COMPARISON: None. HISTORY: Left-sided upper and lower extremity tremor. Balance issues. TECHNIQUE:  Brain: Axial diffusion-weighted with ADC map, T2-weighted with fat saturation, T1-weighted and turboFLAIR and coronal T1-weighted images of the brain were obtained without intravenous contrast.  FINDINGS: There is mild diffuse cerebral volume loss     Hypertension       Incomplete RBBB 2/23/2015     Motion sickness       Other and unspecified hyperlipidemia       Parkinson's disease (H)       Seborrheic dermatitis 2/29/2016     Unspecified arthropathy, hand 08/07/1997     Unspecified sleep apnea       moderate, sleep study 11/07,  on CPAP, has daytime somnolence with this     Variants of migraine, not elsewhere classified, without mention of intractable migraine without mention of status migrainosus       Wears glasses 2/23/2015             Past Surgical History          Past Surgical History:   Procedure Laterality Date     ------------OTHER-------------         left shoulder     ARTHRODESIS TOE(S)   3/2/2011     ARTHRODESIS TOE(S) performed by CLARA LUCAS at [facility]     C LIGATE FALLOPIAN TUBE   1998     CHOLECYSTECTOMY, LAPOROSCOPIC   5/12/2008     Cholecystectomy, Laparoscopic     ESOPHAGOSCOPY, GASTROSCOPY, DUODENOSCOPY (EGD), COMBINED N/A 7/5/2017     Procedure: COMBINED ESOPHAGOSCOPY, GASTROSCOPY, DUODENOSCOPY (EGD), BIOPSY SINGLE OR MULTIPLE;  Esophagogastroduodenoscopy with Multiple Biopsies by Biopsy;  Surgeon: Tj Denney MD;  Location: [facility]     HC COLONOSCOPY W/WO BRUSH/WASH   08/30/06     normal     HC OPEN TX TIBIAL SHAFT FX W PLATE/SCREWS W/WO CERCLAGE   2000     Multiple operations on left leg     HC SHLDR ARTHROSCOP,PART ACROMIOPLAS   11/16/06     Right shoulder     HC SHLDR ARTHROSCOP,SURG,DIS CLAVICULECTOMY   11/16/06     Right shoulder             Family History           Family History   Problem Relation Age of Onset     Diabetes Father         type II, diagnosed in late 60's     Hypertension Father       Cancer Father         rare kidney cancer had kidney removed, 75 y.o. at onset     Cerebrovascular Disease Father       Rashes/Skin Problems Father         rosacea     Neurologic Disorder Mother         head tremor     HEART DISEASE Brother         Valvular disease corrected with surgery     HEART DISEASE Paternal Grandfather         MI     Migraines Son       Migraines Sister       Rashes/Skin Problems Sister         rosacea                    Social History    Substance Use Topics      Smoking status: Never Smoker      Smokeless tobacco: Never Used          Comment: no smokers in the household      Alcohol  use 0.0 oz/week       0 Standard drinks or equivalent per week         Comment: very occ               Immunization History   Administered Date(s) Administered     Influenza (H1N1) 01/12/2010     Influenza (IIV3) PF 10/15/1996, 10/14/1997, 10/15/1998, 10/06/1999, 12/29/2000, 11/15/2001, 11/11/2002, 10/16/2003, 11/01/2004, 11/02/2005, 11/12/2007, 11/10/2008, 11/10/2010, 10/04/2011, 01/04/2013, 09/03/2014     Influenza Vaccine IM 3yrs+ 4 Valent IIV4 08/22/2016, 09/03/2017     Influenza Vaccine, 3 YRS +, IM (QUADRIVALENT W/PRESERVATIVES) 09/08/2015     Pneumococcal 23 valent 11/01/2004     TD (ADULT, 7+) 10/05/1999     TDAP Vaccine (Adacel) 10/04/2011     Zoster vaccine, live 11/29/2014                   Allergies   Allergen Reactions     Sulfa Drugs         Tacchycardia, had to go to ED     Naproxen GI Disturbance     Oxycontin [Oxycodone] GI Disturbance       Made her feel funny/upset stomach     Rivastigmine         Patch was too expensive     Ropinirole         Did not work          Current Outpatient Prescriptions           Current Outpatient Prescriptions   Medication Sig Dispense Refill     carbidopa-levodopa (SINEMET)  MG per tablet 2 tabs by mouth @ 7am, noon, and 9pm 540 tablet 3     citalopram (CELEXA) 20 MG tablet 20mg tab by mouth @12noon 90 tablet 3     diphenhydrAMINE-acetaminophen (TYLENOL PM EXTRA STRENGTH)  MG tablet 1 tab by mouth @ 9/930pm as needed 14 tablet       lisinopril (PRINIVIL/ZESTRIL) 10 MG tablet 1 tab by mouth at 12 noon 90 tablet 3     melatonin 5 MG tablet Not sure of dose and taking 1 tab by mouth @ 9pm         meloxicam (MOBIC) 15 MG tablet Take 1 tablet (15 mg) by mouth daily 180 tablet 3     polyethylene glycol (MIRALAX) powder Once daily as needed in morning (1 capful per 8 oz water) 510 g 1     senna (SENOKOT) 8.6 MG tablet 1 tab @ 9pm as needed 120 tablet       ZOLMitriptan (ZOMIG) 5 MG tablet Take 1 tablet (5 mg) by mouth at onset of headache for migraine MAY REPEAT  ONCE AFTER 2 HOURS. DO NOT EXCEED 2 TABLETS IN 24 HOURS. 6 tablet 10            Review of Systems   Respiratory: Negative for shortness of breath.         POSITIVE labored breathing   Cardiovascular: Negative for chest pain.   Gastrointestinal: Positive for nausea.   Genitourinary: Negative for hematuria.   Neurological: Positive for headaches.        POSITIVE loss of consciousness     All other systems reviewed and are negative.        Physical Exam   BP: 117/57  Pulse: 77  Temp: 98.4  F (36.9  C)  Resp: 20  Weight: 60.8 kg (134 lb)  SpO2: 100 %        Physical Exam   Constitutional: She appears well-developed and well-nourished. No distress.   HENT:   Head: Normocephalic and atraumatic.   Eyes: No scleral icterus.   Neck: Normal range of motion. Neck supple.   Cardiovascular: Normal rate and normal heart sounds.    Pulmonary/Chest: Effort normal. No respiratory distress.   Abdominal: She exhibits no distension.   Musculoskeletal: Normal range of motion. She exhibits no edema or tenderness.   Neurological: She is alert. She exhibits abnormal muscle tone. Coordination abnormal.   Pill-rolling, resting tremor   Skin: Skin is warm and dry. No rash noted. She is not diaphoretic. No erythema. No pallor.   Psychiatric: She has a normal mood and affect. Her behavior is normal.   Nursing note and vitals reviewed.        ED Course      ED Course      Procedures                EKG Interpretation:       Interpreted by Hermann Rosado  Time reviewed:1250   Symptoms at time of EKG: syncopal episode   Rhythm: normal sinus   Rate: normal  Axis: NORMAL  Ectopy: none  Conduction: normal  ST Segments/ T Waves: No ST-T wave changes  Q Waves: none  Comparison to prior: No old EKG available     Clinical Impression: normal EKG                 Results for orders placed or performed during the hospital encounter of 09/29/18 (from the past 24 hour(s))   CBC with platelets differential   Result Value Ref Range     WBC 9.6 4.0 - 11.0 10e9/L      RBC Count 4.57 3.8 - 5.2 10e12/L     Hemoglobin 13.8 11.7 - 15.7 g/dL     Hematocrit 42.0 35.0 - 47.0 %     MCV 92 78 - 100 fl     MCH 30.2 26.5 - 33.0 pg     MCHC 32.9 31.5 - 36.5 g/dL     RDW 12.6 10.0 - 15.0 %     Platelet Count 299 150 - 450 10e9/L     Diff Method Automated Method       % Neutrophils 85.3 %     % Lymphocytes 9.4 %     % Monocytes 4.3 %     % Eosinophils 0.3 %     % Basophils 0.3 %     % Immature Granulocytes 0.4 %     Nucleated RBCs 0 0 /100     Absolute Neutrophil 8.2 1.6 - 8.3 10e9/L     Absolute Lymphocytes 0.9 0.8 - 5.3 10e9/L     Absolute Monocytes 0.4 0.0 - 1.3 10e9/L     Absolute Basophils 0.0 0.0 - 0.2 10e9/L     Abs Immature Granulocytes 0.0 0 - 0.4 10e9/L     Absolute Nucleated RBC 0.0     Basic metabolic panel   Result Value Ref Range     Sodium 140 133 - 144 mmol/L     Potassium 4.3 3.4 - 5.3 mmol/L     Chloride 104 94 - 109 mmol/L     Carbon Dioxide 28 20 - 32 mmol/L     Anion Gap 8 3 - 14 mmol/L     Glucose 119 (H) 70 - 99 mg/dL     Urea Nitrogen 25 7 - 30 mg/dL     Creatinine 1.17 (H) 0.52 - 1.04 mg/dL     GFR Estimate 47 (L) >60 mL/min/1.7m2     GFR Estimate If Black 57 (L) >60 mL/min/1.7m2     Calcium 9.5 8.5 - 10.1 mg/dL   Troponin I   Result Value Ref Range     Troponin I ES <0.015 0.000 - 0.045 ug/L   CT Head w/o Contrast     Narrative     CT OF THE HEAD WITHOUT CONTRAST  9/29/2018 1:23 PM      COMPARISON: Brain MRI 12/12/2016     HISTORY:  Syncope.      TECHNIQUE: 5 mm thick axial CT images of the head were acquired  without IV contrast material.     FINDINGS:  There is mild diffuse cerebral volume loss. There are  subtle patchy areas of decreased density in the cerebral white matter  bilaterally that are consistent with sequela of chronic small vessel  ischemic disease.      The ventricles and basal cisterns are within normal limits in  configuration given the degree of cerebral volume loss.  There is no  midline shift. There are no extra-axial fluid collections.       No intracranial hemorrhage, mass or recent infarct.     The visualized paranasal sinuses are well aerated. There is no  mastoiditis. There are no fractures of the visualized bones.      Impression     IMPRESSION:  Diffuse cerebral volume loss and cerebral white matter  changes consistent with chronic small vessel ischemic disease. No  evidence for acute intracranial pathology.      Radiation dose for this scan was reduced using automated exposure  control, adjustment of the mA and/or kV according to patient size, or  iterative reconstruction technique.     MYRA VAZ MD         Medications   0.9% sodium chloride BOLUS (0 mLs Intravenous Stopped 9/29/18 2037)            Assessments & Plan (with Medical Decision Making)  I had a long discussion with the patient and her  and offered admission for observation given that this was syncope.  I think the most likely etiology of this is vasovagal secondary to being in a hot shower, having Parkinson's disease, being on blood pressure medications.  I discussed the differential diagnosis with him including a malignant ventricular rhythm.  Stroke and other neurologic pathology is quite unlikely as the etiology given a normal head CT scan and that she is back at her baseline.  They would like to go home and return should things worsen.  The differential diagnosis, treatment options, risks and follow-up discussed with a competent patient who agrees with the plan.  Discussed with her  present who agrees.      I have reviewed the nursing notes.     I have reviewed the findings, diagnosis, plan and need for follow up with the patient.        Discharge Medication List as of 9/29/2018  2:20 PM          MOISE: May 14, 2018     Parkinson  Cognitive impairment     Had grand kids  Son and his wife and kids visited  And went to daughters yesterday     Takes a nap for 2 hours - from 1pm till around 3pm  She may have some impact on her sleep at night  She gets tired  walking in the grocery store  She feels better pushing a cart  She has not had falls  She has dyskinesias today in the office.   She has noticed more dyskinesias but not clear if there is a time that it is worth  She has an alarm on her phone to remind her to take her pills  She has her feet turn in.  She was unable to get to [facility]  She walks in the yard and to get the mail.  She has not had falls  She carries her phone with her  Rosa helps her a lot.      She has not had any hallucinations since she went off a medication in the past.      Medications     7am 12noon 9pm         Acetaminophen 325mg tylenol As needed              Carbidopa/levodopa Sinemet 25/100 2 2 2         Citalopram celexa 20mg    1            Diphenhydramine acetaminophen tylenol PM       As needed for sleep         Lisinopril prinivil/zestril 10mg    1            Lorazepam ativan 0.5mg As needed, rarely using               Melatonin 5mg or 10mg       Dose unknown. Taking as needed        Meloxicam mobic 15mg Not sure if taking this.            Polyethylene glycol miralax As needed     as needed        Quetiapine seroquel 25mg Not taking               Rivastigmine exelon patch mg Not taking               Senna senokot 8.6mg       As needed          Trospium sanctura 20mg Not taking               Zolmitriptan zomig 5mg As needed                                                          PLAN  No change in medication  She will need to review her medication schedule with family to make sure we have an accurate list of what she is taking.      History obtained from patient          ______________________________________      Patient was asked about 14 Review of systems including changes in vision (dry eyes, double vision), hearing, heart, lungs, musculoskeletal, depression, anxiety, snoring, RBD, insomnia, urinary frequency, urinary urgency, constipation, swallowing problems, hematological, ID, allergies, skin problems: seborrhea,  endocrinological: thyroid, diabetes, cholesterol; balance, weight changes, and other neurological problems and these were not significant at this time except for   Patient Active Problem List   Diagnosis     Prolapse of vaginal wall     Migraine variant     Symptomatic menopausal or female climacteric states     Obesity     Malaise and fatigue     Other and unspecified adverse effect of drug, medicinal and biological substance     Other and unspecified disc disorder of cervical region     Sleep apnea     Essential hypertension, benign     Chronic cholecystitis     Overactive bladder     Neck pain     Headache     HYPERLIPIDEMIA LDL GOAL <160     Mild major depression (H)     Advanced directives, counseling/discussion     HTN, goal below 140/90     Paralysis agitans (H)     History of MRI of brain and brain stem     Wears glasses     Constipation     Incomplete RBBB     Heart murmur     Family history of tremor     Rosacea     Asymptomatic varicose veins     Seborrheic dermatitis     Post-traumatic osteoarthritis of left knee     Pain from implanted hardware, initial encounter     Cognitive disorder evalaution 2017     Dementia     Hiatal hernia          Allergies   Allergen Reactions     Sulfa Drugs      Tacchycardia, had to go to ED     Naproxen GI Disturbance     Oxycontin [Oxycodone] GI Disturbance     Made her feel funny/upset stomach     Rivastigmine      Patch was too expensive     Ropinirole      Did not work     Past Surgical History:   Procedure Laterality Date     ------------OTHER-------------      left shoulder     ARTHRODESIS TOE(S)  3/2/2011    ARTHRODESIS TOE(S) performed by CL[facility] LUCAS at [facility]     C LIGATE FALLOPIAN TUBE  1998     CHOLECYSTECTOMY, LAPOROSCOPIC  5/12/2008    Cholecystectomy, Laparoscopic     ESOPHAGOSCOPY, GASTROSCOPY, DUODENOSCOPY (EGD), COMBINED N/A 7/5/2017    Procedure: COMBINED ESOPHAGOSCOPY, GASTROSCOPY, DUODENOSCOPY (EGD), BIOPSY SINGLE OR MULTIPLE;   Esophagogastroduodenoscopy with Multiple Biopsies by Biopsy;  Surgeon: Tj Denney MD;  Location: [facility]     HC COLONOSCOPY W/WO BRUSH/WASH  08/30/06    normal     HC OPEN TX TIBIAL SHAFT FX W PLATE/SCREWS W/WO CERCLAGE  2000    Multiple operations on left leg     HC SHLDR ARTHROSCOP,PART ACROMIOPLAS  11/16/06    Right shoulder     HC SHLDR ARTHROSCOP,SURG,DIS CLAVICULECTOMY  11/16/06    Right shoulder     Past Medical History:   Diagnosis Date     Arthralgia of temporomandibular joint 10/22/1996     Arthritis      Cognitive disorder evalaution 2017 3/6/2017    She has marked attentional difficulties, impairments in memory including a rapid forgetting rate, mild anomia, and mild executive dysfunction. 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 She's required increasing assistance with finances, medications, driving, and cooking. I think this is a mild dementia, and     Constipation 2/23/2015     Dementia 3/13/2017    IMPRESSIONS AND RECOMMENDATIONS   Current results indicate marked attentional difficulties, impairments in memory, which in some cases reflected impairments in retention and in other cases reflected retrieval difficulties, and mild anomia as well as mild executive dysfunction. Premorbid intellectual functioning is estimated to fall in the borderline range. She denied experiencing significant depre     Depressive disorder      Heart murmur 2/23/2015     History of MRI of brain and brain stem 2/21/2015    MRI OF THE BRAIN WITHOUT CONTRAST 7/10/2014 1:21 PM  COMPARISON: None. HISTORY: Left-sided upper and lower extremity tremor. Balance issues. TECHNIQUE:  Brain: Axial diffusion-weighted with ADC map, T2-weighted with fat saturation, T1-weighted and turboFLAIR and coronal T1-weighted images of the brain were obtained without intravenous contrast.  FINDINGS: There is mild diffuse cerebral volume loss      "Hypertension      Incomplete RBBB 2/23/2015     Motion sickness      Other and unspecified hyperlipidemia      Parkinson's disease (H)      Seborrheic dermatitis 2/29/2016     Unspecified arthropathy, hand 08/07/1997     Unspecified sleep apnea     moderate, sleep study 11/07, on CPAP, has daytime somnolence with this     Variants of migraine, not elsewhere classified, without mention of intractable migraine without mention of status migrainosus      Wears glasses 2/23/2015     Social History     Social History     Marital status:      Spouse name: violet     Number of children: 2     Years of education: N/A     Occupational History           Social History Main Topics     Smoking status: Never Smoker     Smokeless tobacco: Never Used      Comment: no smokers in the household     Alcohol use 0.0 oz/week     0 Standard drinks or equivalent per week      Comment: very occ     Drug use: No     Sexual activity: Yes     Partners: Male     Birth control/ protection: Surgical, Female Surgical      Comment: tubal ligation     Other Topics Concern     Caffeine Concern Yes     OCC     Exercise No     \"not faithfully\"     Seat Belt Yes     Self-Exams No     Social History Narrative    ALLERGIES:  She is allergic to sulfa, and has had gastrointestinal side effects from naproxen.               FAMILY HISTORY:  Strongly positive for headaches, including in her son and sister.  There is no family history of tremor.               SOCIAL HISTORY:  She does not work outside the home.  She does not smoke.  She uses very little alcohol.  Lives in [place]. . Has 2 kids: son who is 35 yr s old. And daughter who is 30 yrs old. Her  is working as a  .        Drug and lactation database from the [place] [employer]:  http://toxnet.nlm.nih.gov/cgi-bin/sis/htmlgen?LACT      B/P: Data Unavailable, T: Data Unavailable, P: Data Unavailable, R: Data Unavailable 0 lbs 0 oz  Last " menstrual period 06/05/2006, not currently breastfeeding., There is no height or weight on file to calculate BMI.  Medications and Vitals not listed above were documented in the cart and reviewed by me.     Current Outpatient Prescriptions   Medication Sig Dispense Refill     carbidopa-levodopa (SINEMET)  MG per tablet 2 tabs by mouth @ 7am, noon, and 9pm 540 tablet 3     citalopram (CELEXA) 20 MG tablet 20mg tab by mouth @12noon 90 tablet 3     diphenhydrAMINE-acetaminophen (TYLENOL PM EXTRA STRENGTH)  MG tablet 1 tab by mouth @ 9/930pm as needed 14 tablet      lisinopril (PRINIVIL/ZESTRIL) 10 MG tablet 1 tab by mouth at 12 noon 90 tablet 3     melatonin 5 MG tablet Not sure of dose and taking 1 tab by mouth @ 9pm       meloxicam (MOBIC) 15 MG tablet Take 1 tablet (15 mg) by mouth daily 180 tablet 3     polyethylene glycol (MIRALAX) powder Once daily as needed in morning (1 capful per 8 oz water) 510 g 1     senna (SENOKOT) 8.6 MG tablet 1 tab @ 9pm as needed 120 tablet      ZOLMitriptan (ZOMIG) 5 MG tablet Take 1 tablet (5 mg) by mouth at onset of headache for migraine MAY REPEAT ONCE AFTER 2 HOURS. DO NOT EXCEED 2 TABLETS IN 24 HOURS. 6 tablet 10         Jet Porter MD

## 2018-10-31 NOTE — PROGRESS NOTES
SUBJECTIVE:   Nathaly Farias is a 63 year old female who presents to clinic today for the following health issues:      History of Present Illness     Diet:  Other  Frequency of exercise:  1 day/week  Duration of exercise:  Less than 15 minutes  Taking medications regularly:  Yes  Medication side effects:  None  Additional concerns today:  Yes    Answers for HPI/ROS submitted by the patient on 11/5/2018   PHQ-2 Score: 2  If you checked off any problems, how difficult have these problems made it for you to do your work, take care of things at home, or get along with other people?: Somewhat difficult  PHQ9 TOTAL SCORE: 8  REX 7 TOTAL SCORE: 4    ABDOMINAL PAIN (recheck)     Onset: Ongoing    Description:   Character: Patient states it hurt  Location: right lower quadrant  Radiation: None    Intensity: 0/10 currently    Progression of Symptoms:  same    Accompanying Signs & Symptoms:  Fever/Chills?: no   Gas/Bloating: YES  Nausea: no   Vomitting: no   Diarrhea?: Yes   Constipation: Yes  Dysuria or Hematuria: no    History:   Trauma: no   Previous similar pain: YES   Previous tests done: YES through    Precipitating factors:   Does the pain change with:     Food: no      BM: no     Urination: no       Therapies Tried and outcome: Miralax and Sinemet    LMP:  not applicable   Hypertension  She has had a couple instances of dizziness and fainting associated with low blood pressure. She is scheduled for an MRI tomorrow to ensure her Parkinson's is not the cause.     Mood  She does not know why she gets intermittent episodes of crying. She reports having visual hallucinations lately of gremlins, she has had them in the past, at which time she was taken off of a Parkinson's medication which alleviated the symptom at that time. She does not think her memory problems have contributed to her depression.     Abdominal Pain  She reports it is worse after eating a candy bar. She had a bowel movement today and describes it as  liquid, but says her bowel movements vary greatly, typically more towards the dry side.     Problem list and histories reviewed & adjusted, as indicated.  Additional history: as documented    Patient Active Problem List   Diagnosis     Prolapse of vaginal wall     Migraine variant     Symptomatic menopausal or female climacteric states     Obesity     Malaise and fatigue     Other and unspecified adverse effect of drug, medicinal and biological substance     Other and unspecified disc disorder of cervical region     Sleep apnea     Essential hypertension, benign     Chronic cholecystitis     Overactive bladder     Neck pain     Headache     HYPERLIPIDEMIA LDL GOAL <160     Mild major depression (H)     Advanced directives, counseling/discussion     HTN, goal below 140/90     Paralysis agitans (H)     History of MRI of brain and brain stem     Wears glasses     Constipation     Incomplete RBBB     Heart murmur     Family history of tremor     Rosacea     Asymptomatic varicose veins     Seborrheic dermatitis     Post-traumatic osteoarthritis of left knee     Pain from implanted hardware, initial encounter     Cognitive disorder evalaution 2017     Dementia     Hiatal hernia     Past Surgical History:   Procedure Laterality Date     ------------OTHER-------------      left shoulder     ARTHRODESIS TOE(S)  3/2/2011    ARTHRODESIS TOE(S) performed by CLARA LUCAS at  OR     C LIGATE FALLOPIAN TUBE  1998     CHOLECYSTECTOMY, LAPOROSCOPIC  5/12/2008    Cholecystectomy, Laparoscopic     ESOPHAGOSCOPY, GASTROSCOPY, DUODENOSCOPY (EGD), COMBINED N/A 7/5/2017    Procedure: COMBINED ESOPHAGOSCOPY, GASTROSCOPY, DUODENOSCOPY (EGD), BIOPSY SINGLE OR MULTIPLE;  Esophagogastroduodenoscopy with Multiple Biopsies by Biopsy;  Surgeon: Tj Denney MD;  Location: [facility]     HC COLONOSCOPY W/WO BRUSH/WASH  08/30/06    normal     HC OPEN TX TIBIAL SHAFT FX W PLATE/SCREWS W/WO CERCLAGE  2000    Multiple operations on left leg      HC SHLDR ARTHROSCOP,PART ACROMIOPLAS  11/16/06    Right shoulder     HC SHLDR ARTHROSCOP,SURG,DIS CLAVICULECTOMY  11/16/06    Right shoulder       Social History   Substance Use Topics     Smoking status: Never Smoker     Smokeless tobacco: Never Used      Comment: no smokers in the household     Alcohol use 0.0 oz/week     0 Standard drinks or equivalent per week      Comment: very occ     Family History   Problem Relation Age of Onset     Diabetes Father      type II, diagnosed in late 60's     Hypertension Father      Cancer Father      rare kidney cancer had kidney removed, 75 y.o. at onset     Cerebrovascular Disease Father      Rashes/Skin Problems Father      rosacea     Neurologic Disorder Mother      head tremor     HEART DISEASE Brother      Valvular disease corrected with surgery     HEART DISEASE Paternal Grandfather      MI     Migraines Son      Migraines Sister      Rashes/Skin Problems Sister      rosacea           ROS:  Constitutional, HEENT, cardiovascular, pulmonary, GI, , musculoskeletal, neuro, skin, endocrine and psych systems are negative, except as in HPI or otherwise noted.     This document serves as a record of the services and decisions personally performed and made by Maxine Monae MD. It was created on her behalf by Ethan Myers, a trained medical scribe. The creation of this document is based the provider's statements to the medical scribe.  Ethan Myers, November 5, 2018 3:20 PM    OBJECTIVE:                                                    /60  Pulse 81  Temp 96.5  F (35.8  C) (Temporal)  Wt 138 lb (62.6 kg)  LMP 06/05/2006  SpO2 97%  Breastfeeding? No  BMI 24.45 kg/m2  Body mass index is 24.45 kg/(m^2).   GENERAL: healthy, alert, well nourished, well hydrated, no distress  HENT: ear canals- normal; TMs- normal; Nose- normal; Mouth- no ulcers, no lesions  NECK: no tenderness, no adenopathy, no asymmetry, no masses, no stiffness; thyroid- normal to  palpation  RESP: lungs clear to auscultation - no rales, no rhonchi, no wheezes  CV: regular rates and rhythm, normal S1 S2, no S3 or S4 and no murmur, no click or rub -  ABDOMEN: mild discomfort in lower abdomen upon palpation, waste build-up noted  SKIN: no suspicious lesions, no rashes to visible skin  PSYCH: Alert and oriented times 3; speech- coherent , normal rate and volume; able to articulate logical thoughts, able to abstract reason, no tangential thoughts, no hallucinations or delusions, affect- normal    Diagnostic test results:  No results found for this or any previous visit (from the past 24 hour(s)).     ASSESSMENT/PLAN:                                                        ICD-10-CM    1. Migraine variant G43.809    2. Visit for screening mammogram Z12.31 *MA Screening Digital Bilateral   3. HTN, goal below 140/90 I10 lisinopril (PRINIVIL/ZESTRIL) 5 MG tablet     Comprehensive metabolic panel (BMP + Alb, Alk Phos, ALT, AST, Total. Bili, TP)   4. Paralysis agitans (H) G20    5. Cognitive disorder evalaution 2017 F09 Lipid panel reflex to direct LDL Fasting     ESR: Erythrocyte sedimentation rate     CRP, inflammation     TSH with free T4 reflex     Comprehensive metabolic panel (BMP + Alb, Alk Phos, ALT, AST, Total. Bili, TP)   6. Dementia due to Parkinson's disease without behavioral disturbance (H) G20 ESR: Erythrocyte sedimentation rate    F02.80 CRP, inflammation     TSH with free T4 reflex     Comprehensive metabolic panel (BMP + Alb, Alk Phos, ALT, AST, Total. Bili, TP)   7. Need for prophylactic vaccination and inoculation against influenza Z23 FLU VACCINE, (RIV4) RECOMBINANT HA  , IM (FluBlok, egg free) [98784]- >18 YRS ([facility] recommended  50-64 YRS)     Vaccine Administration, Initial [[id number]]     Cognitive Disorder:  Lipid panel ordered to be completed today.    Abdominal Pain:  Likely constipation, recommended twice daily senokot or Miralax per previous instructions.     Hypertension:  Due to  "patients recent dizziness and fainting, we will wean her off of lisinopril. Return to clinic in 1 month for blood pressure recheck.    Mood:  Continue with Celexa dose increase as prescribed by Dr. Porter and follow-up in 1 month. OK for either of us to f/u. But we can adjust and see her in 1 month so have offered to keep on top of this between their visits despite his adjustment today. Also offered counseling.   Also will need to follow up on the \"gremlin\" she is seeing. These hallucinations can occur with parkinson's so will need to keep neurology in the loop in case their are meds that may affect this.    Health Maintenance:  Order placed for mammogram, to be scheduled when convenient. Vaccination(s) administered: Influenza.  Advanced Directive:  Discussed and provided information and/or forms for completing an advance directive.    The information in this document, created by the medical scribe for me, accurately reflects the services I personally performed and the decisions made by me. I have reviewed and approved this document for accuracy.   MD Maxine Brown MD, MD  [facility]    Injectable Influenza Immunization Documentation    1.  Is the person to be vaccinated sick today?   No    2. Does the person to be vaccinated have an allergy to a component   of the vaccine?   No  Egg Allergy Algorithm Link    3. Has the person to be vaccinated ever had a serious reaction   to influenza vaccine in the past?   No    4. Has the person to be vaccinated ever had Guillain-Barré syndrome?   No    Form completed by Bhumika Garcia CMA           "

## 2018-11-05 ENCOUNTER — OFFICE VISIT (OUTPATIENT)
Dept: FAMILY MEDICINE | Facility: OTHER | Age: 63
End: 2018-11-05
Payer: COMMERCIAL

## 2018-11-05 ENCOUNTER — OFFICE VISIT (OUTPATIENT)
Dept: NEUROLOGY | Facility: CLINIC | Age: 63
End: 2018-11-05
Payer: COMMERCIAL

## 2018-11-05 VITALS
OXYGEN SATURATION: 97 % | HEART RATE: 81 BPM | BODY MASS INDEX: 24.45 KG/M2 | WEIGHT: 138 LBS | SYSTOLIC BLOOD PRESSURE: 110 MMHG | TEMPERATURE: 96.5 F | DIASTOLIC BLOOD PRESSURE: 60 MMHG

## 2018-11-05 VITALS
HEART RATE: 76 BPM | HEIGHT: 63 IN | WEIGHT: 136.2 LBS | RESPIRATION RATE: 16 BRPM | OXYGEN SATURATION: 95 % | SYSTOLIC BLOOD PRESSURE: 114 MMHG | DIASTOLIC BLOOD PRESSURE: 80 MMHG | BODY MASS INDEX: 24.13 KG/M2

## 2018-11-05 DIAGNOSIS — I10 HTN, GOAL BELOW 140/90: ICD-10-CM

## 2018-11-05 DIAGNOSIS — F33.0 MAJOR DEPRESSIVE DISORDER, RECURRENT EPISODE, MILD (H): ICD-10-CM

## 2018-11-05 DIAGNOSIS — K59.09 OTHER CONSTIPATION: ICD-10-CM
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
## 2018-11-05 DIAGNOSIS — G43.809 MIGRAINE VARIANT: Primary | ICD-10-CM

## 2018-11-05 DIAGNOSIS — Z12.31 VISIT FOR SCREENING MAMMOGRAM: ICD-10-CM

## 2018-11-05 DIAGNOSIS — Z23 NEED FOR PROPHYLACTIC VACCINATION AND INOCULATION AGAINST INFLUENZA: ICD-10-CM

## 2018-11-05 DIAGNOSIS — F02.80 DEMENTIA DUE TO PARKINSON'S DISEASE WITHOUT BEHAVIORAL DISTURBANCE (H): ICD-10-CM

## 2018-11-05 DIAGNOSIS — G20.A1 PARALYSIS AGITANS (H): Primary | ICD-10-CM

## 2018-11-05 DIAGNOSIS — M17.32 POST-TRAUMATIC OSTEOARTHRITIS OF LEFT KNEE: ICD-10-CM

## 2018-11-05 DIAGNOSIS — G20.A1 DEMENTIA DUE TO PARKINSON'S DISEASE WITHOUT BEHAVIORAL DISTURBANCE (H): ICD-10-CM

## 2018-11-05 DIAGNOSIS — G20.A1 PARALYSIS AGITANS (H): ICD-10-CM

## 2018-11-05 DIAGNOSIS — F09 COGNITIVE DISORDER: ICD-10-CM

## 2018-11-05 LAB
ALBUMIN SERPL-MCNC: 3.9 G/DL (ref 3.4–5)
ALP SERPL-CCNC: 48 U/L (ref 40–150)
ALT SERPL W P-5'-P-CCNC: 14 U/L (ref 0–50)
ANION GAP SERPL CALCULATED.3IONS-SCNC: 10 MMOL/L (ref 3–14)
AST SERPL W P-5'-P-CCNC: 17 U/L (ref 0–45)
BILIRUB SERPL-MCNC: 0.9 MG/DL (ref 0.2–1.3)
BUN SERPL-MCNC: 27 MG/DL (ref 7–30)
CALCIUM SERPL-MCNC: 8.9 MG/DL (ref 8.5–10.1)
CHLORIDE SERPL-SCNC: 107 MMOL/L (ref 94–109)
CHOLEST SERPL-MCNC: 222 MG/DL
CO2 SERPL-SCNC: 25 MMOL/L (ref 20–32)
CREAT SERPL-MCNC: 1.09 MG/DL (ref 0.52–1.04)
CRP SERPL-MCNC: <2.9 MG/L (ref 0–8)
ERYTHROCYTE [SEDIMENTATION RATE] IN BLOOD BY WESTERGREN METHOD: 10 MM/H (ref 0–30)
GFR SERPL CREATININE-BSD FRML MDRD: 51 ML/MIN/1.7M2
GLUCOSE SERPL-MCNC: 92 MG/DL (ref 70–99)
HDLC SERPL-MCNC: 50 MG/DL
LDLC SERPL CALC-MCNC: 138 MG/DL
NONHDLC SERPL-MCNC: 172 MG/DL
POTASSIUM SERPL-SCNC: 4.2 MMOL/L (ref 3.4–5.3)
PROT SERPL-MCNC: 7.3 G/DL (ref 6.8–8.8)
SODIUM SERPL-SCNC: 142 MMOL/L (ref 133–144)
TRIGL SERPL-MCNC: 171 MG/DL
TSH SERPL DL<=0.005 MIU/L-ACNC: 0.78 MU/L (ref 0.4–4)

## 2018-11-05 PROCEDURE — 99214 OFFICE O/P EST MOD 30 MIN: CPT | Performed by: PSYCHIATRY & NEUROLOGY

## 2018-11-05 PROCEDURE — 80061 LIPID PANEL: CPT | Performed by: FAMILY MEDICINE

## 2018-11-05 PROCEDURE — 36415 COLL VENOUS BLD VENIPUNCTURE: CPT | Performed by: FAMILY MEDICINE

## 2018-11-05 PROCEDURE — 86140 C-REACTIVE PROTEIN: CPT | Performed by: FAMILY MEDICINE

## 2018-11-05 PROCEDURE — G0008 ADMIN INFLUENZA VIRUS VAC: HCPCS | Performed by: FAMILY MEDICINE

## 2018-11-05 PROCEDURE — 84443 ASSAY THYROID STIM HORMONE: CPT | Performed by: FAMILY MEDICINE

## 2018-11-05 PROCEDURE — 99214 OFFICE O/P EST MOD 30 MIN: CPT | Mod: 25 | Performed by: FAMILY MEDICINE

## 2018-11-05 PROCEDURE — 90682 RIV4 VACC RECOMBINANT DNA IM: CPT | Performed by: FAMILY MEDICINE

## 2018-11-05 PROCEDURE — 80053 COMPREHEN METABOLIC PANEL: CPT | Performed by: FAMILY MEDICINE

## 2018-11-05 PROCEDURE — 85652 RBC SED RATE AUTOMATED: CPT | Performed by: FAMILY MEDICINE

## 2018-11-05 RX ORDER — CITALOPRAM HYDROBROMIDE 20 MG/1
TABLET ORAL
Qty: 90 TABLET | Refills: 3 | Status: SHIPPED | OUTPATIENT
Start: 2018-11-05 | End: 2018-11-05

## 2018-11-05 RX ORDER — MELOXICAM 15 MG/1
15 TABLET ORAL PRN
Qty: 180 TABLET | Refills: 3 | COMMUNITY
Start: 2018-11-05 | End: 2019-02-04

## 2018-11-05 RX ORDER — POLYETHYLENE GLYCOL 3350 17 G/17G
POWDER, FOR SOLUTION ORAL
Qty: 510 G | Refills: 1 | COMMUNITY
Start: 2018-11-05

## 2018-11-05 RX ORDER — CITALOPRAM HYDROBROMIDE 20 MG/1
TABLET ORAL
Qty: 135 TABLET | Refills: 3 | Status: SHIPPED | OUTPATIENT
Start: 2018-11-05 | End: 2019-02-04

## 2018-11-05 RX ORDER — LISINOPRIL 5 MG/1
TABLET ORAL
Qty: 30 TABLET | Refills: 1 | Status: SHIPPED | OUTPATIENT
Start: 2018-11-05 | End: 2019-02-04

## 2018-11-05 RX ORDER — CARBIDOPA AND LEVODOPA 25; 100 MG/1; MG/1
TABLET ORAL
Qty: 540 TABLET | Refills: 3 | Status: SHIPPED | OUTPATIENT
Start: 2018-11-05 | End: 2019-02-04

## 2018-11-05 RX ORDER — SENNOSIDES A AND B 8.6 MG/1
TABLET, FILM COATED ORAL
Qty: 120 TABLET | COMMUNITY
Start: 2018-11-05 | End: 2019-09-16

## 2018-11-05 ASSESSMENT — ANXIETY QUESTIONNAIRES
5. BEING SO RESTLESS THAT IT IS HARD TO SIT STILL: SEVERAL DAYS
2. NOT BEING ABLE TO STOP OR CONTROL WORRYING: SEVERAL DAYS
7. FEELING AFRAID AS IF SOMETHING AWFUL MIGHT HAPPEN: SEVERAL DAYS
6. BECOMING EASILY ANNOYED OR IRRITABLE: NOT AT ALL
4. TROUBLE RELAXING: NOT AT ALL
7. FEELING AFRAID AS IF SOMETHING AWFUL MIGHT HAPPEN: SEVERAL DAYS
1. FEELING NERVOUS, ANXIOUS, OR ON EDGE: NOT AT ALL
GAD7 TOTAL SCORE: 4
GAD7 TOTAL SCORE: 4
3. WORRYING TOO MUCH ABOUT DIFFERENT THINGS: SEVERAL DAYS
GAD7 TOTAL SCORE: 4

## 2018-11-05 ASSESSMENT — PATIENT HEALTH QUESTIONNAIRE - PHQ9
SUM OF ALL RESPONSES TO PHQ QUESTIONS 1-9: 8
10. IF YOU CHECKED OFF ANY PROBLEMS, HOW DIFFICULT HAVE THESE PROBLEMS MADE IT FOR YOU TO DO YOUR WORK, TAKE CARE OF THINGS AT HOME, OR GET ALONG WITH OTHER PEOPLE: SOMEWHAT DIFFICULT
SUM OF ALL RESPONSES TO PHQ QUESTIONS 1-9: 8

## 2018-11-05 ASSESSMENT — PAIN SCALES - GENERAL: PAINLEVEL: MILD PAIN (2)

## 2018-11-05 NOTE — LETTER
2018         RE: Nathaly Farias  [address] [facility] [id number]        Dear Colleague,    Thank you for referring your patient, Nathaly Farias, to the [facility]. Please see a copy of my visit note below.    Diagnosis/Summary/Recommendations:    PATIENT: Nathaly Farias  63 year old female     : 1955    MOISE: 2018    Parkinson  Cognitive impairment    Emergency visit for collapse on 2018  Had normal ct of head.    Passed out twice.   She states she gets light headed/dizzy  114/80  She had one episode while taking the shower and was feeling dizzy/light headed and passed out in the shower when seated and was out for 3-4 minutes and came to and was disoriented.     The other episode she had fallen down the steps and when she got up she walked up the steps and felt weird and passed.       Medications     7am 12noon 9pm         Acetaminophen 325mg tylenol As needed              Carbidopa/levodopa Sinemet 25/100 2 2 2         Citalopram celexa 20mg    1            Diphenhydramine acetaminophen tylenol PM       As needed for sleep         Lisinopril prinivil/zestril 10mg    1            Lorazepam ativan 0.5mg As needed, rarely using               Melatonin 5mg or 10mg       Dose unknown. Taking as needed        Meloxicam mobic 15mg Not sure if taking this.            Polyethylene glycol miralax Drinking throughout the day            Senna senokot 8.6mg       1          Zolmitriptan zomig 5mg As needed                                                          History obtained from patient     Family wanted to get another scan  Memory issues are worse  Tremors are worse   She has dyskinesias and tremors that may be present at the same time.   She is taking her pills on an empty stomach.   Not clear if she has wearing off.   She is having more frequent headaches. These are different from her usual headaches.   She has some pain in her right side of her abdomen -  she has pain only if she pushes there. She is having a bowel movement every 2-3 days. She wonders if there is something there.   She has had more depression and cries for no clear reason.     SUMMARY  ORTHOSTATISM - PROBABLY THE CAUSE OF THE TWO SPELLS AND HAS WEIRD FEELINGS WHEN WALKING AT [employer]. SHE HAS DIZZY SPELLS AND EXHAUSTED.     WOULD HAVE HER REDUCE HER DOSE OF LISINOPRIL IF HER PCP AGREES. SHE IS TAKING 10MG NOW AND MAY WANT TO TAKE 5MG AND GO FROM THERE. MAY NEED TO GO TO 2.5MG AT SOME POINT.     Hold off on using proamatine (midodrine) or florinef (fludrocortisone) or mestinon (pyridostigmine) to elevate blood pressure.     Discussed changing around her sinemet from 2 tabs 3/day to 1.5 tabs 4 times per day.     Brain mri - expect to find increased atrophy and nonspecific white matter changes. Doubtful we will see a subdural or a stroke.     When she sees her pcp and gets routine/ananual physical may be worth getting a sed rate/crp as well as thyroid, cbc,     Not sure what is the abdominal pain but suspect she is significantly constipated and taking miralax once daily and one senokot per day. May need to increase the miralax to twice daily as well as the senokot twice daily    She has been having more mood issues.   ECG QTC was 453 ms  in females, 451-470 ms is intermediate.   It is no significantly prolonged   She is on citalopram 20mg per day.     PLAN  Brain mri  Visit with pcp today to review and possibly reduce the dose of lisinopril  May need some blood work eg sed rate, crp if getting blood work.   Could conceivably increase the citalopram from 20 to 30mg to see if helps   The abdominal pain may be related to obstipation/constipation and may need more aggressive bowel regimen  Such as twice daily senokot and miralax  Pharmacy consultation if covered with Corazon Dhaliwal, Pharm D to review mood and cognitive issues  She has not been on donepezil (aricept) or namenda/memantine.   Return back in three  months.       Coding statement:   Duration of  Services: patient care and care coordination was 25 minutes  Greater than 50% of this visit was spent in counseling and coordination of care.     Jet Porter MD     ______________________________________    Last visit date and details:     Chief Complaint   Patient presents with     Loss of Consciousness      The history is provided by the patient and the spouse.      Nathaly Farias is a 63 year old female who presents to the ED for loss of consciousness at 11:00am this morning. She has a medical history of Parkinson's dementia. Patient passed out once before when she fell down stairs due to possible excitement. She is also experiencing nausea.  was called to help her as she felt weird.  caught her before she fell. He covered her and sat her down on the stool in the shower. She had labored breathing at that time. She went to lay down in bed, and then vomited as she felt weird. Patient then experienced a headache, but she always has a headache.  thought that the water might have been too hot.  relates that she gets confused when she is tired. Patient denies chest pain, shortness of breath, and hematuria. Patient was brought in Today in a wheelchair.          Patient Active Problem List   Diagnosis     Prolapse of vaginal wall     Migraine variant     Symptomatic menopausal or female climacteric states     Obesity     Malaise and fatigue     Other and unspecified adverse effect of drug, medicinal and biological substance     Other and unspecified disc disorder of cervical region     Sleep apnea     Essential hypertension, benign     Chronic cholecystitis     Overactive bladder     Neck pain     Headache     HYPERLIPIDEMIA LDL GOAL <160     Mild major depression (H)     Advanced directives, counseling/discussion     HTN, goal below 140/90     Paralysis agitans (H)     History of MRI of brain and brain stem     Wears glasses     Constipation      Incomplete RBBB     Heart murmur     Family history of tremor     Rosacea     Asymptomatic varicose veins     Seborrheic dermatitis     Post-traumatic osteoarthritis of left knee     Pain from implanted hardware, initial encounter     Cognitive disorder evalaution 2017     Dementia     Hiatal hernia       Past Medical History         Past Medical History:   Diagnosis Date     Arthralgia of temporomandibular joint 10/22/1996     Arthritis       Cognitive disorder evalaution 2017 3/6/2017     She has marked attentional difficulties, impairments in memory including a rapid forgetting rate, mild anomia, and mild executive dysfunction. 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 She's required increasing assistance with finances, medications, driving, and cooking. I think this is a mild dementia, and     Constipation 2/23/2015     Dementia 3/13/2017     IMPRESSIONS AND RECOMMENDATIONS   Current results indicate marked attentional difficulties, impairments in memory, which in some cases reflected impairments in retention and in other cases reflected retrieval difficulties, and mild anomia as well as mild executive dysfunction. Premorbid intellectual functioning is estimated to fall in the borderline range. She denied experiencing significant depre     Depressive disorder       Heart murmur 2/23/2015     History of MRI of brain and brain stem 2/21/2015     MRI OF THE BRAIN WITHOUT CONTRAST 7/10/2014 1:21 PM  COMPARISON: None. HISTORY: Left-sided upper and lower extremity tremor. Balance issues. TECHNIQUE:  Brain: Axial diffusion-weighted with ADC map, T2-weighted with fat saturation, T1-weighted and turboFLAIR and coronal T1-weighted images of the brain were obtained without intravenous contrast.  FINDINGS: There is mild diffuse cerebral volume loss     Hypertension       Incomplete RBBB 2/23/2015     Motion sickness       Other and  unspecified hyperlipidemia       Parkinson's disease (H)       Seborrheic dermatitis 2/29/2016     Unspecified arthropathy, hand 08/07/1997     Unspecified sleep apnea       moderate, sleep study 11/07, on CPAP, has daytime somnolence with this     Variants of migraine, not elsewhere classified, without mention of intractable migraine without mention of status migrainosus       Wears glasses 2/23/2015             Past Surgical History          Past Surgical History:   Procedure Laterality Date     ------------OTHER-------------         left shoulder     ARTHRODESIS TOE(S)   3/2/2011     ARTHRODESIS TOE(S) performed by CLARA LUCAS at  OR     C LIGATE FALLOPIAN TUBE   1998     CHOLECYSTECTOMY, LAPOROSCOPIC   5/12/2008     Cholecystectomy, Laparoscopic     ESOPHAGOSCOPY, GASTROSCOPY, DUODENOSCOPY (EGD), COMBINED N/A 7/5/2017     Procedure: COMBINED ESOPHAGOSCOPY, GASTROSCOPY, DUODENOSCOPY (EGD), BIOPSY SINGLE OR MULTIPLE;  Esophagogastroduodenoscopy with Multiple Biopsies by Biopsy;  Surgeon: Tj Denney MD;  Location: [facility]     HC COLONOSCOPY W/WO BRUSH/WASH   08/30/06     normal     HC OPEN TX TIBIAL SHAFT FX W PLATE/SCREWS W/WO CERCLAGE   2000     Multiple operations on left leg     HC SHLDR ARTHROSCOP,PART ACROMIOPLAS   11/16/06     Right shoulder     HC SHLDR ARTHROSCOP,SURG,DIS CLAVICULECTOMY   11/16/06     Right shoulder             Family History           Family History   Problem Relation Age of Onset     Diabetes Father         type II, diagnosed in late 60's     Hypertension Father       Cancer Father         rare kidney cancer had kidney removed, 75 y.o. at onset     Cerebrovascular Disease Father       Rashes/Skin Problems Father         rosacea     Neurologic Disorder Mother         head tremor     HEART DISEASE Brother         Valvular disease corrected with surgery     HEART DISEASE Paternal Grandfather         MI     Migraines Son       Migraines Sister       Rashes/Skin Problems  Sister         rosacea                    Social History    Substance Use Topics      Smoking status: Never Smoker      Smokeless tobacco: Never Used          Comment: no smokers in the household      Alcohol use 0.0 oz/week       0 Standard drinks or equivalent per week         Comment: very occ               Immunization History   Administered Date(s) Administered     Influenza (H1N1) 01/12/2010     Influenza (IIV3) PF 10/15/1996, 10/14/1997, 10/15/1998, 10/06/1999, 12/29/2000, 11/15/2001, 11/11/2002, 10/16/2003, 11/01/2004, 11/02/2005, 11/12/2007, 11/10/2008, 11/10/2010, 10/04/2011, 01/04/2013, 09/03/2014     Influenza Vaccine IM 3yrs+ 4 Valent IIV4 08/22/2016, 09/03/2017     Influenza Vaccine, 3 YRS +, IM (QUADRIVALENT W/PRESERVATIVES) 09/08/2015     Pneumococcal 23 valent 11/01/2004     TD (ADULT, 7+) 10/05/1999     TDAP Vaccine (Adacel) 10/04/2011     Zoster vaccine, live 11/29/2014                   Allergies   Allergen Reactions     Sulfa Drugs         Tacchycardia, had to go to ED     Naproxen GI Disturbance     Oxycontin [Oxycodone] GI Disturbance       Made her feel funny/upset stomach     Rivastigmine         Patch was too expensive     Ropinirole         Did not work          Current Outpatient Prescriptions           Current Outpatient Prescriptions   Medication Sig Dispense Refill     carbidopa-levodopa (SINEMET)  MG per tablet 2 tabs by mouth @ 7am, noon, and 9pm 540 tablet 3     citalopram (CELEXA) 20 MG tablet 20mg tab by mouth @12noon 90 tablet 3     diphenhydrAMINE-acetaminophen (TYLENOL PM EXTRA STRENGTH)  MG tablet 1 tab by mouth @ 9/930pm as needed 14 tablet       lisinopril (PRINIVIL/ZESTRIL) 10 MG tablet 1 tab by mouth at 12 noon 90 tablet 3     melatonin 5 MG tablet Not sure of dose and taking 1 tab by mouth @ 9pm         meloxicam (MOBIC) 15 MG tablet Take 1 tablet (15 mg) by mouth daily 180 tablet 3     polyethylene glycol (MIRALAX) powder Once daily as needed in morning (1  capful per 8 oz water) 510 g 1     senna (SENOKOT) 8.6 MG tablet 1 tab @ 9pm as needed 120 tablet       ZOLMitriptan (ZOMIG) 5 MG tablet Take 1 tablet (5 mg) by mouth at onset of headache for migraine MAY REPEAT ONCE AFTER 2 HOURS. DO NOT EXCEED 2 TABLETS IN 24 HOURS. 6 tablet 10            Review of Systems   Respiratory: Negative for shortness of breath.         POSITIVE labored breathing   Cardiovascular: Negative for chest pain.   Gastrointestinal: Positive for nausea.   Genitourinary: Negative for hematuria.   Neurological: Positive for headaches.        POSITIVE loss of consciousness     All other systems reviewed and are negative.        Physical Exam   BP: 117/57  Pulse: 77  Temp: 98.4  F (36.9  C)  Resp: 20  Weight: 60.8 kg (134 lb)  SpO2: 100 %        Physical Exam   Constitutional: She appears well-developed and well-nourished. No distress.   HENT:   Head: Normocephalic and atraumatic.   Eyes: No scleral icterus.   Neck: Normal range of motion. Neck supple.   Cardiovascular: Normal rate and normal heart sounds.    Pulmonary/Chest: Effort normal. No respiratory distress.   Abdominal: She exhibits no distension.   Musculoskeletal: Normal range of motion. She exhibits no edema or tenderness.   Neurological: She is alert. She exhibits abnormal muscle tone. Coordination abnormal.   Pill-rolling, resting tremor   Skin: Skin is warm and dry. No rash noted. She is not diaphoretic. No erythema. No pallor.   Psychiatric: She has a normal mood and affect. Her behavior is normal.   Nursing note and vitals reviewed.        ED Course      ED Course      Procedures                EKG Interpretation:       Interpreted by Hermann Rosado  Time reviewed:1250   Symptoms at time of EKG: syncopal episode   Rhythm: normal sinus   Rate: normal  Axis: NORMAL  Ectopy: none  Conduction: normal  ST Segments/ T Waves: No ST-T wave changes  Q Waves: none  Comparison to prior: No old EKG available     Clinical Impression: normal  EKG                 Results for orders placed or performed during the hospital encounter of 09/29/18 (from the past 24 hour(s))   CBC with platelets differential   Result Value Ref Range     WBC 9.6 4.0 - 11.0 10e9/L     RBC Count 4.57 3.8 - 5.2 10e12/L     Hemoglobin 13.8 11.7 - 15.7 g/dL     Hematocrit 42.0 35.0 - 47.0 %     MCV 92 78 - 100 fl     MCH 30.2 26.5 - 33.0 pg     MCHC 32.9 31.5 - 36.5 g/dL     RDW 12.6 10.0 - 15.0 %     Platelet Count 299 150 - 450 10e9/L     Diff Method Automated Method       % Neutrophils 85.3 %     % Lymphocytes 9.4 %     % Monocytes 4.3 %     % Eosinophils 0.3 %     % Basophils 0.3 %     % Immature Granulocytes 0.4 %     Nucleated RBCs 0 0 /100     Absolute Neutrophil 8.2 1.6 - 8.3 10e9/L     Absolute Lymphocytes 0.9 0.8 - 5.3 10e9/L     Absolute Monocytes 0.4 0.0 - 1.3 10e9/L     Absolute Basophils 0.0 0.0 - 0.2 10e9/L     Abs Immature Granulocytes 0.0 0 - 0.4 10e9/L     Absolute Nucleated RBC 0.0     Basic metabolic panel   Result Value Ref Range     Sodium 140 133 - 144 mmol/L     Potassium 4.3 3.4 - 5.3 mmol/L     Chloride 104 94 - 109 mmol/L     Carbon Dioxide 28 20 - 32 mmol/L     Anion Gap 8 3 - 14 mmol/L     Glucose 119 (H) 70 - 99 mg/dL     Urea Nitrogen 25 7 - 30 mg/dL     Creatinine 1.17 (H) 0.52 - 1.04 mg/dL     GFR Estimate 47 (L) >60 mL/min/1.7m2     GFR Estimate If Black 57 (L) >60 mL/min/1.7m2     Calcium 9.5 8.5 - 10.1 mg/dL   Troponin I   Result Value Ref Range     Troponin I ES <0.015 0.000 - 0.045 ug/L   CT Head w/o Contrast     Narrative     CT OF THE HEAD WITHOUT CONTRAST  9/29/2018 1:23 PM      COMPARISON: Brain MRI 12/12/2016     HISTORY:  Syncope.      TECHNIQUE: 5 mm thick axial CT images of the head were acquired  without IV contrast material.     FINDINGS:  There is mild diffuse cerebral volume loss. There are  subtle patchy areas of decreased density in the cerebral white matter  bilaterally that are consistent with sequela of chronic small  vessel  ischemic disease.      The ventricles and basal cisterns are within normal limits in  configuration given the degree of cerebral volume loss.  There is no  midline shift. There are no extra-axial fluid collections.      No intracranial hemorrhage, mass or recent infarct.     The visualized paranasal sinuses are well aerated. There is no  mastoiditis. There are no fractures of the visualized bones.      Impression     IMPRESSION:  Diffuse cerebral volume loss and cerebral white matter  changes consistent with chronic small vessel ischemic disease. No  evidence for acute intracranial pathology.      Radiation dose for this scan was reduced using automated exposure  control, adjustment of the mA and/or kV according to patient size, or  iterative reconstruction technique.     MYRA VAZ MD         Medications   0.9% sodium chloride BOLUS (0 mLs Intravenous Stopped 9/29/18 1419)            Assessments & Plan (with Medical Decision Making)  I had a long discussion with the patient and her  and offered admission for observation given that this was syncope.  I think the most likely etiology of this is vasovagal secondary to being in a hot shower, having Parkinson's disease, being on blood pressure medications.  I discussed the differential diagnosis with him including a malignant ventricular rhythm.  Stroke and other neurologic pathology is quite unlikely as the etiology given a normal head CT scan and that she is back at her baseline.  They would like to go home and return should things worsen.  The differential diagnosis, treatment options, risks and follow-up discussed with a competent patient who agrees with the plan.  Discussed with her  present who agrees.      I have reviewed the nursing notes.     I have reviewed the findings, diagnosis, plan and need for follow up with the patient.        Discharge Medication List as of 9/29/2018  2:20 PM          MOISE: May 14, 2018     Parkinson  Cognitive  impairment     Had grand kids  Son and his wife and kids visited  And went to daughters yesterday     Takes a nap for 2 hours - from 1pm till around 3pm  She may have some impact on her sleep at night  She gets tired walking in the grocery store  She feels better pushing a cart  She has not had falls  She has dyskinesias today in the office.   She has noticed more dyskinesias but not clear if there is a time that it is worth  She has an alarm on her phone to remind her to take her pills  She has her feet turn in.  She was unable to get to [facility]  She walks in the yard and to get the mail.  She has not had falls  She carries her phone with her  Rosa helps her a lot.      She has not had any hallucinations since she went off a medication in the past.      Medications     7am 12noon 9pm         Acetaminophen 325mg tylenol As needed              Carbidopa/levodopa Sinemet 25/100 2 2 2         Citalopram celexa 20mg    1            Diphenhydramine acetaminophen tylenol PM       As needed for sleep         Lisinopril prinivil/zestril 10mg    1            Lorazepam ativan 0.5mg As needed, rarely using               Melatonin 5mg or 10mg       Dose unknown. Taking as needed        Meloxicam mobic 15mg Not sure if taking this.            Polyethylene glycol miralax As needed     as needed        Quetiapine seroquel 25mg Not taking               Rivastigmine exelon patch mg Not taking               Senna senokot 8.6mg       As needed          Trospium sanctura 20mg Not taking               Zolmitriptan zomig 5mg As needed                                                          PLAN  No change in medication  She will need to review her medication schedule with family to make sure we have an accurate list of what she is taking.      History obtained from patient          ______________________________________      Patient was asked about 14 Review of systems including changes in vision (dry eyes, double vision),  hearing, heart, lungs, musculoskeletal, depression, anxiety, snoring, RBD, insomnia, urinary frequency, urinary urgency, constipation, swallowing problems, hematological, ID, allergies, skin problems: seborrhea, endocrinological: thyroid, diabetes, cholesterol; balance, weight changes, and other neurological problems and these were not significant at this time except for   Patient Active Problem List   Diagnosis     Prolapse of vaginal wall     Migraine variant     Symptomatic menopausal or female climacteric states     Obesity     Malaise and fatigue     Other and unspecified adverse effect of drug, medicinal and biological substance     Other and unspecified disc disorder of cervical region     Sleep apnea     Essential hypertension, benign     Chronic cholecystitis     Overactive bladder     Neck pain     Headache     HYPERLIPIDEMIA LDL GOAL <160     Mild major depression (H)     Advanced directives, counseling/discussion     HTN, goal below 140/90     Paralysis agitans (H)     History of MRI of brain and brain stem     Wears glasses     Constipation     Incomplete RBBB     Heart murmur     Family history of tremor     Rosacea     Asymptomatic varicose veins     Seborrheic dermatitis     Post-traumatic osteoarthritis of left knee     Pain from implanted hardware, initial encounter     Cognitive disorder evalaution 2017     Dementia     Hiatal hernia          Allergies   Allergen Reactions     Sulfa Drugs      Tacchycardia, had to go to ED     Naproxen GI Disturbance     Oxycontin [Oxycodone] GI Disturbance     Made her feel funny/upset stomach     Rivastigmine      Patch was too expensive     Ropinirole      Did not work     Past Surgical History:   Procedure Laterality Date     ------------OTHER-------------      left shoulder     ARTHRODESIS TOE(S)  3/2/2011    ARTHRODESIS TOE(S) performed by CLARA LUCAS at [facility]     C LIGATE FALLOPIAN TUBE  1998     CHOLECYSTECTOMY, LAPOROSCOPIC  5/12/2008     Cholecystectomy, Laparoscopic     ESOPHAGOSCOPY, GASTROSCOPY, DUODENOSCOPY (EGD), COMBINED N/A 7/5/2017    Procedure: COMBINED ESOPHAGOSCOPY, GASTROSCOPY, DUODENOSCOPY (EGD), BIOPSY SINGLE OR MULTIPLE;  Esophagogastroduodenoscopy with Multiple Biopsies by Biopsy;  Surgeon: Tj Denney MD;  Location: [facility]     HC COLONOSCOPY W/WO BRUSH/WASH  08/30/06    normal     HC OPEN TX TIBIAL SHAFT FX W PLATE/SCREWS W/WO CERCLAGE  2000    Multiple operations on left leg     HC SHLDR ARTHROSCOP,PART ACROMIOPLAS  11/16/06    Right shoulder     HC SHLDR ARTHROSCOP,SURG,DIS CLAVICULECTOMY  11/16/06    Right shoulder     Past Medical History:   Diagnosis Date     Arthralgia of temporomandibular joint 10/22/1996     Arthritis      Cognitive disorder evalaution 2017 3/6/2017    She has marked attentional difficulties, impairments in memory including a rapid forgetting rate, mild anomia, and mild executive dysfunction. 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 She's required increasing assistance with finances, medications, driving, and cooking. I think this is a mild dementia, and     Constipation 2/23/2015     Dementia 3/13/2017    IMPRESSIONS AND RECOMMENDATIONS   Current results indicate marked attentional difficulties, impairments in memory, which in some cases reflected impairments in retention and in other cases reflected retrieval difficulties, and mild anomia as well as mild executive dysfunction. Premorbid intellectual functioning is estimated to fall in the borderline range. She denied experiencing significant depre     Depressive disorder      Heart murmur 2/23/2015     History of MRI of brain and brain stem 2/21/2015    MRI OF THE BRAIN WITHOUT CONTRAST 7/10/2014 1:21 PM  COMPARISON: None. HISTORY: Left-sided upper and lower extremity tremor. Balance issues. TECHNIQUE:  Brain: Axial diffusion-weighted with ADC map, T2-weighted  "with fat saturation, T1-weighted and turboFLAIR and coronal T1-weighted images of the brain were obtained without intravenous contrast.  FINDINGS: There is mild diffuse cerebral volume loss     Hypertension      Incomplete RBBB 2/23/2015     Motion sickness      Other and unspecified hyperlipidemia      Parkinson's disease (H)      Seborrheic dermatitis 2/29/2016     Unspecified arthropathy, hand 08/07/1997     Unspecified sleep apnea     moderate, sleep study 11/07, on CPAP, has daytime somnolence with this     Variants of migraine, not elsewhere classified, without mention of intractable migraine without mention of status migrainosus      Wears glasses 2/23/2015     Social History     Social History     Marital status:      Spouse name: violet     Number of children: 2     Years of education: N/A     Occupational History           Social History Main Topics     Smoking status: Never Smoker     Smokeless tobacco: Never Used      Comment: no smokers in the household     Alcohol use 0.0 oz/week     0 Standard drinks or equivalent per week      Comment: very occ     Drug use: No     Sexual activity: Yes     Partners: Male     Birth control/ protection: Surgical, Female Surgical      Comment: tubal ligation     Other Topics Concern     Caffeine Concern Yes     OCC     Exercise No     \"not faithfully\"     Seat Belt Yes     Self-Exams No     Social History Narrative    ALLERGIES:  She is allergic to sulfa, and has had gastrointestinal side effects from naproxen.               FAMILY HISTORY:  Strongly positive for headaches, including in her son and sister.  There is no family history of tremor.               SOCIAL HISTORY:  She does not work outside the home.  She does not smoke.  She uses very little alcohol.  Lives in [place]. . Has 2 kids: son who is 35 yr s old. And daughter who is 30 yrs old. Her  is working as a  .        Drug and lactation database from the [facility]" [facility] National Library of Medicine:  http://toxnet.nlm.nih.gov/cgi-bin/sis/htmlgen?LACT      B/P: Data Unavailable, T: Data Unavailable, P: Data Unavailable, R: Data Unavailable 0 lbs 0 oz  Last menstrual period 06/05/2006, not currently breastfeeding., There is no height or weight on file to calculate BMI.  Medications and Vitals not listed above were documented in the cart and reviewed by me.     Current Outpatient Prescriptions   Medication Sig Dispense Refill     carbidopa-levodopa (SINEMET)  MG per tablet 2 tabs by mouth @ 7am, noon, and 9pm 540 tablet 3     citalopram (CELEXA) 20 MG tablet 20mg tab by mouth @12noon 90 tablet 3     diphenhydrAMINE-acetaminophen (TYLENOL PM EXTRA STRENGTH)  MG tablet 1 tab by mouth @ 9/930pm as needed 14 tablet      lisinopril (PRINIVIL/ZESTRIL) 10 MG tablet 1 tab by mouth at 12 noon 90 tablet 3     melatonin 5 MG tablet Not sure of dose and taking 1 tab by mouth @ 9pm       meloxicam (MOBIC) 15 MG tablet Take 1 tablet (15 mg) by mouth daily 180 tablet 3     polyethylene glycol (MIRALAX) powder Once daily as needed in morning (1 capful per 8 oz water) 510 g 1     senna (SENOKOT) 8.6 MG tablet 1 tab @ 9pm as needed 120 tablet      ZOLMitriptan (ZOMIG) 5 MG tablet Take 1 tablet (5 mg) by mouth at onset of headache for migraine MAY REPEAT ONCE AFTER 2 HOURS. DO NOT EXCEED 2 TABLETS IN 24 HOURS. 6 tablet 10         Jet Porter MD    Again, thank you for allowing me to participate in the care of your patient.        Sincerely,        Jet Porter MD

## 2018-11-05 NOTE — MR AVS SNAPSHOT
After Visit Summary   2018    Nathaly Farias    MRN: [id number]           Patient Information     Date Of Birth          1955        Visit Information        Provider Department      2018 9:30 AM Jet Porter MD [facility]        Today's Diagnoses     Paralysis agitans (H)    -  1    Major depressive disorder, recurrent episode, mild (H)        Post-traumatic osteoarthritis of left knee        Other constipation        Fall, sequela          Care Instructions    : 1955    MOISE: 2018    Parkinson  Cognitive impairment    Emergency visit for collapse on 2018  Had normal ct of head.    Passed out twice.   She states she gets light headed/dizzy  114/80  She had one episode while taking the shower and was feeling dizzy/light headed and passed out in the shower when seated and was out for 3-4 minutes and came to and was disoriented.     The other episode she had fallen down the steps and when she got up she walked up the steps and felt weird and passed.       Medications     7am 12noon 9pm         Acetaminophen 325mg tylenol As needed              Carbidopa/levodopa Sinemet 25/100 2 2 2         Citalopram celexa 20mg    1            Diphenhydramine acetaminophen tylenol PM       As needed for sleep         Lisinopril prinivil/zestril 10mg    1            Lorazepam ativan 0.5mg As needed, rarely using               Melatonin 5mg or 10mg       Dose unknown. Taking as needed        Meloxicam mobic 15mg Not sure if taking this.            Polyethylene glycol miralax Drinking throughout the day            Senna senokot 8.6mg       1          Zolmitriptan zomig 5mg As needed                                                          History obtained from patient     Family wanted to get another scan  Memory issues are worse  Tremors are worse   She has dyskinesias and tremors that may be present at the same time.   She is taking her pills on an empty  stomach.   Not clear if she has wearing off.   She is having more frequent headaches. These are different from her usual headaches.   She has some pain in her right side of her abdomen - she has pain only if she pushes there. She is having a bowel movement every 2-3 days. She wonders if there is something there.   She has had more depression and cries for no clear reason.     SUMMARY  ORTHOSTATISM - PROBABLY THE CAUSE OF THE TWO SPELLS AND HAS WEIRD FEELINGS WHEN WALKING AT [facility]. SHE HAS DIZZY SPELLS AND EXHAUSTED.     WOULD HAVE HER REDUCE HER DOSE OF LISINOPRIL IF HER PCP AGREES. SHE IS TAKING 10MG NOW AND MAY WANT TO TAKE 5MG AND GO FROM THERE. MAY NEED TO GO TO 2.5MG AT SOME POINT.     Hold off on using proamatine (midodrine) or florinef (fludrocortisone) or mestinon (pyridostigmine) to elevate blood pressure.     Discussed changing around her sinemet from 2 tabs 3/day to 1.5 tabs 4 times per day.     Brain mri - expect to find increased atrophy and nonspecific white matter changes. Doubtful we will see a subdural or a stroke.     When she sees her pcp and gets routine/ananual physical may be worth getting a sed rate/crp as well as thyroid, cbc,     Not sure what is the abdominal pain but suspect she is significantly constipated and taking miralax once daily and one senokot per day. May need to increase the miralax to twice daily as well as the senokot twice daily    She has been having more mood issues.   ECG QTC was 453 ms  in females, 451-470 ms is intermediate.   It is no significantly prolonged   She is on citalopram 20mg per day.     PLAN  Brain mri  Visit with pcp today to review and possibly reduce the dose of lisinopril  May need some blood work eg sed rate, crp if getting blood work.   Could conceivably increase the citalopram from 20 to 30mg to see if helps   The abdominal pain may be related to obstipation/constipation and may need more aggressive bowel regimen  Such as twice daily senokot and  [facility]  Pharmacy consultation if covered with Kary Christiansen, Pharm D to review mood and cognitive issues  She has not been on donepezil (aricept) or namenda/memantine.   Return back in three months.           Follow-ups after your visit        Additional Services     MED THERAPY MANAGE REFERRAL       Your provider has referred you to: kary christiansen  Reason for Referral: Parkinson    The [facility] department will contact you to schedule an appointment.  You may also schedule the appointment by calling [phone].  For [facility] patients, please call [phone] to confirm/schedule your appointment on the next business day.    This service is designed to help you get the most from your medications.  A specially trained Pharmacist will work closely with you and your providers to solve any questions, concerns, issues or problems related to your medications.    Please bring all of your prescription and non-prescription medications (such as vitamins, over-the-counter medications, and herbals) or a detailed medication list to your appointment.    If you have a glucose meter or other home monitoring information, please also bring this to your appointment (i.e. blood glucose log, blood pressure log, pain log, etc.).                  Follow-up notes from your care team     Return in about 3 months (around 2/5/2019).      Your next 10 appointments already scheduled     Nov 05, 2018  3:00 PM CST   [facility]    [address] [place] [id number] [phone]           Bring a current list of meds and any records pertaining to this visit. For Physicals, please bring immunization records and any forms needing to be filled out. Please arrive 10 minutes early to complete paperwork.            Nov 06, 2018  2:45 PM CST   MR BRAIN W/O CONTRAST with PHMR1   [facility] ([facility]  [place] [id number] [facility] [address] [phone]           How do I prepare for my exam? (Food and drink instructions) **If you will be receiving sedation or general anesthesia, please see special notes below.**  How do I prepare for my exam? (Other instructions) Take your medicines as usual, unless your doctor tells you not to. Please remove any body piercings and hair extensions before you arrive. Follow your doctor s orders. If you do not, we may have to postpone your exam. You may or may not receive IV contrast for this exam pending the discretion of the Radiologist.  You do not need to do anything special to prepare. **If you will be receiving sedation or general anesthesia, please see special notes below.**  What should I wear:  The MRI machine uses a strong magnet. Please wear clothes without metal (snaps, zippers). A sweatsuit works well, or we may give you a hospital gown.  How long does the exam take: Most tests take 30 to 60 minutes.  HOWEVER, IF YOUR DOCTOR PRESCRIBES ANESTHESIA please plan on spending four to five hours in the recovery room.  What should I bring: Bring a list of your current medicines to your exam (including vitamins, minerals and over-the-counter drugs). Also bring the results of similar scans you may have had.  Do I need a : **If you will be receiving sedation or general anesthesia, please see special notes below.**  What should I do after the exam? No Restrictions, You may resume normal activities.  What is this test: MRI (magnetic resonance imaging) uses a strong magnet and radio waves to look inside the body. An MRA (magnetic resonance angiogram) does the same thing, but it lets us look at your blood vessels. A computer turns the radio waves into pictures showing cross sections of the body, much like slices of bread. This helps us see any problems more clearly.  Who should I call with questions: Please call the Imaging Department at your exam  site with any questions. Directions, parking instructions, and other information is available on our website, [email or web address].  How do I prepare if I m having sedation or anesthesia? **IMPORTANT** THE INSTRUCTIONS BELOW ARE ONLY FOR THOSE PATIENTS WHO HAVE BEEN TOLD THEY WILL RECEIVE SEDATION OR GENERAL ANESTHESIA DURING THEIR MRI PROCEDURE:  IF YOU WILL RECEIVE SEDATION (take medicine to help you relax during your exam): You must get the medicine from your doctor before you arrive. Bring the medicine to the exam. Do not take it at home. Arrive one hour early. Bring someone who can take you home after the test. Your medicine will make you sleepy. After the exam, you may not drive, take a bus or take a taxi by yourself. No eating 8 hours before your exam. You may have clear liquids up until 4 hours before your exam. (Clear liquids include water, clear tea, black coffee and fruit juice without pulp.)  IF YOU WILL RECEIVE ANESTHESIA (be asleep for your exam): Arrive 1 1/2 hours early. Bring someone who can take you home after the test. You may not drive, take a bus or take a taxi by yourself. No eating 8 hours before your exam. You may have clear liquids up until 4 hours before your exam. (Clear liquids include water, clear tea, black coffee and fruit juice without pulp.) You will spend four to five hours in the recovery room.            Feb 04, 2019  9:00 AM CST   Return Visit with Jet Porter MD   [facility] ([facility])    [id number] [place] [id number] [phone]              Future tests that were ordered for you today     Open Future Orders        Priority Expected Expires Ordered    MR Brain w/o Contrast Routine  12/20/2018 11/5/2018            Who to contact     If you have questions or need follow up information about today's clinic visit or your schedule please contact [facility] directly at [phone].  Normal or  "non-critical lab and imaging results will be communicated to you by MyChart, letter or phone within 4 business days after the clinic has received the results. If you do not hear from us within 7 days, please contact the clinic through Myfacepage or phone. If you have a critical or abnormal lab result, we will notify you by phone as soon as possible.  Submit refill requests through Myfacepage or call your pharmacy and they will forward the refill request to us. Please allow 3 business days for your refill to be completed.          Additional Information About Your Visit        Myfacepage Information     Myfacepage gives you secure access to your electronic health record. If you see a primary care provider, you can also send messages to your care team and make appointments. If you have questions, please call your primary care clinic.  If you do not have a primary care provider, please call [phone] and they will assist you.      Myfacepage is an electronic gateway that provides easy, online access to your medical records. With Myfacepage, you can request a clinic appointment, read your test results, renew a prescription or communicate with your care team.     To access your existing account, please contact your [facility] or call [phone] for assistance.        Care EveryWhere ID     This is your Care EveryWhere ID. This could be used by other organizations to access your [facility] medical records  [id number]        Your Vitals Were     Pulse Respirations Height Last Period Pulse Oximetry BMI (Body Mass Index)    76 16 1.6 m (5' 3\") 06/05/2006 95% 24.13 kg/m2       Blood Pressure from Last 3 Encounters:   11/05/18 114/80   09/29/18 99/79   05/14/18 114/65    Weight from Last 3 Encounters:   11/05/18 61.8 kg (136 lb 3.2 oz)   09/29/18 60.8 kg (134 lb)   05/14/18 64 kg (141 lb 1.6 oz)              We Performed the Following     MED THERAPY MANAGE REFERRAL          Today's Medication Changes        "   These changes are accurate as of 11/5/18  9:49 AM.  If you have any questions, ask your nurse or doctor.               Start taking these medicines.        Dose/Directions    citalopram 20 MG tablet   Commonly known as:  celeXA   Used for:  Major depressive disorder, recurrent episode, mild (H)   Started by:  Jet Porter MD        Increase to 1.5 x 20mg tab by mouth @12noon   Quantity:  135 tablet   Refills:  3         These medicines have changed or have updated prescriptions.        Dose/Directions    MIRALAX powder   This may have changed:  additional instructions   Used for:  Other constipation   Generic drug:  polyethylene glycol   Changed by:  Jet Porter MD        1 capful in 8 oz water and sips throughout the day   Quantity:  510 g   Refills:  1       MOBIC 15 MG tablet   This may have changed:    - how much to take  - how to take this  - when to take this  - additional instructions   Used for:  Post-traumatic osteoarthritis of left knee   Generic drug:  meloxicam   Changed by:  Jet Poretr MD        Not sure if taking this.   Quantity:  180 tablet   Refills:  3       senna 8.6 MG tablet   Commonly known as:  SENOKOT   This may have changed:  additional instructions   Changed by:  Jet Porter MD        1 tab @ 9pm   Quantity:  120 tablet   Refills:  0            Where to get your medicines      These medications were sent to [facility] [address] [place] [address] [address]  [address] [facility] [address]     Phone:  [phone]     carbidopa-levodopa  MG per tablet    citalopram 20 MG tablet                Primary Care Provider Office Phone # Fax #    Maxine Monae [phone]       [address] [place] [id number]        Equal Access to Services     [facility]: Jn donahue Sojacque, waaxkarl samuel, qaybta renetta ruiz, xi serrano. So [facility] [phone].    ATENCIÓN: Rainer kennedy,  tiene a shoemaker disposición servicios gratuitos de asistencia lingüística. Paulo medina [phone].    We comply with applicable federal civil rights laws and [place] laws. We do not discriminate on the basis of race, color, national origin, age, disability, sex, sexual orientation, or gender identity.            Thank you!     Thank you for choosing [facility]  for your care. Our goal is always to provide you with excellent care. Hearing back from our patients is one way we can continue to improve our services. Please take a few minutes to complete the written survey that you may receive in the mail after your visit with us. Thank you!             Your Updated Medication List - Protect others around you: Learn how to safely use, store and throw away your medicines at www.disposemymeds.org.          This list is accurate as of 11/5/18  9:49 AM.  Always use your most recent med list.                   Brand Name Dispense Instructions for use Diagnosis    carbidopa-levodopa  MG per tablet    SINEMET    540 tablet    2 tabs by mouth @ 7am, noon, and 9pm    Paralysis agitans (H)       citalopram 20 MG tablet    celeXA    135 tablet    Increase to 1.5 x 20mg tab by mouth @12noon    Major depressive disorder, recurrent episode, mild (H)       lisinopril 10 MG tablet    PRINIVIL/ZESTRIL    90 tablet    1 tab by mouth at 12 noon    HTN, goal below 140/90       melatonin 5 MG tablet      Not sure of dose and taking 1 tab by mouth @ 9pm        MIRALAX powder   Generic drug:  polyethylene glycol     510 g    1 capful in 8 oz water and sips throughout the day    Other constipation       MOBIC 15 MG tablet   Generic drug:  meloxicam     180 tablet    Not sure if taking this.    Post-traumatic osteoarthritis of left knee       senna 8.6 MG tablet    SENOKOT    120 tablet    1 tab @ 9pm        TYLENOL PM EXTRA STRENGTH  MG tablet   Generic drug:  diphenhydrAMINE-acetaminophen     14 tablet    1 tab by  mouth @ 9/930pm as needed        ZOLMitriptan 5 MG tablet    ZOMIG    6 tablet    Take 1 tablet (5 mg) by mouth at onset of headache for migraine MAY REPEAT ONCE AFTER 2 HOURS. DO NOT EXCEED 2 TABLETS IN 24 HOURS.    Intractable chronic migraine without aura and without status migrainosus

## 2018-11-05 NOTE — PATIENT INSTRUCTIONS
: 1955    MOISE: 2018    Parkinson  Cognitive impairment    Emergency visit for collapse on 2018  Had normal ct of head.    Passed out twice.   She states she gets light headed/dizzy  114/80  She had one episode while taking the shower and was feeling dizzy/light headed and passed out in the shower when seated and was out for 3-4 minutes and came to and was disoriented.     The other episode she had fallen down the steps and when she got up she walked up the steps and felt weird and passed.       Medications     7am 12noon 9pm         Acetaminophen 325mg tylenol As needed              Carbidopa/levodopa Sinemet 25/100 2 2 2         Citalopram celexa 20mg    1            Diphenhydramine acetaminophen tylenol PM       As needed for sleep         Lisinopril prinivil/zestril 10mg    1            Lorazepam ativan 0.5mg As needed, rarely using               Melatonin 5mg or 10mg       Dose unknown. Taking as needed        Meloxicam mobic 15mg Not sure if taking this.            Polyethylene glycol miralax Drinking throughout the day            Senna senokot 8.6mg       1          Zolmitriptan zomig 5mg As needed                                                          History obtained from patient     Family wanted to get another scan  Memory issues are worse  Tremors are worse   She has dyskinesias and tremors that may be present at the same time.   She is taking her pills on an empty stomach.   Not clear if she has wearing off.   She is having more frequent headaches. These are different from her usual headaches.   She has some pain in her right side of her abdomen - she has pain only if she pushes there. She is having a bowel movement every 2-3 days. She wonders if there is something there.   She has had more depression and cries for no clear reason.     SUMMARY  ORTHOSTATISM - PROBABLY THE CAUSE OF THE TWO SPELLS AND [facility]. SHE HAS DIZZY SPELLS AND EXHAUSTED.      WOULD HAVE HER REDUCE HER DOSE OF LISINOPRIL IF HER PCP AGREES. SHE IS TAKING 10MG NOW AND MAY WANT TO TAKE 5MG AND GO FROM THERE. MAY NEED TO GO TO 2.5MG AT SOME POINT.     Hold off on using proamatine (midodrine) or florinef (fludrocortisone) or mestinon (pyridostigmine) to elevate blood pressure.     Discussed changing around her sinemet from 2 tabs 3/day to 1.5 tabs 4 times per day.     Brain mri - expect to find increased atrophy and nonspecific white matter changes. Doubtful we will see a subdural or a stroke.     When she sees her pcp and gets routine/ananual physical may be worth getting a sed rate/crp as well as thyroid, cbc,     Not sure what is the abdominal pain but suspect she is significantly constipated and taking miralax once daily and one senokot per day. May need to increase the miralax to twice daily as well as the senokot twice daily    She has been having more mood issues.   ECG QTC was 453 ms  in females, 451-470 ms is intermediate.   It is no significantly prolonged   She is on citalopram 20mg per day.     PLAN  Brain mri  Visit with pcp today to review and possibly reduce the dose of lisinopril  May need some blood work eg sed rate, crp if getting blood work.   Could conceivably increase the citalopram from 20 to 30mg to see if helps   The abdominal pain may be related to obstipation/constipation and may need more aggressive bowel regimen  Such as twice daily senokot and miralax  Pharmacy consultation if covered with Corazon Dhaliwal, Pharm D to review mood and cognitive issues  She has not been on donepezil (aricept) or namenda/memantine.   Return back in three months.

## 2018-11-05 NOTE — NURSING NOTE
"Nathaly Farias's goals for this visit include:   Chief Complaint   Patient presents with     RECHECK     return 6 month       She requests these members of her care team be copied on today's visit information: Yes    PCP: Maxine Monae    Referring Provider:  No referring provider defined for this encounter.    /80 (BP Location: Left arm, Patient Position: Sitting, Cuff Size: Adult Large)  Pulse 76  Resp 16  Ht 1.6 m (5' 3\")  Wt 61.8 kg (136 lb 3.2 oz)  LMP 06/05/2006  SpO2 95%  BMI 24.13 kg/m2    Do you need any medication refills at today's visit? No    "

## 2018-11-05 NOTE — PATIENT INSTRUCTIONS
Follow up in 1 month with Dr. Monae at [phone]    After you have completed your advance directive, bring it in to the clinic to be entered into your chart. It needs to be signed by either 2 witnesses or a notary. A notary is available on staff most days of the week. More information and copies of these forms can be found at [email or web address]     Check at the pharmacy for coverage of the new shingles vaccine, Shingrix. If it is not covered now, it may be covered later in the year. Shingrix is given in a 2 shot series, between 2 and 6 months apart. It is recommended for adults age 50 and older. Initial studies have indicated that this new vaccine is 85-90% effective at preventing shingles, and is preferred over the old Zostavax vaccine.    You may call any office from below to schedule an appointment for radiology.    [place]  [phone]  [place]  [phone]  [place]  [phone]

## 2018-11-05 NOTE — MR AVS SNAPSHOT
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 Monae at [phone]    After you have completed your advance directive, bring it in to the clinic to be entered into your chart. It needs to be signed by either 2 witnesses or a notary. A notary is available on staff most days of the week. More information and copies of these forms can be found at [email or web address]     Check at the pharmacy for coverage of the new shingles vaccine, Shingrix. If it is not covered now, it may be covered later in the year. Shingrix is given in a 2 shot series, between 2 and 6 months apart. It is recommended for adults age 50 and older. Initial studies have indicated that this new vaccine is 85-90% effective at preventing shingles, and is preferred over the old Zostavax vaccine.    You may call any office from below to schedule an appointment for radiology.    [place]  [phone]  [place]  [phone]  [place]  [phone]            Follow-ups after your visit        Follow-up notes from your care team     Return in about 1 month (around 12/5/2018), or if symptoms worsen or fail to improve, for Medication Recheck.      Your next 10 appointments already scheduled     Nov 06, 2018  2:45 PM CST   MR BRAIN W/O CONTRAST with PHMR1   [facility] ([facility])    [address] [place] [address]  Drive  [facility] [address] [phone]           How do I prepare for my exam? (Food and drink instructions) **If you will be receiving sedation or general anesthesia, please see special notes below.**  How do I prepare for my exam? (Other instructions) Take your medicines as usual, unless your doctor tells you not to. Please remove any body piercings and hair extensions before you arrive. Follow your doctor s orders. If you do not, we may have to postpone your exam. You may or may not receive IV contrast for this exam pending the discretion of the Radiologist.  You do not need to do anything special to prepare. **If you will be receiving sedation or general anesthesia, please see special notes below.**  What should I wear:  The MRI machine uses a strong magnet. Please wear clothes without metal (snaps, zippers). A sweatsuit works well, or we may give you a hospital gown.  How long does the exam take: Most tests take 30 to 60 minutes.  HOWEVER, IF YOUR DOCTOR PRESCRIBES ANESTHESIA please plan on spending four to five hours in the recovery room.  What should I bring: Bring a list of your current medicines to your exam (including vitamins, minerals and over-the-counter drugs). Also bring the results of similar scans you may have had.  Do I need a : **If you will be receiving sedation or general anesthesia, please see special notes below.**  What should I do after the exam? No Restrictions, You may resume normal activities.  What is this test: MRI (magnetic resonance imaging) uses a strong magnet and radio waves to look inside the body. An MRA (magnetic resonance angiogram) does the same thing, but it lets us look at your blood vessels. A computer turns the radio waves into pictures showing cross sections of the body, much like slices of bread. This helps us see any problems more clearly.  Who should I call with questions: Please call the Imaging Department at your exam site with any questions. Directions,  parking instructions, and other information is available on our website, [email or web address].  How do I prepare if I m having sedation or anesthesia? **IMPORTANT** THE INSTRUCTIONS BELOW ARE ONLY FOR THOSE PATIENTS WHO HAVE BEEN TOLD THEY WILL RECEIVE SEDATION OR GENERAL ANESTHESIA DURING THEIR MRI PROCEDURE:  IF YOU WILL RECEIVE SEDATION (take medicine to help you relax during your exam): You must get the medicine from your doctor before you arrive. Bring the medicine to the exam. Do not take it at home. Arrive one hour early. Bring someone who can take you home after the test. Your medicine will make you sleepy. After the exam, you may not drive, take a bus or take a taxi by yourself. No eating 8 hours before your exam. You may have clear liquids up until 4 hours before your exam. (Clear liquids include water, clear tea, black coffee and fruit juice without pulp.)  IF YOU WILL RECEIVE ANESTHESIA (be asleep for your exam): Arrive 1 1/2 hours early. Bring someone who can take you home after the test. You may not drive, take a bus or take a taxi by yourself. No eating 8 hours before your exam. You may have clear liquids up until 4 hours before your exam. (Clear liquids include water, clear tea, black coffee and fruit juice without pulp.) You will spend four to five hours in the recovery room.            Feb 04, 2019  9:00 AM CST   [facility] Visit with Jet Porter MD   [facility]    [address] [place] [address] [phone]              Future tests that were ordered for you today     Open Future Orders        Priority Expected Expires Ordered    *MA Screening Digital Bilateral Routine  11/5/2019 11/5/2018    MR Brain w/o Contrast Routine  12/20/2018 11/5/2018            Who to contact     If you have questions or need follow up information about today's clinic visit or your schedule please contact [facility] BELLA BARONE directly at  [phone].  Normal or non-critical lab and imaging results will be communicated to you by PressLabshart, letter or phone within 4 business days after the clinic has received the results. If you do not hear from us within 7 days, please contact the clinic through CytoPherxt or phone. If you have a critical or abnormal lab result, we will notify you by phone as soon as possible.  Submit refill requests through Recensus or call your pharmacy and they will forward the refill request to us. Please allow 3 business days for your refill to be completed.          Additional Information About Your Visit        PressLabshart Information     Recensus gives you secure access to your electronic health record. If you see a primary care provider, you can also send messages to your care team and make appointments. If you have questions, please call your primary care clinic.  If you do not have a primary care provider, please call [phone] and they will assist you.        Care EveryWhere ID     This is your Care EveryWhere ID. This could be used by other organizations to access your [facility] medical records  [id number]        Your Vitals Were     Pulse Temperature Last Period Pulse Oximetry Breastfeeding? BMI (Body Mass Index)    81 96.5  F (35.8  C) (Temporal) 06/05/2006 97% No 24.45 kg/m2       Blood Pressure from Last 3 Encounters:   11/05/18 110/60   11/05/18 114/80   09/29/18 99/79    Weight from Last 3 Encounters:   11/05/18 138 lb (62.6 kg)   11/05/18 136 lb 3.2 oz (61.8 kg)   09/29/18 134 lb (60.8 kg)              We Performed the Following     Comprehensive metabolic panel (BMP + Alb, Alk Phos, ALT, AST, Total. Bili, TP)     CRP, inflammation     ESR: Erythrocyte sedimentation rate     FLU VACCINE, (RIV4) RECOMBINANT HA  , IM (FluBlok, egg free) [32939]- >18 YRS (FMG recommended  50-64 YRS)     Lipid panel reflex to direct LDL Fasting     TSH with free T4 reflex     Vaccine Administration, Initial [45402]          Today's  Medication Changes          These changes are accurate as of 11/5/18  4:40 PM.  If you have any questions, ask your nurse or doctor.               Start taking these medicines.        Dose/Directions    citalopram 20 MG tablet   Commonly known as:  celeXA   Used for:  Major depressive disorder, recurrent episode, mild (H)   Started by:  Jet Porter MD        Increase to 1.5 x 20mg tab by mouth @12noon   Quantity:  135 tablet   Refills:  3         These medicines have changed or have updated prescriptions.        Dose/Directions    lisinopril 5 MG tablet   Commonly known as:  PRINIVIL/ZESTRIL   This may have changed:    - medication strength  - additional instructions   Used for:  HTN, goal below 140/90   Changed by:  Maxine Monae MD        1 tab by mouth at 12 noon for 2 weeks, then 1/2 tab at noon for 2 weeks, then off   Quantity:  30 tablet   Refills:  1       MIRALAX powder   This may have changed:  additional instructions   Used for:  Other constipation   Generic drug:  polyethylene glycol   Changed by:  Jet Porter MD        1 capful in 8 oz water and sips throughout the day   Quantity:  510 g   Refills:  1       MOBIC 15 MG tablet   This may have changed:    - how much to take  - how to take this  - when to take this  - additional instructions   Used for:  Post-traumatic osteoarthritis of left knee   Generic drug:  meloxicam   Changed by:  Jet Porter MD        Not sure if taking this.   Quantity:  180 tablet   Refills:  3       senna 8.6 MG tablet   Commonly known as:  SENOKOT   This may have changed:  additional instructions   Changed by:  Jet Porter MD        1 tab @ 9pm   Quantity:  120 tablet   Refills:  0            Where to get your medicines      These medications were sent to [facility] [place], [place] - [address]  [address], [facility] [address]     Phone:  [phone]     lisinopril 5 MG tablet         These medications were sent to  [facility] [address] [place], [place] - [address]  [address], [facility] [address]     Phone:  [phone]     carbidopa-levodopa  MG per tablet    citalopram 20 MG tablet                Primary Care Provider Office Phone # Fax #    Maxine Monae [phone]       [address] [facility] [facility] [address]        Equal Access to Services     ARACELY DAVENPORT : Hadii aad ku hadasho Soomaali, waaxda luqadaha, qaybta kaalmada adeegyada, waxay idiin hayaan adeeg kharash lastacy . So [facility] [phone].    ATENCIÓN: Si habla español, tiene a shoemaker disposición servicios gratuitos de asistencia lingüística. Paulo al [phone].    We comply with applicable federal civil rights laws and [place] laws. We do not discriminate on the basis of race, color, national origin, age, disability, sex, sexual orientation, or gender identity.            Thank you!     Thank you for choosing [facility]  for your care. Our goal is always to provide you with excellent care. Hearing back from our patients is one way we can continue to improve our services. Please take a few minutes to complete the written survey that you may receive in the mail after your visit with us. Thank you!             Your Updated Medication List - Protect others around you: Learn how to safely use, store and throw away your medicines at www.disposemymeds.org.          This list is accurate as of 11/5/18  4:40 PM.  Always use your most recent med list.                   Brand Name Dispense Instructions for use Diagnosis    carbidopa-levodopa  MG per tablet    SINEMET    540 tablet    2 tabs by mouth @ 7am, noon, and 9pm    Paralysis agitans (H)       citalopram 20 MG tablet    celeXA    135 tablet    Increase to 1.5 x 20mg tab by mouth @12noon    Major depressive disorder, recurrent episode, mild (H)       lisinopril 5 MG tablet    PRINIVIL/ZESTRIL    30 tablet    1 tab by mouth at 12 noon for 2 weeks, then 1/2 tab at noon  for 2 weeks, then off    HTN, goal below 140/90       melatonin 5 MG tablet      Not sure of dose and taking 1 tab by mouth @ 9pm        MIRALAX powder   Generic drug:  polyethylene glycol     510 g    1 capful in 8 oz water and sips throughout the day    Other constipation       MOBIC 15 MG tablet   Generic drug:  meloxicam     180 tablet    Not sure if taking this.    Post-traumatic osteoarthritis of left knee       senna 8.6 MG tablet    SENOKOT    120 tablet    1 tab @ 9pm        TYLENOL PM EXTRA STRENGTH  MG tablet   Generic drug:  diphenhydrAMINE-acetaminophen     14 tablet    1 tab by mouth @ 9/930pm as needed        ZOLMitriptan 5 MG tablet    ZOMIG    6 tablet    Take 1 tablet (5 mg) by mouth at onset of headache for migraine MAY REPEAT ONCE AFTER 2 HOURS. DO NOT EXCEED 2 TABLETS IN 24 HOURS.    Intractable chronic migraine without aura and without status migrainosus

## 2018-11-06 ENCOUNTER — HOSPITAL ENCOUNTER (OUTPATIENT)
Dept: MRI IMAGING | Facility: CLINIC | Age: 63
Discharge: HOME | End: 2018-11-06
Attending: PSYCHIATRY & NEUROLOGY | Admitting: PSYCHIATRY & NEUROLOGY
Payer: MEDICARE

## 2018-11-06 DIAGNOSIS — G20.A1 PARALYSIS AGITANS (H): ICD-10-CM
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
## 2018-11-06 PROCEDURE — 70551 MRI BRAIN STEM W/O DYE: CPT

## 2018-11-06 ASSESSMENT — ANXIETY QUESTIONNAIRES: GAD7 TOTAL SCORE: 4

## 2018-11-06 ASSESSMENT — PATIENT HEALTH QUESTIONNAIRE - PHQ9: SUM OF ALL RESPONSES TO PHQ QUESTIONS 1-9: 8

## 2018-11-06 NOTE — PROGRESS NOTES
Nathaly, your cholesterol has been trending up. Otherwise labs look good.  Please let me know if you have any questions.    Maxine Monae MD

## 2018-11-07 ENCOUNTER — TELEPHONE (OUTPATIENT)
Dept: PHARMACY | Facility: OTHER | Age: 63
End: 2018-11-07

## 2018-11-07 NOTE — TELEPHONE ENCOUNTER
MTM referral from: [facility] visit (referral by provider)    MTM referral outreach attempt #2 on November 7, 2018 at 3:14 PM      Outcome: Patient not reachable after several attempts, will route to MTM Pharmacist/Provider as an FYI. Thank you for the referral.    Martina Huffman, MTM Coordinator

## 2018-11-08 ENCOUNTER — TELEPHONE (OUTPATIENT)
Dept: NEUROLOGY | Facility: CLINIC | Age: 63
End: 2018-11-08

## 2018-11-08 NOTE — TELEPHONE ENCOUNTER
Please call the pt and relay Dr Porter's message.    Notes Recorded by Jet Porter MD on 11/6/2018 at 5:59 PM  Your brain mri scan was fine and did not show worrisome abnormalities.

## 2018-11-08 NOTE — TELEPHONE ENCOUNTER
[facility] Call Center    Phone Message    May a detailed message be left on voicemail: yes    Reason for Call: Requesting Results   Name/type of test: MRI- Brain  Date of test: 11/6/18        Action Taken: Message routed to:  Adult Clinics: Neurology p [id number]

## 2018-11-09 NOTE — TELEPHONE ENCOUNTER
Discussed MRI results and also gave pt MTM pharmacy referral number to schedule MTM visit. Katie Dumont RN

## 2019-01-24 ENCOUNTER — TELEPHONE (OUTPATIENT)
Dept: NEUROLOGY | Facility: CLINIC | Age: 64
End: 2019-01-24

## 2019-01-24 DIAGNOSIS — G20.A1 PARKINSON DISEASE (H): Primary | ICD-10-CM

## 2019-01-24 NOTE — TELEPHONE ENCOUNTER
[facility] Call Center    Phone Message    May a detailed message be left on voicemail: yes    Reason for Call: Other: patient's  is calling to report that patient is having hallucinations and delusions.  He reports that patient has been seeing people just sitting around doing nothing.  He would like ot know if there is a medication to help get rid of the halluciations.     Action Taken: Message routed to:  Adult Clinics: Neurology p [id number]

## 2019-01-24 NOTE — TELEPHONE ENCOUNTER
"Spoke to the pt's  and he states that over the past couple weeks she has been experiencing hallucinations of people sitting the chair or standing in the corner. They started as on and off but now it seems more constant. She does not feel threatened by these hallucinations, they just sit/stand there looking off into space. He states that she is overtired and having some issues with remembering to take the metamucil which has led to some stomach cramping and constipation.   He stated that this has happened in the past but the hallucinations were much worse. He said that \"she was seeing aleshia doing surgery on people\" which caused her quite at bit of stress. He couldn't quite remember the name of the medication that Dr Porter stopped but thinks it was Donepezil because the hallucinations stopped.    He would like to know if there is something that she could take to stop these hallucinations. He also had questions about what stage Nathaly is at with her Parkinsons.   He works as a  and is gone for a week at a time. He states that if she continues to decline like this, he would need to find either an assisted living or get 24 hour help in the home. He would like to know more about his options and find out if insurance would cover it. I will place a social work referral and have Anirudh reach out to him to discuss.  Message routed to Dr Porter to review and advise on medication options and stage of Parkinsons.   Jammie Traylor RN    "

## 2019-01-25 ENCOUNTER — PATIENT OUTREACH (OUTPATIENT)
Dept: CARE COORDINATION | Facility: CLINIC | Age: 64
End: 2019-01-25

## 2019-01-25 ENCOUNTER — HOSPITAL ENCOUNTER (EMERGENCY)
Facility: CLINIC | Age: 64
Discharge: HOME | End: 2019-01-26
Attending: FAMILY MEDICINE | Admitting: FAMILY MEDICINE
Payer: COMMERCIAL

## 2019-01-25 DIAGNOSIS — G20.A1 DEMENTIA DUE TO PARKINSON'S DISEASE WITH BEHAVIORAL DISTURBANCE (H): ICD-10-CM

## 2019-01-25 DIAGNOSIS — R44.3 HALLUCINATIONS: ICD-10-CM

## 2019-01-25 DIAGNOSIS — B37.2 YEAST DERMATITIS: ICD-10-CM

## 2019-01-25 DIAGNOSIS — G20.A1 PARALYSIS AGITANS (H): ICD-10-CM

## 2019-01-25 DIAGNOSIS — F02.818 DEMENTIA DUE TO PARKINSON'S DISEASE WITH BEHAVIORAL DISTURBANCE (H): ICD-10-CM

## 2019-01-25 PROCEDURE — 96360 HYDRATION IV INFUSION INIT: CPT | Performed by: FAMILY MEDICINE

## 2019-01-25 PROCEDURE — 99284 EMERGENCY DEPT VISIT MOD MDM: CPT | Mod: 25 | Performed by: FAMILY MEDICINE

## 2019-01-25 PROCEDURE — 99285 EMERGENCY DEPT VISIT HI MDM: CPT | Mod: Z6 | Performed by: FAMILY MEDICINE

## 2019-01-25 RX ORDER — QUETIAPINE FUMARATE 25 MG/1
TABLET, FILM COATED ORAL
Qty: 30 TABLET | Refills: 11 | Status: SHIPPED | OUTPATIENT
Start: 2019-01-25 | End: 2019-02-04

## 2019-01-25 NOTE — TELEPHONE ENCOUNTER
[facility] Call Center    Phone Message    May a detailed message be left on voicemail: yes    Reason for Call: Other: Pt  states returning a call back to clinic. Please call back.      Action Taken: Message routed to:  Adult Clinics: Neurology p [id number]

## 2019-01-25 NOTE — PROGRESS NOTES
Social Work Telephone Message Note  [facility]    Patient Name:  Nathaly Farias  /Age:  1955 (63 year old)    Referral Source:  Jammie Traylor RN CC  Reason for Referral:  Support/resources for pt who has Parkinson's disease and condition is declining.    SW attempted to contact pt's spouse via phone today, was unable to connect with him, and LM for spouse to call SW back.  SW will follow and await return call from spouse.    ZENON He     Social Work  Count includes the [facility]  Office:  [phone]  E-Mail:  [email or web address]  2019 3:00 PM

## 2019-01-25 NOTE — TELEPHONE ENCOUNTER
Corazon was advised to call her 's cell because he is currently on the road for work [phone]. Her  was informed that he will be receiving a call.    Jammie Traylor RN

## 2019-01-25 NOTE — TELEPHONE ENCOUNTER
Discussed with Jammie Traylor RN. Patient's  should be contacted to schedule MTM visit and his phone number is [phone].     Corazon Dhaliwal, Pharm.D.  Medication Therapy Management Pharmacist  Phone: [phone]

## 2019-01-25 NOTE — ED AVS SNAPSHOT
[facility] Emergency Department  [address] [place] [address]  Phone:  [phone]  Fax:  [phone]                                    Nathaly Farias   MRN: [id number]    Department:  [facility] Emergency Department   Date of Visit:  1/25/2019           After Visit Summary Signature Page    I have received my discharge instructions, and my questions have been answered. I have discussed any challenges I see with this plan with the nurse or doctor.    ..........................................................................................................................................  Patient/Patient Representative Signature      ..........................................................................................................................................  Patient Representative Print Name and Relationship to Patient    ..................................................               ................................................  Date                                   Time    ..........................................................................................................................................  Reviewed by Signature/Title    ...................................................              ..............................................  Date                                               Time          22EPIC Rev 08/18

## 2019-01-25 NOTE — TELEPHONE ENCOUNTER
Corazon Dhaliwal RPH   You; Jet Porter MD 1 hour ago (9:47 AM)      We were previously not able to reach them for a MTM visit over the phone. Jammie - if you reach them again, can you let them know that someone from the MTM team will be contacting them to set up a phone consultation -- and make sure the phone number in Epic is correct. Thank you!     Corazon Dhaliwal, Pharm.D.   Medication Therapy Management Pharmacist   Phone: [phone] (Routing comment)       Jet Porter MD   You; Corazon Dhaliwal RPH 1 hour ago (9:31 AM)      Wondering if she has been admitted.   We had discussed seroquel in the past so I put the order for this medication back in.   She may benefit from a phone consultation with Corazon Dhaliwal Pharm D to review seroquel and its use for parkinson hallucinations/psychosis.   Typically start with 1/2 of 25mg at night and adjust.  (Routing comment)

## 2019-01-26 VITALS
HEART RATE: 85 BPM | RESPIRATION RATE: 20 BRPM | BODY MASS INDEX: 24.61 KG/M2 | OXYGEN SATURATION: 98 % | TEMPERATURE: 98.4 F | SYSTOLIC BLOOD PRESSURE: 144 MMHG | DIASTOLIC BLOOD PRESSURE: 92 MMHG | WEIGHT: 138.9 LBS

## 2019-01-26 PROBLEM — R44.3 HALLUCINATIONS: Status: ACTIVE | Noted: 2019-01-26

## 2019-01-26 LAB
ALBUMIN UR-MCNC: 30 MG/DL
ANION GAP SERPL CALCULATED.3IONS-SCNC: 5 MMOL/L (ref 3–14)
APPEARANCE UR: CLEAR
BASOPHILS # BLD AUTO: 0 10E9/L (ref 0–0.2)
BASOPHILS NFR BLD AUTO: 0.4 %
BILIRUB UR QL STRIP: NEGATIVE
BUN SERPL-MCNC: 30 MG/DL (ref 7–30)
CALCIUM SERPL-MCNC: 9.1 MG/DL (ref 8.5–10.1)
CHLORIDE SERPL-SCNC: 107 MMOL/L (ref 94–109)
CO2 SERPL-SCNC: 27 MMOL/L (ref 20–32)
COLOR UR AUTO: YELLOW
CREAT SERPL-MCNC: 1.01 MG/DL (ref 0.52–1.04)
CRP SERPL-MCNC: <2.9 MG/L (ref 0–8)
DIFFERENTIAL METHOD BLD: NORMAL
EOSINOPHIL NFR BLD AUTO: 1.3 %
ERYTHROCYTE [DISTWIDTH] IN BLOOD BY AUTOMATED COUNT: 13.2 % (ref 10–15)
GFR SERPL CREATININE-BSD FRML MDRD: 60 ML/MIN/{1.73_M2}
GLUCOSE SERPL-MCNC: 98 MG/DL (ref 70–99)
GLUCOSE UR STRIP-MCNC: NEGATIVE MG/DL
HCT VFR BLD AUTO: 37.9 % (ref 35–47)
HGB BLD-MCNC: 12.5 G/DL (ref 11.7–15.7)
HGB UR QL STRIP: ABNORMAL
IMM GRANULOCYTES # BLD: 0 10E9/L (ref 0–0.4)
IMM GRANULOCYTES NFR BLD: 0.1 %
KETONES UR STRIP-MCNC: 20 MG/DL
LACTATE BLD-SCNC: 0.5 MMOL/L (ref 0.7–2)
LEUKOCYTE ESTERASE UR QL STRIP: NEGATIVE
LYMPHOCYTES # BLD AUTO: 2.5 10E9/L (ref 0.8–5.3)
LYMPHOCYTES NFR BLD AUTO: 33.6 %
MCH RBC QN AUTO: 29.6 PG (ref 26.5–33)
MCHC RBC AUTO-ENTMCNC: 33 G/DL (ref 31.5–36.5)
MCV RBC AUTO: 90 FL (ref 78–100)
MONOCYTES # BLD AUTO: 0.7 10E9/L (ref 0–1.3)
MONOCYTES NFR BLD AUTO: 8.9 %
MUCOUS THREADS #/AREA URNS LPF: PRESENT /LPF
NEUTROPHILS # BLD AUTO: 4.1 10E9/L (ref 1.6–8.3)
NEUTROPHILS NFR BLD AUTO: 55.7 %
NITRATE UR QL: NEGATIVE
NRBC # BLD AUTO: 0 10*3/UL
NRBC BLD AUTO-RTO: 0 /100
PH UR STRIP: 5 PH (ref 5–7)
PLATELET # BLD AUTO: 284 10E9/L (ref 150–450)
POTASSIUM SERPL-SCNC: 4 MMOL/L (ref 3.4–5.3)
RBC # BLD AUTO: 4.22 10E12/L (ref 3.8–5.2)
RBC #/AREA URNS AUTO: 2 /HPF (ref 0–2)
SODIUM SERPL-SCNC: 139 MMOL/L (ref 133–144)
SOURCE: ABNORMAL
SP GR UR STRIP: 1.02 (ref 1–1.03)
TROPONIN I SERPL-MCNC: <0.015 UG/L (ref 0–0.04)
TSH SERPL DL<=0.005 MIU/L-ACNC: 1.2 MU/L (ref 0.4–4)
UROBILINOGEN UR STRIP-MCNC: 0 MG/DL (ref 0–2)
WBC # BLD AUTO: 7.4 10E9/L (ref 4–11)
WBC #/AREA URNS AUTO: 3 /HPF (ref 0–5)

## 2019-01-26 PROCEDURE — 85025 COMPLETE CBC W/AUTO DIFF WBC: CPT | Performed by: FAMILY MEDICINE

## 2019-01-26 PROCEDURE — 84484 ASSAY OF TROPONIN QUANT: CPT | Performed by: FAMILY MEDICINE

## 2019-01-26 PROCEDURE — 25000132 ZZH RX MED GY IP 250 OP 250 PS 637: Performed by: FAMILY MEDICINE

## 2019-01-26 PROCEDURE — 86140 C-REACTIVE PROTEIN: CPT | Performed by: FAMILY MEDICINE

## 2019-01-26 PROCEDURE — 84443 ASSAY THYROID STIM HORMONE: CPT | Performed by: FAMILY MEDICINE

## 2019-01-26 PROCEDURE — 83605 ASSAY OF LACTIC ACID: CPT | Performed by: FAMILY MEDICINE

## 2019-01-26 PROCEDURE — 25000128 H RX IP 250 OP 636: Performed by: FAMILY MEDICINE

## 2019-01-26 PROCEDURE — 80048 BASIC METABOLIC PNL TOTAL CA: CPT | Performed by: FAMILY MEDICINE

## 2019-01-26 PROCEDURE — 81001 URINALYSIS AUTO W/SCOPE: CPT | Performed by: FAMILY MEDICINE

## 2019-01-26 RX ORDER — SODIUM CHLORIDE 9 MG/ML
1000 INJECTION, SOLUTION INTRAVENOUS CONTINUOUS
Status: DISCONTINUED | OUTPATIENT
Start: 2019-01-26 | End: 2019-01-26 | Stop reason: HOSPADM

## 2019-01-26 RX ORDER — PRENATAL VIT 91/IRON/FOLIC/DHA 28-975-200
COMBINATION PACKAGE (EA) ORAL 2 TIMES DAILY
Qty: 42 G | Refills: 0 | Status: SHIPPED | OUTPATIENT
Start: 2019-01-26 | End: 2019-04-17

## 2019-01-26 RX ADMIN — SODIUM CHLORIDE 1000 ML: 9 INJECTION, SOLUTION INTRAVENOUS at 01:12

## 2019-01-26 RX ADMIN — QUETIAPINE FUMARATE 12.5 MG: 25 TABLET ORAL at 02:04

## 2019-01-26 ASSESSMENT — ENCOUNTER SYMPTOMS
CONSTIPATION: 1
WEAKNESS: 1
FATIGUE: 1
HALLUCINATIONS: 1
HEADACHES: 0
BACK PAIN: 0
JOINT SWELLING: 0
CHEST TIGHTNESS: 0
NAUSEA: 0
APPETITE CHANGE: 1
EYES NEGATIVE: 1
PALPITATIONS: 0
SLEEP DISTURBANCE: 1
CONFUSION: 1
DIAPHORESIS: 0
FEVER: 0
COUGH: 0
VOMITING: 0
DIFFICULTY URINATING: 1
ACTIVITY CHANGE: 1
ANAL BLEEDING: 0
TREMORS: 1
CHILLS: 0
SHORTNESS OF BREATH: 0

## 2019-01-26 NOTE — ED PROVIDER NOTES
Error     Chief Complaint   Patient presents with     Altered Mental Status     HPI  Nathaly Farias is a 63 year old female who     Review of Systems    Physical Exam   BP: (!) 196/116  Pulse: 85  Temp: 98.4  F (36.9  C)  Resp: 16  Weight: 63 kg (138 lb 14.4 oz)  SpO2: 98 %      Physical Exam    ED Course        Procedures               Critical Care time:  none               No results found for this or any previous visit (from the past 24 hour(s)).    Medications - No data to display    Assessments & Plan (with Medical Decision Making)     I have reviewed the nursing notes.    I have reviewed the findings, diagnosis, plan and need for follow up with the patient.             Medication List      There are no discharge medications for this visit.         Final diagnoses:   None       1/25/2019   [facility] EMERGENCY DEPARTMENT     Prakash Oglesby DO  01/26/19 0251

## 2019-01-26 NOTE — ED PROVIDER NOTES
History     Chief Complaint   Patient presents with     Altered Mental Status     HPI  Nathaly Farias is a 63 year old female who presents to the ER with her daughter with concerns about increased episodes of confusion and hallucinations.  Her daughter states that she has had hallucinations starting about a week ago to the point where she would not leave her bedroom.  Apparently, her  is an over the road  and his truck is currently broken down and is not able to get back home.  The patient has been caring for herself in their home and being checked on by her mother-in-law and brother-in-law who live about 1/2 mile from her home.  Apparently she has had increased episodes of hallucinations.  Her daughter states that she had a similar episode in the past and her neurologist stopped 1 of her Parkinson's medications which resolved the hallucinations.  Her daughter admits that she is not been home much and does not care for her mother.  She stated that her mother takes her own medications and was told by the patient's  that her last medication intake was probably this morning.  Her daughter states that her mother has not eaten much this last 2 days and possibly eating very little or nothing over the last day.  Patient is not sure why she is not eating.  Patient admits to being somewhat confused but otherwise denies any specific pain issues today except for some cramping type pains as she points to her pelvic area.  She has had no vomiting but apparently has had an episode of constipated stool earlier this week.    In review of the medical record it looks like she was prescribed Seroquel for her hallucination symptoms yesterday by her neurologist.  Her daughter states that her  is not been home so it is unlikely that this prescription has been filled yet and the patient has not started that medicine as yet.      I reviewed a recent MRI scan of her head done because of her increased  hallucinations and dementia symptoms:  MRI BRAIN WITHOUT CONTRAST  11/6/2018 3:51 PM     HISTORY:   Paralysis agitans. Fall, sequela.     TECHNIQUE:  Multiplanar, multisequence MRI of the brain without  gadolinium IV contrast material.       COMPARISON:  Head CT 9/29/2018. Brain MR 12/12/2016.     FINDINGS: There is no evidence of hemorrhage, mass, acute infarct, or  anomaly. Mild diffuse parenchymal volume loss. Mild patchy deep and  subcortical white matter T2 hyperintensities which are nonspecific,  but likely related to chronic microvascular ischemic disease.  Ventricular size within normal limits without hydrocephalus.      The facial structures appear normal. The arteries at the base of the  brain and the dural venous sinuses appear patent.                                                                      IMPRESSION:    1. No evidence of acute infarct, mass, hemorrhage, or herniation.  2. Mild diffuse parenchymal volume loss and white matter changes  likely due to chronic microvascular ischemic disease.      PAOLA CRUZ MD    I reviewed a telephone consultation note with the patient's neurologist dated yesterday and copied excerpts from that note below:  1/25/2019  Corazon Dhaliwal RPH   You; Jet Porter MD 1 hour ago (9:47 AM)          We were previously not able to reach them for a MTM visit over the phone. Jammie - if you reach them again, can you let them know that someone from the MTM team will be contacting them to set up a phone consultation -- and make sure the phone number in Epic is correct. Thank you!     Corazon Dhaliwal, Pharm.D.   Medication Therapy Management Pharmacist   Phone: [phone] (Routing comment)         Jet Porter MD   You; Corazon Dhaliwal RPH 1 hour ago (9:31 AM)       Wondering if she has been admitted.   We had discussed seroquel in the past so I put the order for this medication back in.   She may benefit from a phone consultation with Corazon Dhaliwal  "Pharm D to review seroquel and its use for parkinson hallucinations/psychosis.   Typically start with 1/2 of 25mg at night and adjust.  (Routing comment)                         9:47 AM   Corazon Dhaliwal [facility] routed this conversation to Jet Porter MD Diaz, Melissa A, RN             9:31 AM   Jet Porter MD routed this conversation to Jammie Traylor RN Roy, Natalie Theresa [facility]   January 24, 2019              4:43 PM   Jammie Traylor RN routed this conversation to Jet Porter MD Diaz, Melissa A, RN         3:28 PM   Note      Spoke to the pt's  and he states that over the past couple weeks she has been experiencing hallucinations of people sitting the chair or standing in the corner. They started as on and off but now it seems more constant. She does not feel threatened by these hallucinations, they just sit/stand there looking off into space. He states that she is overtired and having some issues with remembering to take the metamucil which has led to some stomach cramping and constipation.   He stated that this has happened in the past but the hallucinations were much worse. He said that \"she was seeing aleshia doing surgery on people\" which caused her quite at bit of stress. He couldn't quite remember the name of the medication that Dr Porter stopped but thinks it was Donepezil because the hallucinations stopped.    He would like to know if there is something that she could take to stop these hallucinations. He also had questions about what stage Nathaly is at with her Parkinsons.   He works as a  and is gone for a week at a time. He states that if she continues to decline like this, he would need to find either an assisted living or get 24 hour help in the home. He would like to know more about his options and find out if insurance would cover it. I will place a social work referral and have Anirudh reach out to him to discuss.  Message routed to Dr Porter to " review and advise on medication options and stage of Parkinsons.   Jammie Traylor RN            Allergies:  Allergies   Allergen Reactions     Sulfa Drugs      Tacchycardia, had to go to ED     Naproxen GI Disturbance     Oxycontin [Oxycodone] GI Disturbance     Made her feel funny/upset stomach     Rivastigmine      Patch was too expensive     Ropinirole      Did not work       Problem List:    Patient Active Problem List    Diagnosis Date Noted     Hallucinations 01/26/2019     Priority: Medium     Hiatal hernia 07/05/2017     Priority: Medium     Dementia 03/13/2017     Priority: Medium     IMPRESSIONS AND RECOMMENDATIONS     Current results indicate marked attentional difficulties, impairments in memory, which in some cases reflected impairments in retention and in other cases reflected retrieval difficulties, and mild anomia as well as mild executive dysfunction. Premorbid intellectual functioning is estimated to fall in the borderline range. She denied experiencing significant depressive symptomatology.     This pattern of performance is suggestive of mild dementia, and there is a suggestion of mesial temporal lobe as well as frontal and subcortical system involvement. She has had increasing difficulty managing her instrumental activities of daily living. Taken together with her history, a neurodegenerative etiology seems likely. Although Parkinson's disease with dementia is a diagnostic consideration, the possibility of an additional etiology, including Alzheimer's disease, cannot entirely be ruled out. In view of the marked attentional difficulties, nonrestorative sleep and the effects of medications may also contribute. Although she has a history of depression, she is not reporting significant depressive symptomatology currently, but does appear to be experiencing mild anxiety.     In terms of daily functioning, Ms. Farias may require increasing assistance managing her instrumental activities of daily living.  In particular, she may require additional assistance managing her medications. She continues to drive, although she has limited her driving to short distances and places with which she is familiar. Given her cognitive difficulties, it may be prudent for her to consider a formal 's assessment, such as that offered at [facility]. She will likely benefit from structure and routine. If she has difficulty managing large, complex tasks, others may assist by breaking down such tasks into smaller, more manageable parts. She has marked attentional difficulties, and may find it helpful when working on a task to work in an environment that is relatively free from distractions, such as noises or other interruptions. She may find it helpful to take frequent, brief breaks when working on a project. She may benefit from the use of written reminders or checklists. It may be helpful to follow her over time. Repeated neuropsychological evaluation in one year may help to determine whether her cognitive difficulties are progressive. These results have not been discussed with Ms. Farias or her family, although they were encouraged to contact my office to schedule an appointment for feedback should they so desire.          Cognitive disorder evalaution 2017 03/06/2017     Priority: Medium     She has marked attentional difficulties, impairments in memory including a rapid forgetting rate, mild anomia, and mild executive dysfunction. 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 She's required increasing assistance with finances, medications, driving, and cooking. I think this is a mild dementia, and there's a suggestion of mesial temporal lobe involvement, so Alzheimer's disease can't be ruled out, although there are also some features consistent with frontal system involvement. She's not reporting significant problems with mood        Post-traumatic osteoarthritis of left knee 10/21/2016     Priority: Medium     Pain from implanted hardware, initial encounter 10/21/2016     Priority: Medium     Seborrheic dermatitis 02/29/2016     Priority: Medium     Rosacea 03/17/2015     Priority: Medium     Asymptomatic varicose veins 03/17/2015     Priority: Medium     Wears glasses 02/23/2015     Priority: Medium     Constipation 02/23/2015     Priority: Medium     Incomplete RBBB 02/23/2015     Priority: Medium     Heart murmur 02/23/2015     Priority: Medium     Family history of tremor 02/23/2015     Priority: Medium     Mother has an head tremor        History of MRI of brain and brain stem 02/21/2015     Priority: Medium     MRI OF THE BRAIN WITHOUT CONTRAST 7/10/2014 1:21 PM   COMPARISON: None.  HISTORY: Left-sided upper and lower extremity tremor. Balance issues.  TECHNIQUE:   Brain: Axial diffusion-weighted with ADC map, T2-weighted with fat  saturation, T1-weighted and turboFLAIR and coronal T1-weighted images  of the brain were obtained without intravenous contrast.   FINDINGS: There is mild diffuse cerebral volume loss. There are  multiple tiny scattered focal areas of abnormal T2 signal  hyperintensity in the cerebral white matter bilaterally that are  consistent with sequela of chronic small vessel ischemic disease.   The ventricles and basal cisterns are within normal limits in  configuration given the degree of cerebral volume loss. There is no  midline shift. There are no extra-axial fluid collections. There is  no evidence for stroke or acute intracranial hemorrhage.   There is no sinusitis or mastoiditis.   IMPRESSION  IMPRESSION: Diffuse cerebral volume loss and cerebral white matter  changes consistent with chronic small vessel ischemic disease. No  evidence for acute intracranial pathology.  MYRA VAZ MD       [facility] () 07/30/2014     Priority: Medium     7/30/2014 evaluation by Dr. Jimenez    SUMMARY: She first noted  this tremor in 2009. It affected her left hand. It occurred mainly at work. Later she noted the tremor affected her left leg. This is mainly a tremor at rest, and it will stop if she moves the limb. She can also have it to some extent with a sustained posture.   She reports her balance has worsened over the past couple of years, and her daughter points out that she tends to shuffle. She reports no change in her voice. She reports no change in her taste or smell. She describes her handwriting as slow and less smooth, but not necessarily small.   Aside from citalopram, she has not been on any psychiatric medications or antiemetic drugs.     Her evaluation for the tremor included a normal CBC. Comprehensive metabolic profile was notable only for sodium of 146. TSH was normal.   She did have a brain MRI scan that I reviewed. There are some scattered T2 signal changes in the white matter which are nonspecific. Note is made by the radiologist of diffuse cerebral volume loss, but I am not impressed by a great deal of atrophy.       HTN, goal below 140/90 07/07/2014     Priority: Medium     Advanced directives, counseling/discussion 04/23/2012     Priority: Medium     Discussed advance care planning with patient; information given to patient to review. 4/23/2012          Mild major depression (H) 10/04/2011     Priority: Medium     HYPERLIPIDEMIA LDL GOAL <160 10/31/2010     Priority: Medium     Neck pain 09/17/2009     Priority: Medium     Headache 09/17/2009     Priority: Medium     Problem list name updated by automated process. Provider to review       Overactive bladder 03/05/2009     Priority: Medium     Chronic cholecystitis 04/17/2008     Priority: Medium     Essential hypertension, benign 11/26/2007     Priority: Medium     Sleep apnea      Priority: Medium     moderate, sleep study 11/07, on CPAP  Problem list name updated by automated process. 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       Obesity 06/16/2005     Priority: Medium     Problem list name updated by automated process. Provider to review       Malaise and fatigue 06/16/2005     Priority: Medium     Problem list name updated by automated process. Provider to review       Migraine variant 02/16/2004     Priority: Medium     Problem list name updated by automated process. Provider to review       Symptomatic menopausal or female climacteric states 02/16/2004     Priority: Medium     hot flashes, dec. libido, irritability       Prolapse of vaginal wall 08/13/2002     Priority: Medium     Problem list name updated by automated process. Provider to review and confirm  Do you wish to do the replacement in the background? yes            Past Medical History:    Past Medical History:   Diagnosis Date     Arthralgia of temporomandibular joint 10/22/1996     Arthritis      Cognitive disorder evalaution 2017 3/6/2017     Constipation 2/23/2015     Dementia 3/13/2017     Depressive disorder      Heart murmur 2/23/2015     History of MRI of brain and brain stem 2/21/2015     Hypertension      Incomplete RBBB 2/23/2015     Motion sickness      Other and unspecified hyperlipidemia      Parkinson's disease (H)      Seborrheic dermatitis 2/29/2016     Unspecified arthropathy, hand 08/07/1997     Unspecified sleep apnea      Variants of migraine, not elsewhere classified, without mention of intractable migraine without mention of status migrainosus      Wears glasses 2/23/2015       Past Surgical History:    Past Surgical History:   Procedure Laterality Date     ------------OTHER-------------      left shoulder     ARTHRODESIS TOE(S)   3/2/2011    ARTHRODESIS TOE(S) performed by CLARA LUCAS at [facility]     C LIGATE FALLOPIAN TUBE  1998     CHOLECYSTECTOMY, LAPOROSCOPIC  5/12/2008    Cholecystectomy, Laparoscopic     ESOPHAGOSCOPY, GASTROSCOPY, DUODENOSCOPY (EGD), COMBINED N/A 7/5/2017    Procedure: COMBINED ESOPHAGOSCOPY, GASTROSCOPY, DUODENOSCOPY (EGD), BIOPSY SINGLE OR MULTIPLE;  Esophagogastroduodenoscopy with Multiple Biopsies by Biopsy;  Surgeon: Tj Denney MD;  Location: [facility]     HC COLONOSCOPY W/WO BRUSH/WASH  08/30/06    normal     HC OPEN TX TIBIAL SHAFT FX W PLATE/SCREWS W/WO CERCLAGE  2000    Multiple operations on left leg     HC SHLDR ARTHROSCOP,PART ACROMIOPLAS  11/16/06    Right shoulder     HC SHLDR ARTHROSCOP,SURG,DIS CLAVICULECTOMY  11/16/06    Right shoulder       Family History:    Family History   Problem Relation Age of Onset     Diabetes Father         type II, diagnosed in late 60's     Hypertension Father      Cancer Father         rare kidney cancer had kidney removed, 75 y.o. at onset     Cerebrovascular Disease Father      Rashes/Skin Problems Father         rosacea     Neurologic Disorder Mother         head tremor     Heart Disease Brother         Valvular disease corrected with surgery     Heart Disease Paternal Grandfather         MI     Migraines Son      Migraines Sister      Rashes/Skin Problems Sister         rosacea       Social History:  Marital Status:   [2]  Social History     Tobacco Use     Smoking status: Never Smoker     Smokeless tobacco: Never Used     Tobacco comment: no smokers in the household   Substance Use Topics     Alcohol use: Yes     Alcohol/week: 0.0 oz     Comment: very occ     Drug use: No        Medications:      carbidopa-levodopa (SINEMET)  MG per tablet   citalopram (CELEXA) 20 MG tablet   diphenhydrAMINE-acetaminophen (TYLENOL PM EXTRA STRENGTH)  MG tablet   lisinopril (PRINIVIL/ZESTRIL) 5 MG tablet   melatonin 5 MG tablet   meloxicam (MOBIC) 15 MG  tablet   polyethylene glycol (MIRALAX) powder   QUEtiapine (SEROQUEL) 25 MG tablet   senna (SENOKOT) 8.6 MG tablet   ZOLMitriptan (ZOMIG) 5 MG tablet         Review of Systems   Constitutional: Positive for activity change, appetite change and fatigue. Negative for chills, diaphoresis and fever.   HENT: Negative.    Eyes: Negative.    Respiratory: Negative for cough, chest tightness and shortness of breath.    Cardiovascular: Negative for chest pain and palpitations. Leg swelling: chronic.   Gastrointestinal: Positive for constipation (But was able to resolve this 2 days ago.). Negative for anal bleeding, nausea and vomiting.   Genitourinary: Positive for difficulty urinating and pelvic pain (cramping). Negative for vaginal bleeding.   Musculoskeletal: Negative for back pain and joint swelling.   Skin: Negative.    Neurological: Positive for tremors (Parkinson's since 2009) and weakness. Negative for headaches.   Psychiatric/Behavioral: Positive for confusion, hallucinations and sleep disturbance.   All other systems reviewed and are negative.      Physical Exam   BP: (!) 196/116  Pulse: 85  Temp: 98.4  F (36.9  C)  Resp: 16  Weight: 63 kg (138 lb 14.4 oz)  SpO2: 98 %      Physical Exam   Constitutional: She appears well-developed. No distress.   HENT:   Head: Normocephalic and atraumatic.   Mouth/Throat: Mucous membranes are normal. Abnormal dentition (Patient has had a lot of dental work done to her teeth with multiple fillings present but they appear to be in good health with normal oral hygiene noted.). No dental abscesses.   Eyes: Conjunctivae, EOM and lids are normal. Pupils are equal, round, and reactive to light. Lids are everted and swept, no foreign bodies found.   Neck: Trachea normal, full passive range of motion without pain and phonation normal. Neck supple.   Cardiovascular: Normal rate, regular rhythm, intact distal pulses and normal pulses.   Pulmonary/Chest: Effort normal and breath sounds normal.    Abdominal: Soft. Normal appearance.       Genitourinary: There is rash on the right labia. There is rash on the left labia.   Genitourinary Comments: Rash consistent with yeast dermatitis and irritation likely from poor hygiene to this area.   Neurological: She is alert. She is disoriented (Patient is alert but confused.  No active hallucinations noted on exam.  Patient is otherwise pleasant and tries to answer questions but is unable to find words at times and loses train of thought easily.). She displays tremor. Gait (Shuffling gait) abnormal.   Skin: Rash (Rash consistent with yeast dermatitis noted to the skin fold area to her lower anterior abdomen.) noted. She is not diaphoretic.   Psychiatric: Her mood appears anxious. Her speech is delayed. She is slowed and actively hallucinating. She is not agitated, not withdrawn and not combative. Thought content is not paranoid. Cognition and memory are impaired. She expresses no homicidal ideation. She is communicative. She exhibits abnormal recent memory.   Nursing note and vitals reviewed.      ED Course        Procedures               Critical Care time:  none             Results for orders placed or performed during the hospital encounter of 01/25/19 (from the past 24 hour(s))   UA reflex to Microscopic and Culture   Result Value Ref Range    Color Urine Yellow     Appearance Urine Clear     Glucose Urine Negative NEG^Negative mg/dL    Bilirubin Urine Negative NEG^Negative    Ketones Urine 20 (A) NEG^Negative mg/dL    Specific Gravity Urine 1.023 1.003 - 1.035    Blood Urine Small (A) NEG^Negative    pH Urine 5.0 5.0 - 7.0 pH    Protein Albumin Urine 30 (A) NEG^Negative mg/dL    Urobilinogen mg/dL 0.0 0.0 - 2.0 mg/dL    Nitrite Urine Negative NEG^Negative    Leukocyte Esterase Urine Negative NEG^Negative    Source Catheterized Urine     RBC Urine 2 0 - 2 /HPF    WBC Urine 3 0 - 5 /HPF    Mucous Urine Present (A) NEG^Negative /LPF   Lactic acid whole blood    Result Value Ref Range    Lactic Acid 0.5 (L) 0.7 - 2.0 mmol/L   CBC with platelets differential   Result Value Ref Range    WBC 7.4 4.0 - 11.0 10e9/L    RBC Count 4.22 3.8 - 5.2 10e12/L    Hemoglobin 12.5 11.7 - 15.7 g/dL    Hematocrit 37.9 35.0 - 47.0 %    MCV 90 78 - 100 fl    MCH 29.6 26.5 - 33.0 pg    MCHC 33.0 31.5 - 36.5 g/dL    RDW 13.2 10.0 - 15.0 %    Platelet Count 284 150 - 450 10e9/L    Diff Method Automated Method     % Neutrophils 55.7 %    % Lymphocytes 33.6 %    % Monocytes 8.9 %    % Eosinophils 1.3 %    % Basophils 0.4 %    % Immature Granulocytes 0.1 %    Nucleated RBCs 0 0 /100    Absolute Neutrophil 4.1 1.6 - 8.3 10e9/L    Absolute Lymphocytes 2.5 0.8 - 5.3 10e9/L    Absolute Monocytes 0.7 0.0 - 1.3 10e9/L    Absolute Basophils 0.0 0.0 - 0.2 10e9/L    Abs Immature Granulocytes 0.0 0 - 0.4 10e9/L    Absolute Nucleated RBC 0.0    Basic metabolic panel   Result Value Ref Range    Sodium 139 133 - 144 mmol/L    Potassium 4.0 3.4 - 5.3 mmol/L    Chloride 107 94 - 109 mmol/L    Carbon Dioxide 27 20 - 32 mmol/L    Anion Gap 5 3 - 14 mmol/L    Glucose 98 70 - 99 mg/dL    Urea Nitrogen 30 7 - 30 mg/dL    Creatinine 1.01 0.52 - 1.04 mg/dL    GFR Estimate 60 (L) >60 mL/min/[1.73_m2]    GFR Estimate If Black 69 >60 mL/min/[1.73_m2]    Calcium 9.1 8.5 - 10.1 mg/dL   Troponin I   Result Value Ref Range    Troponin I ES <0.015 0.000 - 0.045 ug/L   CRP inflammation   Result Value Ref Range    CRP Inflammation <2.9 0.0 - 8.0 mg/L   TSH with free T4 reflex   Result Value Ref Range    TSH 1.20 0.40 - 4.00 mU/L       Medications   0.9% sodium chloride BOLUS (1,000 mLs Intravenous New Bag 1/26/19 0112)     Followed by   sodium chloride 0.9% infusion (not administered)   QUEtiapine (SEROquel) half-tab 12.5 mg (12.5 mg Oral Given 1/26/19 0204)       Assessments & Plan (with Medical Decision Making)  63-year-old female to the ER secondary concerns by her daughter of increasing issues associated with hallucinations  which are felt secondary to her Parkinson's disease.  Her daughter was concerned about the possibility of a urine infection may be contributing to the increase intensity of her symptoms.  I discussed with the patient's daughter that the management of psychosis associated with Parkinson's disease includes:  Ruling out the possible reversible causes (such as infections, metabolic and electrolyte imbalances, sleep disorders)  Decreasing or discontinuing adjunctive antiparkinsonian drugs (with cautious monitoring of motor function).   Simplifying the Parkinson s disease medication regimen  Adding a new or second generation antipsychotic such as the Seoquel as recommended by Dr. Porter  If psychosis occurs in a Parkinson s disease patient with cognitive impairment or dementia, a cholinesterase inhibitor (such donepezil, rivastigmine) may be considered  We discussed that we are able to rule out any significant infection or metabolic disorders tonight with her exam.  Dr. Porter, neurology, has recommended that she start on low-dose Seroquel and actually sent a prescription to the [facility] pharmacy yesterday.  Patient's daughter was made aware of this and plans to  the prescription for for the patient tomorrow.  Patient was started on 1/2 tablet of the Seroquel here in the emergency room tonight.  Placed a clinic care coordinator referral order into the [facility] EMR and asked that they contact the patient's family to discuss options for additional assistance in caring for the patient and possible other options for living settings.  Her daughter plans to take the patient home to her home tonight at least until her  can get back from his job as a .  She plans on making sure the patient is eating and drinking appropriately.  Encouraged him to make appointment to follow-up with Dr. Porter, neurology and/or Dr. Monae for recheck and dosing adjustments given the new Seroquel medication.     I have  reviewed the nursing notes.    I have reviewed the findings, diagnosis, plan and need for follow up with the patient.            I verbally discussed the findings of the evaluation today in the ER. I have verbally discussed with Nathaly the suggested treatment(s) as described in the discharge instructions and handouts.    I have verbally suggested she follow-up in her clinic or return to the ER for increased symptoms. See the follow-up recommendations documented  in the after visit summary in this visit's EPIC chart.      Final diagnoses:   Dementia due to Parkinson's disease with behavioral disturbance (H)   Hallucinations - per history   Paralysis agitans (H)       1/25/2019   [facility] EMERGENCY DEPARTMENT     Prakash Oglesby,   01/26/19 0239

## 2019-01-26 NOTE — ED TRIAGE NOTES
Pt presents with her daughter with concerns about altered mental status.  She has Parkinson's dementia and lives in her home with her .  He has been out town this week and pt has been alone with family checking on her.  She has been having hallucinations this week about seeing animals, seeing people, whispering to people that are not there.  She reportedly has not wanted to leave her bedroom or her house for fear that someone was going to steal her things, which her daughter states is not typical behavior for her.  She has not been eating or drinking regularly this week, complains of some lower belly pain, is unable to say when it started.

## 2019-01-26 NOTE — ED NOTES
"Writer talked to pt and her daughter about recently being prescribed Seroquel and questioned when or if medication was started. Pt unsure and daughter states \"if it was prescribed after Sunday, no. Her  sets up pill packs and he would have done it last on Sunday and hasn't been home to add any new medications.\"    "

## 2019-01-26 NOTE — DISCHARGE INSTRUCTIONS
Please read and follow the handout(s) instructions. Return, if needed, for increased or worsening symptoms and as directed by the handout(s).    The prescription for the Seroquel was sent to the [facility] pharmacy in [place]. You should be receiving a call from the clinic care coordinator likely on Monday to discuss options for additional cares.  I also sent a prescription for the skin yeast infection to the anterior abdomen and the perigenital region to the [facility] pharmacy.    I hope you feel better soon!    Electronically signed, Prakash Oglesby DO

## 2019-01-28 ENCOUNTER — PATIENT OUTREACH (OUTPATIENT)
Dept: CARE COORDINATION | Facility: CLINIC | Age: 64
End: 2019-01-28

## 2019-01-28 NOTE — TELEPHONE ENCOUNTER
Spoke with , Wolf. He did not want to schedule a MTM visit at this time because they are seeing Dr. Porter next week. The patient's insurance does cover telephone visits, so this could be scheduled after the appointment with Dr. Porter, if needed.     Of note, Wolf states that the patient started quetiapine 12.5 mg nightly and her hallucinations improved for the first 2 days but then he forgot to give her a dose yesterday and she did hallucinate this morning. He will plan to add quetiapine to her pill box so that he doesn't forget going forward.     Corazon Dhaliwal, Pharm.D.  Medication Therapy Management Pharmacist  Phone: [phone]

## 2019-01-28 NOTE — PROGRESS NOTES
Diagnosis/Summary/Recommendations:    PATIENT: Nathaly Farias  63 year old female     : 1955    MOISE: 2019    Parkinson    Wants chiropractor or PT referral - was sent  mtm referral    Has not yet tried a heating pad  Has not used a heating    Has a yeast infection on her belly and using topical.     Falls asleep but has been getting up during the night    She has day time naps and when she naps more she sleeps better at night.      Cannabis    Still getting headaches - all day.             Medications     7am 12noon 9pm         Acetaminophen 500mg tylenol As needed              Carbidopa/levodopa Sinemet 25/100 2 2 2         Citalopram celexa 20mg    1.5            Diphenhydramine acetaminophen tylenol PM       stopped         Lisinopril prinivil/zestril 10mg    Off this            Lorazepam ativan 0.5mg Not taking               Melatonin 5mg        1        Meloxicam mobic 15mg Not sure if taking this.            Polyethylene glycol miralax Drinking throughout the day             Probiotic young living 1       Quetiapine seroquel 25mg   Take full tab     Senna senokot 8.6mg       1          Terbinafine lamisil 1% external cream        Zolmitriptan zomig 5mg As needed                                                            History obtained from patient      PLAN  Increase the seroquel to 1 tab at night for hallucinations.   Sill  Try therapy for her neck pain and use packs - microwave for her neck  Could see headache specialist if needed  Consideration for cbd oil.     Consider other products for migraine/headache but headaches may be related to her neck  Vitamin b2 400mg/day   Magnesium up to 600mg/day  Butter bur  Others    Return back in 3-6months      Coding statement:   Duration of  Services: patient care and care coordination was 5 minutes  Greater than 50% of this visit was spent in counseling and coordination of care.     Jet Porter MD     ______________________________________    Last visit  date and details:      MOISE: November 5, 2018     Parkinson  Cognitive impairment     Emergency visit for collapse on 9/29/2018  Had normal ct of head.     Passed out twice.   She states she gets light headed/dizzy  114/80  She had one episode while taking the shower and was feeling dizzy/light headed and passed out in the shower when seated and was out for 3-4 minutes and came to and was disoriented.      The other episode she had fallen down the steps and when she got up she walked up the steps and felt weird and passed.         Medications     7am 12noon 9pm         Acetaminophen 325mg tylenol As needed              Carbidopa/levodopa Sinemet 25/100 2 2 2         Citalopram celexa 20mg    1            Diphenhydramine acetaminophen tylenol PM       As needed for sleep         Lisinopril prinivil/zestril 10mg    1            Lorazepam ativan 0.5mg As needed, rarely using               Melatonin 5mg or 10mg       Dose unknown. Taking as needed        Meloxicam mobic 15mg Not sure if taking this.            Polyethylene glycol miralax Drinking throughout the day             Senna senokot 8.6mg       1          Zolmitriptan zomig 5mg As needed                                                          History obtained from patient      Family wanted to get another scan  Memory issues are worse  Tremors are worse   She has dyskinesias and tremors that may be present at the same time.   She is taking her pills on an empty stomach.   Not clear if she has wearing off.   She is having more frequent headaches. These are different from her usual headaches.   She has some pain in her right side of her abdomen - she has pain only if she pushes there. She is having a bowel movement every 2-3 days. She wonders if there is something there.   She has had more depression and cries for no clear reason.      SUMMARY  ORTHOSTATISM - PROBABLY THE CAUSE OF THE TWO SPELLS AND HAS WE[facility]. SHE HAS DIZZY SPELLS  AND EXHAUSTED.      WOULD HAVE HER REDUCE HER DOSE OF LISINOPRIL IF HER PCP AGREES. SHE IS TAKING 10MG NOW AND MAY WANT TO TAKE 5MG AND GO FROM THERE. MAY NEED TO GO TO 2.5MG AT SOME POINT.      Hold off on using proamatine (midodrine) or florinef (fludrocortisone) or mestinon (pyridostigmine) to elevate blood pressure.      Discussed changing around her sinemet from 2 tabs 3/day to 1.5 tabs 4 times per day.      Brain mri - expect to find increased atrophy and nonspecific white matter changes. Doubtful we will see a subdural or a stroke.      When she sees her pcp and gets routine/ananual physical may be worth getting a sed rate/crp as well as thyroid, cbc,      Not sure what is the abdominal pain but suspect she is significantly constipated and taking miralax once daily and one senokot per day. May need to increase the miralax to twice daily as well as the senokot twice daily     She has been having more mood issues.   ECG QTC was 453 ms  in females, 451-470 ms is intermediate.   It is no significantly prolonged   She is on citalopram 20mg per day.      PLAN  Brain mri  Visit with pcp today to review and possibly reduce the dose of lisinopril  May need some blood work eg sed rate, crp if getting blood work.   Could conceivably increase the citalopram from 20 to 30mg to see if helps   The abdominal pain may be related to obstipation/constipation and may need more aggressive bowel regimen  Such as twice daily senokot and miralax  Pharmacy consultation if covered with Corazon Dhaliwal, Pharm D to review mood and cognitive issues  She has not been on donepezil (aricept) or namenda/memantine.   Return back in three months.              ______________________________________      Patient was asked about 14 Review of systems including changes in vision (dry eyes, double vision), hearing, heart, lungs, musculoskeletal, depression, anxiety, snoring, RBD, insomnia, urinary frequency, urinary urgency, constipation, swallowing  problems, hematological, ID, allergies, skin problems: seborrhea, endocrinological: thyroid, diabetes, cholesterol; balance, weight changes, and other neurological problems and these were not significant at this time except for   Patient Active Problem List   Diagnosis     Prolapse of vaginal wall     Migraine variant     Symptomatic menopausal or female climacteric states     Obesity     Malaise and fatigue     Other and unspecified adverse effect of drug, medicinal and biological substance     Other and unspecified disc disorder of cervical region     Sleep apnea     Essential hypertension, benign     Chronic cholecystitis     Overactive bladder     Neck pain     Headache     HYPERLIPIDEMIA LDL GOAL <160     Mild major depression (H)     Advanced directives, counseling/discussion     HTN, goal below 140/90     Paralysis agitans (H)     History of MRI of brain and brain stem     Wears glasses     Constipation     Incomplete RBBB     Heart murmur     Family history of tremor     Rosacea     Asymptomatic varicose veins     Seborrheic dermatitis     Post-traumatic osteoarthritis of left knee     Pain from implanted hardware, initial encounter     Cognitive disorder evalaution 2017     Dementia     Hiatal hernia     Hallucinations          Allergies   Allergen Reactions     Sulfa Drugs      Tacchycardia, had to go to ED     Naproxen GI Disturbance     Oxycontin [Oxycodone] GI Disturbance     Made her feel funny/upset stomach     Rivastigmine      Patch was too expensive     Ropinirole      Did not work     Past Surgical History:   Procedure Laterality Date     ------------OTHER-------------      left shoulder     ARTHRODESIS TOE(S)  3/2/2011    ARTHRODESIS TOE(S) performed by CLARA LUCAS at [facility] OR     C LIGATE FALLOPIAN TUBE  1998     CHOLECYSTECTOMY, LAPOROSCOPIC  5/12/2008    Cholecystectomy, Laparoscopic     ESOPHAGOSCOPY, GASTROSCOPY, DUODENOSCOPY (EGD), COMBINED N/A 7/5/2017    Procedure: COMBINED  ESOPHAGOSCOPY, GASTROSCOPY, DUODENOSCOPY (EGD), BIOPSY SINGLE OR MULTIPLE;  Esophagogastroduodenoscopy with Multiple Biopsies by Biopsy;  Surgeon: Tj Denney MD;  Location: [facility]     HC COLONOSCOPY W/WO BRUSH/WASH  08/30/06    normal     HC OPEN TX TIBIAL SHAFT FX W PLATE/SCREWS W/WO CERCLAGE  2000    Multiple operations on left leg     HC SHLDR ARTHROSCOP,PART ACROMIOPLAS  11/16/06    Right shoulder     HC SHLDR ARTHROSCOP,SURG,DIS CLAVICULECTOMY  11/16/06    Right shoulder     Past Medical History:   Diagnosis Date     Arthralgia of temporomandibular joint 10/22/1996     Arthritis      Cognitive disorder evalaution 2017 3/6/2017    She has marked attentional difficulties, impairments in memory including a rapid forgetting rate, mild anomia, and mild executive dysfunction. 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 She's required increasing assistance with finances, medications, driving, and cooking. I think this is a mild dementia, and     Constipation 2/23/2015     Dementia 3/13/2017    IMPRESSIONS AND RECOMMENDATIONS   Current results indicate marked attentional difficulties, impairments in memory, which in some cases reflected impairments in retention and in other cases reflected retrieval difficulties, and mild anomia as well as mild executive dysfunction. Premorbid intellectual functioning is estimated to fall in the borderline range. She denied experiencing significant depre     Depressive disorder      Heart murmur 2/23/2015     History of MRI of brain and brain stem 2/21/2015    MRI OF THE BRAIN WITHOUT CONTRAST 7/10/2014 1:21 PM  COMPARISON: None. HISTORY: Left-sided upper and lower extremity tremor. Balance issues. TECHNIQUE:  Brain: Axial diffusion-weighted with ADC map, T2-weighted with fat saturation, T1-weighted and turboFLAIR and coronal T1-weighted images of the brain were obtained without intravenous  "contrast.  FINDINGS: There is mild diffuse cerebral volume loss     Hypertension      Incomplete RBBB 2/23/2015     Motion sickness      Other and unspecified hyperlipidemia      Parkinson's disease (H)      Seborrheic dermatitis 2/29/2016     Unspecified arthropathy, hand 08/07/1997     Unspecified sleep apnea     moderate, sleep study 11/07, on CPAP, has daytime somnolence with this     Variants of migraine, not elsewhere classified, without mention of intractable migraine without mention of status migrainosus      Wears glasses 2/23/2015     Social History     Socioeconomic History     Marital status:      Spouse name: violet     Number of children: 2     Years of education: Not on file     Highest education level: Not on file   Social Needs     Financial resource strain: Not on file     Food insecurity - worry: Not on file     Food insecurity - inability: Not on file     Transportation needs - medical: Not on file     Transportation needs - non-medical: Not on file   Occupational History     Occupation:    Tobacco Use     Smoking status: Never Smoker     Smokeless tobacco: Never Used     Tobacco comment: no smokers in the household   Substance and Sexual Activity     Alcohol use: Yes     Alcohol/week: 0.0 oz     Comment: very occ     Drug use: No     Sexual activity: Yes     Partners: Male     Birth control/protection: Surgical, Female Surgical     Comment: tubal ligation   Other Topics Concern      Service Not Asked     Blood Transfusions Not Asked     Caffeine Concern Yes     Comment: OCC     Occupational Exposure Not Asked     Hobby Hazards Not Asked     Sleep Concern Not Asked     Stress Concern Not Asked     Weight Concern Not Asked     Special Diet Not Asked     Back Care Not Asked     Exercise No     Comment: \"not faithfully\"     Bike Helmet Not Asked     Seat Belt Yes     Self-Exams No     Parent/sibling w/ CABG, MI or angioplasty before 65F 55M? Not Asked   Social History Narrative "    ALLERGIES:  She is allergic to sulfa, and has had gastrointestinal side effects from naproxen.               FAMILY HISTORY:  Strongly positive for headaches, including in her son and sister.  There is no family history of tremor.               SOCIAL HISTORY:  She does not work outside the home.  She does not smoke.  She uses very little alcohol.  Lives in [place]. . Has 2 kids: son who is 35 yr s old. And daughter who is 30 yrs old. Her  is working as a  .        Drug and lactation database from the [place] [employer]:  http://toxnet.nlm.nih.gov/cgi-bin/sis/htmlgen?LACT      B/P: Data Unavailable, T: Data Unavailable, P: Data Unavailable, R: Data Unavailable 0 lbs 0 oz  Last menstrual period 06/05/2006, not currently breastfeeding., There is no height or weight on file to calculate BMI.  Medications and Vitals not listed above were documented in the cart and reviewed by me.     Current Outpatient Medications   Medication Sig Dispense Refill     carbidopa-levodopa (SINEMET)  MG per tablet 2 tabs by mouth @ 7am, noon, and 9pm 540 tablet 3     citalopram (CELEXA) 20 MG tablet Increase to 1.5 x 20mg tab by mouth @12noon 135 tablet 3     diphenhydrAMINE-acetaminophen (TYLENOL PM EXTRA STRENGTH)  MG tablet 1 tab by mouth @ 9/930pm as needed 14 tablet      lisinopril (PRINIVIL/ZESTRIL) 5 MG tablet 1 tab by mouth at 12 noon for 2 weeks, then 1/2 tab at noon for 2 weeks, then off 30 tablet 1     melatonin 5 MG tablet Not sure of dose and taking 1 tab by mouth @ 9pm       meloxicam (MOBIC) 15 MG tablet Not sure if taking this. 180 tablet 3     polyethylene glycol (MIRALAX) powder 1 capful in 8 oz water and sips throughout the day 510 g 1     QUEtiapine (SEROQUEL) 25 MG tablet 1/2 to 1 tab at night as needed for hallucinations 30 tablet 11     senna (SENOKOT) 8.6 MG tablet 1 tab @ 9pm 120 tablet      terbinafine (LAMISIL) 1 % external cream Apply topically 2  times daily 42 g 0     ZOLMitriptan (ZOMIG) 5 MG tablet Take 1 tablet (5 mg) by mouth at onset of headache for migraine MAY REPEAT ONCE AFTER 2 HOURS. DO NOT EXCEED 2 TABLETS IN 24 HOURS. 6 tablet 10         Jet Porter MD

## 2019-01-28 NOTE — PROGRESS NOTES
Clinic Care Coordination Contact    Situation: Patient chart reviewed by care coordinator.    Background: UBALDO CC received referral from ED provider to assist pt and family with resources for assisted living.     Assessment: UBALDO CC in doing chart review notes that pt is working with Neurology and recent referral was made to SW by Neurology provider for resources/support for pt and family. [facility] Care Coordination , Celia is already attempting to reach family.  and would have lead CC for pt    Plan/Recommendations: [facility] CC UBALDO Yang would have lead with this pt. UBALDO CC will not outreach at this time.     ZENON Min Clinic Care Coordinator  [facility]  1/28/2019 12:24 PM  [phone]      UPDATE: Celia did reach pt spouse and will continue to work with them. UBALDO BLOCK will do no outreaches.     ZENON Min Clinic Care Coordinator  [facility]  1/29/2019 8:39 AM  [phone]

## 2019-01-28 NOTE — PROGRESS NOTES
Social Work Intervention  [facility]    Data/Intervention:    Patient Name:  Nathaly Farias  /Age:  1955 (63 year old)    Visit Type: telephone  Referral Source:  Jammie Traylor RN CC, UBALDO also received another referral regarding pt from [facility] ED.  Reason for Referral:  Support/resources for pt who has Parkinson's disease and condition is declining.    Collaborated With:    Spouse, Wolf    Patient Concerns/Issues:   Spouse is inquiring regarding what kind of home care services are available under pt's Medicare and whether an jail/nursing home would be covered under pt's insurance.      Intervention/Education/Resources Provided:  SW contacted pt's spouse via phone today.  He is an over the road  and is often away from home for a week at a time.  Pt has a diagnosis of Parkinson's and is having hallucinations.  Pt was evaluated for these in the [facility] ER on 19 and recently saw Dr Porter regarding hallucinations.  Medication management is being worked on.  At this time,spouse is interested in knowing what pt's insurance will cover in terms of home health care, DIMAS, and nursing home.  UBALDO educated pt regarding what pt's insurance will and will not cover.  SW offered the option of a [facility] assessment to see what pt's needs are and determine whether pt is eligible for home services through [facility].  Spouse appreciated the information though he is not ready to do that yet.  SW also offered the option of hiring extended hours of home care for pt through an agency, if pt/spouse have funds to pay for that.  Lastly, SW educated spouse regarding Medicare covering only rehabilitative care in a nursing home.  Spouse indicated that he has arranged for more family/friends to check on pt while he is working.  SW provided spouse with  contact information.          Assessment/Plan:  Spouse is not ready to make any decisions regarding home care, jail,  or nursing home for pt.  SW will continue to follow and reach out to spouse again regarding pt situation.    ZENON He     Social Work  [facility]  Office:  [phone]  E-Mail:  [email or web address]  1/28/2019 4:11 PM

## 2019-01-31 ENCOUNTER — MYC MEDICAL ADVICE (OUTPATIENT)
Dept: NEUROLOGY | Facility: CLINIC | Age: 64
End: 2019-01-31

## 2019-01-31 DIAGNOSIS — G20.A1 PARKINSON DISEASE (H): Primary | ICD-10-CM

## 2019-02-04 ENCOUNTER — OFFICE VISIT (OUTPATIENT)
Dept: NEUROLOGY | Facility: CLINIC | Age: 64
End: 2019-02-04
Payer: COMMERCIAL

## 2019-02-04 VITALS
OXYGEN SATURATION: 98 % | WEIGHT: 134.4 LBS | DIASTOLIC BLOOD PRESSURE: 86 MMHG | BODY MASS INDEX: 23.81 KG/M2 | HEART RATE: 81 BPM | SYSTOLIC BLOOD PRESSURE: 139 MMHG

## 2019-02-04 DIAGNOSIS — G20.A1 DEMENTIA DUE TO PARKINSON'S DISEASE WITHOUT BEHAVIORAL DISTURBANCE (H): ICD-10-CM

## 2019-02-04 DIAGNOSIS — F33.0 MAJOR DEPRESSIVE DISORDER, RECURRENT EPISODE, MILD (H): ICD-10-CM

## 2019-02-04 DIAGNOSIS — G20.A1 PARALYSIS AGITANS (H): ICD-10-CM

## 2019-02-04 DIAGNOSIS — R44.3 HALLUCINATIONS: ICD-10-CM

## 2019-02-04 DIAGNOSIS — M17.32 POST-TRAUMATIC OSTEOARTHRITIS OF LEFT KNEE: ICD-10-CM

## 2019-02-04 DIAGNOSIS — G20.A1 PARKINSON DISEASE (H): Primary | ICD-10-CM

## 2019-02-04 DIAGNOSIS — F02.80 DEMENTIA DUE TO PARKINSON'S DISEASE WITHOUT BEHAVIORAL DISTURBANCE (H): ICD-10-CM

## 2019-02-04 PROCEDURE — 99214 OFFICE O/P EST MOD 30 MIN: CPT | Performed by: PSYCHIATRY & NEUROLOGY

## 2019-02-04 RX ORDER — ZINC OXIDE 13 %
CREAM (GRAM) TOPICAL
COMMUNITY
Start: 2019-02-04 | End: 2019-12-16

## 2019-02-04 RX ORDER — ACETAMINOPHEN 500 MG
500-1000 TABLET ORAL EVERY 6 HOURS PRN
COMMUNITY
Start: 2019-02-04 | End: 2019-11-19

## 2019-02-04 RX ORDER — CARBIDOPA AND LEVODOPA 25; 100 MG/1; MG/1
TABLET ORAL
Qty: 540 TABLET | Refills: 3 | Status: SHIPPED | OUTPATIENT
Start: 2019-02-04 | End: 2019-09-16

## 2019-02-04 RX ORDER — CITALOPRAM HYDROBROMIDE 20 MG/1
TABLET ORAL
Qty: 135 TABLET | Refills: 3 | Status: SHIPPED | OUTPATIENT
Start: 2019-02-04 | End: 2019-09-16

## 2019-02-04 RX ORDER — QUETIAPINE FUMARATE 25 MG/1
TABLET, FILM COATED ORAL
Qty: 90 TABLET | Refills: 11 | Status: SHIPPED | OUTPATIENT
Start: 2019-02-04 | End: 2019-05-15

## 2019-02-04 RX ORDER — MELOXICAM 15 MG/1
TABLET ORAL
Qty: 180 TABLET | Refills: 3 | COMMUNITY
Start: 2019-02-04 | End: 2019-06-10

## 2019-02-04 ASSESSMENT — PAIN SCALES - GENERAL: PAINLEVEL: MILD PAIN (2)

## 2019-02-04 NOTE — NURSING NOTE
Nathaly Farias's goals for this visit include: return  She requests these members of her care team be copied on today's visit information:     PCP: Maxine Monae    Referring Provider:  No referring provider defined for this encounter.    /86   Pulse 81   Wt 61 kg (134 lb 6.4 oz)   LMP 06/05/2006   SpO2 98%   BMI 23.81 kg/m      Do you need any medication refills at today's visit? y

## 2019-02-04 NOTE — LETTER
2019      RE: Nathaly Farias  [address] [facility] [address]       Diagnosis/Summary/Recommendations:    PATIENT: Nathaly Farias  63 year old female     : 1955    MOISE: 2019    Parkinson    Wants chiropractor or PT referral - was sent  [facility] referral    Has not yet tried a heating pad  Has not used a heating    Has a yeast infection on her belly and using topical.     Falls asleep but has been getting up during the night    She has day time naps and when she naps more she sleeps better at night.      Cannabis    Still getting headaches - all day.             Medications     7am 12noon 9pm         Acetaminophen 500mg tylenol As needed              Carbidopa/levodopa Sinemet 25/100 2 2 2         Citalopram celexa 20mg    1.5            Diphenhydramine acetaminophen tylenol PM       stopped         Lisinopril prinivil/zestril 10mg    Off this            Lorazepam ativan 0.5mg Not taking               Melatonin 5mg        1        Meloxicam mobic 15mg Not sure if taking this.            Polyethylene glycol miralax Drinking throughout the day             Probiotic young living 1       Quetiapine seroquel 25mg   Take full tab     Senna senokot 8.6mg       1          Terbinafine lamisil 1% external cream        Zolmitriptan zomig 5mg As needed                                                            History obtained from patient      PLAN  Increase the seroquel to 1 tab at night for hallucinations.   Sill  Try therapy for her neck pain and use packs - microwave for her neck  Could see headache specialist if needed  Consideration for cbd oil.     Consider other products for migraine/headache but headaches may be related to her neck  Vitamin b2 400mg/day   Magnesium up to 600mg/day  Butter bur  Others    Return back in 3-6months      Coding statement:   Duration of  Services: patient care and care coordination was 5 minutes  Greater than 50% of this visit was spent in counseling and  coordination of care.     Jet Porter MD     ______________________________________    Last visit date and details:      MOISE: November 5, 2018     Parkinson  Cognitive impairment     Emergency visit for collapse on 9/29/2018  Had normal ct of head.     Passed out twice.   She states she gets light headed/dizzy  114/80  She had one episode while taking the shower and was feeling dizzy/light headed and passed out in the shower when seated and was out for 3-4 minutes and came to and was disoriented.      The other episode she had fallen down the steps and when she got up she walked up the steps and felt weird and passed.         Medications     7am 12noon 9pm         Acetaminophen 325mg tylenol As needed              Carbidopa/levodopa Sinemet 25/100 2 2 2         Citalopram celexa 20mg    1            Diphenhydramine acetaminophen tylenol PM       As needed for sleep         Lisinopril prinivil/zestril 10mg    1            Lorazepam ativan 0.5mg As needed, rarely using               Melatonin 5mg or 10mg       Dose unknown. Taking as needed        Meloxicam mobic 15mg Not sure if taking this.            Polyethylene glycol miralax Drinking throughout the day             Senna senokot 8.6mg       1          Zolmitriptan zomig 5mg As needed                                                          History obtained from patient      Family wanted to get another scan  Memory issues are worse  Tremors are worse   She has dyskinesias and tremors that may be present at the same time.   She is taking her pills on an empty stomach.   Not clear if she has wearing off.   She is having more frequent headaches. These are different from her usual headaches.   She has some pain in her right side of her abdomen - she has pain only if she pushes there. She is having a bowel movement every 2-3 days. She wonders if there is something there.   She has had more depression and cries for no clear reason.      SUMMARY  ORTHOSTATISM - PROBABLY  THE CAUSE OF THE TWO SPELLS AND HAS WEIRD FEELINGS WHEN WALKING AT [employer]. SHE HAS DIZZY SPELLS AND EXHAUSTED.      WOULD HAVE HER REDUCE HER DOSE OF LISINOPRIL IF HER PCP AGREES. SHE IS TAKING 10MG NOW AND MAY WANT TO TAKE 5MG AND GO FROM THERE. MAY NEED TO GO TO 2.5MG AT SOME POINT.      Hold off on using proamatine (midodrine) or florinef (fludrocortisone) or mestinon (pyridostigmine) to elevate blood pressure.      Discussed changing around her sinemet from 2 tabs 3/day to 1.5 tabs 4 times per day.      Brain mri - expect to find increased atrophy and nonspecific white matter changes. Doubtful we will see a subdural or a stroke.      When she sees her pcp and gets routine/ananual physical may be worth getting a sed rate/crp as well as thyroid, cbc,      Not sure what is the abdominal pain but suspect she is significantly constipated and taking miralax once daily and one senokot per day. May need to increase the miralax to twice daily as well as the senokot twice daily     She has been having more mood issues.   ECG QTC was 453 ms  in females, 451-470 ms is intermediate.   It is no significantly prolonged   She is on citalopram 20mg per day.      PLAN  Brain mri  Visit with pcp today to review and possibly reduce the dose of lisinopril  May need some blood work eg sed rate, crp if getting blood work.   Could conceivably increase the citalopram from 20 to 30mg to see if helps   The abdominal pain may be related to obstipation/constipation and may need more aggressive bowel regimen  Such as twice daily senokot and miralax  Pharmacy consultation if covered with Corazon Dhaliwal, Pharm D to review mood and cognitive issues  She has not been on donepezil (aricept) or namenda/memantine.   Return back in three months.              ______________________________________      Patient was asked about 14 Review of systems including changes in vision (dry eyes, double vision), hearing, heart, lungs, musculoskeletal, depression,  anxiety, snoring, RBD, insomnia, urinary frequency, urinary urgency, constipation, swallowing problems, hematological, ID, allergies, skin problems: seborrhea, endocrinological: thyroid, diabetes, cholesterol; balance, weight changes, and other neurological problems and these were not significant at this time except for   Patient Active Problem List   Diagnosis     Prolapse of vaginal wall     Migraine variant     Symptomatic menopausal or female climacteric states     Obesity     Malaise and fatigue     Other and unspecified adverse effect of drug, medicinal and biological substance     Other and unspecified disc disorder of cervical region     Sleep apnea     Essential hypertension, benign     Chronic cholecystitis     Overactive bladder     Neck pain     Headache     HYPERLIPIDEMIA LDL GOAL <160     Mild major depression (H)     Advanced directives, counseling/discussion     HTN, goal below 140/90     Paralysis agitans (H)     History of MRI of brain and brain stem     Wears glasses     Constipation     Incomplete RBBB     Heart murmur     Family history of tremor     Rosacea     Asymptomatic varicose veins     Seborrheic dermatitis     Post-traumatic osteoarthritis of left knee     Pain from implanted hardware, initial encounter     Cognitive disorder evalaution 2017     Dementia     Hiatal hernia     Hallucinations          Allergies   Allergen Reactions     Sulfa Drugs      Tacchycardia, had to go to ED     Naproxen GI Disturbance     Oxycontin [Oxycodone] GI Disturbance     Made her feel funny/upset stomach     Rivastigmine      Patch was too expensive     Ropinirole      Did not work     Past Surgical History:   Procedure Laterality Date     ------------OTHER-------------      left shoulder     ARTHRODESIS TOE(S)  3/2/2011    ARTHRODESIS TOE(S) performed by CLARA LUCAS at [facility]     C LIGATE FALLOPIAN TUBE  1998     CHOLECYSTECTOMY, LAPOROSCOPIC  5/12/2008    Cholecystectomy, Laparoscopic      ESOPHAGOSCOPY, GASTROSCOPY, DUODENOSCOPY (EGD), COMBINED N/A 7/5/2017    Procedure: COMBINED ESOPHAGOSCOPY, GASTROSCOPY, DUODENOSCOPY (EGD), BIOPSY SINGLE OR MULTIPLE;  Esophagogastroduodenoscopy with Multiple Biopsies by Biopsy;  Surgeon: Tj Denney MD;  Location: [facility]     HC COLONOSCOPY W/WO BRUSH/WASH  08/30/06    normal     HC OPEN TX TIBIAL SHAFT FX W PLATE/SCREWS W/WO CERCLAGE  2000    Multiple operations on left leg     HC SHLDR ARTHROSCOP,PART ACROMIOPLAS  11/16/06    Right shoulder     HC SHLDR ARTHROSCOP,SURG,DIS CLAVICULECTOMY  11/16/06    Right shoulder     Past Medical History:   Diagnosis Date     Arthralgia of temporomandibular joint 10/22/1996     Arthritis      Cognitive disorder evalaution 2017 3/6/2017    She has marked attentional difficulties, impairments in memory including a rapid forgetting rate, mild anomia, and mild executive dysfunction. 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 She's required increasing assistance with finances, medications, driving, and cooking. I think this is a mild dementia, and     Constipation 2/23/2015     Dementia 3/13/2017    IMPRESSIONS AND RECOMMENDATIONS   Current results indicate marked attentional difficulties, impairments in memory, which in some cases reflected impairments in retention and in other cases reflected retrieval difficulties, and mild anomia as well as mild executive dysfunction. Premorbid intellectual functioning is estimated to fall in the borderline range. She denied experiencing significant depre     Depressive disorder      Heart murmur 2/23/2015     History of MRI of brain and brain stem 2/21/2015    MRI OF THE BRAIN WITHOUT CONTRAST 7/10/2014 1:21 PM  COMPARISON: None. HISTORY: Left-sided upper and lower extremity tremor. Balance issues. TECHNIQUE:  Brain: Axial diffusion-weighted with ADC map, T2-weighted with fat saturation, T1-weighted  "and turboFLAIR and coronal T1-weighted images of the brain were obtained without intravenous contrast.  FINDINGS: There is mild diffuse cerebral volume loss     Hypertension      Incomplete RBBB 2/23/2015     Motion sickness      Other and unspecified hyperlipidemia      Parkinson's disease (H)      Seborrheic dermatitis 2/29/2016     Unspecified arthropathy, hand 08/07/1997     Unspecified sleep apnea     moderate, sleep study 11/07, on CPAP, has daytime somnolence with this     Variants of migraine, not elsewhere classified, without mention of intractable migraine without mention of status migrainosus      Wears glasses 2/23/2015     Social History     Socioeconomic History     Marital status:      Spouse name: violet     Number of children: 2     Years of education: Not on file     Highest education level: Not on file   Social Needs     Financial resource strain: Not on file     Food insecurity - worry: Not on file     Food insecurity - inability: Not on file     Transportation needs - medical: Not on file     Transportation needs - non-medical: Not on file   Occupational History     Occupation:    Tobacco Use     Smoking status: Never Smoker     Smokeless tobacco: Never Used     Tobacco comment: no smokers in the household   Substance and Sexual Activity     Alcohol use: Yes     Alcohol/week: 0.0 oz     Comment: very occ     Drug use: No     Sexual activity: Yes     Partners: Male     Birth control/protection: Surgical, Female Surgical     Comment: tubal ligation   Other Topics Concern      Service Not Asked     Blood Transfusions Not Asked     Caffeine Concern Yes     Comment: OCC     Occupational Exposure Not Asked     Hobby Hazards Not Asked     Sleep Concern Not Asked     Stress Concern Not Asked     Weight Concern Not Asked     Special Diet Not Asked     Back Care Not Asked     Exercise No     Comment: \"not faithfully\"     Bike Helmet Not Asked     Seat Belt Yes     Self-Exams No     " Parent/sibling w/ CABG, MI or angioplasty before 65F 55M? Not Asked   Social History Narrative    ALLERGIES:  She is allergic to sulfa, and has had gastrointestinal side effects from naproxen.               FAMILY HISTORY:  Strongly positive for headaches, including in her son and sister.  There is no family history of tremor.               SOCIAL HISTORY:  She does not work outside the home.  She does not smoke.  She uses very little alcohol.  Lives in [place]. . Has 2 kids: son who is 35 yr s old. And daughter who is 30 yrs old. Her  is working as a  .        Drug and lactation database from the [place] [employer] of [employer]:  http://toxnet.nlm.nih.gov/cgi-bin/sis/htmlgen?LACT      B/P: Data Unavailable, T: Data Unavailable, P: Data Unavailable, R: Data Unavailable 0 lbs 0 oz  Last menstrual period 06/05/2006, not currently breastfeeding., There is no height or weight on file to calculate BMI.  Medications and Vitals not listed above were documented in the cart and reviewed by me.     Current Outpatient Medications   Medication Sig Dispense Refill     carbidopa-levodopa (SINEMET)  MG per tablet 2 tabs by mouth @ 7am, noon, and 9pm 540 tablet 3     citalopram (CELEXA) 20 MG tablet Increase to 1.5 x 20mg tab by mouth @12noon 135 tablet 3     diphenhydrAMINE-acetaminophen (TYLENOL PM EXTRA STRENGTH)  MG tablet 1 tab by mouth @ 9/930pm as needed 14 tablet      lisinopril (PRINIVIL/ZESTRIL) 5 MG tablet 1 tab by mouth at 12 noon for 2 weeks, then 1/2 tab at noon for 2 weeks, then off 30 tablet 1     melatonin 5 MG tablet Not sure of dose and taking 1 tab by mouth @ 9pm       meloxicam (MOBIC) 15 MG tablet Not sure if taking this. 180 tablet 3     polyethylene glycol (MIRALAX) powder 1 capful in 8 oz water and sips throughout the day 510 g 1     QUEtiapine (SEROQUEL) 25 MG tablet 1/2 to 1 tab at night as needed for hallucinations 30 tablet 11     senna (SENOKOT) 8.6  MG tablet 1 tab @ 9pm 120 tablet      terbinafine (LAMISIL) 1 % external cream Apply topically 2 times daily 42 g 0     ZOLMitriptan (ZOMIG) 5 MG tablet Take 1 tablet (5 mg) by mouth at onset of headache for migraine MAY REPEAT ONCE AFTER 2 HOURS. DO NOT EXCEED 2 TABLETS IN 24 HOURS. 6 tablet 10         Jet Porter MD

## 2019-02-04 NOTE — Clinical Note
2/4/2019         RE: Nathaly Farias  [address] [facility] [address]        Dear Colleague,    Thank you for referring your patient, Nathaly Farias, to the [facility]. Please see a copy of my visit note below.    No notes on file    Again, thank you for allowing me to participate in the care of your patient.        Sincerely,        Jet Porter MD

## 2019-02-15 ENCOUNTER — TELEPHONE (OUTPATIENT)
Dept: FAMILY MEDICINE | Facility: OTHER | Age: 64
End: 2019-02-15

## 2019-02-15 NOTE — LETTER
[facility]  [address]   [facility] [address]  Phone: [phone]  February 27, 2019      Nathaly Farias  [address] [address] [address]      Dear Nathaly,    We care about your health and have reviewed your health plan including your medical conditions, medications, and lab results.  Based on this review, it is recommended that you follow up regarding the following health topic(s):  -Breast Cancer Screening    We recommend you take the following action(s):  -schedule a MAMMOGRAM which is due. Please disregard this reminder if you have had this exam elsewhere within the last 1-2 years please let us know so we can update your records.     Please call us at the [facility] - [phone] (or use [employer]) to address the above recommendations.     Thank you for trusting [facility] and we appreciate the opportunity to serve you.  We look forward to supporting your healthcare needs in the future.    Healthy Regards,    Your Health Care Team  [facility]

## 2019-02-21 ENCOUNTER — PATIENT OUTREACH (OUTPATIENT)
Dept: CARE COORDINATION | Facility: CLINIC | Age: 64
End: 2019-02-21

## 2019-02-21 NOTE — PROGRESS NOTES
Social Work Telephone Note  [facility]    Patient Name:  Nathaly Farias  /Age:  1955 (63 year old)    Referral Source:  Jammie Traylor RN CC  Reason for Referral:  Support/resources for pt who has Parkinson's disease and condition is declining    SW reached out to pt's spouse via phone today.  Spouse reported that he was able to arrange for more family members to be able to stay with pt while he is away for work.  Spouse reports that things are going well with that plan.  Spouse denies other SW related needs at this time and has resource information that SW provided to him in previous contact.  Also, spouse is aware he can reach out to SW if needs arise at a future date.       ZENON He     Social Work  [facility]  Office:  [phone]  E-Mail:  [email or web address]  2019 9:21 AM

## 2019-04-17 ENCOUNTER — OFFICE VISIT (OUTPATIENT)
Dept: FAMILY MEDICINE | Facility: OTHER | Age: 64
End: 2019-04-17
Payer: COMMERCIAL

## 2019-04-17 VITALS
BODY MASS INDEX: 25.43 KG/M2 | OXYGEN SATURATION: 96 % | HEIGHT: 62 IN | DIASTOLIC BLOOD PRESSURE: 78 MMHG | RESPIRATION RATE: 16 BRPM | HEART RATE: 82 BPM | WEIGHT: 138.2 LBS | SYSTOLIC BLOOD PRESSURE: 128 MMHG | TEMPERATURE: 99.2 F

## 2019-04-17 DIAGNOSIS — N81.10 BLADDER PROLAPSE, FEMALE, ACQUIRED: Primary | ICD-10-CM

## 2019-04-17 PROCEDURE — 99214 OFFICE O/P EST MOD 30 MIN: CPT | Performed by: FAMILY MEDICINE

## 2019-04-17 ASSESSMENT — MIFFLIN-ST. JEOR: SCORE: 1132.18

## 2019-04-17 NOTE — PROGRESS NOTES
SUBJECTIVE:   Nathaly Farias is a 64 year old female who presents to clinic today for the following health issues:  She is accompanied by her sister and daughter.    HPI     Vaginal Symptoms.  Onset: One year    Description:  Vaginal Discharge: none   Itching (Pruritis): no   Burning sensation:  no   Odor: no     Accompanying Signs & Symptoms:  Pain with Urination: no   Abdominal Pain: YES   Fever: no     History:   Sexually active: no   New Partner: no   Possibility of Pregnancy:  No    Precipitating factors:   Recent Antibiotic Use: no     Alleviating factors:  NA  Therapies Tried and outcome:     Patient states that within the last couple of months she feels like something is coming out of her vaginal area, feels pressure and right abdominal/flank pain. She states that she is having pelvic prolapse, is able to push bulge back in. Her daughter states that she told her it looked like testicles.       Additional history: she has early onset dementia and Parkinson's disease so her sister and daughter are able to help her provide a history.     Reviewed and updated as needed this visit by clinical staff  Tobacco  Allergies  Meds       Reviewed and updated as needed this visit by Provider       Patient Active Problem List   Diagnosis     Prolapse of vaginal wall     Migraine variant     Symptomatic menopausal or female climacteric states     Obesity     Malaise and fatigue     Other and unspecified adverse effect of drug, medicinal and biological substance     Other and unspecified disc disorder of cervical region     Sleep apnea     Essential hypertension, benign     Chronic cholecystitis     Overactive bladder     Neck pain     Headache     HYPERLIPIDEMIA LDL GOAL <160     Mild major depression (H)     Advanced directives, counseling/discussion     HTN, goal below 140/90     Paralysis agitans (H)     History of MRI of brain and brain stem     Wears glasses     Constipation     Incomplete RBBB     Heart murmur      Family history of tremor     Rosacea     Asymptomatic varicose veins     Seborrheic dermatitis     Post-traumatic osteoarthritis of left knee     Pain from implanted hardware, initial encounter     Cognitive disorder evalaution 2017     Dementia     Hiatal hernia     Hallucinations     Past Surgical History:   Procedure Laterality Date     ------------OTHER-------------      left shoulder     ARTHRODESIS TOE(S)  3/2/2011    ARTHRODESIS TOE(S) performed by CLARA LUCAS at  OR     C LIGATE FALLOPIAN TUBE  1998     CHOLECYSTECTOMY, LAPOROSCOPIC  5/12/2008    Cholecystectomy, Laparoscopic     ESOPHAGOSCOPY, GASTROSCOPY, DUODENOSCOPY (EGD), COMBINED N/A 7/5/2017    Procedure: COMBINED ESOPHAGOSCOPY, GASTROSCOPY, DUODENOSCOPY (EGD), BIOPSY SINGLE OR MULTIPLE;  Esophagogastroduodenoscopy with Multiple Biopsies by Biopsy;  Surgeon: Tj Denney MD;  Location: [facility]     HC COLONOSCOPY W/WO BRUSH/WASH  08/30/06    normal     HC OPEN TX TIBIAL SHAFT FX W PLATE/SCREWS W/WO CERCLAGE  2000    Multiple operations on left leg     HC SHLDR ARTHROSCOP,PART ACROMIOPLAS  11/16/06    Right shoulder     HC SHLDR ARTHROSCOP,SURG,DIS CLAVICULECTOMY  11/16/06    Right shoulder       Social History     Tobacco Use     Smoking status: Never Smoker     Smokeless tobacco: Never Used     Tobacco comment: no smokers in the household   Substance Use Topics     Alcohol use: Yes     Alcohol/week: 0.0 oz     Comment: very occ     Family History   Problem Relation Age of Onset     Diabetes Father         type II, diagnosed in late 60's     Hypertension Father      Cancer Father         rare kidney cancer had kidney removed, 75 y.o. at onset     Cerebrovascular Disease Father      Rashes/Skin Problems Father         rosacea     Neurologic Disorder Mother         head tremor     Heart Disease Brother         Valvular disease corrected with surgery     Heart Disease Paternal Grandfather         MI     Migraines Son      Migraines Sister   "    Rashes/Skin Problems Sister         rosacea         Current Outpatient Medications   Medication Sig Dispense Refill     acetaminophen (TYLENOL) 500 MG tablet Take 1-2 tablets (500-1,000 mg) by mouth every 6 hours as needed for mild pain       carbidopa-levodopa (SINEMET)  MG tablet 2 tabs by mouth @ 7am, noon, and 9pm 540 tablet 3     citalopram (CELEXA) 20 MG tablet 1.5 x 20mg tab by mouth @12noon 135 tablet 3     melatonin 5 MG tablet 5mg tab by mouth @ 9pm       meloxicam (MOBIC) 15 MG tablet 15mg tab by mouth daily @ noon 180 tablet 3     polyethylene glycol (MIRALAX) powder 1 capful in 8 oz water and sips throughout the day 510 g 1     Probiotic Product (PROBIOTIC DAILY) CAPS Young living probiotic by mouth daily @ 7am       QUEtiapine (SEROQUEL) 25 MG tablet 25mg tab by mouth nightly @ 9pm 90 tablet 11     senna (SENOKOT) 8.6 MG tablet 1 tab @ 9pm 120 tablet      ZOLMitriptan (ZOMIG) 5 MG tablet Take 1 tablet (5 mg) by mouth at onset of headache for migraine MAY REPEAT ONCE AFTER 2 HOURS. DO NOT EXCEED 2 TABLETS IN 24 HOURS. 6 tablet 10       ROS:  - She is also concerned about some fluid in her ears. Feels like both ears are plugged.   Constitutional, HEENT, cardiovascular, pulmonary, GI, , musculoskeletal, neuro, skin, endocrine and psych systems are negative, except as in HPI or otherwise noted.     This document serves as a record of the services and decisions personally performed and made by Maxine Monae MD. It was created on her behalf by Conchita Talbot, a trained medical scribe. The creation of this document is based the provider's statements to the medical scribe.  Conchita Talbot, April 17, 2019 2:59 PM     OBJECTIVE:                                                    /78 (BP Location: Right arm, Patient Position: Chair, Cuff Size: Adult Regular)   Pulse 82   Temp 99.2  F (37.3  C) (Temporal)   Resp 16   Ht 1.562 m (5' 1.5\")   Wt 62.7 kg (138 lb 3.2 oz)   LMP 06/05/2006   SpO2 96%   " Breastfeeding? No   BMI 25.69 kg/m    Body mass index is 25.69 kg/m .   GENERAL: healthy, alert, well nourished, well hydrated, she appears uncomfortable sitting in her chair, shifting frequently  HENT: Right side with 30% cerumen occlusion, Left side normal  ABDOMEN: soft, no tenderness, no  hepatosplenomegaly, no masses  SKIN: no suspicious lesions, no rashes to visible skin   - female: Grade 2 cystocele which extends into the vaginal introitus but does not extend into the muscle layer.   PSYCH: Alert and oriented times x 3, speech- coherent, normal rate and volume, affect- normal    Diagnostic test results:  No results found for this or any previous visit (from the past 24 hour(s)).     ASSESSMENT/PLAN:                                                        ICD-10-CM    1. Bladder prolapse, female, acquired N81.10 PHYSICAL THERAPY REFERRAL        Cystocele: Discussed treatment options including pessaries, pelvic prolapse surgery, and pelvic physical therapy. Her daughter notes that she was sent to occupational therapy for her Parkinson's but she was embarrassed and stopped going. Her daughter does not think that she would like doing pelvic physical therapy because she would be embarassed. Nathaly states that as long as her therapist was a woman she would be find with it. Pelvic physical therapy was ordered.     Patient Instructions     Patient Education     Pelvic Organ Prolapse: Nonsurgical Treatment  If your pelvic organ prolapse is mild or doesn t bother you much, or if you have medical conditions that make surgery too risky, nonsurgical treatment may be a good choice. A device (pessary) to wear in your vagina can help ease your symptoms. You may also be given certain exercises (Kegels) to do. And you may need to make some lifestyle changes.  Wearing a pessary  A pessary helps support the prolapsed organ or organs. It is specifically fitted by your healthcare provider. A pessary may ease your symptoms, but  it can t repair prolapse. The pessary must be removed for cleaning. If you can t do this, you will need to see your healthcare provider regularly. He or she will remove and clean your pessary. If you have questions or concerns about the pessary, be sure to talk with your provider.  Doing Kegels  Kegels are simple exercises that you can do to strengthen the pelvic floor muscles. They may ease your symptoms and prevent further prolapse. To do a Kegel, contract your pelvic floor muscles as if to stop the urine stream. (Do this when you re not urinating.) Ask your healthcare provider how many Kegels to do and how long to hold each one. During your treatment visits, your provider may place a device in your vagina to measure your Kegel contractions. That way, you can find out if you are doing Kegel exercises correctly.  Living a healthy life  Improving your health may ease your symptoms or keep your problem from worsening. You may be asked to:    Quit smoking to prevent excessive coughing    Adjust medicines that may cause urine leakage     Limit fluid intake, if incontinence is a problem. This includes limiting drinks that contain caffeine (a diuretic). Bladder training (emptying your bladder at scheduled times) also may be useful if you have incontinence.    Not do any lifting, which puts pressure on pelvic muscles    Exercise and eat well to stay at a healthy weight   Date Last Reviewed: 6/1/2017 2000-2018 [employer]. [address] [place] [address], [place] [address]. All rights reserved. This information is not intended as a substitute for professional medical care. Always follow your healthcare professional's instructions.           Patient Education     Pelvic Organ Prolapse: Surgery for Incontinence  The pelvic organs include the organs of your reproductive system and your urinary tract. The pelvic organs are supported by pelvic floor muscles. Pelvic floor muscles can weaken. This may cause one or more  pelvic organs to fall out of place (prolapse). Pelvic organ prolapse can contribute to stress urinary incontinence (JUSTINE). This is trouble controlling the flow of urine out of the body. You may have surgery to treat JUSTINE. During this surgery, a prolapse can be fixed. One or more extra incisions may be needed to do this. Your surgeon can discuss the details with you.  Pelvic organ prolapse: common types     Cystocele.This is when the bladder sags into the vagina. To fix this, the bladder is moved back into its normal position. It is then sewn into place. The tissue between the vagina and bladder may be strengthened for better support to keep the bladder from sagging.     Uterine prolapse.This is when the uterus sags into the vagina. The uterus may fall as far as the opening of the vagina. To fix this, the uterus is often removed (hysterectomy). Or, the uterus may be sewn back into place.      Rectocele. This is when the rectum bulges into the vagina. An enterocele (much less common) is when the small intestine bulges into the vagina. During JUSTINE surgery, the bulge in the rectum or small intestine can be fixed.    Vaginal vault prolapse. This is when the walls of the vagina fall in on themselves. It can happen if the uterus has been removed. Surgery can be done to lift the vagina and hold it in place.   Risks and possible complications of this surgery    Infection    Bleeding    Blood clots    Risks of anesthesia    Damage to nerves, muscles, or nearby pelvic structures    Prolapse of the pelvic organ or organs happening again   Date Last Reviewed: 7/1/2016 2000-2018 [employer]. [address] [place] [address], [place] [address]. All rights reserved. This information is not intended as a substitute for professional medical care. Always follow your healthcare professional's instructions.           The information in this document, created by the medical scribe for me, accurately reflects the services I personally  performed and the decisions made by me. I have reviewed and approved this document for accuracy.     Maxine Monae MD, MD  [facility]

## 2019-04-17 NOTE — PATIENT INSTRUCTIONS
Patient Education     Pelvic Organ Prolapse: Nonsurgical Treatment  If your pelvic organ prolapse is mild or doesn t bother you much, or if you have medical conditions that make surgery too risky, nonsurgical treatment may be a good choice. A device (pessary) to wear in your vagina can help ease your symptoms. You may also be given certain exercises (Kegels) to do. And you may need to make some lifestyle changes.  Wearing a pessary  A pessary helps support the prolapsed organ or organs. It is specifically fitted by your healthcare provider. A pessary may ease your symptoms, but it can t repair prolapse. The pessary must be removed for cleaning. If you can t do this, you will need to see your healthcare provider regularly. He or she will remove and clean your pessary. If you have questions or concerns about the pessary, be sure to talk with your provider.  Doing Kegels  Kegels are simple exercises that you can do to strengthen the pelvic floor muscles. They may ease your symptoms and prevent further prolapse. To do a Kegel, contract your pelvic floor muscles as if to stop the urine stream. (Do this when you re not urinating.) Ask your healthcare provider how many Kegels to do and how long to hold each one. During your treatment visits, your provider may place a device in your vagina to measure your Kegel contractions. That way, you can find out if you are doing Kegel exercises correctly.  Living a healthy life  Improving your health may ease your symptoms or keep your problem from worsening. You may be asked to:    Quit smoking to prevent excessive coughing    Adjust medicines that may cause urine leakage     Limit fluid intake, if incontinence is a problem. This includes limiting drinks that contain caffeine (a diuretic). Bladder training (emptying your bladder at scheduled times) also may be useful if you have incontinence.    Not do any lifting, which puts pressure on pelvic muscles    Exercise and eat well to  stay at a healthy weight   Date Last Reviewed: 6/1/2017 2000-2018 [employer]. [address] [place] [address], [place] [address]. All rights reserved. This information is not intended as a substitute for professional medical care. Always follow your healthcare professional's instructions.           Patient Education     Pelvic Organ Prolapse: Surgery for Incontinence  The pelvic organs include the organs of your reproductive system and your urinary tract. The pelvic organs are supported by pelvic floor muscles. Pelvic floor muscles can weaken. This may cause one or more pelvic organs to fall out of place (prolapse). Pelvic organ prolapse can contribute to stress urinary incontinence (JUSTINE). This is trouble controlling the flow of urine out of the body. You may have surgery to treat JUSTINE. During this surgery, a prolapse can be fixed. One or more extra incisions may be needed to do this. Your surgeon can discuss the details with you.  Pelvic organ prolapse: common types     Cystocele.This is when the bladder sags into the vagina. To fix this, the bladder is moved back into its normal position. It is then sewn into place. The tissue between the vagina and bladder may be strengthened for better support to keep the bladder from sagging.     Uterine prolapse.This is when the uterus sags into the vagina. The uterus may fall as far as the opening of the vagina. To fix this, the uterus is often removed (hysterectomy). Or, the uterus may be sewn back into place.      Rectocele. This is when the rectum bulges into the vagina. An enterocele (much less common) is when the small intestine bulges into the vagina. During JUSTINE surgery, the bulge in the rectum or small intestine can be fixed.    Vaginal vault prolapse. This is when the walls of the vagina fall in on themselves. It can happen if the uterus has been removed. Surgery can be done to lift the vagina and hold it in place.   Risks and possible complications of this  surgery    Infection    Bleeding    Blood clots    Risks of anesthesia    Damage to nerves, muscles, or nearby pelvic structures    Prolapse of the pelvic organ or organs happening again   Date Last Reviewed: 7/1/2016 2000-2018 The [employer]. [address] [place] [address], [place] [address]. All rights reserved. This information is not intended as a substitute for professional medical care. Always follow your healthcare professional's instructions.

## 2019-05-15 ENCOUNTER — TELEPHONE (OUTPATIENT)
Dept: NEUROLOGY | Facility: CLINIC | Age: 64
End: 2019-05-15

## 2019-05-15 DIAGNOSIS — R44.3 HALLUCINATIONS: ICD-10-CM

## 2019-05-15 RX ORDER — QUETIAPINE FUMARATE 25 MG/1
TABLET, FILM COATED ORAL
Qty: 180 TABLET | Refills: 11 | Status: SHIPPED | OUTPATIENT
Start: 2019-05-15 | End: 2019-07-23

## 2019-05-15 NOTE — TELEPHONE ENCOUNTER
Dr Porter, please see the message below.  Her  states that he doesn't think she has a UTI because she usually tells him if she does. He did mention that the pt may have been a little dehydrated lately due to poor fluid intake.   He also mentioned that she fell off the toilet last Friday evening. She broke her glasses and ended up with a black and blue bruise and scraped her forehead. He did not bring her in to be evaluated.    Please advise on the next steps.   Jammie Traylor RN

## 2019-05-15 NOTE — TELEPHONE ENCOUNTER
The pt's  was informed of Dr Porter's recommendation and confirmed that a new prescription was sent to the pharmacy. He will keep us posted on how she does.  Jammie Traylor RN

## 2019-05-15 NOTE — TELEPHONE ENCOUNTER
Jet Porter MD  You 6 minutes ago (3:33 PM)      Okay increase to 2 x 25mg of quetiapine at night

## 2019-05-15 NOTE — TELEPHONE ENCOUNTER
[facility] Call Center    Phone Message    May a detailed message be left on voicemail: yes    Reason for Call: Symptoms or Concerns     Current symptom or concern: Wolf (pt's ) called and said that pt's hallucinations have increased - she is seeing people and animals that are not there.  He wants to increase the pts seroquel prescription.  QUEtiapine (SEROQUEL) 25 MG tablet He would like a phone call back to discuss.  Pharmacy: [facility] [phone]    Symptoms have been present for:  2 week(s)     Has patient previously been seen for this? Yes    By : Jet Vallejo     Are there any new or worsening symptoms? Yes      Action Taken: Message routed to:  Adult Clinics: Neurology p [id number]

## 2019-06-03 NOTE — PROGRESS NOTES
Diagnosis/Summary/Recommendations:    PATIENT: Nathaly Farias  64 year old female     : 1955    MOISE: Chantale 10, 2019    Headache  Parkinson    She has ongoing bladder issues - will begin with therapy and pessary if needed then surgery.    She could not afford the patch.     Having more cognitive problems.     Brain mri changes could be related to second hand smoke. She has hypertension int he past and is not on statin. She does not have diabetes.          Medications     7am 12noon 9pm         Acetaminophen 500mg tylenol As needed              Carbidopa/levodopa Sinemet 25/100 2 2 2         Citalopram celexa 20mg    1.5            Melatonin 5mg        1        Polyethylene glycol miralax sipping throughout the day             Probiotic young living 1           Quetiapine seroquel 25mg     2       Senna senokot 8.6mg       1          Terbinafine lamisil 1% external cream  ?taking           Zolmitriptan zomig 5mg As needed                                                           History obtained from patient    1. Bladder issues may be related to mechanical changes, ie prolapse and neurological, ie neurogenic bladder. She has not been on mirabegron/myrbetriq 25mg or 50mg dose.     2. Cognitive changes. She has not tried the rivastigmine patch due to price and has not been on other medications.  She may or may not have been on donepezil in 2017. She has had some hallucinations. Discussion about prices and [employer] discount information.  Think they should talk with pharmacy before trying something.     3. Neck pain =- she should be seen and managed for this as she is probably having cervicogenic headache with decreased rotation of head to the right and pain on palpation in the posterior neck with brisk reflexes. She may need imaging - ct scan, mri scan, plain films of her neck and then additional evaluation whether she would benefit from epidural steroids or local trigger point injections with steroids and/or  lidocaine. She could see the pain clinic in [facility] or in the headache clinic.  She may want to talk with her pcp who is in [place] as to someone closer to home in [place] who can help her symptoms.  She may want to start with physical therapy but will probably need help for this issues as it is ongoing and probably related to arthritis in her neck.     4. Parkinson - no change in medication regimen.     5. She has not seen our pharmacist Corazon Dhaliwal. Phone number is [phone] is contact phone number       Coding statement:   Duration of  Services: patient care and care coordination was 25 minutes  Greater than 50% of this visit was spent in counseling and coordination of care.     Jet Porter MD     ______________________________________    Last visit date and details:      MOISE: Feb 4, 2019     Parkinson     Wants chiropractor or PT referral - was sent  [facility] referral     Has not yet tried a heating pad  Has not used a heating     Has a yeast infection on her belly and using topical.      Falls asleep but has been getting up during the night     She has day time naps and when she naps more she sleeps better at night.     Cannabis     Still getting headaches - all day.            Medications     7am 12noon 9pm         Acetaminophen 500mg tylenol As needed              Carbidopa/levodopa Sinemet 25/100 2 2 2         Citalopram celexa 20mg    1.5            Diphenhydramine acetaminophen tylenol PM       stopped         Lisinopril prinivil/zestril 10mg    Off this            Lorazepam ativan 0.5mg Not taking               Melatonin 5mg        1        Meloxicam mobic 15mg Not sure if taking this.            Polyethylene glycol miralax Drinking throughout the day             Probiotic young living 1           Quetiapine seroquel 25mg     Take full tab       Senna senokot 8.6mg       1          Terbinafine lamisil 1% external cream             Zolmitriptan zomig 5mg As needed                                                              History obtained from patient        PLAN  Increase the seroquel to 1 tab at night for hallucinations.   Sill  Try therapy for her neck pain and use packs - microwave for her neck  Could see headache specialist if needed  Consideration for cbd oil.      Consider other products for migraine/headache but headaches may be related to her neck  Vitamin b2 400mg/day   Magnesium up to 600mg/day  Butter bur  Others     Return back in 3-6months    ______________________________________      Patient was asked about 14 Review of systems including changes in vision (dry eyes, double vision), hearing, heart, lungs, musculoskeletal, depression, anxiety, snoring, RBD, insomnia, urinary frequency, urinary urgency, constipation, swallowing problems, hematological, ID, allergies, skin problems: seborrhea, endocrinological: thyroid, diabetes, cholesterol; balance, weight changes, and other neurological problems and these were not significant at this time except for   Patient Active Problem List   Diagnosis     Prolapse of vaginal wall     Migraine variant     Symptomatic menopausal or female climacteric states     Obesity     Malaise and fatigue     Other and unspecified adverse effect of drug, medicinal and biological substance     Other and unspecified disc disorder of cervical region     Sleep apnea     Essential hypertension, benign     Chronic cholecystitis     Overactive bladder     Neck pain     Headache     HYPERLIPIDEMIA LDL GOAL <160     Mild major depression (H)     Advanced directives, counseling/discussion     HTN, goal below 140/90     Paralysis agitans (H)     History of MRI of brain and brain stem     Wears glasses     Constipation     Incomplete RBBB     Heart murmur     Family history of tremor     Rosacea     Asymptomatic varicose veins     Seborrheic dermatitis     Post-traumatic osteoarthritis of left knee     Pain from implanted hardware, initial encounter     Cognitive disorder  evalaution 2017     Dementia     Hiatal hernia     Hallucinations          Allergies   Allergen Reactions     Sulfa Drugs      Tacchycardia, had to go to ED     Naproxen GI Disturbance     Oxycontin [Oxycodone] GI Disturbance     Made her feel funny/upset stomach     Rivastigmine      Patch was too expensive     Ropinirole      Did not work     Past Surgical History:   Procedure Laterality Date     ------------OTHER-------------      left shoulder     ARTHRODESIS TOE(S)  3/2/2011    ARTHRODESIS TOE(S) performed by CLARA LUCAS at  OR     C LIGATE FALLOPIAN TUBE  1998     CHOLECYSTECTOMY, LAPOROSCOPIC  5/12/2008    Cholecystectomy, Laparoscopic     ESOPHAGOSCOPY, GASTROSCOPY, DUODENOSCOPY (EGD), COMBINED N/A 7/5/2017    Procedure: COMBINED ESOPHAGOSCOPY, GASTROSCOPY, DUODENOSCOPY (EGD), BIOPSY SINGLE OR MULTIPLE;  Esophagogastroduodenoscopy with Multiple Biopsies by Biopsy;  Surgeon: Tj Denney MD;  Location: [facility]     HC COLONOSCOPY W/WO BRUSH/WASH  08/30/06    normal     HC OPEN TX TIBIAL SHAFT FX W PLATE/SCREWS W/WO CERCLAGE  2000    Multiple operations on left leg     HC SHLDR ARTHROSCOP,PART ACROMIOPLAS  11/16/06    Right shoulder     HC SHLDR ARTHROSCOP,SURG,DIS CLAVICULECTOMY  11/16/06    Right shoulder     Past Medical History:   Diagnosis Date     Arthralgia of temporomandibular joint 10/22/1996     Arthritis      Cognitive disorder evalaution 2017 3/6/2017    She has marked attentional difficulties, impairments in memory including a rapid forgetting rate, mild anomia, and mild executive dysfunction. 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 She's required increasing assistance with finances, medications, driving, and cooking. I think this is a mild dementia, and     Constipation 2/23/2015     Dementia 3/13/2017    IMPRESSIONS AND RECOMMENDATIONS   Current results indicate marked attentional  difficulties, impairments in memory, which in some cases reflected impairments in retention and in other cases reflected retrieval difficulties, and mild anomia as well as mild executive dysfunction. Premorbid intellectual functioning is estimated to fall in the borderline range. She denied experiencing significant depre     Depressive disorder      Heart murmur 2/23/2015     History of MRI of brain and brain stem 2/21/2015    MRI OF THE BRAIN WITHOUT CONTRAST 7/10/2014 1:21 PM  COMPARISON: None. HISTORY: Left-sided upper and lower extremity tremor. Balance issues. TECHNIQUE:  Brain: Axial diffusion-weighted with ADC map, T2-weighted with fat saturation, T1-weighted and turboFLAIR and coronal T1-weighted images of the brain were obtained without intravenous contrast.  FINDINGS: There is mild diffuse cerebral volume loss     Hypertension      Incomplete RBBB 2/23/2015     Motion sickness      Other and unspecified hyperlipidemia      Parkinson's disease (H)      Seborrheic dermatitis 2/29/2016     Unspecified arthropathy, hand 08/07/1997     Unspecified sleep apnea     moderate, sleep study 11/07, on CPAP, has daytime somnolence with this     Variants of migraine, not elsewhere classified, without mention of intractable migraine without mention of status migrainosus      Wears glasses 2/23/2015     Social History     Socioeconomic History     Marital status:      Spouse name: violet     Number of children: 2     Years of education: Not on file     Highest education level: Not on file   Occupational History     Occupation:    Social Needs     Financial resource strain: Not on file     Food insecurity:     Worry: Not on file     Inability: Not on file     Transportation needs:     Medical: Not on file     Non-medical: Not on file   Tobacco Use     Smoking status: Never Smoker     Smokeless tobacco: Never Used     Tobacco comment: no smokers in the household   Substance and Sexual Activity     Alcohol use:  "Yes     Alcohol/week: 0.0 oz     Comment: very occ     Drug use: No     Sexual activity: Yes     Partners: Male     Birth control/protection: Surgical, Female Surgical     Comment: tubal ligation   Lifestyle     Physical activity:     Days per week: Not on file     Minutes per session: Not on file     Stress: Not on file   Relationships     Social connections:     Talks on phone: Not on file     Gets together: Not on file     Attends Synagogue service: Not on file     Active member of club or organization: Not on file     Attends meetings of clubs or organizations: Not on file     Relationship status: Not on file     Intimate partner violence:     Fear of current or ex partner: Not on file     Emotionally abused: Not on file     Physically abused: Not on file     Forced sexual activity: Not on file   Other Topics Concern      Service Not Asked     Blood Transfusions Not Asked     Caffeine Concern Yes     Comment: OCC     Occupational Exposure Not Asked     Hobby Hazards Not Asked     Sleep Concern Not Asked     Stress Concern Not Asked     Weight Concern Not Asked     Special Diet Not Asked     Back Care Not Asked     Exercise No     Comment: \"not faithfully\"     Bike Helmet Not Asked     Seat Belt Yes     Self-Exams No     Parent/sibling w/ CABG, MI or angioplasty before 65F 55M? Not Asked   Social History Narrative    ALLERGIES:  She is allergic to sulfa, and has had gastrointestinal side effects from naproxen.               FAMILY HISTORY:  Strongly positive for headaches, including in her son and sister.  There is no family history of tremor.               SOCIAL HISTORY:  She does not work outside the home.  She does not smoke.  She uses very little alcohol.  Lives in [place]. . Has 2 kids: son who is 35 yr s old. And daughter who is 30 yrs old. Her  is working as a  .        Drug and lactation database from the [employer] of " Medicine:  http://toxnet.nlm.nih.gov/cgi-bin/sis/htmlgen?LACT      B/P: Data Unavailable, T: Data Unavailable, P: Data Unavailable, R: Data Unavailable 0 lbs 0 oz  Last menstrual period 06/05/2006, not currently breastfeeding., There is no height or weight on file to calculate BMI.  Medications and Vitals not listed above were documented in the cart and reviewed by me.     Current Outpatient Medications   Medication Sig Dispense Refill     acetaminophen (TYLENOL) 500 MG tablet Take 1-2 tablets (500-1,000 mg) by mouth every 6 hours as needed for mild pain       carbidopa-levodopa (SINEMET)  MG tablet 2 tabs by mouth @ 7am, noon, and 9pm 540 tablet 3     citalopram (CELEXA) 20 MG tablet 1.5 x 20mg tab by mouth @12noon 135 tablet 3     melatonin 5 MG tablet 5mg tab by mouth @ 9pm       meloxicam (MOBIC) 15 MG tablet 15mg tab by mouth daily @ noon 180 tablet 3     polyethylene glycol (MIRALAX) powder 1 capful in 8 oz water and sips throughout the day 510 g 1     Probiotic Product (PROBIOTIC DAILY) CAPS Young living probiotic by mouth daily @ 7am       QUEtiapine (SEROQUEL) 25 MG tablet 2 x 25mg tab by mouth nightly @ 9pm 180 tablet 11     senna (SENOKOT) 8.6 MG tablet 1 tab @ 9pm 120 tablet      ZOLMitriptan (ZOMIG) 5 MG tablet Take 1 tablet (5 mg) by mouth at onset of headache for migraine MAY REPEAT ONCE AFTER 2 HOURS. DO NOT EXCEED 2 TABLETS IN 24 HOURS. 6 tablet 10         Jet Porter MD

## 2019-06-10 ENCOUNTER — OFFICE VISIT (OUTPATIENT)
Dept: NEUROLOGY | Facility: CLINIC | Age: 64
End: 2019-06-10
Payer: COMMERCIAL

## 2019-06-10 VITALS
SYSTOLIC BLOOD PRESSURE: 169 MMHG | WEIGHT: 136.2 LBS | BODY MASS INDEX: 25.32 KG/M2 | OXYGEN SATURATION: 98 % | HEART RATE: 72 BPM | DIASTOLIC BLOOD PRESSURE: 89 MMHG

## 2019-06-10 DIAGNOSIS — G44.86 CERVICOGENIC HEADACHE: ICD-10-CM

## 2019-06-10 DIAGNOSIS — G20.A1 PARKINSON DISEASE (H): Primary | ICD-10-CM

## 2019-06-10 PROCEDURE — 99214 OFFICE O/P EST MOD 30 MIN: CPT | Performed by: PSYCHIATRY & NEUROLOGY

## 2019-06-10 ASSESSMENT — PAIN SCALES - GENERAL: PAINLEVEL: NO PAIN (0)

## 2019-06-10 NOTE — PATIENT INSTRUCTIONS
MOISE: Chantale 10, 2019    Headache  Parkinson    She has ongoing bladder issues - will begin with therapy and pessary if needed then surgery.    She could not afford the patch.     Having more cognitive problems.     Brain mri changes could be related to second hand smoke. She has hypertension int he past and is not on statin. She does not have diabetes.          Medications     7am 12noon 9pm         Acetaminophen 500mg tylenol As needed              Carbidopa/levodopa Sinemet 25/100 2 2 2         Citalopram celexa 20mg    1.5            Melatonin 5mg        1        Polyethylene glycol miralax sipping throughout the day             Probiotic young living 1           Quetiapine seroquel 25mg     2       Senna senokot 8.6mg       1          Terbinafine lamisil 1% external cream  ?taking           Zolmitriptan zomig 5mg As needed                                                           History obtained from patient    1. Bladder issues may be related to mechanical changes, ie prolapse and neurological, ie neurogenic bladder. She has not been on mirabegron/myrbetriq 25mg or 50mg dose.     2. Cognitive changes. She has not tried the rivastigmine patch due to price and has not been on other medications.  She may or may not have been on donepezil in 2017. She has had some hallucinations. Discussion about prices and [employer] discount information.  Think they should talk with pharmacy before trying something.     3. Neck pain =- she should be seen and managed for this as she is probably having cervicogenic headache with decreased rotation of head to the right and pain on palpation in the posterior neck with brisk reflexes. She may need imaging - ct scan, mri scan, plain films of her neck and then additional evaluation whether she would benefit from epidural steroids or local trigger point injections with steroids and/or lidocaine. She could see the pain clinic in [facility] or in the headache clinic.  She may want to  talk with her pcp who is in [place] as to someone closer to home in [place] who can help her symptoms.  She may want to start with physical therapy but will probably need help for this issues as it is ongoing and probably related to arthritis in her neck.     4. Parkinson - no change in medication regimen.     5. She has not seen our pharmacist Corazon Dhaliwal. Phone number is [phone] is contact phone number

## 2019-06-10 NOTE — NURSING NOTE
Nathaly Farias's goals for this visit include: return  She requests these members of her care team be copied on today's visit information:     PCP: Maxine Monae    Referring Provider:  No referring provider defined for this encounter.    /89   Pulse 72   Wt 61.8 kg (136 lb 3.2 oz)   LMP 06/05/2006   SpO2 98%   BMI 25.32 kg/m      Do you need any medication refills at today's visit? y

## 2019-06-10 NOTE — LETTER
6/10/2019         RE: Nathaly Farias  [address] [facility] [id number]        Dear Colleague,    Thank you for referring your patient, Nathaly Farias, to the [facility]. Please see a copy of my visit note below.    Diagnosis/Summary/Recommendations:    PATIENT: Nathaly Farias  64 year old female     : 1955    MOISE: Chantale 10, 2019    Headache  Parkinson    She has ongoing bladder issues - will begin with therapy and pessary if needed then surgery.    She could not afford the patch.     Having more cognitive problems.     Brain mri changes could be related to second hand smoke. She has hypertension int he past and is not on statin. She does not have diabetes.          Medications     7am 12noon 9pm         Acetaminophen 500mg tylenol As needed              Carbidopa/levodopa Sinemet 25/100 2 2 2         Citalopram celexa 20mg    1.5            Melatonin 5mg        1        Polyethylene glycol miralax sipping throughout the day             Probiotic young living 1           Quetiapine seroquel 25mg     2       Senna senokot 8.6mg       1          Terbinafine lamisil 1% external cream  ?taking           Zolmitriptan zomig 5mg As needed                                                           History obtained from patient    1. Bladder issues may be related to mechanical changes, ie prolapse and neurological, ie neurogenic bladder. She has not been on mirabegron/myrbetriq 25mg or 50mg dose.     2. Cognitive changes. She has not tried the rivastigmine patch due to price and has not been on other medications.  She may or may not have been on donepezil in 2017. She has had some hallucinations. Discussion about prices and [employer] discount information.  Think they should talk with pharmacy before trying something.     3. Neck pain =- she should be seen and managed for this as she is probably having cervicogenic headache with decreased rotation of head to the right and pain on  palpation in the posterior neck with brisk reflexes. She may need imaging - ct scan, mri scan, plain films of her neck and then additional evaluation whether she would benefit from epidural steroids or local trigger point injections with steroids and/or lidocaine. She could see the pain clinic in [facility] or in the headache clinic.  She may want to talk with her pcp who is in [place] as to someone closer to home in [place] who can help her symptoms.  She may want to start with physical therapy but will probably need help for this issues as it is ongoing and probably related to arthritis in her neck.     4. Parkinson - no change in medication regimen.     5. She has not seen our pharmacist Corazon Dhaliwal. Phone number is [phone] is contact phone number       Coding statement:   Duration of  Services: patient care and care coordination was 25 minutes  Greater than 50% of this visit was spent in counseling and coordination of care.     Jet Porter MD     ______________________________________    Last visit date and details:      MOISE: Feb 4, 2019     Parkinson     Wants chiropractor or PT referral - was sent  mtm referral     Has not yet tried a heating pad  Has not used a heating     Has a yeast infection on her belly and using topical.      Falls asleep but has been getting up during the night     She has day time naps and when she naps more she sleeps better at night.     Cannabis     Still getting headaches - all day.            Medications     7am 12noon 9pm         Acetaminophen 500mg tylenol As needed              Carbidopa/levodopa Sinemet 25/100 2 2 2         Citalopram celexa 20mg    1.5            Diphenhydramine acetaminophen tylenol PM       stopped         Lisinopril prinivil/zestril 10mg    Off this            Lorazepam ativan 0.5mg Not taking               Melatonin 5mg        1        Meloxicam mobic 15mg Not sure if taking this.            Polyethylene glycol miralax Drinking throughout the  day             Probiotic young living 1           Quetiapine seroquel 25mg     Take full tab       Senna senokot 8.6mg       1          Terbinafine lamisil 1% external cream             Zolmitriptan zomig 5mg As needed                                                             History obtained from patient        PLAN  Increase the seroquel to 1 tab at night for hallucinations.   Sill  Try therapy for her neck pain and use packs - microwave for her neck  Could see headache specialist if needed  Consideration for cbd oil.      Consider other products for migraine/headache but headaches may be related to her neck  Vitamin b2 400mg/day   Magnesium up to 600mg/day  Butter bur  Others     Return back in 3-6months    ______________________________________      Patient was asked about 14 Review of systems including changes in vision (dry eyes, double vision), hearing, heart, lungs, musculoskeletal, depression, anxiety, snoring, RBD, insomnia, urinary frequency, urinary urgency, constipation, swallowing problems, hematological, ID, allergies, skin problems: seborrhea, endocrinological: thyroid, diabetes, cholesterol; balance, weight changes, and other neurological problems and these were not significant at this time except for   Patient Active Problem List   Diagnosis     Prolapse of vaginal wall     Migraine variant     Symptomatic menopausal or female climacteric states     Obesity     Malaise and fatigue     Other and unspecified adverse effect of drug, medicinal and biological substance     Other and unspecified disc disorder of cervical region     Sleep apnea     Essential hypertension, benign     Chronic cholecystitis     Overactive bladder     Neck pain     Headache     HYPERLIPIDEMIA LDL GOAL <160     Mild major depression (H)     Advanced directives, counseling/discussion     HTN, goal below 140/90     Paralysis agitans (H)     History of MRI of brain and brain stem     Wears glasses     Constipation      Incomplete RBBB     Heart murmur     Family history of tremor     Rosacea     Asymptomatic varicose veins     Seborrheic dermatitis     Post-traumatic osteoarthritis of left knee     Pain from implanted hardware, initial encounter     Cognitive disorder evalaution 2017     Dementia     Hiatal hernia     Hallucinations          Allergies   Allergen Reactions     Sulfa Drugs      Tacchycardia, had to go to ED     Naproxen GI Disturbance     Oxycontin [Oxycodone] GI Disturbance     Made her feel funny/upset stomach     Rivastigmine      Patch was too expensive     Ropinirole      Did not work     Past Surgical History:   Procedure Laterality Date     ------------OTHER-------------      left shoulder     ARTHRODESIS TOE(S)  3/2/2011    ARTHRODESIS TOE(S) performed by CLARA LUCAS at  OR     C LIGATE FALLOPIAN TUBE  1998     CHOLECYSTECTOMY, LAPOROSCOPIC  5/12/2008    Cholecystectomy, Laparoscopic     ESOPHAGOSCOPY, GASTROSCOPY, DUODENOSCOPY (EGD), COMBINED N/A 7/5/2017    Procedure: COMBINED ESOPHAGOSCOPY, GASTROSCOPY, DUODENOSCOPY (EGD), BIOPSY SINGLE OR MULTIPLE;  Esophagogastroduodenoscopy with Multiple Biopsies by Biopsy;  Surgeon: Tj Denney MD;  Location: [facility]     HC COLONOSCOPY W/WO BRUSH/WASH  08/30/06    normal     HC OPEN TX TIBIAL SHAFT FX W PLATE/SCREWS W/WO CERCLAGE  2000    Multiple operations on left leg     HC SHLDR ARTHROSCOP,PART ACROMIOPLAS  11/16/06    Right shoulder     HC SHLDR ARTHROSCOP,SURG,DIS CLAVICULECTOMY  11/16/06    Right shoulder     Past Medical History:   Diagnosis Date     Arthralgia of temporomandibular joint 10/22/1996     Arthritis      Cognitive disorder evalaution 2017 3/6/2017    She has marked attentional difficulties, impairments in memory including a rapid forgetting rate, mild anomia, and mild executive dysfunction. 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 She's  required increasing assistance with finances, medications, driving, and cooking. I think this is a mild dementia, and     Constipation 2/23/2015     Dementia 3/13/2017    IMPRESSIONS AND RECOMMENDATIONS   Current results indicate marked attentional difficulties, impairments in memory, which in some cases reflected impairments in retention and in other cases reflected retrieval difficulties, and mild anomia as well as mild executive dysfunction. Premorbid intellectual functioning is estimated to fall in the borderline range. She denied experiencing significant depre     Depressive disorder      Heart murmur 2/23/2015     History of MRI of brain and brain stem 2/21/2015    MRI OF THE BRAIN WITHOUT CONTRAST 7/10/2014 1:21 PM  COMPARISON: None. HISTORY: Left-sided upper and lower extremity tremor. Balance issues. TECHNIQUE:  Brain: Axial diffusion-weighted with ADC map, T2-weighted with fat saturation, T1-weighted and turboFLAIR and coronal T1-weighted images of the brain were obtained without intravenous contrast.  FINDINGS: There is mild diffuse cerebral volume loss     Hypertension      Incomplete RBBB 2/23/2015     Motion sickness      Other and unspecified hyperlipidemia      Parkinson's disease (H)      Seborrheic dermatitis 2/29/2016     Unspecified arthropathy, hand 08/07/1997     Unspecified sleep apnea     moderate, sleep study 11/07, on CPAP, has daytime somnolence with this     Variants of migraine, not elsewhere classified, without mention of intractable migraine without mention of status migrainosus      Wears glasses 2/23/2015     Social History     Socioeconomic History     Marital status:      Spouse name: violet     Number of children: 2     Years of education: Not on file     Highest education level: Not on file   Occupational History     Occupation:    Social Needs     Financial resource strain: Not on file     Food insecurity:     Worry: Not on file     Inability: Not on file      "Transportation needs:     Medical: Not on file     Non-medical: Not on file   Tobacco Use     Smoking status: Never Smoker     Smokeless tobacco: Never Used     Tobacco comment: no smokers in the household   Substance and Sexual Activity     Alcohol use: Yes     Alcohol/week: 0.0 oz     Comment: very occ     Drug use: No     Sexual activity: Yes     Partners: Male     Birth control/protection: Surgical, Female Surgical     Comment: tubal ligation   Lifestyle     Physical activity:     Days per week: Not on file     Minutes per session: Not on file     Stress: Not on file   Relationships     Social connections:     Talks on phone: Not on file     Gets together: Not on file     Attends Confucianist service: Not on file     Active member of club or organization: Not on file     Attends meetings of clubs or organizations: Not on file     Relationship status: Not on file     Intimate partner violence:     Fear of current or ex partner: Not on file     Emotionally abused: Not on file     Physically abused: Not on file     Forced sexual activity: Not on file   Other Topics Concern      Service Not Asked     Blood Transfusions Not Asked     Caffeine Concern Yes     Comment: OCC     Occupational Exposure Not Asked     Hobby Hazards Not Asked     Sleep Concern Not Asked     Stress Concern Not Asked     Weight Concern Not Asked     Special Diet Not Asked     Back Care Not Asked     Exercise No     Comment: \"not faithfully\"     Bike Helmet Not Asked     Seat Belt Yes     Self-Exams No     Parent/sibling w/ CABG, MI or angioplasty before 65F 55M? Not Asked   Social History Narrative    ALLERGIES:  She is allergic to sulfa, and has had gastrointestinal side effects from naproxen.               FAMILY HISTORY:  Strongly positive for headaches, including in her son and sister.  There is no family history of tremor.               SOCIAL HISTORY:  She does not work outside the home.  She does not smoke.  She uses very little " alcohol.  Lives in [place]. . Has 2 kids: son who is 35 yr s old. And daughter who is 30 yrs old. Her  is working as a  .        Drug and lactation database from the [place] [employer]:  http://toxnet.nlm.nih.gov/cgi-bin/sis/htmlgen?LACT      B/P: Data Unavailable, T: Data Unavailable, P: Data Unavailable, R: Data Unavailable 0 lbs 0 oz  Last menstrual period 06/05/2006, not currently breastfeeding., There is no height or weight on file to calculate BMI.  Medications and Vitals not listed above were documented in the cart and reviewed by me.     Current Outpatient Medications   Medication Sig Dispense Refill     acetaminophen (TYLENOL) 500 MG tablet Take 1-2 tablets (500-1,000 mg) by mouth every 6 hours as needed for mild pain       carbidopa-levodopa (SINEMET)  MG tablet 2 tabs by mouth @ 7am, noon, and 9pm 540 tablet 3     citalopram (CELEXA) 20 MG tablet 1.5 x 20mg tab by mouth @12noon 135 tablet 3     melatonin 5 MG tablet 5mg tab by mouth @ 9pm       meloxicam (MOBIC) 15 MG tablet 15mg tab by mouth daily @ noon 180 tablet 3     polyethylene glycol (MIRALAX) powder 1 capful in 8 oz water and sips throughout the day 510 g 1     Probiotic Product (PROBIOTIC DAILY) CAPS Young living probiotic by mouth daily @ 7am       QUEtiapine (SEROQUEL) 25 MG tablet 2 x 25mg tab by mouth nightly @ 9pm 180 tablet 11     senna (SENOKOT) 8.6 MG tablet 1 tab @ 9pm 120 tablet      ZOLMitriptan (ZOMIG) 5 MG tablet Take 1 tablet (5 mg) by mouth at onset of headache for migraine MAY REPEAT ONCE AFTER 2 HOURS. DO NOT EXCEED 2 TABLETS IN 24 HOURS. 6 tablet 10         Jet Porter MD    Again, thank you for allowing me to participate in the care of your patient.        Sincerely,        Jet Porter MD

## 2019-06-11 ENCOUNTER — ALLIED HEALTH/NURSE VISIT (OUTPATIENT)
Dept: PHARMACY | Facility: CLINIC | Age: 64
End: 2019-06-11
Attending: PSYCHIATRY & NEUROLOGY
Payer: COMMERCIAL

## 2019-06-11 DIAGNOSIS — R44.3 HALLUCINATIONS: ICD-10-CM

## 2019-06-11 DIAGNOSIS — R51.9 NONINTRACTABLE EPISODIC HEADACHE, UNSPECIFIED HEADACHE TYPE: ICD-10-CM

## 2019-06-11 DIAGNOSIS — G47.00 INSOMNIA, UNSPECIFIED TYPE: ICD-10-CM

## 2019-06-11 DIAGNOSIS — F32.0 MILD MAJOR DEPRESSION (H): ICD-10-CM

## 2019-06-11 DIAGNOSIS — G20.A1 DEMENTIA DUE TO PARKINSON'S DISEASE WITHOUT BEHAVIORAL DISTURBANCE (H): ICD-10-CM

## 2019-06-11 DIAGNOSIS — G20.A1 PARALYSIS AGITANS (H): Primary | ICD-10-CM

## 2019-06-11 DIAGNOSIS — K59.09 OTHER CONSTIPATION: ICD-10-CM

## 2019-06-11 DIAGNOSIS — F02.80 DEMENTIA DUE TO PARKINSON'S DISEASE WITHOUT BEHAVIORAL DISTURBANCE (H): ICD-10-CM

## 2019-06-11 PROCEDURE — 99207 ZZC NO CHARGE LOS: CPT | Performed by: PHARMACIST

## 2019-06-11 RX ORDER — RIVASTIGMINE 4.6 MG/24H
1 PATCH, EXTENDED RELEASE TRANSDERMAL DAILY
Qty: 30 PATCH | Refills: 0 | Status: SHIPPED | OUTPATIENT
Start: 2019-06-11 | End: 2019-07-15 | Stop reason: DRUGHIGH

## 2019-06-11 NOTE — PROGRESS NOTES
"SUBJECTIVE/OBJECTIVE:                           Nathaly Farias is a 64 year old female called for an initial visit for Medication Therapy Management.  She was referred to me from Dr. Porter. Today's visit was conducted with patient's , Wolf (consent to communicate on file).     Chief Complaint: Initial MTM visit; would like to discuss memory medications    Allergies/ADRs: Reviewed in Epic  Tobacco: No tobacco use  Alcohol: none  Caffeine: not discussed  Activity: minimal  PMH: Reviewed in Epic    Medication Adherence/Access:  Patient uses a pill dispenser.   Patient takes medications 3 time(s) per day.   Per patient, misses medication 0 times per week.   Medication barriers: none.   The patient fills medications at [facility]: NO, fills medications at [facility] in [place], [place].    Of note, patient's  drives truck M-F and so daughter or daughter-in-law stop by her house on a daily basis in the evening to check on her while her  is out.     Parkinson's Disease/dementia/psychosis:  Current medications include: Carbidopa-levodopa  mg 2 tablets 3 times daily and quetiapine 50 mg nightly.  states that her motor symptoms have been \"about the same.\" She still experiences tremor but this is not too bothersome for her.  Hallucinations have also been stable since being on quetiapine and  states that the hallucinations only come back if she is tired and has not nap during the day or if she has not had enough water to drink.  Her memory is their primary concern at this time.   describes that she is not able to do the things that she used to do (ie: cannot navigate a cell phone, cannot listen to music on her iPod).  He also mentions that there have been times when she forgets that she is in her own home.  They tried CBD oil but she was not consistent about taking it during the week and they did not see any benefit from it.  Has been states that they are interested in trying the " "rivastigmine patch which was expensive previously but with the Goodrx coupon from Dr. Porter, they would be able to afford $150/month.     Insomnia: Currently taking melatonin 10 mg nightly.   states that she wakes up a few times overnight to go to the bathroom but otherwise sleeps well. Of note, patient has a bladder prolapse and is going to be seeing a specialist for this.     Depression:  Current medications include: Citalopram 30 mg once daily.  reports that depression symptoms are unchanged; she gets depressed and upset about the Parkinson's disease at times.   PHQ-9 SCORE 10/26/2017 5/3/2018 11/5/2018   PHQ-9 Total Score - - -   PHQ-9 Total Score MyChart - - 8 (Mild depression)   PHQ-9 Total Score 2 4 8     Headaches: Patient takes 1 extra strength Tylenol at the onset of a headache and if this is not effective then she will take 1 tablet of Zomig which tends to be effective.   states that he is able to tell whether the headache is going to be mild or more like a migraine based on \"how she is.\"  Patient typically has about 1 migraine per month.    Constipation: Currently taking senna once a day and Miralax, which she drinks out of her water bottle, throughout the day.  She is not currently taking the probiotic but will sometimes take it.   states that her bowel movements have been regular with this regimen.    BP Readings from Last 1 Encounters:   06/10/19 169/89     Pulse Readings from Last 1 Encounters:   06/10/19 72     Wt Readings from Last 1 Encounters:   06/10/19 136 lb 3.2 oz (61.8 kg)     ASSESSMENT:                             Current medications were reviewed today. Medicare Part D topics discussed:Medication changes    Medication Adherence: excellent, no issues identified    Parkinson's Disease/dementia/psychosis: Needs improvement.  Primary concern at this time is memory and cognition and therefore it seems reasonable for the patient to try the rivastigmine patch.  Based " on her vitals from the last visit, there should not be concern for bradycardia with the patch. May need to monitor for increased frequency of urination, which is possible with rivastigmine. Will prescribe to the patient's local pharmacy and if not affordable patient can consider using a coupon from [employer] or getting assistance from the Assistance Fund for Parkinson's disease.     Insomnia: Stable.     Depression:  Appears stable.     Headaches: Stable.     Constipation: Stable.     PLAN:                            Start rivastigmine patch 4.6 mg once daily. Educated  to rotate sites of application and the patient's daughter and daughter-in-law will assist with this.     I spent 25 minutes with this patient today (an extra 15 minutes was spent creating the Medication Action Plan). All changes were made via collaborative practice agreement with Dr. Porter. A copy of the visit note was provided to the patient's referring provider.    Will follow up in one month: 7/16/19 at 3 pm    The patient declined a summary of these recommendations as an after visit summary (patient was mailed Medication Action Plan instead).     Corazon Dhaliwal, Pharm.D.  Medication Therapy Management Pharmacist  Phone: [phone]

## 2019-06-11 NOTE — LETTER
"     [facility] NEUROLOGY CLINIC [facility]     Date: 2019    Nathaly Farias  [address] [address]    Dear Ms. Farias,    Thank you for talking with me on 19 about your health and medications. Medicare s MTM (Medication Therapy Management) program helps you understand your medications and use them safely.      This letter includes an action plan (Medication Action Plan) and medication list (Personal Medication List). The action plan has steps you should take to help you get the best results from your medications. The medication list will help you keep track of your medications and how to use them the right way.       Have your action plan and medication list with you when you talk with your doctors, pharmacists, and other healthcare providers in your care team.     Ask your doctors, pharmacists, and other healthcare providers to update the action plan and medication list at every visit.     Take your medication list with you if you go to the hospital or emergency room.     Give a copy of the action plan and medication list to your family or caregivers.     If you want to talk about this letter or any of the papers with it, please call   [phone].   We look forward to working with you, your doctors, and other healthcare providers to help you stay healthy.     Sincerely,    Corazon Dhaliwal      Enclosed: Medication Action Plan and Personal Medication List    MEDICATION ACTION PLAN FOR Nathaly Farias,  1955     This action plan will help you get the best results from your medications if you:   1. Read \"What we talked about.\"   2. Take the steps listed in the \"What I need to do\" boxes.   3. Fill in \"What I did and when I did it.\"   4. Fill in \"My follow-up plan\" and \"Questions I want to ask.\"     Have this action plan with you when you talk with your doctors, pharmacists, and other healthcare providers in your care team. Share this with your family or caregivers too.  DATE PREPARED: " 2019  What we talked about: memory medications                                             What I need to do: start applying rivastigmine (Exelon) 4.6 mg patch once daily and remove old patch each day   What I did and when I did it:                                              My follow-up plan:  Corazon Dhaliwal, PharmD will call Wolf on 19 at 3 pm to follow up          Questions I want to ask:            If you have any questions about your action plan, call Corazon Dhaliwal, Phone: [phone] , Monday-Friday 8-4:30pm.        MEDICATION LIST FOR Nathaly Farias,  1955     This medication list was made for you after we talked. We also used information from your doctor's chart.      Use blank rows to add new medications. Then fill in the dates you started using them.    Cross out medications when you no longer use them. Then write the date and why you stopped using them.    Ask your doctors, pharmacists, and other healthcare providers to update this list at every visit. Keep this list up-to-date with:       Prescription medications    Over the counter drugs     Herbals    Vitamins    Minerals      If you go to the hospital or emergency room, take this list with you. Share this with your family or caregivers too.     DATE PREPARED: 2019  Allergies or side effects: Sulfa drugs; Naproxen; Oxycontin [oxycodone]; and Ropinirole     Medication:  ACETAMINOPHEN 500 MG PO TABS      How I use it:  Take 1-2 tablets (500-1,000 mg) by mouth every 6 hours as needed for mild pain      Why I use it:  Headaches    Prescriber:  OTC      Date I started using it:       Date I stopped using it:         Why I stopped using it:            Medication:  CARBIDOPA-LEVODOPA  MG PO TABS      How I use it:  2 tabs by mouth @ 7am, noon, and 9pm      Why I use it: Parkinson's disease    Prescriber:  Jet Porter MD      Date I started using it:       Date I stopped using it:         Why I stopped using it:                     Medication:  CITALOPRAM HYDROBROMIDE 20 MG PO TABS      How I use it:  1.5 x 20mg tab by mouth @12noon      Why I use it: Depression    Prescriber:  Jet Porter MD      Date I started using it:       Date I stopped using it:         Why I stopped using it:            Medication:  MELATONIN 5 MG PO TABS      How I use it:  2 x 5mg tab by mouth @ 9pm      Why I use it:  Insomnia    Prescriber:  Jet Porter MD      Date I started using it:       Date I stopped using it:         Why I stopped using it:            Medication:  POLYETHYLENE GLYCOL 3350 PO POWD      How I use it:  1 capful in 8 oz water and sips throughout the day      Why I use it: Constipation    Prescriber:  Jet Porter MD      Date I started using it:       Date I stopped using it:         Why I stopped using it:            Medication:  PROBIOTIC DAILY PO CAPS      How I use it:  Young living probiotic by mouth daily @ 7am      Why I use it:  Constipation    Prescriber:  Jet Porter MD      Date I started using it:       Date I stopped using it:         Why I stopped using it:            Medication:  QUETIAPINE FUMARATE 25 MG PO TABS      How I use it:  2 x 25mg tab by mouth nightly @ 9pm      Why I use it: Hallucinations    Prescriber:  Jet Porter MD      Date I started using it:       Date I stopped using it:         Why I stopped using it:            Medication:  RIVASTIGMINE 4.6 MG/24HR TD PT24      How I use it:  Place 1 patch onto the skin daily      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 DO NOT EXCEED 2 TABLETS IN 24 HOURS.      Why I use it: Migraines    Prescriber:  Maxine Monae MD      Date I started using it:       Date I stopped using it:         Why I stopped using it:            Medication:         How I use it:         Why I use it:      Prescriber:         Date I started using it:       Date I stopped using it:         Why I stopped using it:            Medication:         How I use it:         Why I use it:      Prescriber:         Date I started using it:       Date I stopped using it:         Why I stopped using it:            Medication:         How I use it:         Why I use it:      Prescriber:         Date I started using it:       Date I stopped using it:         Why I stopped using it:              Other Information:     If you have any questions about your action plan, call [phone].    According to the Paperwork Reduction Act of 1995, no persons are required to respond to a collection of information unless it displays a valid OMB control number. The valid OMB number for this information collection is [id number]. The time required to complete this information collection is estimated to average 40 minutes per response, including the time to review instructions, searching existing data resources, gather the data needed, and complete and review the information collection. If you have any comments concerning the accuracy of the time estimate(s) or suggestions for improving this form, please write to: CMS, Attn: LENNIE Reports Clearance Officer, [address] [place] [id number].

## 2019-07-15 ENCOUNTER — TELEPHONE (OUTPATIENT)
Dept: PHARMACY | Facility: CLINIC | Age: 64
End: 2019-07-15

## 2019-07-15 DIAGNOSIS — F02.80 DEMENTIA DUE TO PARKINSON'S DISEASE WITHOUT BEHAVIORAL DISTURBANCE (H): Primary | ICD-10-CM

## 2019-07-15 DIAGNOSIS — G20.A1 DEMENTIA DUE TO PARKINSON'S DISEASE WITHOUT BEHAVIORAL DISTURBANCE (H): Primary | ICD-10-CM

## 2019-07-15 RX ORDER — RIVASTIGMINE 9.5 MG/24H
1 PATCH, EXTENDED RELEASE TRANSDERMAL DAILY
Qty: 30 PATCH | Refills: 0 | Status: SHIPPED | OUTPATIENT
Start: 2019-07-15 | End: 2019-07-30 | Stop reason: DRUGHIGH

## 2019-07-15 NOTE — TELEPHONE ENCOUNTER
Patient's , Wolf, called [place] and was transferred to [facility] at [place]. Patient's  is looking for the next dose of the Exelon patch to be sent to [facility].  Wolf thought Dr. Porter had already called in the prescription for the next dose but [facility] does not have it. Patient is due to change her patch today and is looking for the new prescription to be sent today. Patient is due to follow up with Corazon Dhaliwal PharmD 7/16/19. Will forward to Corazon to review and address. Wolf's phone number is [phone].    Christine Allen, Pharm.D, BCACP  Medication Therapy Management Pharmacist

## 2019-07-15 NOTE — TELEPHONE ENCOUNTER
Sent next dose of rivastigmine patch (9.5 mg) to the patient's [facility] pharmacy per CPA with Dr. Potrer. MTM visit scheduled for tomorrow with ; will discuss with him at that time.    Corazon Dhaliwal, Pharm.D.  Medication Therapy Management Pharmacist  Phone: [phone]

## 2019-07-16 ENCOUNTER — ALLIED HEALTH/NURSE VISIT (OUTPATIENT)
Dept: PHARMACY | Facility: CLINIC | Age: 64
End: 2019-07-16
Payer: COMMERCIAL

## 2019-07-16 DIAGNOSIS — G20.A1 DEMENTIA DUE TO PARKINSON'S DISEASE WITHOUT BEHAVIORAL DISTURBANCE (H): ICD-10-CM

## 2019-07-16 DIAGNOSIS — R44.3 HALLUCINATIONS: ICD-10-CM

## 2019-07-16 DIAGNOSIS — G20.A1 PARALYSIS AGITANS (H): Primary | ICD-10-CM

## 2019-07-16 DIAGNOSIS — F02.80 DEMENTIA DUE TO PARKINSON'S DISEASE WITHOUT BEHAVIORAL DISTURBANCE (H): ICD-10-CM

## 2019-07-16 PROCEDURE — 99207 ZZC NO CHARGE LOS: CPT | Performed by: PHARMACIST

## 2019-07-16 NOTE — PROGRESS NOTES
SUBJECTIVE/OBJECTIVE:                Nathaly Farias is a 64 year old female called for a follow-up visit for Medication Therapy Management.  She was referred to me from Dr. Porter. Today's visit was conducted with patient's , Wolf (consent to communicate on file).     Chief Complaint: Follow up from our visit on 6/11/19  Tobacco: No tobacco use  Alcohol: none    Medication Adherence/Access: no issues reported    Parkinson's Disease/dementia/psychosis:  Current medications include: Carbidopa-levodopa  mg 2 tablets 3 times daily, quetiapine 50 mg nightly, and rivastigmine patch 4.6 mg daily. The patient started rivastigmine one month ago and has had no significant changes in the past month. Denies any side effects and they are rotating the site between upper arm, upper back, and chest.  states that the patient has had a slight increase in hallucinations (seeing people) in the last few weeks and he credits this to her being more dehydrated and taking less naps. He continues to encouraged her to drink more water and take naps. Plan is to increase rivastigmine to 9.5 mg today.     ASSESSMENT:              Current medications were reviewed today as discussed above.      Medication Adherence: excellent, no issues identified    Parkinson's Disease/dementia/psychosis: Needs improvement. Patient may benefit from an increase in rivastigmine dose (already ordered yesterday) to help manage her cognitive changes and hallucinations. Will check in with  again in a couple weeks and if hallucinations aren't improving we may also need to increase quetiapine again.      PLAN:                  Increase rivastigmine to 9.5 mg patch daily.    I spent 15 minutes with this patient today. All changes were made via collaborative practice agreement with Dr. Porter. A copy of the visit note was provided to the patient's referring provider.     Will follow up in 2 weeks: 7/30/19.    The patient declined a summary of these  recommendations as an after visit summary.    Corazon Dhaliwal, Pharm.D.  Medication Therapy Management Pharmacist  Phone: [phone]

## 2019-07-23 ENCOUNTER — TELEPHONE (OUTPATIENT)
Dept: PHARMACY | Facility: CLINIC | Age: 64
End: 2019-07-23

## 2019-07-23 DIAGNOSIS — R44.3 HALLUCINATIONS: ICD-10-CM

## 2019-07-23 RX ORDER — QUETIAPINE FUMARATE 25 MG/1
75 TABLET, FILM COATED ORAL AT BEDTIME
Qty: 270 TABLET | Refills: 3 | Status: SHIPPED | OUTPATIENT
Start: 2019-07-23 | End: 2019-09-16

## 2019-07-23 NOTE — TELEPHONE ENCOUNTER
[facility] Call Center    Phone Message    May a detailed message be left on voicemail: yes    Reason for Call: Other: Pt  called. He requested a call back from the Med Management team. He would like to speak with someone about Pt medication dosage and if there could be an increase. He was unsure the medication name, but states it is for hallucinations. Please advise.     Action Taken: Message routed to:  Other: [id number]

## 2019-07-23 NOTE — TELEPHONE ENCOUNTER
Attempted to reach , Wolf, to discuss. Voicemail box was full and could not leave VM.    Corazon Dhaliwal, Pharm.D.  Medication Therapy Management Pharmacist  Phone: [phone]

## 2019-07-23 NOTE — TELEPHONE ENCOUNTER
Second attempt to reach  to discuss but was unable to leave VM. If he calls back, please provide him my phone number: [phone].    Corazon Dhaliwal, Pharm.D.  Medication Therapy Management Pharmacist  Phone: [phone]

## 2019-07-23 NOTE — TELEPHONE ENCOUNTER
"Spoke with , Wolf. He states that the patient's hallucinations continue to worsen. She is now seeing people in the home. He was with the patient for the weekend and made sure she was getting enough food and water and the hallucinations were still bothersome. He states \"it got to the point where I thought about putting her in a nursing home.\" Discussed with Dr. Porter and we both agree to increase quetiapine to 75 mg nightly. The patient has been on rivastigmine 9.5 mg dose for one week and will continue on this dose for now.    Plan:   1. Increase quetiapine to 75 mg nightly (okay per Dr. Porter). Rx updated at [facility].    Corazon Dhaliwal, Pharm.D.  Medication Therapy Management Pharmacist  Phone: [phone]  "

## 2019-07-30 ENCOUNTER — ALLIED HEALTH/NURSE VISIT (OUTPATIENT)
Dept: PHARMACY | Facility: CLINIC | Age: 64
End: 2019-07-30
Payer: COMMERCIAL

## 2019-07-30 DIAGNOSIS — G20.A1 DEMENTIA DUE TO PARKINSON'S DISEASE WITHOUT BEHAVIORAL DISTURBANCE (H): Primary | ICD-10-CM

## 2019-07-30 DIAGNOSIS — R44.3 HALLUCINATIONS: ICD-10-CM

## 2019-07-30 DIAGNOSIS — F02.80 DEMENTIA DUE TO PARKINSON'S DISEASE WITHOUT BEHAVIORAL DISTURBANCE (H): Primary | ICD-10-CM

## 2019-07-30 DIAGNOSIS — G20.A1 PARALYSIS AGITANS (H): ICD-10-CM

## 2019-07-30 DIAGNOSIS — G20.A1 PARKINSON'S DISEASE (H): Primary | ICD-10-CM

## 2019-07-30 PROCEDURE — 99207 ZZC NO CHARGE LOS: CPT | Performed by: PHARMACIST

## 2019-07-30 RX ORDER — RIVASTIGMINE 13.3 MG/24H
1 PATCH, EXTENDED RELEASE TRANSDERMAL DAILY
Qty: 30 PATCH | Refills: 11 | Status: SHIPPED | OUTPATIENT
Start: 2019-07-30 | End: 2019-09-16

## 2019-07-30 NOTE — Clinical Note
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

## 2019-07-30 NOTE — PROGRESS NOTES
"SUBJECTIVE/OBJECTIVE:                Nathaly Farias is a 64 year old female called for a follow-up visit for Medication Therapy Management.  She was referred to me from Dr. Porter. Today's visit was conducted with patient's , Wolf (consent to communicate on file).      Chief Complaint: Follow up from our visit on 7/16/19  Tobacco: No tobacco use  Alcohol: none    Medication Adherence/Access: no issues reported    Parkinson's Disease/dementia/psychosis:  Current medications include: Carbidopa-levodopa  mg 2 tablets 3 times daily, quetiapine 75 mg nightly, and rivastigmine patch 9.5 mg daily.   states that the patient has had some improvement in hallucinations and anxiety since increasing quetiapine dose last week.  He states that she was getting \"really worked up\" about the hallucinations and she seems to do better if she is calmer.  He has not noticed any change in her memory.  states that she tends to do better when he is home on the weekends but when he is gone during the week driving truck she gets worked up about missing him.   states that he is typically able to calm her down on the phone and they do talk frequently on the phone to help her.  He did mention that yesterday was an \"awesome\" day for her and she is doing pretty well today too.   expresses concern that she gets bored during the day is without him there and that she is unable to do games or coloring because of her memory issues and tremor.  They have considered looking into 24-hour care or nursing home but ideally  would like her to be home on the weekends.  They have talked to the  in the past but he would be willing to talk with the  again.    ASSESSMENT:              Current medications were reviewed today as discussed above.      Medication Adherence: excellent, no issues identified    Parkinson's Disease/dementia/psychosis: Needs improvement. Patient has not had significant " Follow your Anticoagulation Dosing Card. Be consistent with your diet and vitamin K foods. Contact office with any medication changes including supplements or over the counter medicines. Monitor for any signs of bleeding or unusual bruising- call office or seek medical attention if they occur. Monitor for any signs of a clot such as sudden shortness of breath, chest pain, or redness, warmth, swelling of arms or legs- seek medical attention if they occur. Call office with any questions.        benefit from rivastigmine, but will continue titrating dose given no new side effects. Will refer patient/ to  to discuss the 's concerns with living situation and possible options.      PLAN:                  1. Refer to  to discuss resources with .   2. Increase rivastigmine to 13.3 mg after 9.5 mg box is finished.     I spent 15 minutes with this patient today. All changes were made via collaborative practice agreement with Dr. Porter. A copy of the visit note was provided to the patient's referring provider.     Will follow up in 3 weeks: 9/21/19.    The patient declined a summary of these recommendations as an after visit summary.    Corazon Dhaliwal, Pharm.D.  Medication Therapy Management Pharmacist  Phone: [phone]

## 2019-07-31 ENCOUNTER — PATIENT OUTREACH (OUTPATIENT)
Dept: CARE COORDINATION | Facility: CLINIC | Age: 64
End: 2019-07-31

## 2019-07-31 NOTE — PROGRESS NOTES
Social Work Telephone Note  [facility]    Patient Name:  Nathaly Farias  /Age:  1955 (64 year old)    Referral Source: MTM Pharm  Reason for Referral:  Resources for hired in support or possible LTC     contacted patients , Wolf via telephone on 19. Wolf shared that he is  and typically gone Monday through Friday.  Together they have two adult children that have been helping provide check-in support during the week.     Discussed with Wolf options of hired in support and provided [facility] contact information and discussed pricing. Also discussed options of LTC and expenses surrounding this. Provided Wolf with [facility] contact information and recommended he connect with an Elder  to discuss long term financial planning.    Wolf was very appreciate for call. Provided writer contact information and encouraged him to connect with any additional concerns or questions. Sw will continue to assist as needed.         KRISTAN Saravia, [facility]    [facility]  [phone]  [email or web address]

## 2019-08-08 ENCOUNTER — TELEPHONE (OUTPATIENT)
Dept: FAMILY MEDICINE | Facility: OTHER | Age: 64
End: 2019-08-08

## 2019-08-08 DIAGNOSIS — N81.10 BLADDER PROLAPSE, FEMALE, ACQUIRED: Primary | ICD-10-CM

## 2019-08-08 NOTE — TELEPHONE ENCOUNTER
I can't remember which ob/gyn's do this. But double check when scheduling or let me know if need urology referral as several of them do this as well.  Maxine Monae MD

## 2019-08-08 NOTE — TELEPHONE ENCOUNTER
Reason for call:  Pt was seen back in April with  for a bladder prolaspe and she was given some options. She tried the PT and that hasn't helped much so she is wanting to go with doing the pessary. Pt wants to know if  does that or does she need to do GYN. Please advise and schedule.

## 2019-08-08 NOTE — TELEPHONE ENCOUNTER
Spoke to Maxine nurse of Dr. Abdullahi to see if they see patients for  This and Maxine said Yes, Dr. Abdullahi does these and to schedule consult for 30 minutes and possibly they would do it same day- She is scheduled for consult for 8/29/19 with Dr. Abdullahi at 2:15 pm

## 2019-08-12 ENCOUNTER — TELEPHONE (OUTPATIENT)
Dept: FAMILY MEDICINE | Facility: OTHER | Age: 64
End: 2019-08-12

## 2019-08-12 NOTE — TELEPHONE ENCOUNTER
Spoke to violet spouse C2C on file and informed him of this date/time with Dr. Abdullahi and he is wondering if the ER would do the pessary. On an emergency basis.  I told him I don't think so but he wanted me to ask Please advise

## 2019-08-12 NOTE — TELEPHONE ENCOUNTER
Reason for Call:  Other appointment    Detailed comments: is wondering if you have found out when she can get the procedure done for her prolapse bladder     Phone Number Patient can be reached at: Other phone number:  Spouse violet [phone]     Best Time: any    Can we leave a detailed message on this number? YES    Call taken on 8/12/2019 at 4:12 PM by Narcisa Johns

## 2019-08-21 ENCOUNTER — ALLIED HEALTH/NURSE VISIT (OUTPATIENT)
Dept: PHARMACY | Facility: CLINIC | Age: 64
End: 2019-08-21
Payer: COMMERCIAL

## 2019-08-21 DIAGNOSIS — R44.3 HALLUCINATIONS: ICD-10-CM

## 2019-08-21 DIAGNOSIS — G20.A1 PARALYSIS AGITANS (H): Primary | ICD-10-CM

## 2019-08-21 DIAGNOSIS — F32.0 MILD MAJOR DEPRESSION (H): ICD-10-CM

## 2019-08-21 DIAGNOSIS — G20.A1 DEMENTIA DUE TO PARKINSON'S DISEASE WITHOUT BEHAVIORAL DISTURBANCE (H): ICD-10-CM

## 2019-08-21 DIAGNOSIS — F02.80 DEMENTIA DUE TO PARKINSON'S DISEASE WITHOUT BEHAVIORAL DISTURBANCE (H): ICD-10-CM

## 2019-08-21 DIAGNOSIS — N81.10 FEMALE BLADDER PROLAPSE: ICD-10-CM

## 2019-08-21 PROCEDURE — 99207 ZZC NO CHARGE LOS: CPT | Performed by: PHARMACIST

## 2019-08-21 NOTE — PROGRESS NOTES
"  SUBJECTIVE/OBJECTIVE:                Nathaly Farias is a 64 year old female called for a follow-up visit for Medication Therapy Management.  She was referred to me from Dr. Porter. Visit was conducted with patient's , Wolf, as the patient has dementia.     Chief Complaint: Follow up from our visit on 7/30/19  Tobacco: No tobacco use  Alcohol: none    Medication Adherence/Access: no issues reported    Parkinson's Disease/dementia/psychosis/depression:  Current medications include: Carbidopa-levodopa  mg 2 tablets 3 times daily, quetiapine 75 mg nightly, citalopram 30 mg daily, and rivastigmine patch 13.3 mg daily. Wolf states that the patient has been doing better when she \"gets naps in\" and her hallucinations are improved when she has napped. If she hasn't napped, she gets more confused. Wolf reports that she is still experiencing symptoms of depression and he is wondering if we should increase citalopram or quetiapine. The dose of rivastigmine was just increased starting last week.     Bladder prolapse: Wolf states the patient is having a consultation for a pessary next week and they may do the procedure. He is concerned that they may use sedation for this procedure and he wants to make sure this is okay for her. The patient's sister will attend that appointment but he is unable to because he will be working.     ASSESSMENT:              Current medications were reviewed today as discussed above.      Medication Adherence: excellent, no issues identified    Parkinson's Disease/dementia/psychosis/depression:  Needs improvement. Patient continues to exhibit symptoms of dementia, psychosis, and depression per the . It is difficult for me to assess what further medication changes should be made as I am only communicating with the . Since increasing citalopram and/or quetiapine could prolong the QT interval, I would feel more comfortable with making medication changes after another EKG is " done, after the pessary consultation and procedure, and potentially after the patient has seen Dr. Porter in person in September.     Bladder prolapse: Needs improvement. I will have the OBGYN team at [facility] reach out to the patient's  regarding his questions about the procedure and possible sedation.      PLAN:                  1. Dr. Porter to consider repeat EKG at next neurology visit and to consider increasing quetiapine or citalopram, as appropriate.  2. [facility] pharmacist to send message to Dr. Abdullahi on [facility] team to address 's questions about the pessary placement.     I spent 20 minutes with this patient today. I offer these suggestions for consideration by Dr. Porter. A copy of the visit note was provided to the patient's referring provider.     Will follow up in 2 months or sooner if needed.    The patient declined a summary of these recommendations as an after visit summary.    Corazon Dhaliwal, Pharm.D.  Medication Therapy Management Pharmacist  Phone: [phone]

## 2019-08-21 NOTE — Clinical Note
I am starting to feel uncomfortable managing this patient's neuropsych symptoms without meeting her in person and just discussing with the . We have titrated rivastigmine up to 13.3 mg and she is now on quetiapine 75 mg nightly. Still depressed, hallucinating, and confused. I think we need to check an EKG at her next appt with you in Sept before any further changes and I need your help in determining next steps. She is having a consultation for pessary next week so I also don't want to make changes in light of that right now.

## 2019-08-22 ENCOUNTER — TELEPHONE (OUTPATIENT)
Dept: OBGYN | Facility: OTHER | Age: 64
End: 2019-08-22

## 2019-08-22 NOTE — TELEPHONE ENCOUNTER
Spoke with pt's , Wolf, to inform him of Dr. Abdullahi's message below.  Wolf would like us to cath pt at that appt to get a urine sample and to empty her bladder.  I told him we wouldn't be able to do this during her appt as it takes a long time to set up and cath someone and it will take up her whole appt to talk with Dr. Abdullahi.  Wolf verbalized understanding.  I asked him if pt could leave a urine sample by urinating in a cup or a hat and he said yes.  I asked him to have pt arrive 15 minutes early to her appt so she can go to lab first to leave a urine sample.  I also explained to Wolf that he may want to bring pt to urology instead as they will be able to cath pt to empty her bladder and possible place the pessary.  He states he knows the reason she is having issues is because her bladder is prolapsed and thinks that all she needs is a pessary and would like to keep the appt with Dr Abdullahi.  Will route to Dr. Abdullahi to place a UA order if appropriate.  I did schedule pt with lab 15 minutes prior to pt's appt on 8/29.    Claribel Robles, RN

## 2019-08-22 NOTE — TELEPHONE ENCOUNTER
Pt is scheduled for a consult/pessary fitting with Dr. Abdullahi on 8/29/19 at 2:15.  Spoke with pt's , Wolf.  He had questions about whether sedation is needed for a pessary fitting/placement.  I explained to him that I have not heard of any providers needing to put a pt under sedation when placing a pessary.  I also explained to him that pt's appt on 8/29/19, is a consult where Dr. Abdullahi will discuss pt's symptoms, do a pelvic exam to see what the problem is and possible do a pessary fitting at that time if that is what is agreed upon.  I did explain that the pessary fitting may need to be done at another appointment.  He is concerned because pt has dementia and parkinson's and she doesn't handle being in unfamiliar places and doesn't do well with doctor's appointments and wants to try and limit the amount of times that he has to bring her in.  I verbalized understanding but stressed the importance of discussing with the provider first concerns/symptoms, doing an exam and discussing options before anything can be done and there may not be time for the pessary fitting at that appt.  Pt's  is not able to be at the appointment with pt on 8/29 but her sister will be there to support her.  He states that the sister is not to make any decisions for the pt and pt's , Wolf, would like a call if any decisions need to be made.  He would like pt to get a pessary the day of her appointment so she can come home and be comfortable.  He states pt is constantly getting up to go to the bathroom but nothing usually comes out.  He is also wondering if she has a UTI because she has urinary urgency.  Will route to Dr. Abdullahi so she is aware of this before pt's appt on 8/29.    Claribel Robles RN

## 2019-08-22 NOTE — TELEPHONE ENCOUNTER
"  Received message from [employer]: \"This patient has an upcoming visit with you and the  has a number of questions about a possible procedure and if the patient will require sedation in light of her Parkinson's disease. Perhaps your nursing staff could call the  and address his questions?\"    I have not yet seen this patient.  It looks like she is set up for a consult regarding prolapse next week.  It looks like she is interested in pessary fitting as well.  Please call the patient and let them know that this will be an initial consult and we start out with having a discussion regarding her concerns/symptoms, then do an exam, then discuss management options based on symptoms and exam findings.  If the pessary is agreed upon, then we would do a pessary fitting.  This is sometimes done at a subsequent appointment.  After I meet her and discuss the pessary fitting, then we can determine whether she needs sedation.  I can say that I have never had to do sedation for a pessary fitting, as it is generally well tolerated.       "

## 2019-08-22 NOTE — TELEPHONE ENCOUNTER
I will do my best to have the pessary fitting the day of the consult.  It is hard to say how the appointment will go because I have never met her.    Are they requesting a UA prior to that appointment?      If she has more urinary symptoms, then she may consider seeing urology if that is her main concern.  Some also do pessary fittings.

## 2019-08-22 NOTE — TELEPHONE ENCOUNTER
Writer informed pt's  that she can be straight cathed at appointment.     Neyda Alford RN on 8/22/2019 at 12:46 PM

## 2019-08-26 ENCOUNTER — PATIENT OUTREACH (OUTPATIENT)
Dept: CARE COORDINATION | Facility: CLINIC | Age: 64
End: 2019-08-26

## 2019-08-26 ENCOUNTER — TELEPHONE (OUTPATIENT)
Dept: OBGYN | Facility: OTHER | Age: 64
End: 2019-08-26

## 2019-08-26 NOTE — PROGRESS NOTES
Social Work Telephone Message Note  [facility]     Patient Name:  Nathaly Farias  /Age:  1955 (64 year old)    Referral Source: neurology     attempted to contact patient's , Wolf via telephone today, .  Left message providing writer contact information encouraging a return call. Sw will continue to follow and assist as needed.         KRISTAN Saravia, [facility]    [facility]  [phone]  [email or web address]

## 2019-08-26 NOTE — TELEPHONE ENCOUNTER
No labs needed.  I will plan to straight cath her for a UA during the exam and then place orders.

## 2019-08-26 NOTE — TELEPHONE ENCOUNTER
This patient is on our lab schedule for this Thursday. On her apt notes she requests labs for you?    If you desire any thing to be done please place the orders.    Thank you,  Teresa

## 2019-08-26 NOTE — PROGRESS NOTES
Diagnosis/Summary/Recommendations:    PATIENT: Nathaly Farias  64 year old female     : 1955    MOISE: 2019    124 lbs  Down from 135 #  She is eating.   Will have to keep monitoring her bowel function     She went back on the sanctura  She thought her bladder was following out and was looking into exercises and did not qualify for a pessary and the urologist prescribed sanctura again which has helped her urinary problems.     Discussed the patch and urination  Discussed the sanctura and cognition           Medications     7am 12noon 9pm         Acetaminophen 500mg tylenol As needed              Carbidopa/levodopa Sinemet 25/100 2 2 2         Citalopram celexa 20mg    1.5            Estradiol cream 2/week       Melatonin 5mg        2        Naproxen sodium 220mg As needed       Nonformulary cbd oil - young living oils   1 eye dropper     Polyethylene glycol miralax sipping throughout the day             Probiotic young living 1 (ran out)           Quetiapine seroquel 25mg     3       Rivastigmine exelon 13.3mg/24 hr patch 1       Senna-docusate senokot-S 8.6mg-50mg       2         Terbinafine lamisil 1% external cream  ?taking           Trospium sanctura 20mg 1    1         Zolmitriptan zomig 5mg                                           History obtained from patient    ECG today showed QTc was 382 and she has some nonspecific abnormalities with motion artifacts.     PLAN  For now she will remain on the 13.3mg patch dose of rivastigmine daily - discussed possibly reducing to 9.5mg patch per day to see if bladder is better    Weight loss needs to monitored and she needs more calories. Need to have weekly monitoring of her weight and if there is continued loss may want to see her pcp to determine the cause. Take ensure, etc.    Discussed the sanctura medication and its possible benefit on the bladder and effects on cognition.   Scheduled voiding.     Headaches are better and will continue to use the  zomig as needed    Return back in 3 months.     IN summary - no changes so far with her regimen      Coding statement:   Duration of  Services: patient care and care coordination was 25 minutes  Greater than 50% of this visit was spent in counseling and coordination of care.     Jet Porter MD     ______________________________________    Last visit date and details:      MOISE: Chantale 10, 2019     Headache  Parkinson     She has ongoing bladder issues - will begin with therapy and pessary if needed then surgery.     She could not afford the patch.      Having more cognitive problems.      Brain mri changes could be related to second hand smoke. She has hypertension int he past and is not on statin. She does not have diabetes.            Medications     7am 12noon 9pm         Acetaminophen 500mg tylenol As needed              Carbidopa/levodopa Sinemet 25/100 2 2 2         Citalopram celexa 20mg    1.5            Melatonin 5mg        1        Polyethylene glycol miralax sipping throughout the day             Probiotic young living 1           Quetiapine seroquel 25mg     2       Rivastigmine exelon 13.3mg patch 24 hrs        Senna senokot 8.6mg       1          Terbinafine lamisil 1% external cream  ?taking           Zolmitriptan zomig 5mg As needed                                                             History obtained from patient     1. Bladder issues may be related to mechanical changes, ie prolapse and neurological, ie neurogenic bladder. She has not been on mirabegron/myrbetriq 25mg or 50mg dose.      2. Cognitive changes. She has not tried the rivastigmine patch due to price and has not been on other medications.  She may or may not have been on donepezil in 2017. She has had some hallucinations. Discussion about prices and [employer] discount information.  Think they should talk with pharmacy before trying something.      3. Neck pain =- she should be seen and managed for this as she is probably having  cervicogenic headache with decreased rotation of head to the right and pain on palpation in the posterior neck with brisk reflexes. She may need imaging - ct scan, mri scan, plain films of her neck and then additional evaluation whether she would benefit from epidural steroids or local trigger point injections with steroids and/or lidocaine. She could see the pain clinic in [facility] or in the headache clinic.  She may want to talk with her pcp who is in [place] as to someone closer to home in [place] who can help her symptoms.  She may want to start with physical therapy but will probably need help for this issues as it is ongoing and probably related to arthritis in her neck.      4. Parkinson - no change in medication regimen.      5. She has not seen our pharmacist Corazon Dhaliwal. Phone number is [phone] is contact phone number              ______________________________________      Patient was asked about 14 Review of systems including changes in vision (dry eyes, double vision), hearing, heart, lungs, musculoskeletal, depression, anxiety, snoring, RBD, insomnia, urinary frequency, urinary urgency, constipation, swallowing problems, hematological, ID, allergies, skin problems: seborrhea, endocrinological: thyroid, diabetes, cholesterol; balance, weight changes, and other neurological problems and these were not significant at this time except for   Patient Active Problem List   Diagnosis     Prolapse of vaginal wall     Migraine variant     Symptomatic menopausal or female climacteric states     Obesity     Malaise and fatigue     Other and unspecified adverse effect of drug, medicinal and biological substance     Other and unspecified disc disorder of cervical region     Sleep apnea     Essential hypertension, benign     Chronic cholecystitis     Overactive bladder     Neck pain     Headache     HYPERLIPIDEMIA LDL GOAL <160     Mild major depression (H)     Advanced directives, counseling/discussion      HTN, goal below 140/90     Paralysis agitans (H)     History of MRI of brain and brain stem     Wears glasses     Constipation     Incomplete RBBB     Heart murmur     Family history of tremor     Rosacea     Asymptomatic varicose veins     Seborrheic dermatitis     Post-traumatic osteoarthritis of left knee     Pain from implanted hardware, initial encounter     Cognitive disorder evalaution 2017     Dementia     Hiatal hernia     Hallucinations          Allergies   Allergen Reactions     Sulfa Drugs      Tacchycardia, had to go to ED     Naproxen GI Disturbance     Oxycontin [Oxycodone] GI Disturbance     Made her feel funny/upset stomach     Ropinirole      Did not work     Past Surgical History:   Procedure Laterality Date     ------------OTHER-------------      left shoulder     ARTHRODESIS TOE(S)  3/2/2011    ARTHRODESIS TOE(S) performed by CLARA LUCAS at  OR     C LIGATE FALLOPIAN TUBE  1998     CHOLECYSTECTOMY, LAPOROSCOPIC  5/12/2008    Cholecystectomy, Laparoscopic     ESOPHAGOSCOPY, GASTROSCOPY, DUODENOSCOPY (EGD), COMBINED N/A 7/5/2017    Procedure: COMBINED ESOPHAGOSCOPY, GASTROSCOPY, DUODENOSCOPY (EGD), BIOPSY SINGLE OR MULTIPLE;  Esophagogastroduodenoscopy with Multiple Biopsies by Biopsy;  Surgeon: Tj Denney MD;  Location: [facility]     HC COLONOSCOPY W/WO BRUSH/WASH  08/30/06    normal     HC OPEN TX TIBIAL SHAFT FX W PLATE/SCREWS W/WO CERCLAGE  2000    Multiple operations on left leg     HC SHLDR ARTHROSCOP,PART ACROMIOPLAS  11/16/06    Right shoulder     HC SHLDR ARTHROSCOP,SURG,DIS CLAVICULECTOMY  11/16/06    Right shoulder     Past Medical History:   Diagnosis Date     Arthralgia of temporomandibular joint 10/22/1996     Arthritis      Cognitive disorder evalaution 2017 3/6/2017    She has marked attentional difficulties, impairments in memory including a rapid forgetting rate, mild anomia, and mild executive dysfunction. 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 She's required increasing assistance with finances, medications, driving, and cooking. I think this is a mild dementia, and     Constipation 2/23/2015     Dementia 3/13/2017    IMPRESSIONS AND RECOMMENDATIONS   Current results indicate marked attentional difficulties, impairments in memory, which in some cases reflected impairments in retention and in other cases reflected retrieval difficulties, and mild anomia as well as mild executive dysfunction. Premorbid intellectual functioning is estimated to fall in the borderline range. She denied experiencing significant depre     Depressive disorder      Heart murmur 2/23/2015     History of MRI of brain and brain stem 2/21/2015    MRI OF THE BRAIN WITHOUT CONTRAST 7/10/2014 1:21 PM  COMPARISON: None. HISTORY: Left-sided upper and lower extremity tremor. Balance issues. TECHNIQUE:  Brain: Axial diffusion-weighted with ADC map, T2-weighted with fat saturation, T1-weighted and turboFLAIR and coronal T1-weighted images of the brain were obtained without intravenous contrast.  FINDINGS: There is mild diffuse cerebral volume loss     Hypertension      Incomplete RBBB 2/23/2015     Motion sickness      Other and unspecified hyperlipidemia      Parkinson's disease (H)      Seborrheic dermatitis 2/29/2016     Unspecified arthropathy, hand 08/07/1997     Unspecified sleep apnea     moderate, sleep study 11/07, on CPAP, has daytime somnolence with this     Variants of migraine, not elsewhere classified, without mention of intractable migraine without mention of status migrainosus      Wears glasses 2/23/2015     Social History     Socioeconomic History     Marital status:      Spouse name: violet     Number of children: 2     Years of education: Not on file     Highest education level: Not on file   Occupational History     Occupation:    Social Needs     Financial resource strain: Not on  "file     Food insecurity:     Worry: Not on file     Inability: Not on file     Transportation needs:     Medical: Not on file     Non-medical: Not on file   Tobacco Use     Smoking status: Never Smoker     Smokeless tobacco: Never Used     Tobacco comment: no smokers in the household   Substance and Sexual Activity     Alcohol use: Yes     Alcohol/week: 0.0 oz     Comment: very occ     Drug use: No     Sexual activity: Yes     Partners: Male     Birth control/protection: Surgical, Female Surgical     Comment: tubal ligation   Lifestyle     Physical activity:     Days per week: Not on file     Minutes per session: Not on file     Stress: Not on file   Relationships     Social connections:     Talks on phone: Not on file     Gets together: Not on file     Attends Orthodox service: Not on file     Active member of club or organization: Not on file     Attends meetings of clubs or organizations: Not on file     Relationship status: Not on file     Intimate partner violence:     Fear of current or ex partner: Not on file     Emotionally abused: Not on file     Physically abused: Not on file     Forced sexual activity: Not on file   Other Topics Concern      Service Not Asked     Blood Transfusions Not Asked     Caffeine Concern Yes     Comment: OCC     Occupational Exposure Not Asked     Hobby Hazards Not Asked     Sleep Concern Not Asked     Stress Concern Not Asked     Weight Concern Not Asked     Special Diet Not Asked     Back Care Not Asked     Exercise No     Comment: \"not faithfully\"     Bike Helmet Not Asked     Seat Belt Yes     Self-Exams No     Parent/sibling w/ CABG, MI or angioplasty before 65F 55M? Not Asked   Social History Narrative    ALLERGIES:  She is allergic to sulfa, and has had gastrointestinal side effects from naproxen.               FAMILY HISTORY:  Strongly positive for headaches, including in her son and sister.  There is no family history of tremor.               SOCIAL HISTORY:  " She does not work outside the home.  She does not smoke.  She uses very little alcohol.  Lives in [place]. . Has 2 kids: son who is 35 yr s old. And daughter who is 30 yrs old. Her  is working as a  .        Drug and lactation database from the [place] [employer]:  http://toxnet.nlm.nih.gov/cgi-bin/sis/htmlgen?LACT      B/P: Data Unavailable, T: Data Unavailable, P: Data Unavailable, R: Data Unavailable 0 lbs 0 oz  Last menstrual period 06/05/2006, not currently breastfeeding., There is no height or weight on file to calculate BMI.  Medications and Vitals not listed above were documented in the cart and reviewed by me.     Current Outpatient Medications   Medication Sig Dispense Refill     acetaminophen (TYLENOL) 500 MG tablet Take 1-2 tablets (500-1,000 mg) by mouth every 6 hours as needed for mild pain       carbidopa-levodopa (SINEMET)  MG tablet 2 tabs by mouth @ 7am, noon, and 9pm 540 tablet 3     citalopram (CELEXA) 20 MG tablet 1.5 x 20mg tab by mouth @12noon 135 tablet 3     melatonin 5 MG tablet 2 x 5mg tab by mouth @ 9pm       polyethylene glycol (MIRALAX) powder 1 capful in 8 oz water and sips throughout the day 510 g 1     Probiotic Product (PROBIOTIC DAILY) CAPS Young living probiotic by mouth daily @ 7am       QUEtiapine (SEROQUEL) 25 MG tablet Take 3 tablets (75 mg) by mouth At Bedtime (9 pm) 270 tablet 3     rivastigmine (EXELON) 13.3 MG/24HR 24 hr patch Place 1 patch onto the skin daily 30 patch 11     senna (SENOKOT) 8.6 MG tablet 1 tab @ 9pm 120 tablet      ZOLMitriptan (ZOMIG) 5 MG tablet Take 1 tablet (5 mg) by mouth at onset of headache for migraine MAY REPEAT ONCE AFTER 2 HOURS. DO NOT EXCEED 2 TABLETS IN 24 HOURS. 6 tablet 10         Jet Porter MD

## 2019-08-29 ENCOUNTER — OFFICE VISIT (OUTPATIENT)
Dept: OBGYN | Facility: OTHER | Age: 64
End: 2019-08-29
Payer: COMMERCIAL

## 2019-08-29 VITALS
WEIGHT: 127.25 LBS | HEART RATE: 80 BPM | DIASTOLIC BLOOD PRESSURE: 80 MMHG | BODY MASS INDEX: 23.65 KG/M2 | SYSTOLIC BLOOD PRESSURE: 120 MMHG

## 2019-08-29 DIAGNOSIS — N36.2 URETHRAL CARUNCLE: ICD-10-CM

## 2019-08-29 DIAGNOSIS — R30.0 DYSURIA: Primary | ICD-10-CM

## 2019-08-29 LAB
ALBUMIN UR-MCNC: NEGATIVE MG/DL
APPEARANCE UR: CLEAR
BILIRUB UR QL STRIP: NEGATIVE
COLOR UR AUTO: YELLOW
GLUCOSE UR STRIP-MCNC: NEGATIVE MG/DL
HGB UR QL STRIP: NEGATIVE
KETONES UR STRIP-MCNC: ABNORMAL MG/DL
LEUKOCYTE ESTERASE UR QL STRIP: NEGATIVE
NITRATE UR QL: NEGATIVE
PH UR STRIP: 5.5 PH (ref 5–7)
SOURCE: ABNORMAL
SP GR UR STRIP: 1.02 (ref 1–1.03)
UROBILINOGEN UR STRIP-ACNC: 0.2 EU/DL (ref 0.2–1)

## 2019-08-29 PROCEDURE — 99203 OFFICE O/P NEW LOW 30 MIN: CPT | Performed by: OBSTETRICS & GYNECOLOGY

## 2019-08-29 PROCEDURE — 81003 URINALYSIS AUTO W/O SCOPE: CPT | Performed by: OBSTETRICS & GYNECOLOGY

## 2019-08-29 RX ORDER — ESTRADIOL 0.1 MG/G
2 CREAM VAGINAL
Qty: 42.5 G | Refills: 1 | Status: SHIPPED | OUTPATIENT
Start: 2019-08-29 | End: 2020-01-27

## 2019-08-29 NOTE — PROGRESS NOTES
"OB/GYN New       NAME:  Nathaly Farias  PCP:  Maxine Monae  MRN:  [id number]      Impression / Plan     64 year old  with:      ICD-10-CM    1. Dysuria R30.0 *UA reflex to Microscopic and Culture ([facility] (except [place] and [place])   2. Urethral caruncle N36.2 estradiol (ESTRACE) 0.1 MG/GM vaginal cream     UROLOGY ADULT REFERRAL       Discussed exam findings.  Grade 2 cystocele and urethral caruncle.  The exam findings do not fully explain her symptoms and poor urinary stream in my opinion.  I recommend she start estrace cream to the urethra and have an exam with urology.  If no abnormalities noted, she can consider pessary placement.  I think she would benefit from pelvic floor physical therpy, but she is not able to do that.   Patient seen with her sister and plan discussed over the phone with her daughter.    Chief Complaint     Chief Complaint   Patient presents with     Consult     Pessary       HPI     Nathaly Farias is a  64 year old female who is seen for prolapse.  Patient saw Dr. Monae in April and grade 2 cystocele noted.  Pelvic floor physical therapy recommended, but patient is not able to do this.      See phone notes as well.  Cath UA done per request.     Patient has poor stream and urinary frequency, urgency.  She only \"tinkles\" when she goes to the bathroom.   She is uncomfortable and restless sitting. She feels like she is sitting on something.     History complicated by Parkinsons dementia.      Patient's last menstrual period was 2006.         Problem List     Patient Active Problem List    Diagnosis Date Noted     Hallucinations 2019     Priority: Medium     Hiatal hernia 2017     Priority: Medium     Dementia 2017     Priority: Medium     IMPRESSIONS AND RECOMMENDATIONS     Current results indicate marked attentional difficulties, impairments in memory, which in some cases reflected impairments in retention and in other cases " reflected retrieval difficulties, and mild anomia as well as mild executive dysfunction. Premorbid intellectual functioning is estimated to fall in the borderline range. She denied experiencing significant depressive symptomatology.     This pattern of performance is suggestive of mild dementia, and there is a suggestion of mesial temporal lobe as well as frontal and subcortical system involvement. She has had increasing difficulty managing her instrumental activities of daily living. Taken together with her history, a neurodegenerative etiology seems likely. Although Parkinson's disease with dementia is a diagnostic consideration, the possibility of an additional etiology, including Alzheimer's disease, cannot entirely be ruled out. In view of the marked attentional difficulties, nonrestorative sleep and the effects of medications may also contribute. Although she has a history of depression, she is not reporting significant depressive symptomatology currently, but does appear to be experiencing mild anxiety.     In terms of daily functioning, Ms. Farias may require increasing assistance managing her instrumental activities of daily living. In particular, she may require additional assistance managing her medications. She continues to drive, although she has limited her driving to short distances and places with which she is familiar. Given her cognitive difficulties, it may be prudent for her to consider a formal 's assessment, such as that offered at [facility]. She will likely benefit from structure and routine. If she has difficulty managing large, complex tasks, others may assist by breaking down such tasks into smaller, more manageable parts. She has marked attentional difficulties, and may find it helpful when working on a task to work in an environment that is relatively free from distractions, such as noises or other interruptions. She may find it helpful to take frequent, brief breaks when working  on a project. She may benefit from the use of written reminders or checklists. It may be helpful to follow her over time. Repeated neuropsychological evaluation in one year may help to determine whether her cognitive difficulties are progressive. These results have not been discussed with Ms. Farias or her family, although they were encouraged to contact my office to schedule an appointment for feedback should they so desire.          Cognitive disorder evalaution 2017 03/06/2017     Priority: Medium     She has marked attentional difficulties, impairments in memory including a rapid forgetting rate, mild anomia, and mild executive dysfunction. 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 She's required increasing assistance with finances, medications, driving, and cooking. I think this is a mild dementia, and there's a suggestion of mesial temporal lobe involvement, so Alzheimer's disease can't be ruled out, although there are also some features consistent with frontal system involvement. She's not reporting significant problems with mood       Post-traumatic osteoarthritis of left knee 10/21/2016     Priority: Medium     Pain from implanted hardware, initial encounter 10/21/2016     Priority: Medium     Seborrheic dermatitis 02/29/2016     Priority: Medium     Rosacea 03/17/2015     Priority: Medium     Asymptomatic varicose veins 03/17/2015     Priority: Medium     Wears glasses 02/23/2015     Priority: Medium     Constipation 02/23/2015     Priority: Medium     Incomplete RBBB 02/23/2015     Priority: Medium     Heart murmur 02/23/2015     Priority: Medium     Family history of tremor 02/23/2015     Priority: Medium     Mother has an head tremor        History of MRI of brain and brain stem 02/21/2015     Priority: Medium     MRI OF THE BRAIN WITHOUT CONTRAST 7/10/2014 1:21 PM   COMPARISON: None.  HISTORY: Left-sided upper and  lower extremity tremor. Balance issues.  TECHNIQUE:   Brain: Axial diffusion-weighted with ADC map, T2-weighted with fat  saturation, T1-weighted and turboFLAIR and coronal T1-weighted images  of the brain were obtained without intravenous contrast.   FINDINGS: There is mild diffuse cerebral volume loss. There are  multiple tiny scattered focal areas of abnormal T2 signal  hyperintensity in the cerebral white matter bilaterally that are  consistent with sequela of chronic small vessel ischemic disease.   The ventricles and basal cisterns are within normal limits in  configuration given the degree of cerebral volume loss. There is no  midline shift. There are no extra-axial fluid collections. There is  no evidence for stroke or acute intracranial hemorrhage.   There is no sinusitis or mastoiditis.   IMPRESSION  IMPRESSION: Diffuse cerebral volume loss and cerebral white matter  changes consistent with chronic small vessel ischemic disease. No  evidence for acute intracranial pathology.  MD Dara HEARN dahlias () 07/30/2014     Priority: Medium     7/30/2014 evaluation by Dr. Jimenez    SUMMARY: She first noted this tremor in 2009. It affected her left hand. It occurred mainly at work. Later she noted the tremor affected her left leg. This is mainly a tremor at rest, and it will stop if she moves the limb. She can also have it to some extent with a sustained posture.   She reports her balance has worsened over the past couple of years, and her daughter points out that she tends to shuffle. She reports no change in her voice. She reports no change in her taste or smell. She describes her handwriting as slow and less smooth, but not necessarily small.   Aside from citalopram, she has not been on any psychiatric medications or antiemetic drugs.     Her evaluation for the tremor included a normal CBC. Comprehensive metabolic profile was notable only for sodium of 146. TSH was normal.   She did have a  brain MRI scan that I reviewed. There are some scattered T2 signal changes in the white matter which are nonspecific. Note is made by the radiologist of diffuse cerebral volume loss, but I am not impressed by a great deal of atrophy.       HTN, goal below 140/90 07/07/2014     Priority: Medium     Advanced directives, counseling/discussion 04/23/2012     Priority: Medium     Discussed advance care planning with patient; information given to patient to review. 4/23/2012          Mild major depression (H) 10/04/2011     Priority: Medium     HYPERLIPIDEMIA LDL GOAL <160 10/31/2010     Priority: Medium     Neck pain 09/17/2009     Priority: Medium     Headache 09/17/2009     Priority: Medium     Problem list name updated by automated process. Provider to review       Overactive bladder 03/05/2009     Priority: Medium     Chronic cholecystitis 04/17/2008     Priority: Medium     Essential hypertension, benign 11/26/2007     Priority: Medium     Sleep apnea      Priority: Medium     moderate, sleep study 11/07, on CPAP  Problem list name updated by automated process. 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       Obesity 06/16/2005     Priority: Medium     Problem list name updated by automated process. Provider to review       Malaise and fatigue 06/16/2005     Priority: Medium     Problem list name updated by automated process. Provider to review       Migraine variant 02/16/2004     Priority: Medium     Problem list name updated by automated process. Provider to review       Symptomatic menopausal or female climacteric states 02/16/2004     Priority: Medium     hot flashes, dec.  libido, irritability       Prolapse of vaginal wall 08/13/2002     Priority: Medium     Problem list name updated by automated process. Provider to review and confirm  Do you wish to do the replacement in the background? yes           Medications     Current Outpatient Medications   Medication     acetaminophen (TYLENOL) 500 MG tablet     carbidopa-levodopa (SINEMET)  MG tablet     citalopram (CELEXA) 20 MG tablet     estradiol (ESTRACE) 0.1 MG/GM vaginal cream     melatonin 5 MG tablet     polyethylene glycol (MIRALAX) powder     Probiotic Product (PROBIOTIC DAILY) CAPS     QUEtiapine (SEROQUEL) 25 MG tablet     rivastigmine (EXELON) 13.3 MG/24HR 24 hr patch     senna (SENOKOT) 8.6 MG tablet     ZOLMitriptan (ZOMIG) 5 MG tablet     No current facility-administered medications for this visit.         Allergies     Allergies   Allergen Reactions     Sulfa Drugs      Tacchycardia, had to go to ED     Naproxen GI Disturbance     Oxycontin [Oxycodone] GI Disturbance     Made her feel funny/upset stomach     Ropinirole      Did not work       Past Medical/Surgical History     Past Medical History:   Diagnosis Date     Arthralgia of temporomandibular joint 10/22/1996     Arthritis      Cognitive disorder evalaution 2017 3/6/2017    She has marked attentional difficulties, impairments in memory including a rapid forgetting rate, mild anomia, and mild executive dysfunction. 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 She's required increasing assistance with finances, medications, driving, and cooking. I think this is a mild dementia, and     Constipation 2/23/2015     Dementia 3/13/2017    IMPRESSIONS AND RECOMMENDATIONS   Current results indicate marked attentional difficulties, impairments in memory, which in some cases reflected impairments in retention and in other cases reflected retrieval difficulties, and mild anomia as  well as mild executive dysfunction. Premorbid intellectual functioning is estimated to fall in the borderline range. She denied experiencing significant depre     Depressive disorder      Heart murmur 2/23/2015     History of MRI of brain and brain stem 2/21/2015    MRI OF THE BRAIN WITHOUT CONTRAST 7/10/2014 1:21 PM  COMPARISON: None. HISTORY: Left-sided upper and lower extremity tremor. Balance issues. TECHNIQUE:  Brain: Axial diffusion-weighted with ADC map, T2-weighted with fat saturation, T1-weighted and turboFLAIR and coronal T1-weighted images of the brain were obtained without intravenous contrast.  FINDINGS: There is mild diffuse cerebral volume loss     Hypertension      Incomplete RBBB 2/23/2015     Motion sickness      Other and unspecified hyperlipidemia      Parkinson's disease (H)      Seborrheic dermatitis 2/29/2016     Unspecified arthropathy, hand 08/07/1997     Unspecified sleep apnea     moderate, sleep study 11/07, on CPAP, has daytime somnolence with this     Variants of migraine, not elsewhere classified, without mention of intractable migraine without mention of status migrainosus      Wears glasses 2/23/2015       Past Surgical History:   Procedure Laterality Date     ------------OTHER-------------      left shoulder     ARTHRODESIS TOE(S)  3/2/2011    ARTHRODESIS TOE(S) performed by CLARA LUCAS at  OR     C LIGATE FALLOPIAN TUBE  1998     CHOLECYSTECTOMY, LAPOROSCOPIC  5/12/2008    Cholecystectomy, Laparoscopic     ESOPHAGOSCOPY, GASTROSCOPY, DUODENOSCOPY (EGD), COMBINED N/A 7/5/2017    Procedure: COMBINED ESOPHAGOSCOPY, GASTROSCOPY, DUODENOSCOPY (EGD), BIOPSY SINGLE OR MULTIPLE;  Esophagogastroduodenoscopy with Multiple Biopsies by Biopsy;  Surgeon: Tj Denney MD;  Location: [facility]     HC COLONOSCOPY W/WO BRUSH/WASH  08/30/06    normal     HC OPEN TX TIBIAL SHAFT FX W PLATE/SCREWS W/WO CERCLAGE  2000    Multiple operations on left leg     HC SHLDR ARTHROSCOP,PART  "ACROMIOPLAS  11/16/06    Right shoulder     HC SHLDR ARTHROSCOP,SURG,DIS CLAVICULECTOMY  11/16/06    Right shoulder        Social History     Social History     Socioeconomic History     Marital status:      Spouse name: violet     Number of children: 2     Years of education: Not on file     Highest education level: Not on file   Occupational History     Occupation:    Social Needs     Financial resource strain: Not on file     Food insecurity:     Worry: Not on file     Inability: Not on file     Transportation needs:     Medical: Not on file     Non-medical: Not on file   Tobacco Use     Smoking status: Never Smoker     Smokeless tobacco: Never Used     Tobacco comment: no smokers in the household   Substance and Sexual Activity     Alcohol use: Yes     Alcohol/week: 0.0 oz     Comment: very occ     Drug use: No     Sexual activity: Yes     Partners: Male     Birth control/protection: Surgical, Female Surgical     Comment: tubal ligation   Lifestyle     Physical activity:     Days per week: Not on file     Minutes per session: Not on file     Stress: Not on file   Relationships     Social connections:     Talks on phone: Not on file     Gets together: Not on file     Attends Che[employer] service: Not on file     Active member of club or organization: Not on file     Attends meetings of clubs or organizations: Not on file     Relationship status: Not on file     Intimate partner violence:     Fear of current or ex partner: Not on file     Emotionally abused: Not on file     Physically abused: Not on file     Forced sexual activity: Not on file   Other Topics Concern      Service Not Asked     Blood Transfusions Not Asked     Caffeine Concern Yes     Comment: OCC     Occupational Exposure Not Asked     Hobby Hazards Not Asked     Sleep Concern Not Asked     Stress Concern Not Asked     Weight Concern Not Asked     Special Diet Not Asked     Back Care Not Asked     Exercise No     Comment: \"not " "faithfully\"     Bike Helmet Not Asked     Seat Belt Yes     Self-Exams No     Parent/sibling w/ CABG, MI or angioplasty before 65F 55M? Not Asked   Social History Narrative    ALLERGIES:  She is allergic to sulfa, and has had gastrointestinal side effects from naproxen.               FAMILY HISTORY:  Strongly positive for headaches, including in her son and sister.  There is no family history of tremor.               SOCIAL HISTORY:  She does not work outside the home.  She does not smoke.  She uses very little alcohol.  Lives in [place]. . Has 2 kids: son who is 35 yr s old. And daughter who is 30 yrs old. Her  is working as a  .        Family History      Family History   Problem Relation Age of Onset     Diabetes Father         type II, diagnosed in late 60's     Hypertension Father      Cancer Father         rare kidney cancer had kidney removed, 75 y.o. at onset     Cerebrovascular Disease Father      Rashes/Skin Problems Father         rosacea     Neurologic Disorder Mother         head tremor     Heart Disease Brother         Valvular disease corrected with surgery     Heart Disease Paternal Grandfather         MI     Migraines Son      Migraines Sister      Rashes/Skin Problems Sister         rosacea       ROS     A 6 organ review of systems was asked and the pertinent positives and negatives are listed in the HPI. All other organ systems can be considered negative.     Physical Exam   Vitals: /80 (BP Location: Left arm, Patient Position: Chair, Cuff Size: Adult Regular)   Pulse 80   Wt 57.7 kg (127 lb 4 oz)   LMP 06/05/2006   BMI 23.65 kg/m      General: Comfortable, no obvious distress  :   Cath UA done using sterile technique.  Normal female external genitalia.  No lesions.  Urethral meatus with fullness and what appears to be a caruncle.  Speculum exam reveals a normal vaginal vault, normal cervix.  No abnormal discharge.  Bimanual exam reveals a normal, mobile, " nontender uterus.  No cervical motion tenderness.  Adnexa nontender with no palpable masses. Split speculum exam reveals anterior wall descent with valsalva about 2 cm proximal to hymen (mild).  Mild posterior laxity.  Uterus is fairly well supported.                35 minutes was spent with patient, more than 50% counseling and coordinating care    Nursing notes read and reviewed    Radha Abdullahi MD

## 2019-08-29 NOTE — NURSING NOTE
"Chief Complaint   Patient presents with     Consult     Pessary       Initial /80 (BP Location: Left arm, Patient Position: Chair, Cuff Size: Adult Regular)   Pulse 80   Wt 57.7 kg (127 lb 4 oz)   LMP 2006   BMI 23.65 kg/m   Estimated body mass index is 23.65 kg/m  as calculated from the following:    Height as of 19: 1.562 m (5' 1.5\").    Weight as of this encounter: 57.7 kg (127 lb 4 oz).  BP completed using cuff size: regular        The following  Due: mammogram      The following patient reported/Care Every where data was sent to:  P ABSTRACT QUALITY INITIATIVES [[id number]]       orders have been placed    Maxine Trevino [facility]  2019           "

## 2019-08-29 NOTE — PATIENT INSTRUCTIONS
Schedule an appointment with Dr. Esposito to assess your bladder and urinary flow.    Please use estrace vaginal cream, pea sized amount, to urethra area nightly for two weeks, then as prescribed.

## 2019-09-04 ENCOUNTER — OFFICE VISIT (OUTPATIENT)
Dept: UROLOGY | Facility: OTHER | Age: 64
End: 2019-09-04
Payer: COMMERCIAL

## 2019-09-04 VITALS
DIASTOLIC BLOOD PRESSURE: 89 MMHG | SYSTOLIC BLOOD PRESSURE: 139 MMHG | TEMPERATURE: 98.4 F | RESPIRATION RATE: 16 BRPM | OXYGEN SATURATION: 96 % | HEART RATE: 67 BPM

## 2019-09-04 DIAGNOSIS — N81.10 PROLAPSE OF VAGINAL WALL: ICD-10-CM

## 2019-09-04 DIAGNOSIS — R35.0 URINARY FREQUENCY: Primary | ICD-10-CM

## 2019-09-04 PROCEDURE — 99214 OFFICE O/P EST MOD 30 MIN: CPT | Mod: 25 | Performed by: UROLOGY

## 2019-09-04 PROCEDURE — 51798 US URINE CAPACITY MEASURE: CPT | Performed by: UROLOGY

## 2019-09-04 RX ORDER — TROSPIUM CHLORIDE 20 MG/1
20 TABLET, FILM COATED ORAL
Qty: 60 TABLET | Refills: 1 | Status: SHIPPED | OUTPATIENT
Start: 2019-09-04 | End: 2019-10-02

## 2019-09-04 ASSESSMENT — PAIN SCALES - GENERAL: PAINLEVEL: NO PAIN (0)

## 2019-09-04 NOTE — PROGRESS NOTES
Chief Complaint   Patient presents with     Referral     urethral caruncle/uterine prolapse       Nathaly Farias is a 64 year old female who presents today for follow up of   Chief Complaint   Patient presents with     Referral     urethral caruncle/uterine prolapse    Patient is a 64-year-old  female who is request be seen by Dr. Radha Abdullahi for a consultation with regard to patient's urinary problems.  Patient has a long history of Parkinson disease.  I last saw her 2 years ago for similar problem.  At that time she was placed on trospium with good results.  She is not on any longer for some reasons.  She does have grade 2 cystocele on previous exam.  She notices some bulging when she is sitting down.  Recent exam by Dr. Abdullahi did not show any significant prolapse.  She has no dysuria or hematuria.    Current Outpatient Medications   Medication Sig Dispense Refill     acetaminophen (TYLENOL) 500 MG tablet Take 1-2 tablets (500-1,000 mg) by mouth every 6 hours as needed for mild pain       carbidopa-levodopa (SINEMET)  MG tablet 2 tabs by mouth @ 7am, noon, and 9pm 540 tablet 3     citalopram (CELEXA) 20 MG tablet 1.5 x 20mg tab by mouth @12noon 135 tablet 3     estradiol (ESTRACE) 0.1 MG/GM vaginal cream Place 2 g vaginally twice a week 42.5 g 1     melatonin 5 MG tablet 2 x 5mg tab by mouth @ 9pm       polyethylene glycol (MIRALAX) powder 1 capful in 8 oz water and sips throughout the day 510 g 1     Probiotic Product (PROBIOTIC DAILY) CAPS Young living probiotic by mouth daily @ 7am       QUEtiapine (SEROQUEL) 25 MG tablet Take 3 tablets (75 mg) by mouth At Bedtime (9 pm) 270 tablet 3     rivastigmine (EXELON) 13.3 MG/24HR 24 hr patch Place 1 patch onto the skin daily 30 patch 11     senna (SENOKOT) 8.6 MG tablet 1 tab @ 9pm 120 tablet      trospium (SANCTURA) 20 MG tablet Take 1 tablet (20 mg) by mouth 2 times daily (before meals) 60 tablet 1     ZOLMitriptan (ZOMIG) 5 MG tablet Take 1 tablet  (5 mg) by mouth at onset of headache for migraine MAY REPEAT ONCE AFTER 2 HOURS. DO NOT EXCEED 2 TABLETS IN 24 HOURS. 6 tablet 10     Allergies   Allergen Reactions     Sulfa Drugs      Tacchycardia, had to go to ED     Naproxen GI Disturbance     Oxycontin [Oxycodone] GI Disturbance     Made her feel funny/upset stomach     Ropinirole      Did not work      Past Medical History:   Diagnosis Date     Arthralgia of temporomandibular joint 10/22/1996     Arthritis      Cognitive disorder evalaution 2017 3/6/2017    She has marked attentional difficulties, impairments in memory including a rapid forgetting rate, mild anomia, and mild executive dysfunction. 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 She's required increasing assistance with finances, medications, driving, and cooking. I think this is a mild dementia, and     Constipation 2/23/2015     Dementia 3/13/2017    IMPRESSIONS AND RECOMMENDATIONS   Current results indicate marked attentional difficulties, impairments in memory, which in some cases reflected impairments in retention and in other cases reflected retrieval difficulties, and mild anomia as well as mild executive dysfunction. Premorbid intellectual functioning is estimated to fall in the borderline range. She denied experiencing significant depre     Depressive disorder      Heart murmur 2/23/2015     History of MRI of brain and brain stem 2/21/2015    MRI OF THE BRAIN WITHOUT CONTRAST 7/10/2014 1:21 PM  COMPARISON: None. HISTORY: Left-sided upper and lower extremity tremor. Balance issues. TECHNIQUE:  Brain: Axial diffusion-weighted with ADC map, T2-weighted with fat saturation, T1-weighted and turboFLAIR and coronal T1-weighted images of the brain were obtained without intravenous contrast.  FINDINGS: There is mild diffuse cerebral volume loss     Hypertension      Incomplete RBBB 2/23/2015     Motion  sickness      Other and unspecified hyperlipidemia      Parkinson's disease (H)      Seborrheic dermatitis 2/29/2016     Unspecified arthropathy, hand 08/07/1997     Unspecified sleep apnea     moderate, sleep study 11/07, on CPAP, has daytime somnolence with this     Variants of migraine, not elsewhere classified, without mention of intractable migraine without mention of status migrainosus      Wears glasses 2/23/2015     Past Surgical History:   Procedure Laterality Date     ------------OTHER-------------      left shoulder     ARTHRODESIS TOE(S)  3/2/2011    ARTHRODESIS TOE(S) performed by CLARA LUCAS at  OR     C LIGATE FALLOPIAN TUBE  1998     CHOLECYSTECTOMY, LAPOROSCOPIC  5/12/2008    Cholecystectomy, Laparoscopic     ESOPHAGOSCOPY, GASTROSCOPY, DUODENOSCOPY (EGD), COMBINED N/A 7/5/2017    Procedure: COMBINED ESOPHAGOSCOPY, GASTROSCOPY, DUODENOSCOPY (EGD), BIOPSY SINGLE OR MULTIPLE;  Esophagogastroduodenoscopy with Multiple Biopsies by Biopsy;  Surgeon: Tj Denney MD;  Location: [facility]     HC COLONOSCOPY W/WO BRUSH/WASH  08/30/06    normal     HC OPEN TX TIBIAL SHAFT FX W PLATE/SCREWS W/WO CERCLAGE  2000    Multiple operations on left leg     HC SHLDR ARTHROSCOP,PART ACROMIOPLAS  11/16/06    Right shoulder     HC SHLDR ARTHROSCOP,SURG,DIS CLAVICULECTOMY  11/16/06    Right shoulder     Family History   Problem Relation Age of Onset     Diabetes Father         type II, diagnosed in late 60's     Hypertension Father      Cancer Father         rare kidney cancer had kidney removed, 75 y.o. at onset     Cerebrovascular Disease Father      Rashes/Skin Problems Father         rosacea     Neurologic Disorder Mother         head tremor     Heart Disease Brother         Valvular disease corrected with surgery     Heart Disease Paternal Grandfather         MI     Migraines Son      Migraines Sister      Rashes/Skin Problems Sister         rosacea     Social History     Socioeconomic History     Marital  "status:      Spouse name: violet     Number of children: 2     Years of education: None     Highest education level: None   Occupational History     Occupation:    Social Needs     Financial resource strain: None     Food insecurity:     Worry: None     Inability: None     Transportation needs:     Medical: None     Non-medical: None   Tobacco Use     Smoking status: Never Smoker     Smokeless tobacco: Never Used     Tobacco comment: no smokers in the household   Substance and Sexual Activity     Alcohol use: Yes     Alcohol/week: 0.0 oz     Comment: very occ     Drug use: No     Sexual activity: Yes     Partners: Male     Birth control/protection: Surgical, Female Surgical     Comment: tubal ligation   Lifestyle     Physical activity:     Days per week: None     Minutes per session: None     Stress: None   Relationships     Social connections:     Talks on phone: None     Gets together: None     Attends Gnosticist service: None     Active member of club or organization: None     Attends meetings of clubs or organizations: None     Relationship status: None     Intimate partner violence:     Fear of current or ex partner: None     Emotionally abused: None     Physically abused: None     Forced sexual activity: None   Other Topics Concern      Service Not Asked     Blood Transfusions Not Asked     Caffeine Concern Yes     Comment: OCC     Occupational Exposure Not Asked     Hobby Hazards Not Asked     Sleep Concern Not Asked     Stress Concern Not Asked     Weight Concern Not Asked     Special Diet Not Asked     Back Care Not Asked     Exercise No     Comment: \"not faithfully\"     Bike Helmet Not Asked     Seat Belt Yes     Self-Exams No     Parent/sibling w/ CABG, MI or angioplasty before 65F 55M? Not Asked   Social History Narrative    ALLERGIES:  She is allergic to sulfa, and has had gastrointestinal side effects from naproxen.               FAMILY HISTORY:  Strongly positive for headaches, " including in her son and sister.  There is no family history of tremor.               SOCIAL HISTORY:  She does not work outside the home.  She does not smoke.  She uses very little alcohol.  Lives in [place]. . Has 2 kids: son who is 35 yr s old. And daughter who is 30 yrs old. Her  is working as a  .        REVIEW OF SYSTEMS  =================  C: NEGATIVE for fever, chills, change in weight  I: NEGATIVE for worrisome rashes, moles or lesions  E/M: NEGATIVE for ear, mouth and throat problems  R: NEGATIVE for significant cough or SHORTNESS OF BREATH,   CV: NEGATIVE for chest pain, palpitations or peripheral edema  GI: NEGATIVE for nausea, abdominal pain, heartburn, or change in bowel habits  NEURO: NEGATIVE any motor/sensory changes  PSYCH: NEGATIVE for recent mood disorder    Physical Exam:  /89 (BP Location: Left arm, Patient Position: Sitting, Cuff Size: Adult Regular)   Pulse 67   Temp 98.4  F (36.9  C) (Oral)   Resp 16   LMP 06/05/2006   SpO2 96%    Patient is pleasant, in no acute distress, good general condition.  Lung: no evidence of respiratory distress    Abdomen: Soft, nondistended, non tender. No masses. No rebound or guarding.   Exam: Atrophic vaginitis.  No significant prolapse identified.  Normal urethra.  Minimal residual urine.  Skin: Warm and dry.  No redness.  Psych: normal mood and affect  Neuro: alert and oriented  Musculaskeletal: moving all extremities    Assessment/Plan:   (R35.0) Urinary frequency  (primary encounter diagnosis)  Comment:    Plan: I will start her back on trospium 20 mg p.o. twice daily.  The patient come back to see me in 1 month now for reexamination.    (N81.10) Prolapse of vaginal wall  Comment:    Plan: As needed.

## 2019-09-04 NOTE — PROGRESS NOTES
Bladder Scan performed. 52 maximum residual urine detected after 3 scans. MD informed. Kelin Riggs RN

## 2019-09-16 ENCOUNTER — OFFICE VISIT (OUTPATIENT)
Dept: NEUROLOGY | Facility: CLINIC | Age: 64
End: 2019-09-16
Payer: COMMERCIAL

## 2019-09-16 ENCOUNTER — PATIENT OUTREACH (OUTPATIENT)
Dept: CARE COORDINATION | Facility: CLINIC | Age: 64
End: 2019-09-16

## 2019-09-16 VITALS
WEIGHT: 124.8 LBS | SYSTOLIC BLOOD PRESSURE: 124 MMHG | HEART RATE: 88 BPM | DIASTOLIC BLOOD PRESSURE: 78 MMHG | BODY MASS INDEX: 23.2 KG/M2 | OXYGEN SATURATION: 97 %

## 2019-09-16 DIAGNOSIS — F33.0 MAJOR DEPRESSIVE DISORDER, RECURRENT EPISODE, MILD (H): ICD-10-CM

## 2019-09-16 DIAGNOSIS — R44.3 HALLUCINATIONS: ICD-10-CM

## 2019-09-16 DIAGNOSIS — G20.A1 PARKINSON'S DISEASE (H): Primary | ICD-10-CM

## 2019-09-16 DIAGNOSIS — F02.80 DEMENTIA DUE TO PARKINSON'S DISEASE WITHOUT BEHAVIORAL DISTURBANCE (H): ICD-10-CM

## 2019-09-16 DIAGNOSIS — G20.A1 DEMENTIA DUE TO PARKINSON'S DISEASE WITHOUT BEHAVIORAL DISTURBANCE (H): ICD-10-CM

## 2019-09-16 DIAGNOSIS — G20.A1 PARALYSIS AGITANS (H): ICD-10-CM

## 2019-09-16 PROCEDURE — 99214 OFFICE O/P EST MOD 30 MIN: CPT | Performed by: PSYCHIATRY & NEUROLOGY

## 2019-09-16 RX ORDER — RIVASTIGMINE 13.3 MG/24H
1 PATCH, EXTENDED RELEASE TRANSDERMAL DAILY
Qty: 90 PATCH | Refills: 11 | Status: SHIPPED | OUTPATIENT
Start: 2019-09-16 | End: 2019-12-16

## 2019-09-16 RX ORDER — CARBIDOPA AND LEVODOPA 25; 100 MG/1; MG/1
TABLET ORAL
Qty: 540 TABLET | Refills: 3 | Status: SHIPPED | OUTPATIENT
Start: 2019-09-16 | End: 2019-12-16

## 2019-09-16 RX ORDER — NAPROXEN SODIUM 220 MG
220 TABLET ORAL 2 TIMES DAILY WITH MEALS
COMMUNITY
End: 2019-09-16

## 2019-09-16 RX ORDER — QUETIAPINE FUMARATE 25 MG/1
75 TABLET, FILM COATED ORAL AT BEDTIME
Qty: 270 TABLET | Refills: 3 | Status: SHIPPED | OUTPATIENT
Start: 2019-09-16 | End: 2019-11-19

## 2019-09-16 RX ORDER — AMOXICILLIN 250 MG
2 CAPSULE ORAL AT BEDTIME
COMMUNITY

## 2019-09-16 RX ORDER — CITALOPRAM HYDROBROMIDE 20 MG/1
TABLET ORAL
Qty: 135 TABLET | Refills: 3 | Status: SHIPPED | OUTPATIENT
Start: 2019-09-16 | End: 2019-12-16

## 2019-09-16 RX ORDER — COVID-19 ANTIGEN TEST
220 KIT MISCELLANEOUS PRN
Status: ON HOLD | COMMUNITY
End: 2019-12-23

## 2019-09-16 ASSESSMENT — PAIN SCALES - GENERAL: PAINLEVEL: NO PAIN (0)

## 2019-09-16 NOTE — NURSING NOTE
Nathaly Farias's goals for this visit include: retrurn  She requests these members of her care team be copied on today's visit information:     PCP: Maxine Monae    Referring Provider:  No referring provider defined for this encounter.    /78   Pulse 88   Wt 56.6 kg (124 lb 12.8 oz)   LMP 06/05/2006   SpO2 97%   BMI 23.20 kg/m      Do you need any medication refills at today's visit? ?

## 2019-09-16 NOTE — PATIENT INSTRUCTIONS
MOISE: September 16, 2019    124 lbs  Down from 135 #  She is eating.   Will have to keep monitoring her bowel function     She went back on the sanctura  She thought her bladder was following out and was looking into exercises and did not qualify for a pessary and the urologist prescribed sanctura again which has helped her urinary problems.     Discussed the patch and urination  Discussed the sanctura and cognition           Medications     7am 12noon 9pm         Acetaminophen 500mg tylenol As needed              Carbidopa/levodopa Sinemet 25/100 2 2 2         Citalopram celexa 20mg    1.5            Estradiol cream 2/week       Melatonin 5mg        2        Naproxen sodium 220mg As needed       Nonformulary cbd oil - young living oils   1 eye dropper     Polyethylene glycol miralax sipping throughout the day             Probiotic young living 1 (ran out)           Quetiapine seroquel 25mg     3       Rivastigmine exelon 13.3mg/24 hr patch 1       Senna-docusate senokot-S 8.6mg-50mg       2         Terbinafine lamisil 1% external cream  ?taking           Trospium sanctura 20mg 1    1         Zolmitriptan zomig 5mg                                           History obtained from patient    ECG today showed QTc was 382 and she has some nonspecific abnormalities with motion artifacts.     PLAN  For now she will remain on the 13.3mg patch dose of rivastigmine daily - discussed possibly reducing to 9.5mg patch per day to see if bladder is better    Weight loss needs to monitored and she needs more calories. Need to have weekly monitoring of her weight and if there is continued loss may want to see her pcp to determine the cause. Take ensure, etc.    Discussed the sanctura medication and its possible benefit on the bladder and effects on cognition.   Scheduled voiding.     Headaches are better and will continue to use the zomig as needed    Return back in 3 months.     IN summary - no changes so far with her regimen

## 2019-09-16 NOTE — LETTER
2019         RE: Nathaly Farias  [address] [facility] [address]        Dear Colleague,    Thank you for referring your patient, Nathaly Farias, to the [facility]. Please see a copy of my visit note below.    Diagnosis/Summary/Recommendations:    PATIENT: Nathaly Farias  64 year old female     : 1955    MOISE: 2019    124 lbs  Down from 135 #  She is eating.   Will have to keep monitoring her bowel function     She went back on the sanctura  She thought her bladder was following out and was looking into exercises and did not qualify for a pessary and the urologist prescribed sanctura again which has helped her urinary problems.     Discussed the patch and urination  Discussed the sanctura and cognition           Medications     7am 12noon 9pm         Acetaminophen 500mg tylenol As needed              Carbidopa/levodopa Sinemet 25/100 2 2 2         Citalopram celexa 20mg    1.5            Estradiol cream 2/week       Melatonin 5mg        2        Naproxen sodium 220mg As needed       Nonformulary cbd oil - young living oils   1 eye dropper     Polyethylene glycol miralax sipping throughout the day             Probiotic young living 1 (ran out)           Quetiapine seroquel 25mg     3       Rivastigmine exelon 13.3mg/24 hr patch 1       Senna-docusate senokot-S 8.6mg-50mg       2         Terbinafine lamisil 1% external cream  ?taking           Trospium sanctura 20mg 1    1         Zolmitriptan zomig 5mg                                           History obtained from patient    ECG today showed QTc was 382 and she has some nonspecific abnormalities with motion artifacts.     PLAN  For now she will remain on the 13.3mg patch dose of rivastigmine daily - discussed possibly reducing to 9.5mg patch per day to see if bladder is better    Weight loss needs to monitored and she needs more calories. Need to have weekly monitoring of her weight and if there is  continued loss may want to see her pcp to determine the cause. Take ensure, etc.    Discussed the sanctura medication and its possible benefit on the bladder and effects on cognition.   Scheduled voiding.     Headaches are better and will continue to use the zomig as needed    Return back in 3 months.     IN summary - no changes so far with her regimen      Coding statement:   Duration of  Services: patient care and care coordination was 25 minutes  Greater than 50% of this visit was spent in counseling and coordination of care.     Jet Porter MD     ______________________________________    Last visit date and details:      MOISE: Chantale 10, 2019     Headache  Parkinson     She has ongoing bladder issues - will begin with therapy and pessary if needed then surgery.     She could not afford the patch.      Having more cognitive problems.      Brain mri changes could be related to second hand smoke. She has hypertension int he past and is not on statin. She does not have diabetes.            Medications     7am 12noon 9pm         Acetaminophen 500mg tylenol As needed              Carbidopa/levodopa Sinemet 25/100 2 2 2         Citalopram celexa 20mg    1.5            Melatonin 5mg        1        Polyethylene glycol miralax sipping throughout the day             Probiotic young living 1           Quetiapine seroquel 25mg     2       Rivastigmine exelon 13.3mg patch 24 hrs        Senna senokot 8.6mg       1          Terbinafine lamisil 1% external cream  ?taking           Zolmitriptan zomig 5mg As needed                                                             History obtained from patient     1. Bladder issues may be related to mechanical changes, ie prolapse and neurological, ie neurogenic bladder. She has not been on mirabegron/myrbetriq 25mg or 50mg dose.      2. Cognitive changes. She has not tried the rivastigmine patch due to price and has not been on other medications.  She may or may not have been on donepezil  in 2017. She has had some hallucinations. Discussion about prices and [employer] discount information.  Think they should talk with pharmacy before trying something.      3. Neck pain =- she should be seen and managed for this as she is probably having cervicogenic headache with decreased rotation of head to the right and pain on palpation in the posterior neck with brisk reflexes. She may need imaging - ct scan, mri scan, plain films of her neck and then additional evaluation whether she would benefit from epidural steroids or local trigger point injections with steroids and/or lidocaine. She could see the pain clinic in [facility] or in the headache clinic.  She may want to talk with her pcp who is in [place] as to someone closer to home in [place] who can help her symptoms.  She may want to start with physical therapy but will probably need help for this issues as it is ongoing and probably related to arthritis in her neck.      4. Parkinson - no change in medication regimen.      5. She has not seen our pharmacist Corazon Dhaliwal. Phone number is [phone] is contact phone number              ______________________________________      Patient was asked about 14 Review of systems including changes in vision (dry eyes, double vision), hearing, heart, lungs, musculoskeletal, depression, anxiety, snoring, RBD, insomnia, urinary frequency, urinary urgency, constipation, swallowing problems, hematological, ID, allergies, skin problems: seborrhea, endocrinological: thyroid, diabetes, cholesterol; balance, weight changes, and other neurological problems and these were not significant at this time except for   Patient Active Problem List   Diagnosis     Prolapse of vaginal wall     Migraine variant     Symptomatic menopausal or female climacteric states     Obesity     Malaise and fatigue     Other and unspecified adverse effect of drug, medicinal and biological substance     Other and unspecified disc disorder of  cervical region     Sleep apnea     Essential hypertension, benign     Chronic cholecystitis     Overactive bladder     Neck pain     Headache     HYPERLIPIDEMIA LDL GOAL <160     Mild major depression (H)     Advanced directives, counseling/discussion     HTN, goal below 140/90     Paralysis agitans (H)     History of MRI of brain and brain stem     Wears glasses     Constipation     Incomplete RBBB     Heart murmur     Family history of tremor     Rosacea     Asymptomatic varicose veins     Seborrheic dermatitis     Post-traumatic osteoarthritis of left knee     Pain from implanted hardware, initial encounter     Cognitive disorder evalaution 2017     Dementia     Hiatal hernia     Hallucinations          Allergies   Allergen Reactions     Sulfa Drugs      Tacchycardia, had to go to ED     Naproxen GI Disturbance     Oxycontin [Oxycodone] GI Disturbance     Made her feel funny/upset stomach     Ropinirole      Did not work     Past Surgical History:   Procedure Laterality Date     ------------OTHER-------------      left shoulder     ARTHRODESIS TOE(S)  3/2/2011    ARTHRODESIS TOE(S) performed by CLARA LUCAS at  OR     C LIGATE FALLOPIAN TUBE  1998     CHOLECYSTECTOMY, LAPOROSCOPIC  5/12/2008    Cholecystectomy, Laparoscopic     ESOPHAGOSCOPY, GASTROSCOPY, DUODENOSCOPY (EGD), COMBINED N/A 7/5/2017    Procedure: COMBINED ESOPHAGOSCOPY, GASTROSCOPY, DUODENOSCOPY (EGD), BIOPSY SINGLE OR MULTIPLE;  Esophagogastroduodenoscopy with Multiple Biopsies by Biopsy;  Surgeon: Tj Denney MD;  Location: [facility]     HC COLONOSCOPY W/WO BRUSH/WASH  08/30/06    normal     HC OPEN TX TIBIAL SHAFT FX W PLATE/SCREWS W/WO CERCLAGE  2000    Multiple operations on left leg      SHLDR ARTHROSCOP,PART ACROMIOPLAS  11/16/06    Right shoulder      SHLDR ARTHROSCOP,SURG,DIS CLAVICULECTOMY  11/16/06    Right shoulder     Past Medical History:   Diagnosis Date     Arthralgia of temporomandibular joint 10/22/1996      Arthritis      Cognitive disorder evalaution 2017 3/6/2017    She has marked attentional difficulties, impairments in memory including a rapid forgetting rate, mild anomia, and mild executive dysfunction. 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 She's required increasing assistance with finances, medications, driving, and cooking. I think this is a mild dementia, and     Constipation 2/23/2015     Dementia 3/13/2017    IMPRESSIONS AND RECOMMENDATIONS   Current results indicate marked attentional difficulties, impairments in memory, which in some cases reflected impairments in retention and in other cases reflected retrieval difficulties, and mild anomia as well as mild executive dysfunction. Premorbid intellectual functioning is estimated to fall in the borderline range. She denied experiencing significant depre     Depressive disorder      Heart murmur 2/23/2015     History of MRI of brain and brain stem 2/21/2015    MRI OF THE BRAIN WITHOUT CONTRAST 7/10/2014 1:21 PM  COMPARISON: None. HISTORY: Left-sided upper and lower extremity tremor. Balance issues. TECHNIQUE:  Brain: Axial diffusion-weighted with ADC map, T2-weighted with fat saturation, T1-weighted and turboFLAIR and coronal T1-weighted images of the brain were obtained without intravenous contrast.  FINDINGS: There is mild diffuse cerebral volume loss     Hypertension      Incomplete RBBB 2/23/2015     Motion sickness      Other and unspecified hyperlipidemia      Parkinson's disease (H)      Seborrheic dermatitis 2/29/2016     Unspecified arthropathy, hand 08/07/1997     Unspecified sleep apnea     moderate, sleep study 11/07, on CPAP, has daytime somnolence with this     Variants of migraine, not elsewhere classified, without mention of intractable migraine without mention of status migrainosus      Wears glasses 2/23/2015     Social History     Socioeconomic  "History     Marital status:      Spouse name: violet     Number of children: 2     Years of education: Not on file     Highest education level: Not on file   Occupational History     Occupation:    Social Needs     Financial resource strain: Not on file     Food insecurity:     Worry: Not on file     Inability: Not on file     Transportation needs:     Medical: Not on file     Non-medical: Not on file   Tobacco Use     Smoking status: Never Smoker     Smokeless tobacco: Never Used     Tobacco comment: no smokers in the household   Substance and Sexual Activity     Alcohol use: Yes     Alcohol/week: 0.0 oz     Comment: very occ     Drug use: No     Sexual activity: Yes     Partners: Male     Birth control/protection: Surgical, Female Surgical     Comment: tubal ligation   Lifestyle     Physical activity:     Days per week: Not on file     Minutes per session: Not on file     Stress: Not on file   Relationships     Social connections:     Talks on phone: Not on file     Gets together: Not on file     Attends Mu-ism service: Not on file     Active member of club or organization: Not on file     Attends meetings of clubs or organizations: Not on file     Relationship status: Not on file     Intimate partner violence:     Fear of current or ex partner: Not on file     Emotionally abused: Not on file     Physically abused: Not on file     Forced sexual activity: Not on file   Other Topics Concern      Service Not Asked     Blood Transfusions Not Asked     Caffeine Concern Yes     Comment: OCC     Occupational Exposure Not Asked     Hobby Hazards Not Asked     Sleep Concern Not Asked     Stress Concern Not Asked     Weight Concern Not Asked     Special Diet Not Asked     Back Care Not Asked     Exercise No     Comment: \"not faithfully\"     Bike Helmet Not Asked     Seat Belt Yes     Self-Exams No     Parent/sibling w/ CABG, MI or angioplasty before 65F 55M? Not Asked   Social History Narrative    " ALLERGIES:  She is allergic to sulfa, and has had gastrointestinal side effects from naproxen.               FAMILY HISTORY:  Strongly positive for headaches, including in her son and sister.  There is no family history of tremor.               SOCIAL HISTORY:  She does not work outside the home.  She does not smoke.  She uses very little alcohol.  Lives in [place]. . Has 2 kids: son who is 35 yr s old. And daughter who is 30 yrs old. Her  is working as a  .        Drug and lactation database from the [place] [employer] of Medicine:  http://toxnet.nlm.nih.gov/cgi-bin/sis/htmlgen?LACT      B/P: Data Unavailable, T: Data Unavailable, P: Data Unavailable, R: Data Unavailable 0 lbs 0 oz  Last menstrual period 06/05/2006, not currently breastfeeding., There is no height or weight on file to calculate BMI.  Medications and Vitals not listed above were documented in the cart and reviewed by me.     Current Outpatient Medications   Medication Sig Dispense Refill     acetaminophen (TYLENOL) 500 MG tablet Take 1-2 tablets (500-1,000 mg) by mouth every 6 hours as needed for mild pain       carbidopa-levodopa (SINEMET)  MG tablet 2 tabs by mouth @ 7am, noon, and 9pm 540 tablet 3     citalopram (CELEXA) 20 MG tablet 1.5 x 20mg tab by mouth @12noon 135 tablet 3     melatonin 5 MG tablet 2 x 5mg tab by mouth @ 9pm       polyethylene glycol (MIRALAX) powder 1 capful in 8 oz water and sips throughout the day 510 g 1     Probiotic Product (PROBIOTIC DAILY) CAPS Young living probiotic by mouth daily @ 7am       QUEtiapine (SEROQUEL) 25 MG tablet Take 3 tablets (75 mg) by mouth At Bedtime (9 pm) 270 tablet 3     rivastigmine (EXELON) 13.3 MG/24HR 24 hr patch Place 1 patch onto the skin daily 30 patch 11     senna (SENOKOT) 8.6 MG tablet 1 tab @ 9pm 120 tablet      ZOLMitriptan (ZOMIG) 5 MG tablet Take 1 tablet (5 mg) by mouth at onset of headache for migraine MAY REPEAT ONCE AFTER 2 HOURS.  DO NOT EXCEED 2 TABLETS IN 24 HOURS. 6 tablet 10         Jet Porter MD    Again, thank you for allowing me to participate in the care of your patient.        Sincerely,        Jet Porter MD

## 2019-09-16 NOTE — PROGRESS NOTES
Social Work Intervention  [facility]    Data/Intervention: 19    Patient Name:  Nathaly Farias  /Age:  1955 (64 year old)    Visit Type: in person  Referral Source: Neurology - LESVIA Julien  Reason for Referral:  financial    Collaborated With:    -patient  -    Patient Concerns/Issues:   Social work connected with patient, her  - Wolf, and daughter following a neurology appointment with Dr Porter. Pt resides with her , Wolf in [place]. Wolf is and over the road  and typically gone five days out of the week.  Nathaly has supportive family that provides daily checks and assistance with cleaning and meals.  Patient utilizes a med dispenser for assistance with medication management.     Patient and her  asked to connect with social work regarding finances and county support.     Provided patent and family with several different community resource options. Provided them with a list of agencies that provide private duty services.  Family is considering hire in additional support while Wolf is gone during the week working.  Family income consists of Wolf's income and Nathaly's SSDI.  Encouraged family to connect with [place]  pending qualifying for MA. Family is aware of options for PCA services if they qualify for Medical Assistance. Also, discussed with family to connect with en elder  to discuss long term financial options. Wolf is self employed and has financial resources relating to his business and would appreciate additional guidance.    Also, provided family with [employer] contact information for additional resources near their home. Provided writer contact information and encouraged family to call with any additional concerns or questions. Sw will continue to assist as needed.             KRISTAN Saravia, [facility]    [facility]  [phone]  [email or web address]

## 2019-09-19 DIAGNOSIS — G20.A1 PARALYSIS AGITANS (H): ICD-10-CM

## 2019-09-19 DIAGNOSIS — R44.3 HALLUCINATIONS: ICD-10-CM

## 2019-09-19 PROCEDURE — 99207 EKG 12-LEAD, COMPLETE (FUTURE): CPT

## 2019-09-27 ENCOUNTER — HEALTH MAINTENANCE LETTER (OUTPATIENT)
Age: 64
End: 2019-09-27

## 2019-10-02 ENCOUNTER — OFFICE VISIT (OUTPATIENT)
Dept: UROLOGY | Facility: OTHER | Age: 64
End: 2019-10-02
Payer: COMMERCIAL

## 2019-10-02 VITALS
OXYGEN SATURATION: 97 % | HEART RATE: 71 BPM | TEMPERATURE: 98.3 F | DIASTOLIC BLOOD PRESSURE: 89 MMHG | RESPIRATION RATE: 16 BRPM | SYSTOLIC BLOOD PRESSURE: 139 MMHG

## 2019-10-02 DIAGNOSIS — R35.0 URINARY FREQUENCY: ICD-10-CM

## 2019-10-02 DIAGNOSIS — N81.10 PROLAPSE OF VAGINAL WALL: Primary | ICD-10-CM

## 2019-10-02 PROCEDURE — 99214 OFFICE O/P EST MOD 30 MIN: CPT | Performed by: UROLOGY

## 2019-10-02 RX ORDER — TROSPIUM CHLORIDE 20 MG/1
20 TABLET, FILM COATED ORAL
Qty: 180 TABLET | Refills: 3 | Status: SHIPPED | OUTPATIENT
Start: 2019-10-02 | End: 2019-11-19

## 2019-10-02 ASSESSMENT — PAIN SCALES - GENERAL: PAINLEVEL: NO PAIN (0)

## 2019-10-02 NOTE — PROGRESS NOTES
Chief Complaint   Patient presents with     Follow Up     urethral caruncle       Nathaly Farias is a 64 year old female who presents today for follow up of   Chief Complaint   Patient presents with     Follow Up     urethral caruncle   Patient is here for follow-up of her urinary frequency and also vaginal prolapse.  She has been on trospium and according to her friend her urinary symptoms have improved.  However, she has noticed worsening of her vaginal prolapse.  She is interested in treatments.  No CVA tenderness  Current Outpatient Medications   Medication Sig Dispense Refill     acetaminophen (TYLENOL) 500 MG tablet Take 1-2 tablets (500-1,000 mg) by mouth every 6 hours as needed for mild pain       carbidopa-levodopa (SINEMET)  MG tablet 2 tabs by mouth @ 7am, noon, and 9pm 540 tablet 3     citalopram (CELEXA) 20 MG tablet 1.5 x 20mg tab by mouth @12noon 135 tablet 3     estradiol (ESTRACE) 0.1 MG/GM vaginal cream Place 2 g vaginally twice a week 42.5 g 1     melatonin 5 MG tablet 2 x 5mg tab by mouth @ 9pm       naproxen sodium 220 MG capsule Take 220 mg by mouth as needed       NONFORMULARY 1 eye dropper of cbd oil by mouth every morning (young living oils)       polyethylene glycol (MIRALAX) powder 1 capful in 8 oz water and sips throughout the day 510 g 1     Probiotic Product (PROBIOTIC DAILY) CAPS Young living probiotic by mouth daily @ 7am       QUEtiapine (SEROQUEL) 25 MG tablet Take 3 tablets (75 mg) by mouth At Bedtime (9 pm) 270 tablet 3     rivastigmine (EXELON) 13.3 MG/24HR 24 hr patch Place 1 patch onto the skin daily 90 patch 11     senna-docusate (SENOKOT-S/PERICOLACE) 8.6-50 MG tablet Take 2 tablets by mouth At Bedtime       trospium (SANCTURA) 20 MG tablet Take 1 tablet (20 mg) by mouth 2 times daily (before meals) 180 tablet 3     ZOLMitriptan (ZOMIG) 5 MG tablet Take 1 tablet (5 mg) by mouth at onset of headache for migraine MAY REPEAT ONCE AFTER 2 HOURS. DO NOT EXCEED 2 TABLETS IN 24  HOURS. 6 tablet 10     Allergies   Allergen Reactions     Sulfa Drugs      Tacchycardia, had to go to ED     Naproxen GI Disturbance     Oxycontin [Oxycodone] GI Disturbance     Made her feel funny/upset stomach     Ropinirole      Did not work      Past Medical History:   Diagnosis Date     Arthralgia of temporomandibular joint 10/22/1996     Arthritis      Cognitive disorder evalaution 2017 3/6/2017    She has marked attentional difficulties, impairments in memory including a rapid forgetting rate, mild anomia, and mild executive dysfunction. 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 She's required increasing assistance with finances, medications, driving, and cooking. I think this is a mild dementia, and     Constipation 2/23/2015     Dementia (H) 3/13/2017    IMPRESSIONS AND RECOMMENDATIONS   Current results indicate marked attentional difficulties, impairments in memory, which in some cases reflected impairments in retention and in other cases reflected retrieval difficulties, and mild anomia as well as mild executive dysfunction. Premorbid intellectual functioning is estimated to fall in the borderline range. She denied experiencing significant depre     Depressive disorder      Heart murmur 2/23/2015     History of MRI of brain and brain stem 2/21/2015    MRI OF THE BRAIN WITHOUT CONTRAST 7/10/2014 1:21 PM  COMPARISON: None. HISTORY: Left-sided upper and lower extremity tremor. Balance issues. TECHNIQUE:  Brain: Axial diffusion-weighted with ADC map, T2-weighted with fat saturation, T1-weighted and turboFLAIR and coronal T1-weighted images of the brain were obtained without intravenous contrast.  FINDINGS: There is mild diffuse cerebral volume loss     Hypertension      Incomplete RBBB 2/23/2015     Motion sickness      Other and unspecified hyperlipidemia      Parkinson's disease (H)      Seborrheic dermatitis  2/29/2016     Unspecified arthropathy, hand 08/07/1997     Unspecified sleep apnea     moderate, sleep study 11/07, on CPAP, has daytime somnolence with this     Variants of migraine, not elsewhere classified, without mention of intractable migraine without mention of status migrainosus      Wears glasses 2/23/2015     Past Surgical History:   Procedure Laterality Date     ------------OTHER-------------      left shoulder     ARTHRODESIS TOE(S)  3/2/2011    ARTHRODESIS TOE(S) performed by CLARA LUCAS at  OR     C LIGATE FALLOPIAN TUBE  1998     CHOLECYSTECTOMY, LAPOROSCOPIC  5/12/2008    Cholecystectomy, Laparoscopic     ESOPHAGOSCOPY, GASTROSCOPY, DUODENOSCOPY (EGD), COMBINED N/A 7/5/2017    Procedure: COMBINED ESOPHAGOSCOPY, GASTROSCOPY, DUODENOSCOPY (EGD), BIOPSY SINGLE OR MULTIPLE;  Esophagogastroduodenoscopy with Multiple Biopsies by Biopsy;  Surgeon: Tj Denney MD;  Location: [facility]     HC COLONOSCOPY W/WO BRUSH/WASH  08/30/06    normal     HC OPEN TX TIBIAL SHAFT FX W PLATE/SCREWS W/WO CERCLAGE  2000    Multiple operations on left leg     HC SHLDR ARTHROSCOP,PART ACROMIOPLAS  11/16/06    Right shoulder     HC SHLDR ARTHROSCOP,SURG,DIS CLAVICULECTOMY  11/16/06    Right shoulder     Family History   Problem Relation Age of Onset     Diabetes Father         type II, diagnosed in late 60's     Hypertension Father      Cancer Father         rare kidney cancer had kidney removed, 75 y.o. at onset     Cerebrovascular Disease Father      Rashes/Skin Problems Father         rosacea     Neurologic Disorder Mother         head tremor     Heart Disease Brother         Valvular disease corrected with surgery     Heart Disease Paternal Grandfather         MI     Migraines Son      Migraines Sister      Rashes/Skin Problems Sister         rosacea     Social History     Socioeconomic History     Marital status:      Spouse name: violet     Number of children: 2     Years of education: None     Highest  "education level: None   Occupational History     Occupation:    Social Needs     Financial resource strain: None     Food insecurity:     Worry: None     Inability: None     Transportation needs:     Medical: None     Non-medical: None   Tobacco Use     Smoking status: Never Smoker     Smokeless tobacco: Never Used     Tobacco comment: no smokers in the household   Substance and Sexual Activity     Alcohol use: Yes     Alcohol/week: 0.0 standard drinks     Comment: very occ     Drug use: No     Sexual activity: Yes     Partners: Male     Birth control/protection: Surgical, Female Surgical     Comment: tubal ligation   Lifestyle     Physical activity:     Days per week: None     Minutes per session: None     Stress: None   Relationships     Social connections:     Talks on phone: None     Gets together: None     Attends Anabaptist service: None     Active member of club or organization: None     Attends meetings of clubs or organizations: None     Relationship status: None     Intimate partner violence:     Fear of current or ex partner: None     Emotionally abused: None     Physically abused: None     Forced sexual activity: None   Other Topics Concern      Service Not Asked     Blood Transfusions Not Asked     Caffeine Concern Yes     Comment: OCC     Occupational Exposure Not Asked     Hobby Hazards Not Asked     Sleep Concern Not Asked     Stress Concern Not Asked     Weight Concern Not Asked     Special Diet Not Asked     Back Care Not Asked     Exercise No     Comment: \"not faithfully\"     Bike Helmet Not Asked     Seat Belt Yes     Self-Exams No     Parent/sibling w/ CABG, MI or angioplasty before 65F 55M? Not Asked   Social History Narrative    ALLERGIES:  She is allergic to sulfa, and has had gastrointestinal side effects from naproxen.               FAMILY HISTORY:  Strongly positive for headaches, including in her son and sister.  There is no family history of tremor.               SOCIAL " HISTORY:  She does not work outside the home.  She does not smoke.  She uses very little alcohol.  Lives in [place]. . Has 2 kids: son who is 35 yr s old. And daughter who is 30 yrs old. Her  is working as a  .        REVIEW OF SYSTEMS  =================  C: NEGATIVE for fever, chills, change in weight  I: NEGATIVE for worrisome rashes, moles or lesions  E/M: NEGATIVE for ear, mouth and throat problems  R: NEGATIVE for significant cough or SHORTNESS OF BREATH  CV:  NEGATIVE for chest pain, palpitations or peripheral edema  GI: NEGATIVE for nausea, abdominal pain, heartburn, or change in bowel habits  NEURO: NEGATIVE numbness/weakness  : see HPI  PSYCH: NEGATIVE depression/anxiety  LYmph: no new enlarged lymph nodes  Ortho: no new trauma/movements    Physical Exam:  /89 (BP Location: Right arm, Patient Position: Sitting, Cuff Size: Adult Regular)   Pulse 71   Temp 98.3  F (36.8  C) (Oral)   Resp 16   LMP 06/05/2006   SpO2 97%    Patient is pleasant, in no acute distress, good general condition.  Lung: no evidence of respiratory distress    Abdomen: Soft, nondistended, non tender. No masses. No rebound or guarding.   Exam:   Skin: Warm and dry.  No redness.  Psych: normal mood and affect  Neuro: alert and oriented  Musculaskeletal: moving all extremities    Assessment/Plan:   (N81.10) Prolapse of vaginal wall  (primary encounter diagnosis)  Comment:    Plan: Treatment options discussed.  We discussed observation versus pessary versus surgical interventions.  Different times of surgery discussed.  We talked about vaginal prolapse repair using biological tissue.  We talked about robotic sacral colpopexy and hysterectomy.  Pros and cons discussed.  Patient will think about it and get back to me in the future with regard to what she wants to.    (R35.0) Urinary frequency  Comment:    Plan: trospium (SANCTURA) 20 MG tablet

## 2019-10-10 ENCOUNTER — TELEPHONE (OUTPATIENT)
Dept: UROLOGY | Facility: CLINIC | Age: 64
End: 2019-10-10

## 2019-10-10 NOTE — TELEPHONE ENCOUNTER
Reason for Call:  Other Surgery Options    Detailed comments: Patients  is calling looking for some information on what surgery options there are ffor his wife. He said that she forgets things and would like them to call him. He is on the C2C.     Phone Number Patient can be reached at: Cell number on file:    Telephone Information:   Mobile [phone]       Best Time: any    Can we leave a detailed message on this number? YES    Call taken on 10/10/2019 at 1:37 PM by Christine Gupta

## 2019-10-14 NOTE — TELEPHONE ENCOUNTER
Called patient's  however there was no answer and his voicemail box was full.  Will try calling again later to inform that surgery would be robotic sacral colpopexy and hysterectomy per Dr. Esposito's last office visit notes.    Elmo Luna RN....10/14/2019 11:23 AM

## 2019-10-14 NOTE — TELEPHONE ENCOUNTER
returned call and was informed of the recommended surgery.   He is wondering why she would need a hysterectomy.   Advised that urology staff will call tomorrow when Dr. Esposito's RN returns.    Elmo Luna RN....10/14/2019 11:45 AM

## 2019-10-15 NOTE — TELEPHONE ENCOUNTER
Returned call to patient's and advised that this is a combination procedure per Dr. Esposito. Provided patient's  with phone number to the [facility] to schedule with Dr. Carpio for consults for robotic sacral colpopexy hysterectomy. Patients  verbalized understanding and agrees to plan. Kelin Riggs RN

## 2019-10-25 ENCOUNTER — HOSPITAL ENCOUNTER (EMERGENCY)
Facility: CLINIC | Age: 64
Discharge: HOME | End: 2019-10-25
Attending: EMERGENCY MEDICINE | Admitting: EMERGENCY MEDICINE
Payer: COMMERCIAL

## 2019-10-25 ENCOUNTER — APPOINTMENT (OUTPATIENT)
Dept: GENERAL RADIOLOGY | Facility: CLINIC | Age: 64
End: 2019-10-25
Attending: EMERGENCY MEDICINE
Payer: COMMERCIAL

## 2019-10-25 VITALS
RESPIRATION RATE: 22 BRPM | OXYGEN SATURATION: 97 % | SYSTOLIC BLOOD PRESSURE: 153 MMHG | DIASTOLIC BLOOD PRESSURE: 100 MMHG | HEART RATE: 70 BPM | TEMPERATURE: 98.4 F

## 2019-10-25 DIAGNOSIS — F41.9 ANXIETY: ICD-10-CM

## 2019-10-25 DIAGNOSIS — R06.02 SHORTNESS OF BREATH: ICD-10-CM

## 2019-10-25 LAB
ALBUMIN SERPL-MCNC: 4.3 G/DL (ref 3.4–5)
ALP SERPL-CCNC: 48 U/L (ref 40–150)
ALT SERPL W P-5'-P-CCNC: 9 U/L (ref 0–50)
ANION GAP SERPL CALCULATED.3IONS-SCNC: 3 MMOL/L (ref 3–14)
AST SERPL W P-5'-P-CCNC: 16 U/L (ref 0–45)
BASOPHILS # BLD AUTO: 0.2 10E9/L (ref 0–0.2)
BASOPHILS NFR BLD AUTO: 2.4 %
BILIRUB SERPL-MCNC: 1 MG/DL (ref 0.2–1.3)
BUN SERPL-MCNC: 12 MG/DL (ref 7–30)
CALCIUM SERPL-MCNC: 9.8 MG/DL (ref 8.5–10.1)
CHLORIDE SERPL-SCNC: 106 MMOL/L (ref 94–109)
CO2 SERPL-SCNC: 31 MMOL/L (ref 20–32)
CREAT SERPL-MCNC: 0.92 MG/DL (ref 0.52–1.04)
DIFFERENTIAL METHOD BLD: ABNORMAL
EOSINOPHIL NFR BLD AUTO: 1.6 %
ERYTHROCYTE [DISTWIDTH] IN BLOOD BY AUTOMATED COUNT: 12.9 % (ref 10–15)
GFR SERPL CREATININE-BSD FRML MDRD: 67 ML/MIN/{1.73_M2}
GLUCOSE SERPL-MCNC: 93 MG/DL (ref 70–99)
HCT VFR BLD AUTO: 40.1 % (ref 35–47)
HGB BLD-MCNC: 13.3 G/DL (ref 11.7–15.7)
IMM GRANULOCYTES # BLD: 0 10E9/L (ref 0–0.4)
IMM GRANULOCYTES NFR BLD: 0.1 %
LYMPHOCYTES # BLD AUTO: 2.2 10E9/L (ref 0.8–5.3)
LYMPHOCYTES NFR BLD AUTO: 31.1 %
MCH RBC QN AUTO: 30.4 PG (ref 26.5–33)
MCHC RBC AUTO-ENTMCNC: 33.2 G/DL (ref 31.5–36.5)
MCV RBC AUTO: 92 FL (ref 78–100)
MONOCYTES # BLD AUTO: 0.6 10E9/L (ref 0–1.3)
MONOCYTES NFR BLD AUTO: 8 %
NEUTROPHILS # BLD AUTO: 4 10E9/L (ref 1.6–8.3)
NEUTROPHILS NFR BLD AUTO: 56.8 %
NRBC # BLD AUTO: 0.1 10*3/UL
NRBC BLD AUTO-RTO: 2 /100
PLATELET # BLD AUTO: 270 10E9/L (ref 150–450)
POTASSIUM SERPL-SCNC: 3.9 MMOL/L (ref 3.4–5.3)
PROT SERPL-MCNC: 7.7 G/DL (ref 6.8–8.8)
RBC # BLD AUTO: 4.37 10E12/L (ref 3.8–5.2)
SODIUM SERPL-SCNC: 140 MMOL/L (ref 133–144)
TROPONIN I SERPL-MCNC: <0.015 UG/L (ref 0–0.04)
WBC # BLD AUTO: 7 10E9/L (ref 4–11)

## 2019-10-25 PROCEDURE — 71046 X-RAY EXAM CHEST 2 VIEWS: CPT | Mod: TC

## 2019-10-25 PROCEDURE — 84484 ASSAY OF TROPONIN QUANT: CPT | Performed by: EMERGENCY MEDICINE

## 2019-10-25 PROCEDURE — 25000128 H RX IP 250 OP 636: Performed by: EMERGENCY MEDICINE

## 2019-10-25 PROCEDURE — 85025 COMPLETE CBC W/AUTO DIFF WBC: CPT | Performed by: EMERGENCY MEDICINE

## 2019-10-25 PROCEDURE — 93005 ELECTROCARDIOGRAM TRACING: CPT | Performed by: EMERGENCY MEDICINE

## 2019-10-25 PROCEDURE — 99285 EMERGENCY DEPT VISIT HI MDM: CPT | Mod: 25 | Performed by: EMERGENCY MEDICINE

## 2019-10-25 PROCEDURE — 93010 ELECTROCARDIOGRAM REPORT: CPT | Mod: Z6 | Performed by: EMERGENCY MEDICINE

## 2019-10-25 PROCEDURE — 96374 THER/PROPH/DIAG INJ IV PUSH: CPT | Performed by: EMERGENCY MEDICINE

## 2019-10-25 PROCEDURE — 80053 COMPREHEN METABOLIC PANEL: CPT | Performed by: EMERGENCY MEDICINE

## 2019-10-25 RX ORDER — LORAZEPAM 2 MG/ML
0.5 INJECTION INTRAMUSCULAR
Status: COMPLETED | OUTPATIENT
Start: 2019-10-25 | End: 2019-10-25

## 2019-10-25 RX ADMIN — LORAZEPAM 0.5 MG: 2 INJECTION INTRAMUSCULAR; INTRAVENOUS at 18:14

## 2019-10-25 NOTE — ED TRIAGE NOTES
Started to have SOB couple of hours ago and seems to be getting worse. Doesn't not have any respiratory history.

## 2019-10-25 NOTE — ED AVS SNAPSHOT
[facility]  [address] [place] [address]  Phone:  [phone]  Fax:  [phone]                                    Nathaly Farias   MRN: [id number]    Department:  [facility]   Date of Visit:  10/25/2019           After Visit Summary Signature Page    I have received my discharge instructions, and my questions have been answered. I have discussed any challenges I see with this plan with the nurse or doctor.    ..........................................................................................................................................  Patient/Patient Representative Signature      ..........................................................................................................................................  Patient Representative Print Name and Relationship to Patient    ..................................................               ................................................  Date                                   Time    ..........................................................................................................................................  Reviewed by Signature/Title    ...................................................              ..............................................  Date                                               Time          22EPIC Rev 08/18

## 2019-10-26 NOTE — DISCHARGE INSTRUCTIONS
Try to get a good night sleep.  Continue current medication regimen.    Return at any time for concerns.    I hope you have a great weekend!!

## 2019-10-26 NOTE — ED NOTES
Pt states that she is feeling much improved.  IV removed.  Reviewed discharge instructions with pt.  No additional questions or concerns.

## 2019-10-26 NOTE — ED PROVIDER NOTES
History     Chief Complaint   Patient presents with     Shortness of Breath     HPI  History per medical records, patient's , some from patient.  Some of patient's history is limited secondary to her underlying dementia.    This is a 64-year-old female with history of Parkinson's, dementia, hypertension, hyperlipidemia, anxiety/depression presenting with shortness of breath.  Patient apparently has been in her usual state of health.  When her  got home from work today she was complaining of shortness of breath.  He has not noted any cough or cold symptoms.  She denies any chest pain.  It she seems to describe the inability to take a deep breath.  No recent fevers or chills, nausea or vomiting, new changes in bowel or bladder.  She does have a prolapsed bladder and is in the process of being evaluated for hysterectomy and bladder surgery.  Patient does not smoke.  No history of lung issues.    Allergies:  Allergies   Allergen Reactions     Sulfa Drugs      Tacchycardia, had to go to ED     Naproxen GI Disturbance     Oxycontin [Oxycodone] GI Disturbance     Made her feel funny/upset stomach     Ropinirole      Did not work       Problem List:    Patient Active Problem List    Diagnosis Date Noted     Hallucinations 01/26/2019     Priority: Medium     Hiatal hernia 07/05/2017     Priority: Medium     Dementia (H) 03/13/2017     Priority: Medium     IMPRESSIONS AND RECOMMENDATIONS     Current results indicate marked attentional difficulties, impairments in memory, which in some cases reflected impairments in retention and in other cases reflected retrieval difficulties, and mild anomia as well as mild executive dysfunction. Premorbid intellectual functioning is estimated to fall in the borderline range. She denied experiencing significant depressive symptomatology.     This pattern of performance is suggestive of mild dementia, and there is a suggestion of mesial temporal lobe as well as frontal and  subcortical system involvement. She has had increasing difficulty managing her instrumental activities of daily living. Taken together with her history, a neurodegenerative etiology seems likely. Although Parkinson's disease with dementia is a diagnostic consideration, the possibility of an additional etiology, including Alzheimer's disease, cannot entirely be ruled out. In view of the marked attentional difficulties, nonrestorative sleep and the effects of medications may also contribute. Although she has a history of depression, she is not reporting significant depressive symptomatology currently, but does appear to be experiencing mild anxiety.     In terms of daily functioning, Ms. Farias may require increasing assistance managing her instrumental activities of daily living. In particular, she may require additional assistance managing her medications. She continues to drive, although she has limited her driving to short distances and places with which she is familiar. Given her cognitive difficulties, it may be prudent for her to consider a formal 's assessment, such as that offered at [facility]. She will likely benefit from structure and routine. If she has difficulty managing large, complex tasks, others may assist by breaking down such tasks into smaller, more manageable parts. She has marked attentional difficulties, and may find it helpful when working on a task to work in an environment that is relatively free from distractions, such as noises or other interruptions. She may find it helpful to take frequent, brief breaks when working on a project. She may benefit from the use of written reminders or checklists. It may be helpful to follow her over time. Repeated neuropsychological evaluation in one year may help to determine whether her cognitive difficulties are progressive. These results have not been discussed with Ms. Farias or her family, although they were encouraged to contact my office to  schedule an appointment for feedback should they so desire.          Cognitive disorder evalaution 2017 03/06/2017     Priority: Medium     She has marked attentional difficulties, impairments in memory including a rapid forgetting rate, mild anomia, and mild executive dysfunction. 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 She's required increasing assistance with finances, medications, driving, and cooking. I think this is a mild dementia, and there's a suggestion of mesial temporal lobe involvement, so Alzheimer's disease can't be ruled out, although there are also some features consistent with frontal system involvement. She's not reporting significant problems with mood       Post-traumatic osteoarthritis of left knee 10/21/2016     Priority: Medium     Pain from implanted hardware, initial encounter 10/21/2016     Priority: Medium     Seborrheic dermatitis 02/29/2016     Priority: Medium     Rosacea 03/17/2015     Priority: Medium     Asymptomatic varicose veins 03/17/2015     Priority: Medium     Wears glasses 02/23/2015     Priority: Medium     Constipation 02/23/2015     Priority: Medium     Incomplete RBBB 02/23/2015     Priority: Medium     Heart murmur 02/23/2015     Priority: Medium     Family history of tremor 02/23/2015     Priority: Medium     Mother has an head tremor        History of MRI of brain and brain stem 02/21/2015     Priority: Medium     MRI OF THE BRAIN WITHOUT CONTRAST 7/10/2014 1:21 PM   COMPARISON: None.  HISTORY: Left-sided upper and lower extremity tremor. Balance issues.  TECHNIQUE:   Brain: Axial diffusion-weighted with ADC map, T2-weighted with fat  saturation, T1-weighted and turboFLAIR and coronal T1-weighted images  of the brain were obtained without intravenous contrast.   FINDINGS: There is mild diffuse cerebral volume loss. There are  multiple tiny scattered focal areas of abnormal T2  signal  hyperintensity in the cerebral white matter bilaterally that are  consistent with sequela of chronic small vessel ischemic disease.   The ventricles and basal cisterns are within normal limits in  configuration given the degree of cerebral volume loss. There is no  midline shift. There are no extra-axial fluid collections. There is  no evidence for stroke or acute intracranial hemorrhage.   There is no sinusitis or mastoiditis.   IMPRESSION  IMPRESSION: Diffuse cerebral volume loss and cerebral white matter  changes consistent with chronic small vessel ischemic disease. No  evidence for acute intracranial pathology.  MYRA VAZ MD       [facility] zeynep () 07/30/2014     Priority: Medium     7/30/2014 evaluation by Dr. Jimenez    SUMMARY: She first noted this tremor in 2009. It affected her left hand. It occurred mainly at work. Later she noted the tremor affected her left leg. This is mainly a tremor at rest, and it will stop if she moves the limb. She can also have it to some extent with a sustained posture.   She reports her balance has worsened over the past couple of years, and her daughter points out that she tends to shuffle. She reports no change in her voice. She reports no change in her taste or smell. She describes her handwriting as slow and less smooth, but not necessarily small.   Aside from citalopram, she has not been on any psychiatric medications or antiemetic drugs.     Her evaluation for the tremor included a normal CBC. Comprehensive metabolic profile was notable only for sodium of 146. TSH was normal.   She did have a brain MRI scan that I reviewed. There are some scattered T2 signal changes in the white matter which are nonspecific. Note is made by the radiologist of diffuse cerebral volume loss, but I am not impressed by a great deal of atrophy.       HTN, goal below 140/90 07/07/2014     Priority: Medium     Advanced directives, counseling/discussion 04/23/2012     Priority:  Medium     Discussed advance care planning with patient; information given to patient to review. 4/23/2012        Rosalie Rajan MD  10/25/19 2141         Mild major depression (H) 10/04/2011     Priority: Medium     HYPERLIPIDEMIA LDL GOAL <160 10/31/2010     Priority: Medium     Neck pain 09/17/2009     Priority: Medium     Headache 09/17/2009     Priority: Medium     Problem list name updated by automated process. Provider to review       Overactive bladder 03/05/2009     Priority: Medium     Chronic cholecystitis 04/17/2008     Priority: Medium     Essential hypertension, benign 11/26/2007     Priority: Medium     Sleep apnea      Priority: Medium     moderate, sleep study 11/07, on CPAP  Problem list name updated by automated process. 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       Obesity 06/16/2005     Priority: Medium     Problem list name updated by automated process. Provider to review       Malaise and fatigue 06/16/2005     Priority: Medium     Problem list name updated by automated process. Provider to review       Migraine variant 02/16/2004     Priority: Medium     Problem list name updated by automated process. Provider to review       Symptomatic menopausal or female climacteric states 02/16/2004     Priority: Medium     hot flashes, dec. libido, irritability       Prolapse of vaginal wall 08/13/2002     Priority: Medium     Problem list name updated by automated process. Provider to review and confirm  Do you wish to do the replacement in the background? yes            Past Medical History:    Past Medical History:   Diagnosis Date     Arthralgia  of temporomandibular joint 10/22/1996     Arthritis      Cognitive disorder evalaution 2017 3/6/2017     Constipation 2/23/2015     Dementia (H) 3/13/2017     Depressive disorder      Heart murmur 2/23/2015     History of MRI of brain and brain stem 2/21/2015     Hypertension      Incomplete RBBB 2/23/2015     Motion sickness      Other and unspecified hyperlipidemia      Parkinson's disease (H)      Seborrheic dermatitis 2/29/2016     Unspecified arthropathy, hand 08/07/1997     Unspecified sleep apnea      Variants of migraine, not elsewhere classified, without mention of intractable migraine without mention of status migrainosus      Wears glasses 2/23/2015       Past Surgical History:    Past Surgical History:   Procedure Laterality Date     ------------OTHER-------------      left shoulder     ARTHRODESIS TOE(S)  3/2/2011    ARTHRODESIS TOE(S) performed by CLARA LUCAS at  OR     C LIGATE FALLOPIAN TUBE  1998     CHOLECYSTECTOMY, LAPOROSCOPIC  5/12/2008    Cholecystectomy, Laparoscopic     ESOPHAGOSCOPY, GASTROSCOPY, DUODENOSCOPY (EGD), COMBINED N/A 7/5/2017    Procedure: COMBINED ESOPHAGOSCOPY, GASTROSCOPY, DUODENOSCOPY (EGD), BIOPSY SINGLE OR MULTIPLE;  Esophagogastroduodenoscopy with Multiple Biopsies by Biopsy;  Surgeon: Tj Denney MD;  Location: [facility]     HC COLONOSCOPY W/WO BRUSH/WASH  08/30/06    normal     HC OPEN TX TIBIAL SHAFT FX W PLATE/SCREWS W/WO CERCLAGE  2000    Multiple operations on left leg     HC SHLDR ARTHROSCOP,PART ACROMIOPLAS  11/16/06    Right shoulder     HC SHLDR ARTHROSCOP,SURG,DIS CLAVICULECTOMY  11/16/06    Right shoulder       Family History:    Family History   Problem Relation Age of Onset     Diabetes Father         type II, diagnosed in late 60's     Hypertension Father      Cancer Father         rare kidney cancer had kidney removed, 75 y.o. at onset     Cerebrovascular Disease Father      Rashes/Skin Problems Father         rosacea     Neurologic Disorder  Mother         head tremor     Heart Disease Brother         Valvular disease corrected with surgery     Heart Disease Paternal Grandfather         MI     Migraines Son      Migraines Sister      Rashes/Skin Problems Sister         rosacea       Social History:  Marital Status:   [2]  Social History     Tobacco Use     Smoking status: Never Smoker     Smokeless tobacco: Never Used     Tobacco comment: no smokers in the household   Substance Use Topics     Alcohol use: Yes     Alcohol/week: 0.0 standard drinks     Comment: very occ     Drug use: No        Medications:    acetaminophen (TYLENOL) 500 MG tablet  carbidopa-levodopa (SINEMET)  MG tablet  citalopram (CELEXA) 20 MG tablet  estradiol (ESTRACE) 0.1 MG/GM vaginal cream  melatonin 5 MG tablet  naproxen sodium 220 MG capsule  NONFORMULARY  polyethylene glycol (MIRALAX) powder  Probiotic Product (PROBIOTIC DAILY) CAPS  QUEtiapine (SEROQUEL) 25 MG tablet  rivastigmine (EXELON) 13.3 MG/24HR 24 hr patch  senna-docusate (SENOKOT-S/PERICOLACE) 8.6-50 MG tablet  trospium (SANCTURA) 20 MG tablet  ZOLMitriptan (ZOMIG) 5 MG tablet          Review of Systems   All other ROS reviewed and are negative or non-contributory except as stated in HPI.     Physical Exam   BP: (!) 186/117  Pulse: 72  Heart Rate: 74  Temp: 98.4  F (36.9  C)  Resp: 22  SpO2: 100 %      Physical Exam  Vitals signs and nursing note reviewed.   Constitutional:       Appearance: She is well-developed and normal weight.      Comments: Pleasant, anxious female sitting in the bed.  She looks to her  for a lot of the answers.  She is quiet.   HENT:      Head: Normocephalic.      Mouth/Throat:      Mouth: Mucous membranes are moist.      Pharynx: Oropharynx is clear.   Eyes:      Extraocular Movements: Extraocular movements intact.      Pupils: Pupils are equal, round, and reactive to light.   Neck:      Musculoskeletal: Normal range of motion and neck supple.   Cardiovascular:      Rate  and Rhythm: Normal rate and regular rhythm.      Pulses: Normal pulses.      Heart sounds: Normal heart sounds.   Pulmonary:      Effort: Pulmonary effort is normal.      Breath sounds: Normal breath sounds.   Chest:      Chest wall: No tenderness.   Abdominal:      General: Bowel sounds are normal.      Palpations: Abdomen is soft. There is no mass.      Tenderness: There is no tenderness. There is no rebound.   Musculoskeletal: Normal range of motion.      Right lower leg: She exhibits no tenderness. No edema.      Left lower leg: She exhibits no tenderness. No edema.   Skin:     General: Skin is warm and dry.      Coloration: Skin is not pale.      Findings: No rash.   Neurological:      General: No focal deficit present.      Mental Status: She is alert.      Comments: Tremor   Psychiatric:         Mood and Affect: Mood is anxious.         Behavior: Behavior normal.         ED Course (with Medical Decision Making)    Pt seen and examined by me.  RN and EPIC notes reviewed.      Patient with complains of shortness of breath.  Her O2 sats are within normal limits and her lung sounds are clear.  She is anxious.  There is some difficulty with getting a full history.  I am going to do an EKG and a chest x-ray along with labs to be sure there is no underlying cardiac or pulmonary abnormalities.    EKG shows normal sinus rhythm with no acute ST segment abnormalities.  She has an RSR pattern in lead V1 that is nondiagnostic.  No EKG for comparison.  She does have a little bit of baseline abnormality due to her tremor.    Chest x-ray was clear with no acute abnormalities.  Labs were all within normal limits including troponin.    I did give the patient 0.5 mg of Ativan.  She has significant decrease in her tremor.  She denies any further shortness of breath.  Her  is more comfortable now and would like to take her home.  If she has any worsening or changes she can return at any time.        Procedures    Results  for orders placed or performed during the hospital encounter of 10/25/19 (from the past 24 hour(s))   XR Chest 2 Views    Narrative    CHEST TWO VIEWS 10/25/2019 5:43 PM     HISTORY: Shortness of breath.     COMPARISON: 8/16/2012    FINDINGS: Heart size and pulmonary vascularity are within normal  limits. The lungs are clear. No pneumothorax or pleural effusion.       Impression    IMPRESSION: No radiographic evidence of acute chest abnormality.     HAMIDA ELDER MD   CBC with platelets differential   Result Value Ref Range    WBC 7.0 4.0 - 11.0 10e9/L    RBC Count 4.37 3.8 - 5.2 10e12/L    Hemoglobin 13.3 11.7 - 15.7 g/dL    Hematocrit 40.1 35.0 - 47.0 %    MCV 92 78 - 100 fl    MCH 30.4 26.5 - 33.0 pg    MCHC 33.2 31.5 - 36.5 g/dL    RDW 12.9 10.0 - 15.0 %    Platelet Count 270 150 - 450 10e9/L    Diff Method Automated Method     % Neutrophils 56.8 %    % Lymphocytes 31.1 %    % Monocytes 8.0 %    % Eosinophils 1.6 %    % Basophils 2.4 %    % Immature Granulocytes 0.1 %    Nucleated RBCs 2 (H) 0 /100    Absolute Neutrophil 4.0 1.6 - 8.3 10e9/L    Absolute Lymphocytes 2.2 0.8 - 5.3 10e9/L    Absolute Monocytes 0.6 0.0 - 1.3 10e9/L    Absolute Basophils 0.2 0.0 - 0.2 10e9/L    Abs Immature Granulocytes 0.0 0 - 0.4 10e9/L    Absolute Nucleated RBC 0.1    Comprehensive metabolic panel   Result Value Ref Range    Sodium 140 133 - 144 mmol/L    Potassium 3.9 3.4 - 5.3 mmol/L    Chloride 106 94 - 109 mmol/L    Carbon Dioxide 31 20 - 32 mmol/L    Anion Gap 3 3 - 14 mmol/L    Glucose 93 70 - 99 mg/dL    Urea Nitrogen 12 7 - 30 mg/dL    Creatinine 0.92 0.52 - 1.04 mg/dL    GFR Estimate 67 >60 mL/min/[1.73_m2]    GFR Estimate If Black 77 >60 mL/min/[1.73_m2]    Calcium 9.8 8.5 - 10.1 mg/dL    Bilirubin Total 1.0 0.2 - 1.3 mg/dL    Albumin 4.3 3.4 - 5.0 g/dL    Protein Total 7.7 6.8 - 8.8 g/dL    Alkaline Phosphatase 48 40 - 150 U/L    ALT 9 0 - 50 U/L    AST 16 0 - 45 U/L   Troponin I   Result Value Ref Range    Troponin  I ES <0.015 0.000 - 0.045 ug/L       Medications   LORazepam (ATIVAN) injection 0.5 mg (0.5 mg Intravenous Given 10/25/19 2744)       Assessments & Plan      I have reviewed the findings, diagnosis, plan and need for follow up with the patient and     Discharge Medication List as of 10/25/2019  7:13 PM          Final diagnoses:   Shortness of breath   Anxiety     Disposition: Patient discharged home in stable condition.  Plan as above.  Return for concerns.     Note: Chart documentation done in part with Dragon Voice Recognition software. Although reviewed after completion, some word and grammatical errors may remain.     10/25/2019   [facility] EMERGENCY DEPARTMENT

## 2019-10-29 ENCOUNTER — NURSE TRIAGE (OUTPATIENT)
Dept: NURSING | Facility: CLINIC | Age: 64
End: 2019-10-29

## 2019-10-29 NOTE — TELEPHONE ENCOUNTER
Sister Catherine calls for general information, if she can visit her sister at home and not be disruptive. FNA recommended to have her work with sister's caregiver to find out what sister's routine is. Caller verbalized understanding and had no further questions.     Gloria Owusu RN/[facility] Nurse Advisor      Reason for Disposition    General information question, no triage required and triager able to answer question    Protocols used: INFORMATION ONLY CALL-A-

## 2019-11-17 ENCOUNTER — HOSPITAL ENCOUNTER (EMERGENCY)
Facility: CLINIC | Age: 64
Discharge: HOME | End: 2019-11-17
Attending: PHYSICIAN ASSISTANT | Admitting: PHYSICIAN ASSISTANT
Payer: COMMERCIAL

## 2019-11-17 ENCOUNTER — APPOINTMENT (OUTPATIENT)
Dept: CT IMAGING | Facility: CLINIC | Age: 64
End: 2019-11-17
Attending: PHYSICIAN ASSISTANT
Payer: COMMERCIAL

## 2019-11-17 ENCOUNTER — TELEPHONE (OUTPATIENT)
Dept: FAMILY MEDICINE | Facility: OTHER | Age: 64
End: 2019-11-17

## 2019-11-17 VITALS
SYSTOLIC BLOOD PRESSURE: 160 MMHG | OXYGEN SATURATION: 98 % | RESPIRATION RATE: 20 BRPM | BODY MASS INDEX: 21.75 KG/M2 | DIASTOLIC BLOOD PRESSURE: 97 MMHG | HEART RATE: 79 BPM | WEIGHT: 117 LBS | TEMPERATURE: 96.9 F

## 2019-11-17 DIAGNOSIS — F41.9 ANXIETY: ICD-10-CM

## 2019-11-17 DIAGNOSIS — G44.209 TENSION HEADACHE: ICD-10-CM

## 2019-11-17 DIAGNOSIS — R06.00 DYSPNEA: ICD-10-CM

## 2019-11-17 LAB
ALBUMIN SERPL-MCNC: 4 G/DL (ref 3.4–5)
ALP SERPL-CCNC: 43 U/L (ref 40–150)
ALT SERPL W P-5'-P-CCNC: 9 U/L (ref 0–50)
ANION GAP SERPL CALCULATED.3IONS-SCNC: 4 MMOL/L (ref 3–14)
AST SERPL W P-5'-P-CCNC: 16 U/L (ref 0–45)
BASOPHILS # BLD AUTO: 0 10E9/L (ref 0–0.2)
BASOPHILS NFR BLD AUTO: 0.3 %
BILIRUB SERPL-MCNC: 0.8 MG/DL (ref 0.2–1.3)
BUN SERPL-MCNC: 27 MG/DL (ref 7–30)
CALCIUM SERPL-MCNC: 8.8 MG/DL (ref 8.5–10.1)
CHLORIDE SERPL-SCNC: 111 MMOL/L (ref 94–109)
CO2 SERPL-SCNC: 28 MMOL/L (ref 20–32)
CREAT SERPL-MCNC: 1.06 MG/DL (ref 0.52–1.04)
D DIMER PPP FEU-MCNC: 0.7 UG/ML FEU (ref 0–0.5)
DIFFERENTIAL METHOD BLD: NORMAL
EOSINOPHIL NFR BLD AUTO: 1.6 %
ERYTHROCYTE [DISTWIDTH] IN BLOOD BY AUTOMATED COUNT: 13.1 % (ref 10–15)
GFR SERPL CREATININE-BSD FRML MDRD: 56 ML/MIN/{1.73_M2}
GLUCOSE SERPL-MCNC: 98 MG/DL (ref 70–99)
HCT VFR BLD AUTO: 37.9 % (ref 35–47)
HGB BLD-MCNC: 12.5 G/DL (ref 11.7–15.7)
IMM GRANULOCYTES # BLD: 0 10E9/L (ref 0–0.4)
IMM GRANULOCYTES NFR BLD: 0.3 %
LYMPHOCYTES # BLD AUTO: 1.9 10E9/L (ref 0.8–5.3)
LYMPHOCYTES NFR BLD AUTO: 29.4 %
MCH RBC QN AUTO: 30.4 PG (ref 26.5–33)
MCHC RBC AUTO-ENTMCNC: 33 G/DL (ref 31.5–36.5)
MCV RBC AUTO: 92 FL (ref 78–100)
MONOCYTES # BLD AUTO: 0.4 10E9/L (ref 0–1.3)
MONOCYTES NFR BLD AUTO: 6.9 %
NEUTROPHILS # BLD AUTO: 3.9 10E9/L (ref 1.6–8.3)
NEUTROPHILS NFR BLD AUTO: 61.5 %
NRBC # BLD AUTO: 0 10*3/UL
NRBC BLD AUTO-RTO: 0 /100
PLATELET # BLD AUTO: 236 10E9/L (ref 150–450)
POTASSIUM SERPL-SCNC: 4.1 MMOL/L (ref 3.4–5.3)
PROT SERPL-MCNC: 7 G/DL (ref 6.8–8.8)
RBC # BLD AUTO: 4.11 10E12/L (ref 3.8–5.2)
SODIUM SERPL-SCNC: 143 MMOL/L (ref 133–144)
TROPONIN I SERPL-MCNC: <0.015 UG/L (ref 0–0.04)
WBC # BLD AUTO: 6.4 10E9/L (ref 4–11)

## 2019-11-17 PROCEDURE — 99285 EMERGENCY DEPT VISIT HI MDM: CPT | Mod: 25 | Performed by: PHYSICIAN ASSISTANT

## 2019-11-17 PROCEDURE — 25000128 H RX IP 250 OP 636: Performed by: PHYSICIAN ASSISTANT

## 2019-11-17 PROCEDURE — 80053 COMPREHEN METABOLIC PANEL: CPT | Performed by: PHYSICIAN ASSISTANT

## 2019-11-17 PROCEDURE — 85025 COMPLETE CBC W/AUTO DIFF WBC: CPT | Performed by: PHYSICIAN ASSISTANT

## 2019-11-17 PROCEDURE — 84484 ASSAY OF TROPONIN QUANT: CPT | Performed by: PHYSICIAN ASSISTANT

## 2019-11-17 PROCEDURE — 96374 THER/PROPH/DIAG INJ IV PUSH: CPT | Performed by: PHYSICIAN ASSISTANT

## 2019-11-17 PROCEDURE — 85379 FIBRIN DEGRADATION QUANT: CPT | Performed by: PHYSICIAN ASSISTANT

## 2019-11-17 PROCEDURE — 71275 CT ANGIOGRAPHY CHEST: CPT

## 2019-11-17 PROCEDURE — 25000125 ZZHC RX 250: Performed by: PHYSICIAN ASSISTANT

## 2019-11-17 PROCEDURE — 93010 ELECTROCARDIOGRAM REPORT: CPT | Mod: Z6 | Performed by: PHYSICIAN ASSISTANT

## 2019-11-17 PROCEDURE — 70450 CT HEAD/BRAIN W/O DYE: CPT

## 2019-11-17 PROCEDURE — 93005 ELECTROCARDIOGRAM TRACING: CPT | Performed by: PHYSICIAN ASSISTANT

## 2019-11-17 RX ORDER — ALPRAZOLAM 0.25 MG
0.25 TABLET ORAL 3 TIMES DAILY PRN
Qty: 5 TABLET | Refills: 0 | Status: SHIPPED | OUTPATIENT
Start: 2019-11-17 | End: 2019-11-17

## 2019-11-17 RX ORDER — ALPRAZOLAM 0.25 MG
0.25 TABLET ORAL 3 TIMES DAILY PRN
Qty: 5 TABLET | Refills: 0 | Status: SHIPPED | OUTPATIENT
Start: 2019-11-17 | End: 2019-11-19

## 2019-11-17 RX ORDER — LORAZEPAM 2 MG/ML
0.5 INJECTION INTRAMUSCULAR ONCE
Status: COMPLETED | OUTPATIENT
Start: 2019-11-17 | End: 2019-11-17

## 2019-11-17 RX ORDER — IOPAMIDOL 755 MG/ML
500 INJECTION, SOLUTION INTRAVASCULAR ONCE
Status: COMPLETED | OUTPATIENT
Start: 2019-11-17 | End: 2019-11-17

## 2019-11-17 RX ADMIN — SODIUM CHLORIDE 70 ML: 9 INJECTION, SOLUTION INTRAVENOUS at 17:28

## 2019-11-17 RX ADMIN — IOPAMIDOL 70 ML: 755 INJECTION, SOLUTION INTRAVENOUS at 17:28

## 2019-11-17 RX ADMIN — LORAZEPAM 0.5 MG: 2 INJECTION INTRAMUSCULAR; INTRAVENOUS at 16:52

## 2019-11-17 NOTE — ED TRIAGE NOTES
She has acute onset of shortness of breath and headache one hour ago. She has a history of dementia and parkinson's

## 2019-11-17 NOTE — ED NOTES
Patient is much more calm after having Ativan and getting up fir the CT scan. Arm/leg tremors have decreased.

## 2019-11-17 NOTE — ED PROVIDER NOTES
"  History     Chief Complaint   Patient presents with     Shortness of Breath     The history is provided by the patient and the spouse.     Nathaly Farias is a 64 year old female who is presenting to the emergency department with complaints of shortness of breath. The patient states that she got a sudden  headache about one hour ago and shortness of breath that started about an hour and a half ago. Her  thinks her headache is about an 8/10 and says she has been tearful all day. The patient had a similar, small episode earlier today and another one a few weeks ago. After her episode earlier today the feeling went away, but has now returned. She says she has not had a fever, chills, cough, chest pain, or leg swelling. She claims she has had a runny nose. The patient has early onset dementia and parkinson's. Her  mentions that she is very anxious and has been \"working herself up\".      Excerpt from her ED visit for the same concern on 10/25/2019:    ED Course (with Medical Decision Making)     Pt seen and examined by me.  RN and EPIC notes reviewed.       Patient with complains of shortness of breath.  Her O2 sats are within normal limits and her lung sounds are clear.  She is anxious.  There is some difficulty with getting a full history.  I am going to do an EKG and a chest x-ray along with labs to be sure there is no underlying cardiac or pulmonary abnormalities.     EKG shows normal sinus rhythm with no acute ST segment abnormalities.  She has an RSR pattern in lead V1 that is nondiagnostic.  No EKG for comparison.  She does have a little bit of baseline abnormality due to her tremor.     Chest x-ray was clear with no acute abnormalities.  Labs were all within normal limits including troponin.     I did give the patient 0.5 mg of Ativan.  She has significant decrease in her tremor.  She denies any further shortness of breath.  Her  is more comfortable now and would like to take her home.  If " she has any worsening or changes she can return at any time.         Procedures           Results for orders placed or performed during the hospital encounter of 10/25/19 (from the past 24 hour(s))   XR Chest 2 Views     Narrative     CHEST TWO VIEWS 10/25/2019 5:43 PM      HISTORY: Shortness of breath.      COMPARISON: 8/16/2012     FINDINGS: Heart size and pulmonary vascularity are within normal  limits. The lungs are clear. No pneumothorax or pleural effusion.         Impression     IMPRESSION: No radiographic evidence of acute chest abnormality.      HAMIDA ELDER MD   CBC with platelets differential   Result Value Ref Range     WBC 7.0 4.0 - 11.0 10e9/L     RBC Count 4.37 3.8 - 5.2 10e12/L     Hemoglobin 13.3 11.7 - 15.7 g/dL     Hematocrit 40.1 35.0 - 47.0 %     MCV 92 78 - 100 fl     MCH 30.4 26.5 - 33.0 pg     MCHC 33.2 31.5 - 36.5 g/dL     RDW 12.9 10.0 - 15.0 %     Platelet Count 270 150 - 450 10e9/L     Diff Method Automated Method       % Neutrophils 56.8 %     % Lymphocytes 31.1 %     % Monocytes 8.0 %     % Eosinophils 1.6 %     % Basophils 2.4 %     % Immature Granulocytes 0.1 %     Nucleated RBCs 2 (H) 0 /100     Absolute Neutrophil 4.0 1.6 - 8.3 10e9/L     Absolute Lymphocytes 2.2 0.8 - 5.3 10e9/L     Absolute Monocytes 0.6 0.0 - 1.3 10e9/L     Absolute Basophils 0.2 0.0 - 0.2 10e9/L     Abs Immature Granulocytes 0.0 0 - 0.4 10e9/L     Absolute Nucleated RBC 0.1     Comprehensive metabolic panel   Result Value Ref Range     Sodium 140 133 - 144 mmol/L     Potassium 3.9 3.4 - 5.3 mmol/L     Chloride 106 94 - 109 mmol/L     Carbon Dioxide 31 20 - 32 mmol/L     Anion Gap 3 3 - 14 mmol/L     Glucose 93 70 - 99 mg/dL     Urea Nitrogen 12 7 - 30 mg/dL     Creatinine 0.92 0.52 - 1.04 mg/dL     GFR Estimate 67 >60 mL/min/[1.73_m2]     GFR Estimate If Black 77 >60 mL/min/[1.73_m2]     Calcium 9.8 8.5 - 10.1 mg/dL     Bilirubin Total 1.0 0.2 - 1.3 mg/dL     Albumin 4.3 3.4 - 5.0 g/dL     Protein Total 7.7  6.8 - 8.8 g/dL     Alkaline Phosphatase 48 40 - 150 U/L     ALT 9 0 - 50 U/L     AST 16 0 - 45 U/L   Troponin I   Result Value Ref Range     Troponin I ES <0.015 0.000 - 0.045 ug/L         Medications   LORazepam (ATIVAN) injection 0.5 mg (0.5 mg Intravenous Given 10/25/19 1814)             Allergies:  Allergies   Allergen Reactions     Sulfa Drugs      Tacchycardia, had to go to ED     Naproxen GI Disturbance     Oxycontin [Oxycodone] GI Disturbance     Made her feel funny/upset stomach     Ropinirole      Did not work       Problem List:    Patient Active Problem List    Diagnosis Date Noted     Hallucinations 01/26/2019     Priority: Medium     Hiatal hernia 07/05/2017     Priority: Medium     Dementia (H) 03/13/2017     Priority: Medium     IMPRESSIONS AND RECOMMENDATIONS     Current results indicate marked attentional difficulties, impairments in memory, which in some cases reflected impairments in retention and in other cases reflected retrieval difficulties, and mild anomia as well as mild executive dysfunction. Premorbid intellectual functioning is estimated to fall in the borderline range. She denied experiencing significant depressive symptomatology.     This pattern of performance is suggestive of mild dementia, and there is a suggestion of mesial temporal lobe as well as frontal and subcortical system involvement. She has had increasing difficulty managing her instrumental activities of daily living. Taken together with her history, a neurodegenerative etiology seems likely. Although Parkinson's disease with dementia is a diagnostic consideration, the possibility of an additional etiology, including Alzheimer's disease, cannot entirely be ruled out. In view of the marked attentional difficulties, nonrestorative sleep and the effects of medications may also contribute. Although she has a history of depression, she is not reporting significant depressive symptomatology currently, but does appear to be  done experiencing mild anxiety.     In terms of daily functioning, Ms. Farias may require increasing assistance managing her instrumental activities of daily living. In particular, she may require additional assistance managing her medications. She continues to drive, although she has limited her driving to short distances and places with which she is familiar. Given her cognitive difficulties, it may be prudent for her to consider a formal 's assessment, such as that offered at [facility]. She will likely benefit from structure and routine. If she has difficulty managing large, complex tasks, others may assist by breaking down such tasks into smaller, more manageable parts. She has marked attentional difficulties, and may find it helpful when working on a task to work in an environment that is relatively free from distractions, such as noises or other interruptions. She may find it helpful to take frequent, brief breaks when working on a project. She may benefit from the use of written reminders or checklists. It may be helpful to follow her over time. Repeated neuropsychological evaluation in one year may help to determine whether her cognitive difficulties are progressive. These results have not been discussed with Ms. Farias or her family, although they were encouraged to contact my office to schedule an appointment for feedback should they so desire.          Cognitive disorder evalaution 2017 03/06/2017     Priority: Medium     She has marked attentional difficulties, impairments in memory including a rapid forgetting rate, mild anomia, and mild executive dysfunction. 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 She's required increasing assistance with finances, medications, driving, and cooking. I think this is a mild dementia, and there's a suggestion of mesial temporal lobe involvement, so Alzheimer's disease can't be  ruled out, although there are also some features consistent with frontal system involvement. She's not reporting significant problems with mood       Post-traumatic osteoarthritis of left knee 10/21/2016     Priority: Medium     Pain from implanted hardware, initial encounter 10/21/2016     Priority: Medium     Seborrheic dermatitis 02/29/2016     Priority: Medium     Rosacea 03/17/2015     Priority: Medium     Asymptomatic varicose veins 03/17/2015     Priority: Medium     Wears glasses 02/23/2015     Priority: Medium     Constipation 02/23/2015     Priority: Medium     Incomplete RBBB 02/23/2015     Priority: Medium     Heart murmur 02/23/2015     Priority: Medium     Family history of tremor 02/23/2015     Priority: Medium     Mother has an head tremor        History of MRI of brain and brain stem 02/21/2015     Priority: Medium     MRI OF THE BRAIN WITHOUT CONTRAST 7/10/2014 1:21 PM   COMPARISON: None.  HISTORY: Left-sided upper and lower extremity tremor. Balance issues.  TECHNIQUE:   Brain: Axial diffusion-weighted with ADC map, T2-weighted with fat  saturation, T1-weighted and turboFLAIR and coronal T1-weighted images  of the brain were obtained without intravenous contrast.   FINDINGS: There is mild diffuse cerebral volume loss. There are  multiple tiny scattered focal areas of abnormal T2 signal  hyperintensity in the cerebral white matter bilaterally that are  consistent with sequela of chronic small vessel ischemic disease.   The ventricles and basal cisterns are within normal limits in  configuration given the degree of cerebral volume loss. There is no  midline shift. There are no extra-axial fluid collections. There is  no evidence for stroke or acute intracranial hemorrhage.   There is no sinusitis or mastoiditis.   IMPRESSION  IMPRESSION: Diffuse cerebral volume loss and cerebral white matter  changes consistent with chronic small vessel ischemic disease. No  evidence for acute intracranial  pathology.  MYRA VAZ MD       Paralysis agitans (H) 07/30/2014     Priority: Medium     7/30/2014 evaluation by Dr. Jimenez    SUMMARY: She first noted this tremor in 2009. It affected her left hand. It occurred mainly at work. Later she noted the tremor affected her left leg. This is mainly a tremor at rest, and it will stop if she moves the limb. She can also have it to some extent with a sustained posture.   She reports her balance has worsened over the past couple of years, and her daughter points out that she tends to shuffle. She reports no change in her voice. She reports no change in her taste or smell. She describes her handwriting as slow and less smooth, but not necessarily small.   Aside from citalopram, she has not been on any psychiatric medications or antiemetic drugs.     Her evaluation for the tremor included a normal CBC. Comprehensive metabolic profile was notable only for sodium of 146. TSH was normal.   She did have a brain MRI scan that I reviewed. There are some scattered T2 signal changes in the white matter which are nonspecific. Note is made by the radiologist of diffuse cerebral volume loss, but I am not impressed by a great deal of atrophy.       HTN, goal below 140/90 07/07/2014     Priority: Medium     Advanced directives, counseling/discussion 04/23/2012     Priority: Medium     Discussed advance care planning with patient; information given to patient to review. 4/23/2012          Mild major depression (H) 10/04/2011     Priority: Medium     HYPERLIPIDEMIA LDL GOAL <160 10/31/2010     Priority: Medium     Neck pain 09/17/2009     Priority: Medium     Headache 09/17/2009     Priority: Medium     Problem list name updated by automated process. Provider to review       Overactive bladder 03/05/2009     Priority: Medium     Chronic cholecystitis 04/17/2008     Priority: Medium     Essential hypertension, benign 11/26/2007     Priority: Medium     Sleep apnea      Priority: Medium      moderate, sleep study 11/07, on CPAP  Problem list name updated by automated process. 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       Obesity 06/16/2005     Priority: Medium     Problem list name updated by automated process. Provider to review       Malaise and fatigue 06/16/2005     Priority: Medium     Problem list name updated by automated process. Provider to review       Migraine variant 02/16/2004     Priority: Medium     Problem list name updated by automated process. Provider to review       Symptomatic menopausal or female climacteric states 02/16/2004     Priority: Medium     hot flashes, dec. libido, irritability       Prolapse of vaginal wall 08/13/2002     Priority: Medium     Problem list name updated by automated process. Provider to review and confirm  Do you wish to do the replacement in the background? yes            Past Medical History:    Past Medical History:   Diagnosis Date     Arthralgia of temporomandibular joint 10/22/1996     Arthritis      Cognitive disorder evalaution 2017 3/6/2017     Constipation 2/23/2015     Dementia (H) 3/13/2017     Depressive disorder      Heart murmur 2/23/2015     History of MRI of brain and brain stem 2/21/2015     Hypertension      Incomplete RBBB 2/23/2015     Motion sickness      Other and unspecified hyperlipidemia      Parkinson's disease (H)      Seborrheic dermatitis 2/29/2016     Unspecified arthropathy, hand 08/07/1997     Unspecified sleep apnea      Variants of migraine, not elsewhere classified, without mention of intractable migraine without mention of status migrainosus      Wears glasses 2/23/2015        Past Surgical History:    Past Surgical History:   Procedure Laterality Date     ------------OTHER-------------      left shoulder     ARTHRODESIS TOE(S)  3/2/2011    ARTHRODESIS TOE(S) performed by CLARA LUCAS at  OR     C LIGATE FALLOPIAN TUBE  1998     CHOLECYSTECTOMY, LAPOROSCOPIC  5/12/2008    Cholecystectomy, Laparoscopic     ESOPHAGOSCOPY, GASTROSCOPY, DUODENOSCOPY (EGD), COMBINED N/A 7/5/2017    Procedure: COMBINED ESOPHAGOSCOPY, GASTROSCOPY, DUODENOSCOPY (EGD), BIOPSY SINGLE OR MULTIPLE;  Esophagogastroduodenoscopy with Multiple Biopsies by Biopsy;  Surgeon: Tj Denney MD;  Location: [facility]     HC COLONOSCOPY W/WO BRUSH/WASH  08/30/06    normal     HC OPEN TX TIBIAL SHAFT FX W PLATE/SCREWS W/WO CERCLAGE  2000    Multiple operations on left leg     HC SHLDR ARTHROSCOP,PART ACROMIOPLAS  11/16/06    Right shoulder     HC SHLDR ARTHROSCOP,SURG,DIS CLAVICULECTOMY  11/16/06    Right shoulder       Family History:    Family History   Problem Relation Age of Onset     Diabetes Father         type II, diagnosed in late 60's     Hypertension Father      Cancer Father         rare kidney cancer had kidney removed, 75 y.o. at onset     Cerebrovascular Disease Father      Rashes/Skin Problems Father         rosacea     Neurologic Disorder Mother         head tremor     Heart Disease Brother         Valvular disease corrected with surgery     Heart Disease Paternal Grandfather         MI     Migraines Son      Migraines Sister      Rashes/Skin Problems Sister         rosacea       Social History:  Marital Status:   [2]  Social History     Tobacco Use     Smoking status: Never Smoker     Smokeless tobacco: Never Used     Tobacco comment: no smokers in the household   Substance Use Topics     Alcohol use: Yes     Alcohol/week: 0.0 standard drinks     Comment: very occ     Drug use: No        Medications:    ALPRAZolam (XANAX) 0.25 MG tablet  acetaminophen (TYLENOL) 500 MG  tablet  carbidopa-levodopa (SINEMET)  MG tablet  citalopram (CELEXA) 20 MG tablet  estradiol (ESTRACE) 0.1 MG/GM vaginal cream  melatonin 5 MG tablet  naproxen sodium 220 MG capsule  NONFORMULARY  polyethylene glycol (MIRALAX) powder  Probiotic Product (PROBIOTIC DAILY) CAPS  QUEtiapine (SEROQUEL) 25 MG tablet  rivastigmine (EXELON) 13.3 MG/24HR 24 hr patch  senna-docusate (SENOKOT-S/PERICOLACE) 8.6-50 MG tablet  trospium (SANCTURA) 20 MG tablet  ZOLMitriptan (ZOMIG) 5 MG tablet          Review of Systems   All other systems reviewed and are negative.      Physical Exam   BP: (!) 199/116  Pulse: 70  Temp: 98  F (36.7  C)  Resp: 16  Weight: 53.1 kg (117 lb)  SpO2: 99 %      Physical Exam  Vitals signs and nursing note reviewed.   Constitutional:       General: She is in acute distress (Appears very anxious.).      Appearance: She is not toxic-appearing or diaphoretic.   HENT:      Head: Normocephalic and atraumatic.      Right Ear: External ear normal.      Left Ear: External ear normal.      Nose: Nose normal.      Mouth/Throat:      Mouth: Mucous membranes are moist.      Pharynx: No pharyngeal swelling or oropharyngeal exudate.   Eyes:      General: No scleral icterus.        Right eye: No discharge.         Left eye: No discharge.      Conjunctiva/sclera: Conjunctivae normal.      Pupils: Pupils are equal, round, and reactive to light.   Neck:      Musculoskeletal: Normal range of motion and neck supple.      Thyroid: No thyromegaly.      Vascular: No hepatojugular reflux.   Cardiovascular:      Rate and Rhythm: Normal rate and regular rhythm.      Heart sounds: Normal heart sounds. No murmur.   Pulmonary:      Effort: Pulmonary effort is normal. No respiratory distress.      Breath sounds: Normal breath sounds. No wheezing or rales.   Chest:      Chest wall: No tenderness.   Abdominal:      General: Bowel sounds are normal. There is no distension.      Palpations: Abdomen is soft. There is no mass.       Tenderness: There is no abdominal tenderness. There is no guarding or rebound.   Musculoskeletal: Normal range of motion.         General: No tenderness or deformity.      Right lower leg: She exhibits no tenderness. No edema.      Left lower leg: She exhibits no tenderness. No edema.   Lymphadenopathy:      Cervical: No cervical adenopathy.   Skin:     General: Skin is warm and dry.      Capillary Refill: Capillary refill takes less than 2 seconds.      Findings: No erythema or rash.   Neurological:      Mental Status: She is alert and oriented to person, place, and time.      Cranial Nerves: No cranial nerve deficit.   Psychiatric:         Behavior: Behavior normal.         Thought Content: Thought content normal.         ED Course        Procedures               EKG Interpretation:      Interpreted by Delvin Dunne PA-C  Time reviewed: 1810  Symptoms at time of EKG: dyspnea   Rhythm: normal sinus   Rate: normal  Axis: normal  Ectopy: none  Conduction: normal  ST Segments/ T Waves: No ST-T wave changes  Q Waves: none  Comparison to prior: Unchanged    Clinical Impression: normal EKG          Critical Care time:  none               Results for orders placed or performed during the hospital encounter of 11/17/19 (from the past 24 hour(s))   CBC with platelets differential   Result Value Ref Range    WBC 6.4 4.0 - 11.0 10e9/L    RBC Count 4.11 3.8 - 5.2 10e12/L    Hemoglobin 12.5 11.7 - 15.7 g/dL    Hematocrit 37.9 35.0 - 47.0 %    MCV 92 78 - 100 fl    MCH 30.4 26.5 - 33.0 pg    MCHC 33.0 31.5 - 36.5 g/dL    RDW 13.1 10.0 - 15.0 %    Platelet Count 236 150 - 450 10e9/L    Diff Method Automated Method     % Neutrophils 61.5 %    % Lymphocytes 29.4 %    % Monocytes 6.9 %    % Eosinophils 1.6 %    % Basophils 0.3 %    % Immature Granulocytes 0.3 %    Nucleated RBCs 0 0 /100    Absolute Neutrophil 3.9 1.6 - 8.3 10e9/L    Absolute Lymphocytes 1.9 0.8 - 5.3 10e9/L    Absolute Monocytes 0.4 0.0 - 1.3 10e9/L     Absolute Basophils 0.0 0.0 - 0.2 10e9/L    Abs Immature Granulocytes 0.0 0 - 0.4 10e9/L    Absolute Nucleated RBC 0.0    D dimer quantitative   Result Value Ref Range    D Dimer 0.7 (H) 0.0 - 0.50 ug/ml FEU   Comprehensive metabolic panel   Result Value Ref Range    Sodium 143 133 - 144 mmol/L    Potassium 4.1 3.4 - 5.3 mmol/L    Chloride 111 (H) 94 - 109 mmol/L    Carbon Dioxide 28 20 - 32 mmol/L    Anion Gap 4 3 - 14 mmol/L    Glucose 98 70 - 99 mg/dL    Urea Nitrogen 27 7 - 30 mg/dL    Creatinine 1.06 (H) 0.52 - 1.04 mg/dL    GFR Estimate 56 (L) >60 mL/min/[1.73_m2]    GFR Estimate If Black 65 >60 mL/min/[1.73_m2]    Calcium 8.8 8.5 - 10.1 mg/dL    Bilirubin Total 0.8 0.2 - 1.3 mg/dL    Albumin 4.0 3.4 - 5.0 g/dL    Protein Total 7.0 6.8 - 8.8 g/dL    Alkaline Phosphatase 43 40 - 150 U/L    ALT 9 0 - 50 U/L    AST 16 0 - 45 U/L   Troponin I   Result Value Ref Range    Troponin I ES <0.015 0.000 - 0.045 ug/L   CT Head w/o Contrast    Narrative    CT SCAN OF THE HEAD WITHOUT CONTRAST   11/17/2019 5:14 PM     HISTORY: Acute onset headache.    TECHNIQUE:  Axial images of the head and coronal reformations without  IV contrast material. Radiation dose for this scan was reduced using  automated exposure control, adjustment of the mA and/or kV according  to patient size, or iterative reconstruction technique.    COMPARISON: Head MRI 11/6/2018, head CT 9/29/2018.    FINDINGS:  Mild volume loss is present. Patchy white matter hypoattenuation  likely represents chronic small vessel ischemic change. No evidence of  acute ischemia, hemorrhage, mass, mass effect, or hydrocephalus. The  visualized calvarium, tympanic cavities, mastoid cavities, and  paranasal sinuses are unremarkable.      Impression    IMPRESSION:   No acute intracranial abnormality.    PARISA NEWMAN MD   CT Chest Pulmonary Embolism w Contrast    Narrative    CT CHEST PULMONARY EMBOLISM WITH CONTRAST 11/17/2019 5:39 PM     HISTORY: Dyspnea, elevated D-dimer.      TECHNIQUE: Thin section axial images are performed from the thoracic  inlet to the lung bases utilizing 70 mL of Isovue 370 IV contrast  without adverse event. Coronal reformatted images are also generated.  Radiation dose for this scan was reduced using automated exposure  control, adjustment of the mA and/or kV according to patient size, or  iterative reconstruction technique.    FINDINGS:     Chest: Tiny right major fissure lymph node is noted on series 8, image  166. Lungs are otherwise clear. No infiltrate, consolidation or mass.  No pleural or pericardial fluid. Heart is normal in size. Esophagus is  unremarkable. No evidence of pulmonary embolism. Thoracic aorta is  tortuous in its course with a few scattered calcified atherosclerotic  plaques. No enlarged lymph nodes in the chest or axillas. Limited  images upper abdomen demonstrate calcified granulomas in the liver and  spleen. Prior cholecystectomy. Degenerative spine changes are present.  No destructive bone lesions are noted.      Impression    IMPRESSION:  1. No evidence of pulmonary embolism. Thoracic aorta is mildly  tortuous but otherwise unremarkable.  2. Lungs are clear. No enlarged lymph nodes.  3. Evidence of old granulomatous disease.    MAGDA GONZALEZ MD       Medications   LORazepam (ATIVAN) injection 0.5 mg (0.5 mg Intravenous Given 11/17/19 1652)   sodium chloride (PF) 0.9% PF flush 3 mL (3 mLs Intravenous Given 11/17/19 1728)   Saline Bag 100mL  CT  flush use only (70 mLs Intravenous Given 11/17/19 1728)   iopamidol (ISOVUE-370) solution 500 mL (70 mLs Intravenous Given 11/17/19 1728)       Assessments & Plan (with Medical Decision Making)     Dyspnea  Anxiety  Tension headache     64 year old female presents for evaluation of acute onset headache 1 hour prior to arrival which is throughout her head.  Describes it is a significant pain.  She has a hard time quantifying the amount of pain, but her  states he thinks it is 8 on a scale  of 10 based on her response to the pain.  No recent fall or head injury.  No prior history of intracranial abnormality.  Patient also has experienced dyspnea onset since onset of headache.  Denies any URI symptoms leading up to this.  Denies any chest pain or lower extremity pain/swelling.  She had a similar episode requiring ED evaluation on 10/25/2019 with a normal work-up at that time.  On exam blood pressure 199/116, pulse 70, temperature 98.0, and oxygen saturation 95% on room air.  Patient appears very anxious upon evaluation, and appears to be experiencing a panic attack.  Her exam is otherwise very reassuring.  She has her baseline upper extremity tremor from Parkinson's disease.  Lung fields are clear.  No murmur.  No abdominal discomfort.  Negative Homans sign and no calf tenderness.  EKG was performed and displays no acute ST or T wave change.  Laboratory levels displayed a minor elevation in the d-dimer at 0.7.  CT PE study was completely negative.  No sign of pulmonary embolus.  No chest masses.  Lung fields are otherwise clear.  CT of the head without intracranial bleeding or mass lesion.  Troponin undetectable.  Comprehensive metabolic panel all reassuring as well as a normal CBC.  Patient was given IV Ativan with complete resolution of her symptoms.  By the end of her visit, she felt completely improved.  She did not have any dyspnea, tachypnea, or hypoxia.  Repeat exam shows clear lung fields.  We did discuss the likelihood of anxiety contributing to her symptoms.  This is her second visit in the past 3 weeks for the same concern, and each visit has resolved her symptoms with a benzodiazepine.  I did give her a small Rx of alprazolam to use for any breakthrough symptoms prior to seeing her PCP this week for recheck evaluation.  Possible side effects of medication discussed.  Patient and her  were in agreement with this plan.  ED return instructions reviewed.     I have reviewed the nursing  notes.    I have reviewed the findings, diagnosis, plan and need for follow up with the patient.       New Prescriptions    ALPRAZOLAM (XANAX) 0.25 MG TABLET    Take 1 tablet (0.25 mg) by mouth 3 times daily as needed for anxiety       Final diagnoses:   Dyspnea   Anxiety   Tension headache               This document serves as a record of services personally performed by Delvin Dunne PA*. It was created on their behalf by Shanda Russell, a trained medical scribe. The creation of this record is based on the provider's personal observations and the statements of the patient. This document has been checked and approved by the attending provider.  Note: Chart documentation done in part with Dragon Voice Recognition software. Although reviewed after completion, some word and grammatical errors may remain.  11/17/2019   Delvin Dunne PA-C   [facility] EMERGENCY DEPARTMENT     Delvin Dunne PA-C  11/17/19 2658

## 2019-11-17 NOTE — ED AVS SNAPSHOT
[facility] Emergency Department  [address] [place] [address]  Phone:  [phone]  Fax:  [phone]                                    Nathaly Farias   MRN: [id number]    Department:  [facility] Emergency Department   Date of Visit:  11/17/2019           After Visit Summary Signature Page    I have received my discharge instructions, and my questions have been answered. I have discussed any challenges I see with this plan with the nurse or doctor.    ..........................................................................................................................................  Patient/Patient Representative Signature      ..........................................................................................................................................  Patient Representative Print Name and Relationship to Patient    ..................................................               ................................................  Date                                   Time    ..........................................................................................................................................  Reviewed by Signature/Title    ...................................................              ..............................................  Date                                               Time          22EPIC Rev 08/18

## 2019-11-18 ENCOUNTER — TELEPHONE (OUTPATIENT)
Dept: PHARMACY | Facility: CLINIC | Age: 64
End: 2019-11-18

## 2019-11-18 DIAGNOSIS — G44.86 CERVICOGENIC HEADACHE: Primary | ICD-10-CM

## 2019-11-18 NOTE — DISCHARGE INSTRUCTIONS
It was a pleasure working with you today!      I am thankful that you are feeling much better after today's treatment.  Thankfully, all of your testing returned reassuring.     If you experience a panic attack again at home, then you could try using the alprazolam.  Please see Dr. Monae in the clinic this week to discuss ongoing treatment for this if necessary.

## 2019-11-18 NOTE — TELEPHONE ENCOUNTER
It appears the patient was in the ED twice recently for anxiety, the second time with a headache. It seems more appropriate for primary care to assess these symptoms, rather than neurology.    I am happy to discuss medications with the  if he makes a telemedicine appointment with me. He can call neurology at [phone] or the [facility] scheduling line at [phone]    Corazon Dhaliwal Pharm.D.  Medication Therapy Management Pharmacist  Phone: [phone]

## 2019-11-18 NOTE — TELEPHONE ENCOUNTER
Received VM from , Wolf, wondering how her new medication (alprazolam) interacts with her other medications. Will discuss with Dr. Porter and HALEY Wilson prior to calling Wolf back.    Corazon Dhaliwal, Pharm.D.  Medication Therapy Management Pharmacist  Phone: [phone]

## 2019-11-18 NOTE — TELEPHONE ENCOUNTER
Spoke to patient's  and declined setting up a recheck here in clinic this week, but wanted a message sent to the Dr. Porter, and Corazon because she has been handling her medications. Would like a call back from one of them.  Connie Oshea MA

## 2019-11-18 NOTE — TELEPHONE ENCOUNTER
Discussed with Dr. Porter.     Plan:   1) Referral for patient see Dr. Jimenez for headaches.  2) Stop alprazolam as this could worsen her confusion/cognitive impairment.   3) Make follow up telemedicine appt with Corazon to discuss other medication options for anxiety if not able to see PCP or Dr. Porter.     Will have HALEY Wilson call , Wolf, back and discuss this plan.     Corazon Dhaliwal, Pharm.D.  Medication Therapy Management Pharmacist  Phone: [phone]

## 2019-11-18 NOTE — ED NOTES
Patient up to the bathroom with assistance from spouse and wheelchair. She appears to be much more comfortable at this time.

## 2019-11-18 NOTE — TELEPHONE ENCOUNTER
Spoke with Wolf. He will take the 1:30pm spot with Corazon over the phone tomorrow afternoon.     I assisted them with getting scheduled with Dr. Barrera (1st available) since pt last Dr. Jimenez over 3 years ago and would be a NEW pt; Wolf can only do Monday appts.     They will stop the Xanax. Will update Corazon. Katie Dumont RN

## 2019-11-18 NOTE — TELEPHONE ENCOUNTER
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 He still declines setting up an OV with PCP, still awaiting thoughts from neurology.  Connie Oshea MA

## 2019-11-19 ENCOUNTER — ALLIED HEALTH/NURSE VISIT (OUTPATIENT)
Dept: PHARMACY | Facility: CLINIC | Age: 64
End: 2019-11-19
Payer: COMMERCIAL

## 2019-11-19 DIAGNOSIS — R44.3 HALLUCINATIONS: ICD-10-CM

## 2019-11-19 DIAGNOSIS — N81.10 FEMALE BLADDER PROLAPSE: ICD-10-CM

## 2019-11-19 DIAGNOSIS — F41.9 ANXIETY: ICD-10-CM

## 2019-11-19 DIAGNOSIS — F32.0 MILD MAJOR DEPRESSION (H): ICD-10-CM

## 2019-11-19 DIAGNOSIS — G20.A1 DEMENTIA DUE TO PARKINSON'S DISEASE WITHOUT BEHAVIORAL DISTURBANCE (H): ICD-10-CM

## 2019-11-19 DIAGNOSIS — G20.A1 PARALYSIS AGITANS (H): Primary | ICD-10-CM

## 2019-11-19 DIAGNOSIS — F02.80 DEMENTIA DUE TO PARKINSON'S DISEASE WITHOUT BEHAVIORAL DISTURBANCE (H): ICD-10-CM

## 2019-11-19 DIAGNOSIS — R35.0 URINARY FREQUENCY: ICD-10-CM

## 2019-11-19 DIAGNOSIS — R51.9 NONINTRACTABLE EPISODIC HEADACHE, UNSPECIFIED HEADACHE TYPE: ICD-10-CM

## 2019-11-19 PROCEDURE — 99207 ZZC NO CHARGE LOS: CPT | Performed by: PHARMACIST

## 2019-11-19 RX ORDER — QUETIAPINE FUMARATE 25 MG/1
TABLET, FILM COATED ORAL
Qty: 315 TABLET | Refills: 3 | Status: SHIPPED | OUTPATIENT
Start: 2019-11-19 | End: 2019-12-06

## 2019-11-20 ENCOUNTER — TELEPHONE (OUTPATIENT)
Dept: FAMILY MEDICINE | Facility: OTHER | Age: 64
End: 2019-11-20

## 2019-11-20 DIAGNOSIS — N81.10 BLADDER PROLAPSE, FEMALE, ACQUIRED: Primary | ICD-10-CM

## 2019-11-20 NOTE — TELEPHONE ENCOUNTER
Patient seeing neurology for HA and hallucinations and urinary frequency. They would like us to get UA to rule out UTI. Order placed for her to complete.   If negative, neurology is planning to adjust meds - Seroquel.  Maxine Monae MD

## 2019-11-20 NOTE — TELEPHONE ENCOUNTER
Contacted Wolf patients  and relayed Dr Paz message below.  He stated [place] and it will be next to impossible to get Nathaly in for a UA.  Notified him I would forward this information back to Dr Monae as a FYI.

## 2019-11-20 NOTE — TELEPHONE ENCOUNTER
Order in for when they can, otherwise I will let Dr. Carpio know their request as this is the next appt they have.  Maxine Monae MD

## 2019-11-20 NOTE — TELEPHONE ENCOUNTER
"----- Message from Corazon Dhaliwal RP sent at 11/19/2019  3:04 PM CST -----  Regarding: RE: discussion with   Dr. Monae,    This patient has been in the ED twice in the last month for increased anxiety. Please see my note below with other symptoms she's been experiencing. It appears that someone from your clinic reached out to the patient's  and they declined follow up with you in clinic (post-ED visit). I am concerned that her acute worsening of symptoms could possibly be a UTI. Would you be willing to order a UA or what do you recommend? I think we will also slightly increase her Seroquel dose for hallucinations and anxiety/agitation.     Thank you!  Corazon Dhaliwal, Pharm.D.  Medication Therapy Management Pharmacist  Phone: [phone]  ----- Message -----  From: Jet Porter MD  Sent: 11/19/2019   2:59 PM CST  To: Katie Dumont, RN, Corazon Dhaliwal [facility]  Subject: RE: discussion with                       Okay  See if she can go to pcp to have this checked but agree with seroquel  ----- Message -----  From: Corazon Dhaliwal RPH  Sent: 11/19/2019   2:27 PM CST  To: Jet Porter MD, Katie Dumont, RN  Subject: discussion with                           I talked with the patient's . She has been in the ED twice for shaking/anxiety - headaches are not their primary concern.     She seems to be having increase in tremor (all over \"shaking\"), increase in hallucinations, increased confusion, increased anxiety associated with shaking, worse depression, and frequent urination. I am concerned about potential UTI and wondering if we should get a UA before changing the other medications. If negative, one option would be to add low-dose quetiapine in the AM (12.5 mg). Let me know your thoughts or we can discuss in clinic this afternoon.    Corazon Dhaliwal, Pharm.D.  Medication Therapy Management Pharmacist  Phone: [phone]        "

## 2019-11-22 ENCOUNTER — TELEPHONE (OUTPATIENT)
Dept: NEUROLOGY | Facility: CLINIC | Age: 64
End: 2019-11-22

## 2019-12-02 ENCOUNTER — OFFICE VISIT (OUTPATIENT)
Dept: UROLOGY | Facility: CLINIC | Age: 64
End: 2019-12-02
Payer: COMMERCIAL

## 2019-12-02 VITALS
SYSTOLIC BLOOD PRESSURE: 129 MMHG | HEIGHT: 62 IN | DIASTOLIC BLOOD PRESSURE: 73 MMHG | HEART RATE: 74 BPM | BODY MASS INDEX: 22.08 KG/M2 | OXYGEN SATURATION: 96 % | WEIGHT: 120 LBS

## 2019-12-02 DIAGNOSIS — R39.15 URINARY URGENCY: ICD-10-CM

## 2019-12-02 DIAGNOSIS — R35.0 FREQUENCY OF URINATION: Primary | ICD-10-CM

## 2019-12-02 DIAGNOSIS — N95.2 ATROPHIC VAGINITIS: ICD-10-CM

## 2019-12-02 DIAGNOSIS — R39.11 URINARY HESITANCY: ICD-10-CM

## 2019-12-02 DIAGNOSIS — N81.11 MIDLINE CYSTOCELE: ICD-10-CM

## 2019-12-02 LAB
ALBUMIN UR-MCNC: NEGATIVE MG/DL
APPEARANCE UR: CLEAR
BILIRUB UR QL STRIP: NEGATIVE
COLOR UR AUTO: YELLOW
GLUCOSE UR STRIP-MCNC: NEGATIVE MG/DL
HGB UR QL STRIP: NEGATIVE
KETONES UR STRIP-MCNC: NEGATIVE MG/DL
LEUKOCYTE ESTERASE UR QL STRIP: NEGATIVE
NITRATE UR QL: NEGATIVE
PH UR STRIP: 6 PH (ref 5–7)
SOURCE: NORMAL
SP GR UR STRIP: 1.02 (ref 1–1.03)
UROBILINOGEN UR STRIP-MCNC: NORMAL MG/DL (ref 0–2)

## 2019-12-02 PROCEDURE — 57160 INSERT PESSARY/OTHER DEVICE: CPT | Performed by: UROLOGY

## 2019-12-02 PROCEDURE — 81003 URINALYSIS AUTO W/O SCOPE: CPT | Performed by: UROLOGY

## 2019-12-02 PROCEDURE — 99214 OFFICE O/P EST MOD 30 MIN: CPT | Mod: 25 | Performed by: UROLOGY

## 2019-12-02 PROCEDURE — A4561 PESSARY RUBBER, ANY TYPE: HCPCS | Performed by: UROLOGY

## 2019-12-02 ASSESSMENT — MIFFLIN-ST. JEOR: SCORE: 1057.57

## 2019-12-02 NOTE — PROGRESS NOTES
CC:  Prolapse    HPI:  Nathaly Farias is a 64 year old female asked to be seen in consultation by Dr. Esposito for the above.  This problem has been going on for many years and has been getting worse.  It is not associated with any incontinence. The patient voids q20 min.  She denies any dysuria,  hematuria, hesitancy, intermittency, feeling of incomplete emptying, or any recent hx of UTI's or stones.    The patient has no constipation or splinting.   She reports a prominent vaginal bulge.  She has Parkinson's disease with Dimentia but is very bothered by the prolapse and it is uncomfortable to sit.     Obstetric Hx:  She is .  The weight of her largest baby was 6 lbs 7oz.  Babies were delivered vaginally.  /menopause 50's, HRT: none    Past Medical History:   Diagnosis Date     Arthralgia of temporomandibular joint 10/22/1996     Arthritis      Cognitive disorder evalaution 2017 3/6/2017    She has marked attentional difficulties, impairments in memory including a rapid forgetting rate, mild anomia, and mild executive dysfunction. 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 She's required increasing assistance with finances, medications, driving, and cooking. I think this is a mild dementia, and     Constipation 2015     Dementia (H) 3/13/2017    IMPRESSIONS AND RECOMMENDATIONS   Current results indicate marked attentional difficulties, impairments in memory, which in some cases reflected impairments in retention and in other cases reflected retrieval difficulties, and mild anomia as well as mild executive dysfunction. Premorbid intellectual functioning is estimated to fall in the borderline range. She denied experiencing significant depre     Depressive disorder      Heart murmur 2015     History of MRI of brain and brain stem 2015    MRI OF THE BRAIN WITHOUT CONTRAST 7/10/2014 1:21 PM  COMPARISON: None.  HISTORY: Left-sided upper and lower extremity tremor. Balance issues. TECHNIQUE:  Brain: Axial diffusion-weighted with ADC map, T2-weighted with fat saturation, T1-weighted and turboFLAIR and coronal T1-weighted images of the brain were obtained without intravenous contrast.  FINDINGS: There is mild diffuse cerebral volume loss     Hypertension      Incomplete RBBB 2/23/2015     Motion sickness      Other and unspecified hyperlipidemia      Parkinson's disease (H)      Seborrheic dermatitis 2/29/2016     Unspecified arthropathy, hand 08/07/1997     Unspecified sleep apnea     moderate, sleep study 11/07, on CPAP, has daytime somnolence with this     Variants of migraine, not elsewhere classified, without mention of intractable migraine without mention of status migrainosus      Wears glasses 2/23/2015       Past Surgical History:   Procedure Laterality Date     ------------OTHER-------------      left shoulder     ARTHRODESIS TOE(S)  3/2/2011    ARTHRODESIS TOE(S) performed by CLARA LUCAS at  OR     C LIGATE FALLOPIAN TUBE  1998     CHOLECYSTECTOMY, LAPOROSCOPIC  5/12/2008    Cholecystectomy, Laparoscopic     ESOPHAGOSCOPY, GASTROSCOPY, DUODENOSCOPY (EGD), COMBINED N/A 7/5/2017    Procedure: COMBINED ESOPHAGOSCOPY, GASTROSCOPY, DUODENOSCOPY (EGD), BIOPSY SINGLE OR MULTIPLE;  Esophagogastroduodenoscopy with Multiple Biopsies by Biopsy;  Surgeon: Tj Denney MD;  Location:  GI     HC COLONOSCOPY W/WO BRUSH/WASH  08/30/06    normal     HC OPEN TX TIBIAL SHAFT FX W PLATE/SCREWS W/WO CERCLAGE  2000    Multiple operations on left leg     HC SHLDR ARTHROSCOP,PART ACROMIOPLAS  11/16/06    Right shoulder     HC SHLDR ARTHROSCOP,SURG,DIS CLAVICULECTOMY  11/16/06    Right shoulder       Meds, Allergies, FHx and SHx reviewed per nurse's intake note.    ROS is negative on a 14 point scale except for what is mentioned in the HPI.  All other positive and pertinent information is mentioned in the HPI.    PEx:  "  Blood pressure 129/73, pulse 74, height 1.575 m (5' 2\"), weight 54.4 kg (120 lb), last menstrual period 06/05/2006, SpO2 96 %, not currently breastfeeding.  5' 2\", Body mass index is 21.95 kg/m ., 120 lbs 0 oz  Gen appearance:  Well groomed  HEENT:  EOMI, AT NC  Psych:  Normal Affect  Neuro:  A/O X 3  Skin:  Warm to touch  Resp:  No increased respiratory effort  Vasc:  RRR  lymph:  No LE edema  Abd:  Soft/NT, ND, no palpable masses  Musk:  Full ROM in extremities  :  Normal external genitalia and introitus, some atrophic changes          Urethra with small caruncle          Stress incontinence was not demonstrated with coughing          Grade II cystocele and no rectocele          Pelvic floor muscles are of normal tonicity, non tender          No significant apical descent appreciated.  Perineum WNL.    UA: UA RESULTS:  Recent Labs   Lab Test 08/29/19  1509 01/26/19  0025   COLOR Yellow Yellow   APPEARANCE Clear Clear   URINEGLC Negative Negative   URINEBILI Negative Negative   URINEKETONE Trace* 20*   SG 1.020 1.023   UBLD Negative Small*   URINEPH 5.5 5.0   PROTEIN Negative 30*   UROBILINOGEN 0.2  --    NITRITE Negative Negative   LEUKEST Negative Negative   RBCU  --  2   WBCU  --  3       Admission on 11/17/2019, Discharged on 11/17/2019   Component Date Value Ref Range Status     WBC 11/17/2019 6.4  4.0 - 11.0 10e9/L Final     RBC Count 11/17/2019 4.11  3.8 - 5.2 10e12/L Final     Hemoglobin 11/17/2019 12.5  11.7 - 15.7 g/dL Final     Hematocrit 11/17/2019 37.9  35.0 - 47.0 % Final     MCV 11/17/2019 92  78 - 100 fl Final     MCH 11/17/2019 30.4  26.5 - 33.0 pg Final     MCHC 11/17/2019 33.0  31.5 - 36.5 g/dL Final     RDW 11/17/2019 13.1  10.0 - 15.0 % Final     Platelet Count 11/17/2019 236  150 - 450 10e9/L Final     Diff Method 11/17/2019 Automated Method   Final     % Neutrophils 11/17/2019 61.5  % Final     % Lymphocytes 11/17/2019 29.4  % Final     % Monocytes 11/17/2019 6.9  % Final     % " Eosinophils 11/17/2019 1.6  % Final     % Basophils 11/17/2019 0.3  % Final     % Immature Granulocytes 11/17/2019 0.3  % Final     Nucleated RBCs 11/17/2019 0  0 /100 Final     Absolute Neutrophil 11/17/2019 3.9  1.6 - 8.3 10e9/L Final     Absolute Lymphocytes 11/17/2019 1.9  0.8 - 5.3 10e9/L Final     Absolute Monocytes 11/17/2019 0.4  0.0 - 1.3 10e9/L Final     Absolute Basophils 11/17/2019 0.0  0.0 - 0.2 10e9/L Final     Abs Immature Granulocytes 11/17/2019 0.0  0 - 0.4 10e9/L Final     Absolute Nucleated RBC 11/17/2019 0.0   Final     D Dimer 11/17/2019 0.7* 0.0 - 0.50 ug/ml FEU Final    Comment: This D-dimer assay is intended for use in conjunction with a clinical pretest   probability assessment model to exclude pulmonary embolism (PE) and deep   venous thrombosis (DVT) in outpatients suspected of PE or DVT. The cut-off   value is 0.5 ug/mL FEU.       Sodium 11/17/2019 143  133 - 144 mmol/L Final     Potassium 11/17/2019 4.1  3.4 - 5.3 mmol/L Final     Chloride 11/17/2019 111* 94 - 109 mmol/L Final     Carbon Dioxide 11/17/2019 28  20 - 32 mmol/L Final     Anion Gap 11/17/2019 4  3 - 14 mmol/L Final     Glucose 11/17/2019 98  70 - 99 mg/dL Final     Urea Nitrogen 11/17/2019 27  7 - 30 mg/dL Final     Creatinine 11/17/2019 1.06* 0.52 - 1.04 mg/dL Final     GFR Estimate 11/17/2019 56* >60 mL/min/[1.73_m2] Final    Comment: Non  GFR Calc  Starting 12/18/2018, serum creatinine based estimated GFR (eGFR) will be   calculated using the Chronic Kidney Disease Epidemiology Collaboration   (CKD-EPI) equation.       GFR Estimate If Black 11/17/2019 65  >60 mL/min/[1.73_m2] Final    Comment:  GFR Calc  Starting 12/18/2018, serum creatinine based estimated GFR (eGFR) will be   calculated using the Chronic Kidney Disease Epidemiology Collaboration   (CKD-EPI) equation.       Calcium 11/17/2019 8.8  8.5 - 10.1 mg/dL Final     Bilirubin Total 11/17/2019 0.8  0.2 - 1.3 mg/dL Final     Albumin  11/17/2019 4.0  3.4 - 5.0 g/dL Final     Protein Total 11/17/2019 7.0  6.8 - 8.8 g/dL Final     Alkaline Phosphatase 11/17/2019 43  40 - 150 U/L Final     ALT 11/17/2019 9  0 - 50 U/L Final     AST 11/17/2019 16  0 - 45 U/L Final     Troponin I ES 11/17/2019 <0.015  0.000 - 0.045 ug/L Final    Comment: The 99th percentile for upper reference range is 0.045 ug/L.  Troponin values   in the range of 0.045 - 0.120 ug/L may be associated with risks of adverse   clinical events.       ASSESSMENT and PLAN:  This is a 64 year old female with grade II cystocele as well as atrophic vaginitis, urinary frequency, urinary hesitancy, and urgency all in the setting of parkinson's disease.  Different management options were discussed with the patient including observation, further testing, medication, and surgery.  Given patient's dementia it would be nice to avoid a long surgery such as a robotic sacal colpopexy.  We discussed a transvaginal repair as well as a pessary.  For now the patient and her  have decided to try a pessary to see if lifting her prolapse will help.  Will also proceed with further w/u.  -pessary fitting today  -may have this changed monthly in [place]  -schedule UDS   -UA/UCx today, will cath since pt. Unable to void currently.  -f/u with me to review UDS results and undergo cystoscopy.  -of note she did not tolerate anticholinergics as they caused hallucinations, we could consider myrbetriq if appropriate.    Thank you for allowing me to participate in Ms. Farias's care.  I will keep you updated on her progress.    Patricio Carpio MD

## 2019-12-02 NOTE — PROGRESS NOTES
Per Dr. Carpio, plan to obtain urine sample via straight catheterization.  Straight catheter successfully inserted into the urethral meatus in the usual sterile fashion without immediate complication.  Urine is yellow in color.  Urine sample obtained and sent to lab, results pending.  Straight catheter removed with ease.      Pessary fitting completed per Dr. Carpio. Pessary insertion, removal, and care reviewed with patient. Patient fit with pessary #3.     Patient and spouse aware that the pessary is to be removed monthly for cleaning. Per spouse, they will contact the [facility] to have pessary removed and cleaned each month. Patient  was sent home with pessary.     Jammie Spears RN, BSN

## 2019-12-02 NOTE — NURSING NOTE
"Nathaly Farias's goals for this visit include:   Chief Complaint   Patient presents with     New Patient     consult for robotic sacral colpopexy hysterectomy       She requests these members of her care team be copied on today's visit information: Maxine Monae    PCP: Maxine Monae    Referring Provider:  No referring provider defined for this encounter.    /73 (BP Location: Left arm, Patient Position: Sitting, Cuff Size: Adult Regular)   Pulse 74   Ht 1.575 m (5' 2\")   Wt 53.1 kg (117 lb)   LMP 06/05/2006   SpO2 96%   BMI 21.40 kg/m      Do you need any medication refills at today's visit? No    Rani Lopes LPN    "

## 2019-12-03 ENCOUNTER — DOCUMENTATION ONLY (OUTPATIENT)
Dept: CARE COORDINATION | Facility: CLINIC | Age: 64
End: 2019-12-03

## 2019-12-04 ENCOUNTER — TELEPHONE (OUTPATIENT)
Dept: PHARMACY | Facility: CLINIC | Age: 64
End: 2019-12-04

## 2019-12-04 ENCOUNTER — TELEPHONE (OUTPATIENT)
Dept: NEUROLOGY | Facility: CLINIC | Age: 64
End: 2019-12-04

## 2019-12-04 ENCOUNTER — ALLIED HEALTH/NURSE VISIT (OUTPATIENT)
Dept: PHARMACY | Facility: CLINIC | Age: 64
End: 2019-12-04
Payer: COMMERCIAL

## 2019-12-04 DIAGNOSIS — R44.3 HALLUCINATIONS: ICD-10-CM

## 2019-12-04 DIAGNOSIS — G20.A1 PARALYSIS AGITANS (H): Primary | ICD-10-CM

## 2019-12-04 DIAGNOSIS — F02.80 DEMENTIA DUE TO PARKINSON'S DISEASE WITHOUT BEHAVIORAL DISTURBANCE (H): ICD-10-CM

## 2019-12-04 DIAGNOSIS — G20.A1 DEMENTIA DUE TO PARKINSON'S DISEASE WITHOUT BEHAVIORAL DISTURBANCE (H): ICD-10-CM

## 2019-12-04 DIAGNOSIS — R35.0 URINARY FREQUENCY: ICD-10-CM

## 2019-12-04 PROCEDURE — 99207 ZZC NO CHARGE LOS: CPT | Performed by: PHARMACIST

## 2019-12-04 RX ORDER — TROSPIUM CHLORIDE 20 MG/1
TABLET, FILM COATED ORAL
Refills: 1 | COMMUNITY
Start: 2019-09-04 | End: 2019-12-04

## 2019-12-04 RX ORDER — ALPRAZOLAM 0.25 MG
TABLET ORAL
Refills: 0 | COMMUNITY
Start: 2019-11-17 | End: 2019-12-04

## 2019-12-04 NOTE — TELEPHONE ENCOUNTER
Jet Porter MD  You; Katie Dumont, HALEY 50 minutes ago (9:23 AM)      May be worth trying nuplazid with seroquel if can get covered - anxiety and hallucinations approach. If not would keep pushing up on the quetiapine.   We can always consider clozapine which has been reported to help tremor, etc. But would require blood draws     Her citalopram is at 30mg -> we can later try 40mg?    Routing comment

## 2019-12-04 NOTE — TELEPHONE ENCOUNTER
Received voicemail from , Wolf, stating that Nathaly has been shaking worse and that she had a urine sample checked by urology yesterday. He is requesting a call back.    Reviewed urine results and they are WNL - no UTI present.     Corazon Dhaliwal, Pharm.D.  Medication Therapy Management Pharmacist  Phone: [phone]

## 2019-12-04 NOTE — PROGRESS NOTES
"SUBJECTIVE/OBJECTIVE:                Nathaly Farias is a 64 year old female called for a follow-up visit for Medication Therapy Management. She was referred to me from Dr. Porter. Visit was conducted with patient's , Wolf, as the patient has dementia (consent to communicate on file).     Chief Complaint: Follow up from [facility] visit on 11/19/19  Tobacco:  reports that she has never smoked. She has never used smokeless tobacco.  Alcohol: none    Medication Adherence/Access: no issues reported    Parkinson's Disease/dementia/psychosis:  Current medications include: Carbidopa-levodopa  mg 2 tablets 3 times daily at 7 am, 12 pm, and 8:30 pm, quetiapine 12.5 mg daily in the morning and 75 mg nightly, citalopram 30 mg daily, and rivastigmine patch 13.3 mg daily.  states that with the recent increase in quetiapine her hallucinations have improved but their most significant concern is more frequent \"spells,\" which he describes as \"very bad tremor,\" increased confusion, increased anxiety, and shortness of breath. These spells last for 2-3 hours and occur about twice per day.  states that there is no pattern to when the spells occur - can be in the late morning, right after lunch, afternoon, or evening. She is waiting 30-45 minutes to eat after taking carbidopa-levodopa. Patient is at home all day and has visitors 2-3 times/day who help with meals while her  is out driving truck. She will call him when she is having one of these spells and he encourages her to sit down on the cough and watch TV.  is wondering if her PD is progressing or what is causing these worsening symptoms.     OAB: Not currently taking any medications. Saw urology on 12/2/19 and had pessary place. Recent UA was negative for UTI.  states pessary has not helped but they are reluctant to try medications as anticholinergics previously worsened her hallucinations.     ASSESSMENT:                Medication Adherence: " excellent, no issues identified    Parkinson's Disease/dementia/psychosis:  Needs improvement. It seems she is having unpredictable off-time associated with Parkinson's disease. She may benefit from taking a PRN dose of Sinemet during these episodes, however it would be difficult to do this because the patient has people administer medications to her and she is not supervised all day. Discussed symptoms with Dr. Porter and he suspects that her symptoms are due to dementia and possibly cognitive fluctuations. We had discussed Nuplazid but we are going to wait on a trial of Nuplazid for now due to cost issues. Instead, we will increase quetiapine to see if this helps with relaxation and psychosis. The patient will be seeing Dr. Porter on 12/16/19.     OAB: Needs improvement. Defer to urology for non-medication approaches.    PLAN:                  1. Increase morning quetiapine to 25 mg.   2. Nuplazid approved by insurance but high copay so would need copay assistance - waiting to initiate Nuplazid for now.    I spent 30 minutes with this patient today. All changes were made via collaborative practice agreement with Dr. Porter. A copy of the visit note was provided to the patient's referring provider.     Will follow up with Dr. Porter on 12/16/19 and as needed with MTM thereafter    The patient declined a summary of these recommendations as an after visit summary.    Corazon Dhaliwal, Pharm.D.  Medication Therapy Management Pharmacist  Phone: [phone]

## 2019-12-04 NOTE — TELEPHONE ENCOUNTER
Prior Authorization Approval    Authorization Effective Date: 9/5/2019  Authorization Expiration Date: 12/4/2020  Medication: Nuplazid- Approved   Approved Dose/Quantity:34mg  Reference #: [id number]   Insurance Company: [employer] [place] - Phone [phone] Fax [phone]  Expected CoPay: $1061.58     CoPay Card Available: No    Foundation Assistance Needed: TAF and PAN open  Which Pharmacy is filling the prescription (Not needed for infusion/clinic administered): [facility] - [place], [place] - [address]  Pharmacy Notified: No  Patient Notified: Juanito Chandra speaking to patient about approval and kylie

## 2019-12-05 NOTE — TELEPHONE ENCOUNTER
Jet Porter MD  You; Katie Dumont, HALEY 18 hours ago (10:37 PM)      They may be cognitive fluctuations of her dementia   Still would consider something easy to control such as increasing seroquel or trying nuplazid as we cannot assure that we can get meds on time such as ssinemet    Routing comment

## 2019-12-05 NOTE — PROGRESS NOTES
Diagnosis/Summary/Recommendations:    PATIENT: Nathaly Farias  64 year old female     : 1955    MOISE: 2019    Taking h er pills and has had shaking  Eats 1.5 hours after her dose.     1230 1245pm today problem began. Varies from morning to night.   She has not taken extra sinemet.     9am it can happen  1pm it can happen  4 or 5 pm it can happen  Can happen in the evening.     Shaking lasts about one hour and then gets better.          Medications     7am 12noon 9pm         Acetaminophen 500mg tylenol As needed              Carbidopa/levodopa Sinemet 25/100 2 2 2         Citalopram celexa 20mg    1.5            Estradiol cream 2/week           Melatonin 5mg        2        Naproxen sodium 220mg As needed           Nonformulary cbd oil - young living oils  ran out          Pimavanserin nuplazid 34mg Not taking       Polyethylene glycol miralax sipping throughout the day             Probiotic young living Ran out           Quetiapine seroquel 25mg 1    3       Rivastigmine exelon 13.3mg/24 hr patch 1           Senna-docusate senokot-S 8.6mg-50mg       2         Terbinafine lamisil 1% external cream stopped           Trospium sanctura 20mg stopped             Zolmitriptan zomig 5mg As needed                                        History obtained from patient    PLAN  1. Recent dose increase in her seroquel to 25mg in the morning and 3 x 25mg in the evening.   2. Consideration for nuplazid 34mg/day - order put in and will need prior authorization.   3. She had significant shaking in the office today and is having spells throughout the day that we should try 1/2-1 tab by mouth as needed (chew it and use carbonated water)  4. Return back   5. Will  Have to decide if we should increase all her doses of sinemet. Right now she is on 2-2-2 (2 tabs 3/day) and may be needing to increasing to 2.5 tabs 3/day or possibly 3 tabs 3/day but will start with prn change.   6. Will see Dr. Barrera on  for her  headaches  7. Visit to urology 1/13 with Ana    ADDENDUM  Since it is hard to use prn medications may want to make all medications 2.5 tabs 3/day for the sinemet.      Medications     7am 12noon 9pm         Acetaminophen 500mg tylenol As needed              Carbidopa/levodopa Sinemet 25/100 2 2 2         Citalopram celexa 20mg    1.5            Estradiol cream 2/week           Melatonin 5mg        2        Naproxen sodium 220mg As needed           Pimavanserin nuplazid 34mg Not taking       Polyethylene glycol miralax sipping throughout the day             Quetiapine seroquel 25mg 1    3       Rivastigmine exelon 13.3mg/24 hr patch 1           Senna-docusate senokot-S 8.6mg-50mg       2         Zolmitriptan zomig 5mg As needed                                             Coding statement:   Duration of  Services: patient care and care coordination was 25 minutes  Greater than 50% of this visit was spent in counseling and coordination of care.     Jet Porter MD     ______________________________________    Last visit date and details:      MOISE: September 16, 2019     124 lbs  Down from 135 #  She is eating.   Will have to keep monitoring her bowel function      She went back on the sanctura  She thought her bladder was following out and was looking into exercises and did not qualify for a pessary and the urologist prescribed sanctura again which has helped her urinary problems.      Discussed the patch and urination  Discussed the sanctura and cognition              Medications     7am 12noon 9pm         Acetaminophen 500mg tylenol As needed              Carbidopa/levodopa Sinemet 25/100 2 2 2         Citalopram celexa 20mg    1.5            Estradiol cream 2/week           Melatonin 5mg        2        Naproxen sodium 220mg As needed           Nonformulary cbd oil - young living oils     1 eye dropper       Polyethylene glycol miralax sipping throughout the day             Probiotic young living 1 (ran out)            Quetiapine seroquel 25mg     3       Rivastigmine exelon 13.3mg/24 hr patch 1           Senna-docusate senokot-S 8.6mg-50mg       2         Terbinafine lamisil 1% external cream  ?taking           Trospium sanctura 20mg 1    1         Zolmitriptan zomig 5mg                                            History obtained from patient     ECG today showed QTc was 382 and she has some nonspecific abnormalities with motion artifacts.      PLAN  For now she will remain on the 13.3mg patch dose of rivastigmine daily - discussed possibly reducing to 9.5mg patch per day to see if bladder is better     Weight loss needs to monitored and she needs more calories. Need to have weekly monitoring of her weight and if there is continued loss may want to see her pcp to determine the cause. Take ensure, etc.     Discussed the sanctura medication and its possible benefit on the bladder and effects on cognition.   Scheduled voiding.      Headaches are better and will continue to use the zomig as needed     Return back in 3 months.      IN summary - no changes so far with her regimen                ______________________________________      Patient was asked about 14 Review of systems including changes in vision (dry eyes, double vision), hearing, heart, lungs, musculoskeletal, depression, anxiety, snoring, RBD, insomnia, urinary frequency, urinary urgency, constipation, swallowing problems, hematological, ID, allergies, skin problems: seborrhea, endocrinological: thyroid, diabetes, cholesterol; balance, weight changes, and other neurological problems and these were not significant at this time except for   Patient Active Problem List   Diagnosis     Prolapse of vaginal wall     Migraine variant     Symptomatic menopausal or female climacteric states     Obesity     Malaise and fatigue     Other and unspecified adverse effect of drug, medicinal and biological substance     Other and unspecified disc disorder of cervical region     Sleep  apnea     Essential hypertension, benign     Chronic cholecystitis     Overactive bladder     Neck pain     Headache     HYPERLIPIDEMIA LDL GOAL <160     Mild major depression (H)     Advanced directives, counseling/discussion     HTN, goal below 140/90     Paralysis agitans (H)     History of MRI of brain and brain stem     Wears glasses     Constipation     Incomplete RBBB     Heart murmur     Family history of tremor     Rosacea     Asymptomatic varicose veins     Seborrheic dermatitis     Post-traumatic osteoarthritis of left knee     Pain from implanted hardware, initial encounter     Cognitive disorder evalaution 2017     Dementia (H)     Hiatal hernia     Hallucinations     Frequency of urination     Midline cystocele     Atrophic vaginitis     Urinary hesitancy     Urinary urgency          Allergies   Allergen Reactions     Sulfa Drugs      Tacchycardia, had to go to ED     Naproxen GI Disturbance     Oxycontin [Oxycodone] GI Disturbance     Made her feel funny/upset stomach     Ropinirole      Did not work     Past Surgical History:   Procedure Laterality Date     ------------OTHER-------------      left shoulder     ARTHRODESIS TOE(S)  3/2/2011    ARTHRODESIS TOE(S) performed by CLARA LUCAS at  OR     C LIGATE FALLOPIAN TUBE  1998     CHOLECYSTECTOMY, LAPOROSCOPIC  5/12/2008    Cholecystectomy, Laparoscopic     ESOPHAGOSCOPY, GASTROSCOPY, DUODENOSCOPY (EGD), COMBINED N/A 7/5/2017    Procedure: COMBINED ESOPHAGOSCOPY, GASTROSCOPY, DUODENOSCOPY (EGD), BIOPSY SINGLE OR MULTIPLE;  Esophagogastroduodenoscopy with Multiple Biopsies by Biopsy;  Surgeon: Tj Denney MD;  Location: [facility]     HC COLONOSCOPY W/WO BRUSH/WASH  08/30/06    normal     HC OPEN TX TIBIAL SHAFT FX W PLATE/SCREWS W/WO CERCLAGE  2000    Multiple operations on left leg     HC SHLDR ARTHROSCOP,PART ACROMIOPLAS  11/16/06    Right shoulder     HC SHLDR ARTHROSCOP,SURG,DIS CLAVICULECTOMY  11/16/06    Right shoulder     Past  Medical History:   Diagnosis Date     Arthralgia of temporomandibular joint 10/22/1996     Arthritis      Cognitive disorder evalaution 2017 3/6/2017    She has marked attentional difficulties, impairments in memory including a rapid forgetting rate, mild anomia, and mild executive dysfunction. 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 She's required increasing assistance with finances, medications, driving, and cooking. I think this is a mild dementia, and     Constipation 2/23/2015     Dementia (H) 3/13/2017    IMPRESSIONS AND RECOMMENDATIONS   Current results indicate marked attentional difficulties, impairments in memory, which in some cases reflected impairments in retention and in other cases reflected retrieval difficulties, and mild anomia as well as mild executive dysfunction. Premorbid intellectual functioning is estimated to fall in the borderline range. She denied experiencing significant depre     Depressive disorder      Heart murmur 2/23/2015     History of MRI of brain and brain stem 2/21/2015    MRI OF THE BRAIN WITHOUT CONTRAST 7/10/2014 1:21 PM  COMPARISON: None. HISTORY: Left-sided upper and lower extremity tremor. Balance issues. TECHNIQUE:  Brain: Axial diffusion-weighted with ADC map, T2-weighted with fat saturation, T1-weighted and turboFLAIR and coronal T1-weighted images of the brain were obtained without intravenous contrast.  FINDINGS: There is mild diffuse cerebral volume loss     Hypertension      Incomplete RBBB 2/23/2015     Motion sickness      Other and unspecified hyperlipidemia      Parkinson's disease (H)      Seborrheic dermatitis 2/29/2016     Unspecified arthropathy, hand 08/07/1997     Unspecified sleep apnea     moderate, sleep study 11/07, on CPAP, has daytime somnolence with this     Variants of migraine, not elsewhere classified, without mention of intractable migraine without  "mention of status migrainosus      Wears glasses 2/23/2015     Social History     Socioeconomic History     Marital status:      Spouse name: violet     Number of children: 2     Years of education: Not on file     Highest education level: Not on file   Occupational History     Occupation:    Social Needs     Financial resource strain: Not on file     Food insecurity:     Worry: Not on file     Inability: Not on file     Transportation needs:     Medical: Not on file     Non-medical: Not on file   Tobacco Use     Smoking status: Never Smoker     Smokeless tobacco: Never Used     Tobacco comment: no smokers in the household   Substance and Sexual Activity     Alcohol use: Yes     Alcohol/week: 0.0 standard drinks     Comment: very occ     Drug use: No     Sexual activity: Yes     Partners: Male     Birth control/protection: Surgical, Female Surgical     Comment: tubal ligation   Lifestyle     Physical activity:     Days per week: Not on file     Minutes per session: Not on file     Stress: Not on file   Relationships     Social connections:     Talks on phone: Not on file     Gets together: Not on file     Attends [employer] service: Not on file     Active member of club or organization: Not on file     Attends meetings of clubs or organizations: Not on file     Relationship status: Not on file     Intimate partner violence:     Fear of current or ex partner: Not on file     Emotionally abused: Not on file     Physically abused: Not on file     Forced sexual activity: Not on file   Other Topics Concern      Service Not Asked     Blood Transfusions Not Asked     Caffeine Concern Yes     Comment: OCC     Occupational Exposure Not Asked     Hobby Hazards Not Asked     Sleep Concern Not Asked     Stress Concern Not Asked     Weight Concern Not Asked     Special Diet Not Asked     Back Care Not Asked     Exercise No     Comment: \"not faithfully\"     Bike Helmet Not Asked     Seat Belt Yes     " Self-Exams No     Parent/sibling w/ CABG, MI or angioplasty before 65F 55M? Not Asked   Social History Narrative    ALLERGIES:  She is allergic to sulfa, and has had gastrointestinal side effects from naproxen.               FAMILY HISTORY:  Strongly positive for headaches, including in her son and sister.  There is no family history of tremor.               SOCIAL HISTORY:  She does not work outside the home.  She does not smoke.  She uses very little alcohol.  Lives in [place]. . Has 2 kids: son who is 35 yr s old. And daughter who is 30 yrs old. Her  is working as a  .        Drug and lactation database from the [place] [employer] of Medicine:  http://toxnet.nlm.nih.gov/cgi-bin/sis/htmlgen?LACT      B/P: Data Unavailable, T: Data Unavailable, P: Data Unavailable, R: Data Unavailable 0 lbs 0 oz  Last menstrual period 06/05/2006, not currently breastfeeding., There is no height or weight on file to calculate BMI.  Medications and Vitals not listed above were documented in the cart and reviewed by me.     Current Outpatient Medications   Medication Sig Dispense Refill     carbidopa-levodopa (SINEMET)  MG tablet 2 tabs by mouth @ 7am, noon, and 9pm 540 tablet 3     citalopram (CELEXA) 20 MG tablet 1.5 x 20mg tab by mouth @12noon 135 tablet 3     estradiol (ESTRACE) 0.1 MG/GM vaginal cream Place 2 g vaginally twice a week 42.5 g 1     melatonin 5 MG tablet 2 x 5mg tab by mouth @ 9pm       naproxen sodium 220 MG capsule Take 220 mg by mouth as needed       NONFORMULARY 1 eye dropper of cbd oil by mouth every morning (young living oils)       Pimavanserin Tartrate 34 MG CAPS Take 1 capsule (34 mg) by mouth daily (Patient not taking: Reported on 12/4/2019) 30 capsule 11     polyethylene glycol (MIRALAX) powder 1 capful in 8 oz water and sips throughout the day 510 g 1     Probiotic Product (PROBIOTIC DAILY) CAPS Young living probiotic by mouth daily @ 7am       QUEtiapine  (SEROQUEL) 25 MG tablet Take one-half tablet (12.5 mg) in the morning and 3 tablets (75 mg) in the evening (9 pm) 315 tablet 3     rivastigmine (EXELON) 13.3 MG/24HR 24 hr patch Place 1 patch onto the skin daily 90 patch 11     senna-docusate (SENOKOT-S/PERICOLACE) 8.6-50 MG tablet Take 2 tablets by mouth At Bedtime       ZOLMitriptan (ZOMIG) 5 MG tablet Take 1 tablet (5 mg) by mouth at onset of headache for migraine MAY REPEAT ONCE AFTER 2 HOURS. DO NOT EXCEED 2 TABLETS IN 24 HOURS. 6 tablet 10         Jet Porter MD

## 2019-12-06 RX ORDER — QUETIAPINE FUMARATE 25 MG/1
TABLET, FILM COATED ORAL
Qty: 120 TABLET | Refills: 11 | Status: SHIPPED | OUTPATIENT
Start: 2019-12-06 | End: 2020-01-27

## 2019-12-06 NOTE — TELEPHONE ENCOUNTER
Plan:  1. Increase quetiapine to 25 mg daily in the morning and continue 75 mg at night.  2. Patient to see Dr. Porter on 12/16/19 for follow up.   3. Nuplazid has been approved by insurance but there is a very high copay, so we would want to fill out the treatment when she is in clinic if Dr. Porter decides to pursue Nuplazid at next visit.     Routing to HALEY Wilson to convey plan to , Wolf.    Corazon Dhaliwal, Pharm.D.  Medication Therapy Management Pharmacist  Phone: [phone]

## 2019-12-06 NOTE — TELEPHONE ENCOUNTER
Called and conveyed plan to Wolf. He verbalized understanding & will let us know if he has further questions before 12/16. Katie Dumont RN

## 2019-12-11 NOTE — NURSING NOTE
"Chief Complaint   Patient presents with     [facility] F/U     Health Maintenance       Initial /86 (BP Location: Right arm, Patient Position: Chair, Cuff Size: Adult Regular)  Pulse 68  Temp 98  F (36.7  C) (Temporal)  Resp 18  Ht 5' 1.8\" (1.57 m)  Wt 132 lb (59.9 kg)  LMP 06/05/2006  BMI 24.3 kg/m2 Estimated body mass index is 24.3 kg/(m^2) as calculated from the following:    Height as of this encounter: 5' 1.8\" (1.57 m).    Weight as of this encounter: 132 lb (59.9 kg).  Medication Reconciliation: complete   Michelle Chi CMA (AAMA)      " [General Appearance - Alert] : alert [PERRL With Normal Accommodation] : pupils were equal in size, round, and reactive to light [Sclera] : the sclera and conjunctiva were normal [General Appearance - In No Acute Distress] : in no acute distress [Outer Ear] : the ears and nose were normal in appearance [Extraocular Movements] : extraocular movements were intact [Oropharynx] : the oropharynx was normal [Neck Appearance] : the appearance of the neck was normal [Jugular Venous Distention Increased] : there was no jugular-venous distention [Neck Cervical Mass (___cm)] : no neck mass was observed [Thyroid Diffuse Enlargement] : the thyroid was not enlarged [Thyroid Nodule] : there were no palpable thyroid nodules [Heart Rate And Rhythm] : heart rate was normal and rhythm regular [Auscultation Breath Sounds / Voice Sounds] : lungs were clear to auscultation bilaterally [Heart Sounds] : normal S1 and S2 [Heart Sounds Gallop] : no gallops [Murmurs] : no murmurs [Heart Sounds Pericardial Friction Rub] : no pericardial rub [Normal] : normal [Soft, Nontender] : the abdomen was soft and nontender [Epigastric] : in the epigastric area [No Mass] : no masses were palpated [No HSM] : no hepatosplenomegaly noted [Occult Blood Positive] : stool was negative for occult blood [Cervical Lymph Nodes Enlarged Posterior Bilaterally] : posterior cervical [Supraclavicular Lymph Nodes Enlarged Bilaterally] : supraclavicular [Cervical Lymph Nodes Enlarged Anterior Bilaterally] : anterior cervical [Axillary Lymph Nodes Enlarged Bilaterally] : axillary [Inguinal Lymph Nodes Enlarged Bilaterally] : inguinal [Femoral Lymph Nodes Enlarged Bilaterally] : femoral [No CVA Tenderness] : no ~M costovertebral angle tenderness [No Spinal Tenderness] : no spinal tenderness [Abnormal Walk] : normal gait [Musculoskeletal - Swelling] : no joint swelling seen [Nail Clubbing] : no clubbing  or cyanosis of the fingernails [Skin Turgor] : normal skin turgor [Motor Tone] : muscle strength and tone were normal [Skin Color & Pigmentation] : normal skin color and pigmentation [] : no rash [Deep Tendon Reflexes (DTR)] : deep tendon reflexes were 2+ and symmetric [Sensation] : the sensory exam was normal to light touch and pinprick [No Focal Deficits] : no focal deficits [Oriented To Time, Place, And Person] : oriented to person, place, and time [Impaired Insight] : insight and judgment were intact [Affect] : the affect was normal

## 2019-12-16 ENCOUNTER — OFFICE VISIT (OUTPATIENT)
Dept: NEUROLOGY | Facility: CLINIC | Age: 64
End: 2019-12-16
Payer: COMMERCIAL

## 2019-12-16 VITALS
BODY MASS INDEX: 21.97 KG/M2 | SYSTOLIC BLOOD PRESSURE: 137 MMHG | WEIGHT: 120.1 LBS | HEART RATE: 88 BPM | RESPIRATION RATE: 18 BRPM | DIASTOLIC BLOOD PRESSURE: 76 MMHG | OXYGEN SATURATION: 99 %

## 2019-12-16 DIAGNOSIS — F33.0 MAJOR DEPRESSIVE DISORDER, RECURRENT EPISODE, MILD (H): ICD-10-CM

## 2019-12-16 DIAGNOSIS — R44.3 HALLUCINATIONS: ICD-10-CM

## 2019-12-16 DIAGNOSIS — F02.80 DEMENTIA DUE TO PARKINSON'S DISEASE WITHOUT BEHAVIORAL DISTURBANCE (H): ICD-10-CM

## 2019-12-16 DIAGNOSIS — G20.A1 PARALYSIS AGITANS (H): Primary | ICD-10-CM

## 2019-12-16 DIAGNOSIS — G20.A1 DEMENTIA DUE TO PARKINSON'S DISEASE WITHOUT BEHAVIORAL DISTURBANCE (H): ICD-10-CM

## 2019-12-16 PROCEDURE — 99214 OFFICE O/P EST MOD 30 MIN: CPT | Performed by: PSYCHIATRY & NEUROLOGY

## 2019-12-16 RX ORDER — RIVASTIGMINE 13.3 MG/24H
1 PATCH, EXTENDED RELEASE TRANSDERMAL DAILY
Qty: 90 PATCH | Refills: 11 | Status: ON HOLD | OUTPATIENT
Start: 2019-12-16 | End: 2019-12-22

## 2019-12-16 RX ORDER — CARBIDOPA AND LEVODOPA 25; 100 MG/1; MG/1
TABLET ORAL
Qty: 630 TABLET | Refills: 3 | Status: SHIPPED | OUTPATIENT
Start: 2019-12-16 | End: 2019-12-16

## 2019-12-16 RX ORDER — CARBIDOPA AND LEVODOPA 25; 100 MG/1; MG/1
TABLET ORAL
Qty: 330 TABLET | Refills: 3 | Status: ON HOLD | OUTPATIENT
Start: 2019-12-16 | End: 2019-12-23

## 2019-12-16 RX ORDER — CITALOPRAM HYDROBROMIDE 20 MG/1
TABLET ORAL
Qty: 135 TABLET | Refills: 3 | Status: ON HOLD | OUTPATIENT
Start: 2019-12-16 | End: 2019-12-23

## 2019-12-16 RX ORDER — CARBIDOPA AND LEVODOPA 25; 100 MG/1; MG/1
TABLET ORAL
Qty: 1 TABLET | Refills: 0 | Status: SHIPPED | OUTPATIENT
Start: 2019-12-16 | End: 2019-12-16

## 2019-12-16 RX ORDER — CARBIDOPA AND LEVODOPA 25; 100 MG/1; MG/1
TABLET ORAL
Qty: 1 TABLET | Refills: 0 | Status: SHIPPED | OUTPATIENT
Start: 2019-12-16 | End: 2019-12-19

## 2019-12-16 ASSESSMENT — PAIN SCALES - GENERAL: PAINLEVEL: NO PAIN (0)

## 2019-12-16 NOTE — PROGRESS NOTES
Met with pt and her  in clinic to fill out application form online. Information forwarded to Corazon Dhaliwal and Kyra Rogers. Katie Dumont RN

## 2019-12-16 NOTE — PATIENT INSTRUCTIONS
PLAN  1. Recent dose increase in her seroquel to 25mg in the morning and 3 x 25mg in the evening.   2. Consideration for nuplazid 34mg/day - order put in and will need prior authorization.   3. She had significant shaking in the office today and is having spells throughout the day that we should try 1/2-1 tab by mouth as needed (chew it and use carbonated water)  4. Return back   5. Will  Have to decide if we should increase all her doses of sinemet. Right now she is on 2-2-2 (2 tabs 3/day) and may be needing to increasing to 2.5 tabs 3/day or possibly 3 tabs 3/day but will start with prn change.   6. Will see Dr. Barrera on 12/30 for her headaches  7. Visit to urology 1/13 with Ana     Medications     7am 12noon 9pm         Acetaminophen 500mg tylenol As needed              Carbidopa/levodopa Sinemet 25/100 2 2 2         Citalopram celexa 20mg    1.5            Estradiol cream 2/week           Melatonin 5mg        2        Naproxen sodium 220mg As needed           Pimavanserin nuplazid 34mg Not taking       Polyethylene glycol miralax sipping throughout the day             Quetiapine seroquel 25mg 1    3       Rivastigmine exelon 13.3mg/24 hr patch 1           Senna-docusate senokot-S 8.6mg-50mg       2         Zolmitriptan zomig 5mg As needed                                           ADDENDUM  Since it is hard to use prn medications may want to make all medications 2.5 tabs 3/day for the sinemet.

## 2019-12-16 NOTE — NURSING NOTE
Nathaly Farias's goals for this visit include:   Chief Complaint   Patient presents with     RECHECK             She requests these members of her care team be copied on today's visit information: Yes    PCP: Maxine Monae    Referring Provider:  Jet Porter MD  [address] [place] [id number]    /76 (BP Location: Left arm, Patient Position: Sitting, Cuff Size: Adult Large)   Pulse 88   Resp 18   Wt 54.5 kg (120 lb 1.6 oz)   LMP 06/05/2006   SpO2 99%   BMI 21.97 kg/m      Do you need any medication refills at today's visit? No    Joao Conteh CMA

## 2019-12-16 NOTE — LETTER
2019         RE: Nathaly Farias  [address] [facility] [id number]        Dear Colleague,    Thank you for referring your patient, Nathaly Farias, to the [facility]. Please see a copy of my visit note below.    Diagnosis/Summary/Recommendations:    PATIENT: Nathaly Farias  64 year old female     : 1955    MOISE: 2019    Taking h er pills and has had shaking  Eats 1.5 hours after her dose.     1230 1245pm today problem began. Varies from morning to night.   She has not taken extra sinemet.     9am it can happen  1pm it can happen  4 or 5 pm it can happen  Can happen in the evening.     Shaking lasts about one hour and then gets better.          Medications     7am 12noon 9pm         Acetaminophen 500mg tylenol As needed              Carbidopa/levodopa Sinemet 25/100 2 2 2         Citalopram celexa 20mg    1.5            Estradiol cream 2/week           Melatonin 5mg        2        Naproxen sodium 220mg As needed           Nonformulary cbd oil - young living oils  ran out          Pimavanserin nuplazid 34mg Not taking       Polyethylene glycol miralax sipping throughout the day             Probiotic young living Ran out           Quetiapine seroquel 25mg 1    3       Rivastigmine exelon 13.3mg/24 hr patch 1           Senna-docusate senokot-S 8.6mg-50mg       2         Terbinafine lamisil 1% external cream stopped           Trospium sanctura 20mg stopped             Zolmitriptan zomig 5mg As needed                                        History obtained from patient    PLAN  1. Recent dose increase in her seroquel to 25mg in the morning and 3 x 25mg in the evening.   2. Consideration for nuplazid 34mg/day - order put in and will need prior authorization.   3. She had significant shaking in the office today and is having spells throughout the day that we should try 1/2-1 tab by mouth as needed (chew it and use carbonated water)  4. Return back   5. Will  Have  to decide if we should increase all her doses of sinemet. Right now she is on 2-2-2 (2 tabs 3/day) and may be needing to increasing to 2.5 tabs 3/day or possibly 3 tabs 3/day but will start with prn change.   6. Will see Dr. Barrera on 12/30 for her headaches  7. Visit to urology 1/13 with Ana    ADDENDUM  Since it is hard to use prn medications may want to make all medications 2.5 tabs 3/day for the sinemet.      Medications     7am 12noon 9pm         Acetaminophen 500mg tylenol As needed              Carbidopa/levodopa Sinemet 25/100 2 2 2         Citalopram celexa 20mg    1.5            Estradiol cream 2/week           Melatonin 5mg        2        Naproxen sodium 220mg As needed           Pimavanserin nuplazid 34mg Not taking       Polyethylene glycol miralax sipping throughout the day             Quetiapine seroquel 25mg 1    3       Rivastigmine exelon 13.3mg/24 hr patch 1           Senna-docusate senokot-S 8.6mg-50mg       2         Zolmitriptan zomig 5mg As needed                                             Coding statement:   Duration of  Services: patient care and care coordination was 25 minutes  Greater than 50% of this visit was spent in counseling and coordination of care.     Jet Porter MD     ______________________________________    Last visit date and details:      MOISE: September 16, 2019     124 lbs  Down from 135 #  She is eating.   Will have to keep monitoring her bowel function      She went back on the sanctura  She thought her bladder was following out and was looking into exercises and did not qualify for a pessary and the urologist prescribed sanctura again which has helped her urinary problems.      Discussed the patch and urination  Discussed the sanctura and cognition              Medications     7am 12noon 9pm         Acetaminophen 500mg tylenol As needed              Carbidopa/levodopa Sinemet 25/100 2 2 2         Citalopram celexa 20mg    1.5            Estradiol cream 2/week            Melatonin 5mg        2        Naproxen sodium 220mg As needed           Nonformulary cbd oil - young living oils     1 eye dropper       Polyethylene glycol miralax sipping throughout the day             Probiotic young living 1 (ran out)           Quetiapine seroquel 25mg     3       Rivastigmine exelon 13.3mg/24 hr patch 1           Senna-docusate senokot-S 8.6mg-50mg       2         Terbinafine lamisil 1% external cream  ?taking           Trospium sanctura 20mg 1    1         Zolmitriptan zomig 5mg                                            History obtained from patient     ECG today showed QTc was 382 and she has some nonspecific abnormalities with motion artifacts.      PLAN  For now she will remain on the 13.3mg patch dose of rivastigmine daily - discussed possibly reducing to 9.5mg patch per day to see if bladder is better     Weight loss needs to monitored and she needs more calories. Need to have weekly monitoring of her weight and if there is continued loss may want to see her pcp to determine the cause. Take ensure, etc.     Discussed the sanctura medication and its possible benefit on the bladder and effects on cognition.   Scheduled voiding.      Headaches are better and will continue to use the zomig as needed     Return back in 3 months.      IN summary - no changes so far with her regimen                ______________________________________      Patient was asked about 14 Review of systems including changes in vision (dry eyes, double vision), hearing, heart, lungs, musculoskeletal, depression, anxiety, snoring, RBD, insomnia, urinary frequency, urinary urgency, constipation, swallowing problems, hematological, ID, allergies, skin problems: seborrhea, endocrinological: thyroid, diabetes, cholesterol; balance, weight changes, and other neurological problems and these were not significant at this time except for   Patient Active Problem List   Diagnosis     Prolapse of vaginal wall     Migraine  variant     Symptomatic menopausal or female climacteric states     Obesity     Malaise and fatigue     Other and unspecified adverse effect of drug, medicinal and biological substance     Other and unspecified disc disorder of cervical region     Sleep apnea     Essential hypertension, benign     Chronic cholecystitis     Overactive bladder     Neck pain     Headache     HYPERLIPIDEMIA LDL GOAL <160     Mild major depression (H)     Advanced directives, counseling/discussion     HTN, goal below 140/90     Paralysis agitans (H)     History of MRI of brain and brain stem     Wears glasses     Constipation     Incomplete RBBB     Heart murmur     Family history of tremor     Rosacea     Asymptomatic varicose veins     Seborrheic dermatitis     Post-traumatic osteoarthritis of left knee     Pain from implanted hardware, initial encounter     Cognitive disorder evalaution 2017     Dementia (H)     Hiatal hernia     Hallucinations     Frequency of urination     Midline cystocele     Atrophic vaginitis     Urinary hesitancy     Urinary urgency          Allergies   Allergen Reactions     Sulfa Drugs      Tacchycardia, had to go to ED     Naproxen GI Disturbance     Oxycontin [Oxycodone] GI Disturbance     Made her feel funny/upset stomach     Ropinirole      Did not work     Past Surgical History:   Procedure Laterality Date     ------------OTHER-------------      left shoulder     ARTHRODESIS TOE(S)  3/2/2011    ARTHRODESIS TOE(S) performed by CLARA LUCAS at  OR     C LIGATE FALLOPIAN TUBE  1998     CHOLECYSTECTOMY, LAPOROSCOPIC  5/12/2008    Cholecystectomy, Laparoscopic     ESOPHAGOSCOPY, GASTROSCOPY, DUODENOSCOPY (EGD), COMBINED N/A 7/5/2017    Procedure: COMBINED ESOPHAGOSCOPY, GASTROSCOPY, DUODENOSCOPY (EGD), BIOPSY SINGLE OR MULTIPLE;  Esophagogastroduodenoscopy with Multiple Biopsies by Biopsy;  Surgeon: Tj Denney MD;  Location:  GI     HC COLONOSCOPY W/WO BRUSH/WASH  08/30/06    normal     HC  OPEN TX TIBIAL SHAFT FX W PLATE/SCREWS W/WO CERCLAGE  2000    Multiple operations on left leg     HC SHLDR ARTHROSCOP,PART ACROMIOPLAS  11/16/06    Right shoulder     HC SHLDR ARTHROSCOP,SURG,DIS CLAVICULECTOMY  11/16/06    Right shoulder     Past Medical History:   Diagnosis Date     Arthralgia of temporomandibular joint 10/22/1996     Arthritis      Cognitive disorder evalaution 2017 3/6/2017    She has marked attentional difficulties, impairments in memory including a rapid forgetting rate, mild anomia, and mild executive dysfunction. 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 She's required increasing assistance with finances, medications, driving, and cooking. I think this is a mild dementia, and     Constipation 2/23/2015     Dementia (H) 3/13/2017    IMPRESSIONS AND RECOMMENDATIONS   Current results indicate marked attentional difficulties, impairments in memory, which in some cases reflected impairments in retention and in other cases reflected retrieval difficulties, and mild anomia as well as mild executive dysfunction. Premorbid intellectual functioning is estimated to fall in the borderline range. She denied experiencing significant depre     Depressive disorder      Heart murmur 2/23/2015     History of MRI of brain and brain stem 2/21/2015    MRI OF THE BRAIN WITHOUT CONTRAST 7/10/2014 1:21 PM  COMPARISON: None. HISTORY: Left-sided upper and lower extremity tremor. Balance issues. TECHNIQUE:  Brain: Axial diffusion-weighted with ADC map, T2-weighted with fat saturation, T1-weighted and turboFLAIR and coronal T1-weighted images of the brain were obtained without intravenous contrast.  FINDINGS: There is mild diffuse cerebral volume loss     Hypertension      Incomplete RBBB 2/23/2015     Motion sickness      Other and unspecified hyperlipidemia      Parkinson's disease (H)      Seborrheic dermatitis 2/29/2016      Unspecified arthropathy, hand 08/07/1997     Unspecified sleep apnea     moderate, sleep study 11/07, on CPAP, has daytime somnolence with this     Variants of migraine, not elsewhere classified, without mention of intractable migraine without mention of status migrainosus      Wears glasses 2/23/2015     Social History     Socioeconomic History     Marital status:      Spouse name: violet     Number of children: 2     Years of education: Not on file     Highest education level: Not on file   Occupational History     Occupation:    Social Needs     Financial resource strain: Not on file     Food insecurity:     Worry: Not on file     Inability: Not on file     Transportation needs:     Medical: Not on file     Non-medical: Not on file   Tobacco Use     Smoking status: Never Smoker     Smokeless tobacco: Never Used     Tobacco comment: no smokers in the household   Substance and Sexual Activity     Alcohol use: Yes     Alcohol/week: 0.0 standard drinks     Comment: very occ     Drug use: No     Sexual activity: Yes     Partners: Male     Birth control/protection: Surgical, Female Surgical     Comment: tubal ligation   Lifestyle     Physical activity:     Days per week: Not on file     Minutes per session: Not on file     Stress: Not on file   Relationships     Social connections:     Talks on phone: Not on file     Gets together: Not on file     Attends Latter day service: Not on file     Active member of club or organization: Not on file     Attends meetings of clubs or organizations: Not on file     Relationship status: Not on file     Intimate partner violence:     Fear of current or ex partner: Not on file     Emotionally abused: Not on file     Physically abused: Not on file     Forced sexual activity: Not on file   Other Topics Concern      Service Not Asked     Blood Transfusions Not Asked     Caffeine Concern Yes     Comment: OCC     Occupational Exposure Not Asked     Hobby Hazards Not  "Asked     Sleep Concern Not Asked     Stress Concern Not Asked     Weight Concern Not Asked     Special Diet Not Asked     Back Care Not Asked     Exercise No     Comment: \"not faithfully\"     Bike Helmet Not Asked     Seat Belt Yes     Self-Exams No     Parent/sibling w/ CABG, MI or angioplasty before 65F 55M? Not Asked   Social History Narrative    ALLERGIES:  She is allergic to sulfa, and has had gastrointestinal side effects from naproxen.               FAMILY HISTORY:  Strongly positive for headaches, including in her son and sister.  There is no family history of tremor.               SOCIAL HISTORY:  She does not work outside the home.  She does not smoke.  She uses very little alcohol.  Lives in [place]. . Has 2 kids: son who is 35 yr s old. And daughter who is 30 yrs old. Her  is working as a  .        Drug and lactation database from the [place] [employer] of [employer]:  http://toxnet.nlm.nih.gov/cgi-bin/sis/htmlgen?LACT      B/P: Data Unavailable, T: Data Unavailable, P: Data Unavailable, R: Data Unavailable 0 lbs 0 oz  Last menstrual period 06/05/2006, not currently breastfeeding., There is no height or weight on file to calculate BMI.  Medications and Vitals not listed above were documented in the cart and reviewed by me.     Current Outpatient Medications   Medication Sig Dispense Refill     carbidopa-levodopa (SINEMET)  MG tablet 2 tabs by mouth @ 7am, noon, and 9pm 540 tablet 3     citalopram (CELEXA) 20 MG tablet 1.5 x 20mg tab by mouth @12noon 135 tablet 3     estradiol (ESTRACE) 0.1 MG/GM vaginal cream Place 2 g vaginally twice a week 42.5 g 1     melatonin 5 MG tablet 2 x 5mg tab by mouth @ 9pm       naproxen sodium 220 MG capsule Take 220 mg by mouth as needed       NONFORMULARY 1 eye dropper of cbd oil by mouth every morning (young living oils)       Pimavanserin Tartrate 34 MG CAPS Take 1 capsule (34 mg) by mouth daily (Patient not taking: Reported " on 12/4/2019) 30 capsule 11     polyethylene glycol (MIRALAX) powder 1 capful in 8 oz water and sips throughout the day 510 g 1     Probiotic Product (PROBIOTIC DAILY) CAPS Young living probiotic by mouth daily @ 7am       QUEtiapine (SEROQUEL) 25 MG tablet Take one-half tablet (12.5 mg) in the morning and 3 tablets (75 mg) in the evening (9 pm) 315 tablet 3     rivastigmine (EXELON) 13.3 MG/24HR 24 hr patch Place 1 patch onto the skin daily 90 patch 11     senna-docusate (SENOKOT-S/PERICOLACE) 8.6-50 MG tablet Take 2 tablets by mouth At Bedtime       ZOLMitriptan (ZOMIG) 5 MG tablet Take 1 tablet (5 mg) by mouth at onset of headache for migraine MAY REPEAT ONCE AFTER 2 HOURS. DO NOT EXCEED 2 TABLETS IN 24 HOURS. 6 tablet 10         Jet Porter MD        Met with pt and her  in clinic to fill out application form online. Information forwarded to Corazon Dhaliwal and Kyra Rogers. Katie Dumont, HALEY      Again, thank you for allowing me to participate in the care of your patient.        Sincerely,        Jet Porter MD

## 2019-12-18 NOTE — TELEPHONE ENCOUNTER
Kyra Rogers  You; Corazon Dhaliwal, [facility] 22 hours ago (9:48 AM)      Hello!     She can fill with [facility] I sent them the RX and provided them with the kylie information. They will be reaching out to the patient to go over services and set up an order.     Thank you,     Kyra Rogers CPh-T

## 2019-12-19 ENCOUNTER — HOSPITAL ENCOUNTER (OUTPATIENT)
Facility: CLINIC | Age: 64
Setting detail: OBSERVATION
Discharge: NURSING, REHAB OR LONG-TERM CARE | End: 2019-12-23
Attending: PHYSICIAN ASSISTANT | Admitting: FAMILY MEDICINE
Payer: COMMERCIAL

## 2019-12-19 ENCOUNTER — NURSE TRIAGE (OUTPATIENT)
Dept: NURSING | Facility: CLINIC | Age: 64
End: 2019-12-19

## 2019-12-19 ENCOUNTER — TELEPHONE (OUTPATIENT)
Dept: PHARMACY | Facility: CLINIC | Age: 64
End: 2019-12-19

## 2019-12-19 DIAGNOSIS — G20.A1 PARALYSIS AGITANS (H): ICD-10-CM

## 2019-12-19 DIAGNOSIS — R25.1 TREMOR: ICD-10-CM

## 2019-12-19 DIAGNOSIS — R44.3 HALLUCINATIONS: ICD-10-CM

## 2019-12-19 DIAGNOSIS — F02.80 DEMENTIA DUE TO PARKINSON'S DISEASE WITHOUT BEHAVIORAL DISTURBANCE (H): Primary | ICD-10-CM

## 2019-12-19 DIAGNOSIS — F33.0 MAJOR DEPRESSIVE DISORDER, RECURRENT EPISODE, MILD (H): ICD-10-CM

## 2019-12-19 DIAGNOSIS — G20.A1 DEMENTIA DUE TO PARKINSON'S DISEASE WITHOUT BEHAVIORAL DISTURBANCE (H): Primary | ICD-10-CM

## 2019-12-19 DIAGNOSIS — G20.A1 PARKINSON DISEASE (H): ICD-10-CM

## 2019-12-19 DIAGNOSIS — Z65.8 INSUFFICIENT SOCIAL SUPPORT: ICD-10-CM

## 2019-12-19 DIAGNOSIS — G20.A1 PARKINSON'S DISEASE (H): ICD-10-CM

## 2019-12-19 PROBLEM — R10.2 PELVIC PAIN IN FEMALE: Status: ACTIVE | Noted: 2019-12-19

## 2019-12-19 LAB
ALBUMIN UR-MCNC: NEGATIVE MG/DL
APPEARANCE UR: CLEAR
BILIRUB UR QL STRIP: NEGATIVE
COLOR UR AUTO: YELLOW
GLUCOSE UR STRIP-MCNC: NEGATIVE MG/DL
HGB UR QL STRIP: NEGATIVE
HYALINE CASTS #/AREA URNS LPF: 3 /LPF (ref 0–2)
KETONES UR STRIP-MCNC: 5 MG/DL
LEUKOCYTE ESTERASE UR QL STRIP: NEGATIVE
MUCOUS THREADS #/AREA URNS LPF: PRESENT /LPF
NITRATE UR QL: NEGATIVE
PH UR STRIP: 6 PH (ref 5–7)
RBC #/AREA URNS AUTO: 1 /HPF (ref 0–2)
SOURCE: ABNORMAL
SP GR UR STRIP: 1.02 (ref 1–1.03)
UROBILINOGEN UR STRIP-MCNC: 0 MG/DL (ref 0–2)
WBC #/AREA URNS AUTO: 1 /HPF (ref 0–5)

## 2019-12-19 PROCEDURE — 81001 URINALYSIS AUTO W/SCOPE: CPT | Performed by: PHYSICIAN ASSISTANT

## 2019-12-19 PROCEDURE — 99219 ZZC INITIAL OBSERVATION CARE,LEVL II: CPT | Performed by: FAMILY MEDICINE

## 2019-12-19 PROCEDURE — 99285 EMERGENCY DEPT VISIT HI MDM: CPT | Mod: 25 | Performed by: EMERGENCY MEDICINE

## 2019-12-19 PROCEDURE — 25000132 ZZH RX MED GY IP 250 OP 250 PS 637: Performed by: PHYSICIAN ASSISTANT

## 2019-12-19 RX ORDER — QUETIAPINE FUMARATE 25 MG/1
75 TABLET, FILM COATED ORAL ONCE
Status: COMPLETED | OUTPATIENT
Start: 2019-12-19 | End: 2019-12-19

## 2019-12-19 RX ORDER — CARBIDOPA AND LEVODOPA 25; 100 MG/1; MG/1
2 TABLET ORAL ONCE
Status: COMPLETED | OUTPATIENT
Start: 2019-12-19 | End: 2019-12-19

## 2019-12-19 RX ORDER — LORAZEPAM 0.5 MG/1
0.5 TABLET ORAL 2 TIMES DAILY PRN
Status: ON HOLD | COMMUNITY
End: 2019-12-23

## 2019-12-19 RX ADMIN — CARBIDOPA AND LEVODOPA 2 TABLET: 25; 100 TABLET ORAL at 23:09

## 2019-12-19 RX ADMIN — QUETIAPINE FUMARATE 75 MG: 25 TABLET ORAL at 23:08

## 2019-12-19 NOTE — TELEPHONE ENCOUNTER
Spoke with . He states that the patient is now using carbidopa-levodopa  mg 2.5 tablets 3 times/day and they will be starting Nuplazid tomorrow. Patient is still experiencing the shaky episodes but when she takes a PRN dose this seems to improve. They are also now using dark chocolate kisses which seem to shorten the episodes as well. I answered some questions that the  had about Nuplazid.     Corazon Dhaliwal, Pharm.D.  Medication Therapy Management Pharmacist  Phone: [phone]

## 2019-12-19 NOTE — LETTER
Transition Communication Hand-off for Care Transitions to Next Level of Care Provider    Name: Nathaly Farias  : 1955  MRN #: [id number]  Primary Care Provider: Maxine Monae MD     Primary Clinic: [address] [place] [id number]     Reason for Hospitalization:  Tremor [R25.1]  Parkinson's disease (H) [G20]  Insufficient social support [Z65.8]  Admit Date/Time: 2019  8:56 PM  Discharge Date: 2019  Payor Source: Payor: [facility] / Plan: [facility] MEDICARE ADVANTAGE / Product Type: Medicare /     Readmission Assessment Measure (VANI) Risk Score/category: Obs         Reason for Communication Hand-off Referral: Other Parkinsons    Discharge Plan: [facility]     Concern for non-adherence with plan of care:   Y/N no  Discharge Needs Assessment:  Needs      Most Recent Value   Equipment Currently Used at Home  cane, straight, grab bar, tub/shower, shower chair   # of Referrals Placed by [facility]  Internal Clinic Care Coordination, Post Acute Facilities, Transportation   Skilled Nursing Facility  [facility] [phone], Fax: [phone]   PAS Number  -- [[id number]]        Follow-up plan:    Future Appointments   Date Time Provider Department Center   2019  3:15 PM Travis Barrera MD [facility]   2020  3:00 PM Shea Perez CNP [facility]   2020  1:00 PM Rani Baez PA-C [facility]   2020 12:30 PM [facility]       Any outstanding tests or procedures:        Referrals     Future Labs/Procedures    Occupational Therapy Adult Consult     Comments:    Evaluate and treat as clinically indicated.    Reason:  Parkinson's, confusion    Physical Therapy Adult Consult     Comments:    Evaluate and treat as clinically indicated.    Reason:  Parkinson's, abnormal balance and gait            Key Recommendations:  Patient wants to transfer to a Closer TCU if possible    Cherie Gusman RN    AVS/Discharge  Summary is the source of truth; this is a helpful guide for improved communication of patient story

## 2019-12-19 NOTE — TELEPHONE ENCOUNTER
Received VM from  stating that they will be receiving Nuplazid in the mail and they are wondering if there is anything she needs to do before starting the new medication.     Corazon Dhaliwal, Pharm.D.  Medication Therapy Management Pharmacist  Phone: [phone]

## 2019-12-20 ENCOUNTER — APPOINTMENT (OUTPATIENT)
Dept: OCCUPATIONAL THERAPY | Facility: CLINIC | Age: 64
End: 2019-12-20
Attending: FAMILY MEDICINE
Payer: COMMERCIAL

## 2019-12-20 ENCOUNTER — APPOINTMENT (OUTPATIENT)
Dept: PHYSICAL THERAPY | Facility: CLINIC | Age: 64
End: 2019-12-20
Attending: FAMILY MEDICINE
Payer: COMMERCIAL

## 2019-12-20 PROBLEM — R33.9 URINARY RETENTION: Status: ACTIVE | Noted: 2019-12-19

## 2019-12-20 PROBLEM — G20.A1 PARKINSON DISEASE (H): Status: ACTIVE | Noted: 2019-12-20

## 2019-12-20 LAB
ALBUMIN SERPL-MCNC: 3.4 G/DL (ref 3.4–5)
ALP SERPL-CCNC: 48 U/L (ref 40–150)
ALT SERPL W P-5'-P-CCNC: <6 U/L (ref 0–50)
ANION GAP SERPL CALCULATED.3IONS-SCNC: 3 MMOL/L (ref 3–14)
AST SERPL W P-5'-P-CCNC: 17 U/L (ref 0–45)
BILIRUB SERPL-MCNC: 0.6 MG/DL (ref 0.2–1.3)
BUN SERPL-MCNC: 32 MG/DL (ref 7–30)
CALCIUM SERPL-MCNC: 8.8 MG/DL (ref 8.5–10.1)
CHLORIDE SERPL-SCNC: 111 MMOL/L (ref 94–109)
CO2 SERPL-SCNC: 28 MMOL/L (ref 20–32)
CREAT SERPL-MCNC: 0.9 MG/DL (ref 0.52–1.04)
ERYTHROCYTE [DISTWIDTH] IN BLOOD BY AUTOMATED COUNT: 13.4 % (ref 10–15)
GFR SERPL CREATININE-BSD FRML MDRD: 68 ML/MIN/{1.73_M2}
GLUCOSE SERPL-MCNC: 103 MG/DL (ref 70–99)
HCT VFR BLD AUTO: 35.1 % (ref 35–47)
HGB BLD-MCNC: 11.3 G/DL (ref 11.7–15.7)
MCH RBC QN AUTO: 30.5 PG (ref 26.5–33)
MCHC RBC AUTO-ENTMCNC: 32.2 G/DL (ref 31.5–36.5)
MCV RBC AUTO: 95 FL (ref 78–100)
PLATELET # BLD AUTO: 230 10E9/L (ref 150–450)
POTASSIUM SERPL-SCNC: 3.8 MMOL/L (ref 3.4–5.3)
PROT SERPL-MCNC: 6.5 G/DL (ref 6.8–8.8)
RBC # BLD AUTO: 3.7 10E12/L (ref 3.8–5.2)
SODIUM SERPL-SCNC: 142 MMOL/L (ref 133–144)
WBC # BLD AUTO: 7.2 10E9/L (ref 4–11)

## 2019-12-20 PROCEDURE — 97167 OT EVAL HIGH COMPLEX 60 MIN: CPT | Mod: GO

## 2019-12-20 PROCEDURE — 99207 ZZC CDG-MDM COMPONENT: MEETS LOW - DOWN CODED: CPT | Performed by: PEDIATRICS

## 2019-12-20 PROCEDURE — 97530 THERAPEUTIC ACTIVITIES: CPT | Mod: GP | Performed by: PHYSICAL THERAPIST

## 2019-12-20 PROCEDURE — 97530 THERAPEUTIC ACTIVITIES: CPT | Mod: GO

## 2019-12-20 PROCEDURE — 25000132 ZZH RX MED GY IP 250 OP 250 PS 637: Performed by: FAMILY MEDICINE

## 2019-12-20 PROCEDURE — 85027 COMPLETE CBC AUTOMATED: CPT | Performed by: FAMILY MEDICINE

## 2019-12-20 PROCEDURE — 97127 ZZHC OT THERAPEUTIC INTERVENTION W/FOCUS ON COGNITIVE FUNCTION,EA 15 MIN: CPT | Mod: GO

## 2019-12-20 PROCEDURE — G0378 HOSPITAL OBSERVATION PER HR: HCPCS

## 2019-12-20 PROCEDURE — 97116 GAIT TRAINING THERAPY: CPT | Mod: GP | Performed by: PHYSICAL THERAPIST

## 2019-12-20 PROCEDURE — 97112 NEUROMUSCULAR REEDUCATION: CPT | Mod: GP | Performed by: PHYSICAL THERAPIST

## 2019-12-20 PROCEDURE — 80053 COMPREHEN METABOLIC PANEL: CPT | Performed by: FAMILY MEDICINE

## 2019-12-20 PROCEDURE — 25000132 ZZH RX MED GY IP 250 OP 250 PS 637: Performed by: PEDIATRICS

## 2019-12-20 PROCEDURE — 36415 COLL VENOUS BLD VENIPUNCTURE: CPT | Performed by: FAMILY MEDICINE

## 2019-12-20 PROCEDURE — 97162 PT EVAL MOD COMPLEX 30 MIN: CPT | Mod: GP | Performed by: PHYSICAL THERAPIST

## 2019-12-20 PROCEDURE — 99225 ZZC SUBSEQUENT OBSERVATION CARE,LEVEL II: CPT | Performed by: PEDIATRICS

## 2019-12-20 RX ORDER — RIVASTIGMINE 13.3 MG/24H
1 PATCH, EXTENDED RELEASE TRANSDERMAL AT BEDTIME
Status: DISCONTINUED | OUTPATIENT
Start: 2019-12-20 | End: 2019-12-20

## 2019-12-20 RX ORDER — NALOXONE HYDROCHLORIDE 0.4 MG/ML
.1-.4 INJECTION, SOLUTION INTRAMUSCULAR; INTRAVENOUS; SUBCUTANEOUS
Status: DISCONTINUED | OUTPATIENT
Start: 2019-12-20 | End: 2019-12-23 | Stop reason: HOSPADM

## 2019-12-20 RX ORDER — RIVASTIGMINE 13.3 MG/24H
1 PATCH, EXTENDED RELEASE TRANSDERMAL AT BEDTIME
Status: DISCONTINUED | OUTPATIENT
Start: 2019-12-20 | End: 2019-12-23 | Stop reason: HOSPADM

## 2019-12-20 RX ORDER — AMOXICILLIN 250 MG
2 CAPSULE ORAL AT BEDTIME
Status: DISCONTINUED | OUTPATIENT
Start: 2019-12-20 | End: 2019-12-23 | Stop reason: HOSPADM

## 2019-12-20 RX ORDER — ACETAMINOPHEN 650 MG/1
650 SUPPOSITORY RECTAL EVERY 4 HOURS PRN
Status: DISCONTINUED | OUTPATIENT
Start: 2019-12-20 | End: 2019-12-23 | Stop reason: HOSPADM

## 2019-12-20 RX ORDER — ACETAMINOPHEN 325 MG/1
650 TABLET ORAL EVERY 4 HOURS PRN
Status: DISCONTINUED | OUTPATIENT
Start: 2019-12-20 | End: 2019-12-23 | Stop reason: HOSPADM

## 2019-12-20 RX ORDER — CARBIDOPA AND LEVODOPA 25; 100 MG/1; MG/1
2 TABLET ORAL 3 TIMES DAILY
Status: DISCONTINUED | OUTPATIENT
Start: 2019-12-20 | End: 2019-12-23 | Stop reason: HOSPADM

## 2019-12-20 RX ORDER — CARBIDOPA AND LEVODOPA 25; 100 MG/1; MG/1
2.5 TABLET ORAL 3 TIMES DAILY
Status: DISCONTINUED | OUTPATIENT
Start: 2019-12-20 | End: 2019-12-20

## 2019-12-20 RX ORDER — POLYETHYLENE GLYCOL 3350 17 G/17G
17 POWDER, FOR SOLUTION ORAL DAILY
Status: DISCONTINUED | OUTPATIENT
Start: 2019-12-20 | End: 2019-12-23 | Stop reason: HOSPADM

## 2019-12-20 RX ORDER — LORAZEPAM 0.5 MG/1
0.5 TABLET ORAL 2 TIMES DAILY PRN
Status: DISCONTINUED | OUTPATIENT
Start: 2019-12-20 | End: 2019-12-23 | Stop reason: HOSPADM

## 2019-12-20 RX ORDER — CARBIDOPA AND LEVODOPA 25; 100 MG/1; MG/1
1 TABLET ORAL ONCE
Status: COMPLETED | OUTPATIENT
Start: 2019-12-20 | End: 2019-12-20

## 2019-12-20 RX ORDER — QUETIAPINE FUMARATE 25 MG/1
25 TABLET, FILM COATED ORAL EVERY MORNING
Status: DISCONTINUED | OUTPATIENT
Start: 2019-12-20 | End: 2019-12-23 | Stop reason: HOSPADM

## 2019-12-20 RX ORDER — CARBIDOPA AND LEVODOPA 25; 100 MG/1; MG/1
1 TABLET ORAL 3 TIMES DAILY PRN
Status: DISCONTINUED | OUTPATIENT
Start: 2019-12-20 | End: 2019-12-23 | Stop reason: HOSPADM

## 2019-12-20 RX ORDER — ONDANSETRON 2 MG/ML
4 INJECTION INTRAMUSCULAR; INTRAVENOUS EVERY 6 HOURS PRN
Status: DISCONTINUED | OUTPATIENT
Start: 2019-12-20 | End: 2019-12-23 | Stop reason: HOSPADM

## 2019-12-20 RX ORDER — QUETIAPINE FUMARATE 25 MG/1
75 TABLET, FILM COATED ORAL AT BEDTIME
Status: DISCONTINUED | OUTPATIENT
Start: 2019-12-20 | End: 2019-12-23 | Stop reason: HOSPADM

## 2019-12-20 RX ORDER — ONDANSETRON 4 MG/1
4 TABLET, ORALLY DISINTEGRATING ORAL EVERY 6 HOURS PRN
Status: DISCONTINUED | OUTPATIENT
Start: 2019-12-20 | End: 2019-12-23 | Stop reason: HOSPADM

## 2019-12-20 RX ADMIN — CARBIDOPA AND LEVODOPA 1 HALF-TAB: 25; 100 TABLET ORAL at 21:24

## 2019-12-20 RX ADMIN — CITALOPRAM HYDROBROMIDE 30 MG: 20 TABLET, FILM COATED ORAL at 12:43

## 2019-12-20 RX ADMIN — CARBIDOPA AND LEVODOPA 2 TABLET: 25; 100 TABLET ORAL at 21:17

## 2019-12-20 RX ADMIN — POLYETHYLENE GLYCOL 3350 17 G: 17 POWDER, FOR SOLUTION ORAL at 08:48

## 2019-12-20 RX ADMIN — CARBIDOPA AND LEVODOPA 1 TABLET: 25; 100 TABLET ORAL at 16:51

## 2019-12-20 RX ADMIN — RIVASTIGMINE 1 PATCH: 13.3 PATCH, EXTENDED RELEASE TRANSDERMAL at 21:12

## 2019-12-20 RX ADMIN — CARBIDOPA AND LEVODOPA 2 TABLET: 25; 100 TABLET ORAL at 07:02

## 2019-12-20 RX ADMIN — CARBIDOPA AND LEVODOPA 1 HALF-TAB: 25; 100 TABLET ORAL at 07:06

## 2019-12-20 RX ADMIN — ACETAMINOPHEN 650 MG: 325 TABLET, FILM COATED ORAL at 12:46

## 2019-12-20 RX ADMIN — CARBIDOPA AND LEVODOPA 1 HALF-TAB: 25; 100 TABLET ORAL at 12:50

## 2019-12-20 RX ADMIN — Medication 10 MG: at 21:15

## 2019-12-20 RX ADMIN — SENNOSIDES AND DOCUSATE SODIUM 2 TABLET: 8.6; 5 TABLET ORAL at 01:29

## 2019-12-20 RX ADMIN — Medication 10 MG: at 01:29

## 2019-12-20 RX ADMIN — SENNOSIDES AND DOCUSATE SODIUM 2 TABLET: 8.6; 5 TABLET ORAL at 21:17

## 2019-12-20 RX ADMIN — QUETIAPINE FUMARATE 25 MG: 25 TABLET ORAL at 08:49

## 2019-12-20 RX ADMIN — QUETIAPINE FUMARATE 75 MG: 25 TABLET ORAL at 21:18

## 2019-12-20 RX ADMIN — CARBIDOPA AND LEVODOPA 2 TABLET: 25; 100 TABLET ORAL at 12:43

## 2019-12-20 ASSESSMENT — MIFFLIN-ST. JEOR: SCORE: 1025.82

## 2019-12-20 NOTE — ASSESSMENT & PLAN NOTE
Previously diagnosed, followed by neurology  Today with concerns of increased shaking spells sometimes lasting for an hour at home.  Previously witnessed by neurology with increased dosing of Sinemet and consideration of starting new med, nuplazid, once approved by insurance  Daughter's concern of safety at home  [place] observation, monitor overnight,  PT/OT/social service consult tomorrow  Try to speak to her neurologist tomorrow about medication options.

## 2019-12-20 NOTE — PLAN OF CARE
Discharge Planner PT   Patient plan for discharge: Home with   Current status: Patient is a 64 year old female admitted due to increased shakiness at home and paralysis agitans, patient also with complaint of suprapubic tenderness. Patient has a previous medical history of Parkinson s disease, dementia, hallucinations, neck pain, hyperlipidemia, sleep apnea, obesity, varicose veins, constipation, migraine, heart murmurs, paralysis agitans, HTN, overactive bladder and depression. Prior to admission patient living in a single family home with 2 stairs without railing to enter and stairs to the basement, however these are gated to prevent patient access. Patient lives with her  who drives truck 5 days a week, her niece (in law) lives down the road and assists patient during the week with meals, changing and dressing, re-orienting to home, medications, re-orient to transfers; patient's daughter also assists with dinner and check in. luis armando has noticed a decline in the last two weeks. Patient is alone 17-20 hours a day. Prior to the recent decline she was IND in mobility, transfers and required assistance for cares. Per niece patient has demonstrated recent surface seeking behaviors, increased confusion with processing and reminders for safety. Her  completes bathing 1-2 times a week on the weekend. Currently, patient unable to state date of birth, not oriented, niece assisted with evaluation and treatment. Patient with fair strength bilat UEs and LEs. Min assist rolling in bed with verbal cues and reminders for sequencing, physical assistance to complete task due to fear of falling and freezing. Tactile cues scooting forward at EOB. Frequent cues to orient to tasks and continue transfer. Sit to/from stand min assist for hand placement and safety, no physical assistance for lift off. Good static standing balance when tremors subside, when tremoring requires mod assist to prevent loss of balance upon  coming to stand. Ambulated 125ft with 2WW, CGA for safety, 2 episodes of min assist due to tremors for safety and physical assistance of the walker for management of turns and environment safety. Poor motor planning and unable to follow verbal directional cues. Ascend/descend 2 stairs with bilat hand rail, min assist for safety, physical hand placement on railing bilaterally due to poor visualization of target and hand missing the railing upon reaching. Step to method with fair foot placement however concerns for safety without railing and patient unable to complete tasks without railing. Stand to sit on toilet with physical guidance of walker into the room, physical placement of hands on grab bars and verbal cues for sequencing.   Barriers to return to prior living situation: Medical status, decreased strength, impaired cognition, decreased safety insight, increased risk of falling, increased physical assistance for baseline transfers  Recommendations for discharge: TCU with memory care  Rationale for recommendations: Patient demonstrates decline from baseline over the past two weeks, patient able to participate with PT and follow minimal cues at this time. Anticipate patient to make progress towards baseline and increased safety with continues skilled intervention. Patient would benefit from TCU placement to receive further PT intervention and training, safe disposition prior to return to home alone.        Entered by: Elvia Long 12/20/2019 11:07 AM     Thank you for your referral.  Elvia Long, PT, DPT, ATC    [facility]  O: [phone]  E: [email or web address]

## 2019-12-20 NOTE — TELEPHONE ENCOUNTER
Clinic Action Needed:No  Reason for Call: Daughter Veronica called.  Her mother was just seen in office by  Neurologist on 12/16/19 and she is having increasing shaking with Parkinson's.  They aren't able to control symptoms today and Nathaly wants to go to the hospital.    Caller is concerned as she isn't able to transport her and will need to call an ambulance.  She is wondering if her mother can be admitted for observation as she cannot stay with her and Nathaly's  is gone as an over the road .    Paged on call provider for [facility] to speak to caller directly regarding options.  Page sent to on call rounding pager by [facility] page  at 7:03 pm.      Caller advised to call back if they have not heard back from on call provider within 20 minutes.  Caller appears to understand directives and agrees with plan.    Routed to: Not routed.    Temi Sykes, RN  [facility]

## 2019-12-20 NOTE — ASSESSMENT & PLAN NOTE
Increased pain with the spasms and shaking spells  Has a pessary in place due to incontinence and prolapsed uterus  She and daughter are asking about potential GYN consult to check on pessary placement  We will see if this is an option tomorrow

## 2019-12-20 NOTE — PROGRESS NOTES
S-(situation): Patient registered to Observation. Patient arrived to room 269 via cart from ED.    B-(background): Increase tremors     A-(assessment): Vital signs:  Temp: 98.8  F (37.1  C) Temp src: Oral BP: 122/66 Pulse: 62   Resp: 16 SpO2: 96 % O2 Device: None (Room air)   Height: 152.4 cm (5') Weight: 54.4 kg (120 lb) Lung sounds clear.  A&O to self only.  Pt denies pain. Bowel sounds normoactive.  Skin is intact. IV site asymptomatic.Able to make needs known and uses call light appropriately.     R-(recommendations): Orders and observation goals reviewed with patient and daughter.    Nursing Observation criteria listed below was met:    Skin issues/needs documented:Yes  Isolation needs addressed, if appropriate: NA  Fall Prevention: Education given and documented: Yes  Education Assessment documented:Yes  Education Documented (Pre-existing chronic infection such as, MRSA/VRE need education on admission): Yes  OBS video/handout Reviewed & DocumentedYes  Medication Reconciliation Complete: Yes  New medication patient education completed and documented (Possible Side Effects of Common Medications handout): Yes  Home medications if not able to send immediately home with family stored here: NA  Reminder note placed in discharge instructions: NA  Patient has discharge needs (If yes, please explain): No

## 2019-12-20 NOTE — PROGRESS NOTES
Initial Inpatient Occupational Therapy Evaluation     12/20/19 1135   Quick Adds   Type of Visit Initial Occupational Therapy Evaluation   Living Environment   Lives With spouse   Living Arrangements house   Home Accessibility stairs to enter home   Number of Stairs, Main Entrance 2   Stair Railings, Main Entrance none   Transportation Anticipated family or friend will provide   Living Environment Comment  is a   M-F. Patient is alone 17-20 hours a day, luis armando assists with cares daily during the week bringing food for breakfast and lunch; other family assists with dinners. Neyadiel assists dressing, re-orienting to home, medications, re-orient to transfers. luis armando has noticed a decline in the last two weeks. walk in shower   Self-Care   Usual Activity Tolerance fair   Current Activity Tolerance fair   Regular Exercise No   Equipment Currently Used at Home cane, straight;grab bar, tub/shower;shower chair   Activity/Exercise/Self-Care Comment afternoon nap, if does not get nap will be paranoid, more confused, agitated however never violet.    Functional Level   Ambulation 1-->assistive equipment   Transferring 1-->assistive equipment   Toileting 1-->assistive equipment   Bathing 3-->assistive equipment and person  (1-2 times weekly when  is home)   Dressing 2-->assistive person   Eating 2-->assistive person   Communication 2-->difficulty understanding (not related to language barrier)   Swallowing 0-->swallows foods/liquids without difficulty   Cognition 2 - difficulty with organizing thoughts   Fall history within last six months yes   Number of times patient has fallen within last six months 1   Which of the above functional risks had a recent onset or change? ambulation;transferring;toileting;bathing;dressing;cognition;fall history   Prior Functional Level Comment Previous assistance at baseline.    General Information   Onset of Illness/Injury or Date of Surgery - Date 12/19/19   Referring  Physician José Miguel Mcgrath MD   Patient/Family Goals Statement home with familly assist.    Additional Occupational Profile Info/Pertinent History of Current Problem Patient is a 64 year old female admitted due to increased shakiness at home and paralysis agitans, patient also with complaint of suprapubic tenderness. Patient has a previous medical history of Parkinson s disease, dementia, hallucinations, neck pain, hyperlipidemia, sleep apnea, obesity, varicose veins, constipation, migraine, heart murmurs, paralysis agitans, HTN, overactive bladder and depression. Prior to admission patient living in a single family home with 2 stairs without railing to enter and stairs to the basement, however these are gated to prevent patient access. Patient lives with her  who drives truck 5 days a week, her niece (in law) lives down the road and assists patient during the week with meals (breakfast and lunch only), changing and dressing, re-orienting to home, medications, re-orient to transfers; patient's daughter assists with dinner and check in's. Niece has noticed a decline in the last two weeks. Patient is alone 17-20 hours a day. Prior to the recent decline she was IND in mobility, transfers and required assistance for cares. Per niece patient has demonstrated recent surface seeking behaviors, increased confusion with processing and reminders for safety. Her  completes bathing 1-2 times a week on the weekend.    Cognitive Status Examination   Orientation person   Level of Consciousness lethargic/somnolent;confused;agitated  (emotional)   Follows Commands (Cognition) increased processing time needed;0-24% accuracy   Memory impaired   Attention Distractible during evaluation   Organization/Problem Solving Problem solving impaired;Prioritizing of information/tasks impaired   Executive Function Working memory impaired, decreased storage of information for performing tasks;Cognitive flexibility impaired;Planning  ability impaired;Self awareness/monitoring impaired   Cognitive Comment Attempted [facility] cogntive screen on this date.  Unable to answer orientation questions then became emotional and agitated about condition.  Neice attempted to console patient with minimal success.  OT attempted to distract to discuss preferred tasks like watching TV with inability to state what shows she watches.     Visual Perception   Visual Perception Wears glasses   Range of Motion (ROM)   ROM Comment Not assessed due to emotional dysregulation.    Strength   Strength Comments Not assessed due to emotional dysregulation.    Instrumental Activities of Daily Living (IADL)   IADL Comments Pt watches TV; family sets TV up for patient before they leave the house.    Activities of Daily Living Analysis   Impairments Contributing to Impaired Activities of Daily Living cognition impaired;fear and anxiety   ADL Comments Pt recieved assistance with dressing and bathing at baseline.    General Therapy Interventions   Planned Therapy Interventions ADL retraining;cognition   Intervention Comments AE handout for eating.    Clinical Impression   Criteria for Skilled Therapeutic Interventions Met yes, treatment indicated   OT Diagnosis decreased independence with BADLs/IADLs.    Influenced by the following impairments cognition; emotional dysregulation.    Assessment of Occupational Performance 5 or more Performance Deficits   Identified Performance Deficits safety, dressing, bathing, eating/feeding, grooming/hygiene, functional transfers, community mobility; medial history.    Clinical Decision Making (Complexity) High complexity   Therapy Frequency Daily   Predicted Duration of Therapy Intervention (days/wks) 1-2 days.    Anticipated Discharge Disposition Transitional Care Facility   Risks and Benefits of Treatment have been explained. Yes   Patient, Family & other staff in agreement with plan of care Yes   Clinical Impression Comments Patient would  "benefit from further skilled OT services within the TCU setting to increase independence with BADLs and progress toward prior level of function and safety.    [facility] AM-PAC TM \"6 Clicks\"   2016, Trustees of [facility], under license to [employer].  All rights reserved.   6 Clicks Short Forms Daily Activity Inpatient Short Form   [facility] AM-PAC  \"6 Clicks\" Daily Activity Inpatient Short Form   1. Putting on and taking off regular lower body clothing? 2 - A Lot   2. Bathing (including washing, rinsing, drying)? 2 - A Lot   3. Toileting, which includes using toilet, bedpan or urinal? 2 - A Lot   4. Putting on and taking off regular upper body clothing? 3 - A Little   5. Taking care of personal grooming such as brushing teeth? 3 - A Little   6. Eating meals? 2 - A Lot   Daily Activity Raw Score (Score out of 24.Lower scores equate to lower levels of function) 14   Total Evaluation Time   Total Evaluation Time (Minutes) 23     Thank you for your referral.     HERVE [facility]  Occupational Therapy     Phone: [phone]  Email: [email or web address]    "

## 2019-12-20 NOTE — H&P
[facility]    History and Physical - Hospitalist Service       Date of Admission:  12/19/2019    Assessment & Plan   Nathaly Farias is a 64 year old female admitted on 12/19/2019. She presents via EMS to the hospital with her daughter with concerns of increased shaking spells at home.  She has known history of Parkinson's disease, followed by neurology but in the recent past has had increased prolonged shaking spells which are painful and fairly debilitating.  These have been previously witnessed by her neurologist with an increased dosing of Sinemet recommended and they are waiting currently for approval of a new med, nuplazid.  Tonight there does not seem to be any acute medical issue present, however daughter states that she does not feel safe bringing her mother home and her father who is currently on the road as an over the road  is not due back home until tomorrow evening.  We will register observation, watch overnight, continue home medications with PRN Sinemet for the spells as recommended by neurology.  She is also been having some episodes of hallucinations and has been started on Seroquel for this.  Will order PT/OT/social service consult tomorrow to look for options.  Might benefit by speaking to her neurologist tomorrow for suggestions for change in treatment if necessary.    Paralysis agitans (H)  Previously diagnosed, followed by neurology  Today with concerns of increased shaking spells sometimes lasting for an hour at home.  Previously witnessed by neurology with increased dosing of Sinemet and consideration of starting new med, nuplazid, once approved by insurance  Daughter's concern of safety at home  [place] observation, monitor overnight,  PT/OT/social service consult tomorrow  Try to speak to her neurologist tomorrow about medication options.    Hallucinations  Recent issues, question related to increased dosing of Sinemet  Currently on Seroquel  Continue  current dosing of Seroquel, will speak to neurology regarding hallucinations    Pelvic pain in female  Increased pain with the spasms and shaking spells  Has a pessary in place due to incontinence and prolapsed uterus  She and daughter are asking about potential GYN consult to check on pessary placement  We will see if this is an option tomorrow         Diet: regular  DVT Prophylaxis: observation status  Lentz Catheter: not present  Code Status: full code    Disposition Plan   Expected discharge: Tomorrow, recommended to prior living arrangement once mental status at baseline and safe dispositon arranged.  Entered: José Miguel Mcgrath MD 12/19/2019, 11:56 PM     The patient's care was discussed with the Patient and Patient's Family.    José Miguel Mcgrath MD  [facility]    ______________________________________________________________________    Chief Complaint   64-year-old female with increased shaking spells at home    History is obtained from the patient, electronic health record, emergency department provider and patient's daughter    History of Present Illness   Nathaly Farias is a 64 year old female who presents via EMS from home with episodes of increased shaking.  She has known history of Parkinson's disease, followed by neurology, taking 3 times daily Sinemet.  The past several months has had increasing and longer lasting spells of shakiness where she tenses up and has shaking which limits her ability to get around and complains of pain in the pelvic area along with this.  The sometimes will last up to an hour in length.  This is been witnessed by her neurologist who at the last visit increase her dosing of Sinemet and suggested as needed Sinemet with these spells and to take a dose of Ativan or Xanax as well.  She in addition has been having some increased hallucinations, possibly from the increased dosing of Sinemet.  Her  who cares for her, is an over the road  charissa and is gone for this week.  Her son and daughter have been caring for her and this evening the daughter does not feel comfortable bringing her back home and caring for her.  Patient's only complaint besides the shaking spells is some pelvic discomfort.  She does have a pessary in place due to urinary incontinence and uterine prolapse.  Scheduled see her GYN doctor in January, but wondering if someone could check it earlier than this.  She denies fever, chills, cough, shortness of breath, abdominal pain, nausea or vomiting.  At home she is ambulatory using a cane.  Denies any recent falls.    Review of Systems    The 10 point Review of Systems is negative other than noted in the HPI or here.     Past Medical History    I have reviewed this patient's medical history and updated it with pertinent information if needed.   Past Medical History:   Diagnosis Date     Arthralgia of temporomandibular joint 10/22/1996     Arthritis      Cognitive disorder evalaution 2017 3/6/2017    She has marked attentional difficulties, impairments in memory including a rapid forgetting rate, mild anomia, and mild executive dysfunction. 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 She's required increasing assistance with finances, medications, driving, and cooking. I think this is a mild dementia, and     Constipation 2/23/2015     Dementia (H) 3/13/2017    IMPRESSIONS AND RECOMMENDATIONS   Current results indicate marked attentional difficulties, impairments in memory, which in some cases reflected impairments in retention and in other cases reflected retrieval difficulties, and mild anomia as well as mild executive dysfunction. Premorbid intellectual functioning is estimated to fall in the borderline range. She denied experiencing significant depre     Depressive disorder      Heart murmur 2/23/2015     History of MRI of brain and brain stem  2/21/2015    MRI OF THE BRAIN WITHOUT CONTRAST 7/10/2014 1:21 PM  COMPARISON: None. HISTORY: Left-sided upper and lower extremity tremor. Balance issues. TECHNIQUE:  Brain: Axial diffusion-weighted with ADC map, T2-weighted with fat saturation, T1-weighted and turboFLAIR and coronal T1-weighted images of the brain were obtained without intravenous contrast.  FINDINGS: There is mild diffuse cerebral volume loss     Hypertension      Incomplete RBBB 2/23/2015     Motion sickness      Other and unspecified hyperlipidemia      Parkinson's disease (H)      Seborrheic dermatitis 2/29/2016     Unspecified arthropathy, hand 08/07/1997     Unspecified sleep apnea     moderate, sleep study 11/07, on CPAP, has daytime somnolence with this     Variants of migraine, not elsewhere classified, without mention of intractable migraine without mention of status migrainosus      Wears glasses 2/23/2015       Past Surgical History   I have reviewed this patient's surgical history and updated it with pertinent information if needed.  Past Surgical History:   Procedure Laterality Date     ------------OTHER-------------      left shoulder     ARTHRODESIS TOE(S)  3/2/2011    ARTHRODESIS TOE(S) performed by CLARA LUCAS at  OR     C LIGATE FALLOPIAN TUBE  1998     CHOLECYSTECTOMY, LAPOROSCOPIC  5/12/2008    Cholecystectomy, Laparoscopic     ESOPHAGOSCOPY, GASTROSCOPY, DUODENOSCOPY (EGD), COMBINED N/A 7/5/2017    Procedure: COMBINED ESOPHAGOSCOPY, GASTROSCOPY, DUODENOSCOPY (EGD), BIOPSY SINGLE OR MULTIPLE;  Esophagogastroduodenoscopy with Multiple Biopsies by Biopsy;  Surgeon: Tj Denney MD;  Location: [facility]     HC COLONOSCOPY W/WO BRUSH/WASH  08/30/06    normal     HC OPEN TX TIBIAL SHAFT FX W PLATE/SCREWS W/WO CERCLAGE  2000    Multiple operations on left leg     HC SHLDR ARTHROSCOP,PART ACROMIOPLAS  11/16/06    Right shoulder     HC SHLDR ARTHROSCOP,SURG,DIS CLAVICULECTOMY  11/16/06    Right shoulder       Social History    I have reviewed this patient's social history and updated it with pertinent information if needed.  Social History     Tobacco Use     Smoking status: Never Smoker     Smokeless tobacco: Never Used     Tobacco comment: no smokers in the household   Substance Use Topics     Alcohol use: Yes     Alcohol/week: 0.0 standard drinks     Comment: very occ     Drug use: No       Family History   I have reviewed this patient's family history and updated it with pertinent information if needed.   Family History   Problem Relation Age of Onset     Diabetes Father         type II, diagnosed in late 60's     Hypertension Father      Cancer Father         rare kidney cancer had kidney removed, 75 y.o. at onset     Cerebrovascular Disease Father      Rashes/Skin Problems Father         rosacea     Neurologic Disorder Mother         head tremor     Heart Disease Brother         Valvular disease corrected with surgery     Heart Disease Paternal Grandfather         MI     Migraines Son      Migraines Sister      Rashes/Skin Problems Sister         rosacea       Prior to Admission Medications   Prior to Admission Medications   Prescriptions Last Dose Informant Patient Reported? Taking?   LORazepam (ATIVAN) 0.5 MG tablet 2019 at 2000  Yes Yes   Si.5 mg 2 times daily as needed for anxiety   NONFORMULARY Unknown at Unknown time  Yes No   Si eye dropper of cbd oil by mouth every morning (young living oils)   Pimavanserin Tartrate 34 MG CAPS Unknown at NOT ON HER LIST  No No   Sig: Take 1 capsule (34 mg) by mouth daily   QUEtiapine (SEROQUEL) 25 MG tablet 2019 at 2310 75 mg.   No Yes   Sig: Take one tablet (25 mg) in the morning and 3 tablets (75 mg) in the evening (9 pm)   carbidopa-levodopa (SINEMET)  MG tablet 2019 at 2310Given 2 mg per MD order  No Yes   Sig: Increase to 2.5 tabs by mouth @ 7am, noon, and 9pm and 1/2-1 tab as needed for shaking episodes   citalopram (CELEXA) 20 MG tablet 2019 at  0900  No Yes   Si.5 x 20mg tab by mouth @12noon   estradiol (ESTRACE) 0.1 MG/GM vaginal cream Unknown at Daughter not sure if doing.   No No   Sig: Place 2 g vaginally twice a week   melatonin 5 MG tablet 2019 at 2100  Yes Yes   Si x 5mg tab by mouth @ 9pm   naproxen sodium 220 MG capsule Unknown at Unknown time  Yes No   Sig: Take 220 mg by mouth as needed   polyethylene glycol (MIRALAX) powder 2019 at 0900  Yes Yes   Si capful in 8 oz water and sips throughout the day   rivastigmine (EXELON) 13.3 MG/24HR 24 hr patch 2019 at 2315  No Yes   Sig: Place 1 patch onto the skin daily   senna-docusate (SENOKOT-S/PERICOLACE) 8.6-50 MG tablet 2019 at 2100  Yes Yes   Sig: Take 2 tablets by mouth At Bedtime      Facility-Administered Medications: None     Allergies   Allergies   Allergen Reactions     Sulfa Drugs      Tacchycardia, had to go to ED     Naproxen GI Disturbance     Oxycontin [Oxycodone] GI Disturbance     Made her feel funny/upset stomach     Ropinirole      Did not work       Physical Exam   Vital Signs: Temp: 99.2  F (37.3  C) Temp src: Oral BP: (!) 134/112(Pts left arm shaking) Pulse: 84   Resp: 16 SpO2: 96 % O2 Device: None (Room air)    Weight: 120 lbs 0 oz    Constitutional: awake, alert, cooperative, no apparent distress and appears older than stated age.  Speaks about dogs at home that sometimes will pop her belly, daughter says there are no pets at home.  Eyes: pupils equal, round and reactive to light and extra-ocular muscles intact  ENT: Normocephalic, without obvious abnormality, atraumatic, sinuses nontender on palpation, external ears without lesions, oral pharynx with moist mucous membranes, tonsils without erythema or exudates, gums normal and good dentition.  Hematologic / Lymphatic: no cervical lymphadenopathy and no supraclavicular lymphadenopathy  Respiratory: No increased work of breathing, good air exchange, clear to auscultation bilaterally, no crackles  or wheezing  Cardiovascular: regular rate and rhythm, murmurs include systolic murmur III/VI located at left sternal border with radiation to the carotids and no edema  GI: normal bowel sounds, soft, non-distended and non-tender  Skin: no bruising or bleeding and normal skin color, texture, turgor  Musculoskeletal: no lower extremity pitting edema present  there is no redness, warmth, or swelling of the joints  Neurologic: Awake and alert.  Quite stiff with cogwheeling present.  Did not attempt to walk    Data   Data reviewed today: I reviewed all medications, new labs and imaging results over the last 24 hours. I personally reviewed no images or EKG's today.    Results for orders placed or performed during the hospital encounter of 12/19/19 (from the past 24 hour(s))   UA with Microscopic reflex to Culture   Result Value Ref Range    Color Urine Yellow     Appearance Urine Clear     Glucose Urine Negative NEG^Negative mg/dL    Bilirubin Urine Negative NEG^Negative    Ketones Urine 5 (A) NEG^Negative mg/dL    Specific Gravity Urine 1.023 1.003 - 1.035    Blood Urine Negative NEG^Negative    pH Urine 6.0 5.0 - 7.0 pH    Protein Albumin Urine Negative NEG^Negative mg/dL    Urobilinogen mg/dL 0.0 0.0 - 2.0 mg/dL    Nitrite Urine Negative NEG^Negative    Leukocyte Esterase Urine Negative NEG^Negative    Source Catheterized Urine     WBC Urine 1 0 - 5 /HPF    RBC Urine 1 0 - 2 /HPF    Mucous Urine Present (A) NEG^Negative /LPF    Hyaline Casts 3 (H) 0 - 2 /LPF

## 2019-12-20 NOTE — ED NOTES
"Pt weepy. States \" here it comes again\" Pts tremors worsened. Co urge to vd. Daughter trying to calm Mother  "

## 2019-12-20 NOTE — PLAN OF CARE
Pt confused; alert to self and occasionally place. Crying and anxious this morning r/t urethral/vaginal/bladder pain (hard for pt to express specific location). Bladder scanned for 335 cc after almost 12 hours of not voiding on own. Lentz placed for 500cc out. Pt reports pain has gone away but now complains of not liking the feeling of having the catheter. Pt continues to need re-direction and education for the need of the catheter. Pt becomes anxious easily when talking about plan of care and need for catheter. A-1/2 with transfers. Good intake and output. Family caregiver at bedside most of shift. Skin intact. VSS. C/o back pain this afternoon - Tylenol given. Will continue to monitor.

## 2019-12-20 NOTE — PROGRESS NOTES
12/20/19 1000   Quick Adds   Type of Visit Initial PT Evaluation       Present no   Living Environment   Lives With spouse   Living Arrangements house   Home Accessibility stairs to enter home  (stairs to basement, gated to prevent access)   Number of Stairs, Main Entrance 2   Stair Railings, Main Entrance none   Transportation Anticipated family or friend will provide   Living Environment Comment  is a   M-F. Patient is alone 17-20 hours a day, neice assists with cares daily during the week and assists with meals, changing and dressing, re-orienting to home, medications, re-orient to transfers. neyadiel has noticed a decline in the last two weeks. walk in shower   Self-Care   Usual Activity Tolerance fair   Current Activity Tolerance fair   Regular Exercise No   Equipment Currently Used at Home cane, straight;grab bar, tub/shower;shower chair   Activity/Exercise/Self-Care Comment afternoon nap, if does not get nap will be paranoid, more confused, agitated however never violet.    Functional Level Prior   Ambulation 1-->assistive equipment  (surface seeking)   Transferring 1-->assistive equipment   Toileting 1-->assistive equipment  (surfaces)   Bathing 3-->assistive equipment and person  (1-2 times weekly when  is home)   Swallowing 0-->swallows foods/liquids without difficulty   Cognition 2 - difficulty with organizing thoughts   Fall history within last six months yes   Number of times patient has fallen within last six months 1  (at least)   Which of the above functional risks had a recent onset or change? ambulation;transferring   General Information   Onset of Illness/Injury or Date of Surgery - Date 12/19/19   Referring Physician Dr. Mcgrath   Patient/Family Goals Statement  would like to bring her home   Pertinent History of Current Problem (include personal factors and/or comorbidities that impact the POC) Patient is a 64 year old female admitted due to  increased shakiness at home and paralysis agitans, patient also with complaint of suprapubic tenderness. Patient has a previous medical history of Parkinson's disease, dementia, hallucinations, neck pain, hyperlipidemia, sleep apnea, obesity, varicose veins, constipation, migraine, heart murmurs, paralysis agitans, HTN, overactive bladder and depression.   Precautions/Limitations fall precautions   Weight-Bearing Status - LUE full weight-bearing   Weight-Bearing Status - RUE full weight-bearing   Weight-Bearing Status - LLE full weight-bearing   Weight-Bearing Status - RLE full weight-bearing   General Observations neice present and provided PLOF and home environment information   General Info Comments PT orders: eval and treat discharge recommendations. Activity orders: up with assistance.    Cognitive Status Examination   Orientation not oriented to person, place or time   Level of Consciousness confused   Follows Commands and Answers Questions unable to follow multi-step instructions;able to follow single-step instructions;25% of the time   Personal Safety and Judgment impulsive   Memory impaired   Pain Assessment   Patient Currently in Pain Yes, see Vital Sign flowsheet   Integumentary/Edema   Integumentary/Edema no deficits were identifed   Posture    Posture Forward head position   Posture Comments downward gaze   Range of Motion (ROM)   ROM Comment limited end range UE and LE   Strength   Strength Comments unable to follow commands for break testing. fair strength bilat UEs and LEs   Bed Mobility   Bed Mobility Bed mobility skill: Rolling/Turning;Bed mobility skill: Sit to supine   Bed Mobility Skill: Rolling/Turning   Level of [place]: Rolling/Turning minimum assist (75% patients effort)   Physical/Nonphysical Assist: Rolling/Turning 1 person assist;verbal cues   Bed Mobility Skill: Supine to Sit   Level of [place]: Supine/Sit minimum assist (75% patients effort)   Physical Assist/Nonphysical  Assist: Supine/Sit 1 person assist;verbal cues;supervision   Transfer Skills   Transfer Transfer Skill: Sit to Stand;Transfer Skill:  Stand to Sit   Transfer Skill:  Sit to Stand   Level of [place]: Sit/Stand minimum assist (75% patients effort)   Physical Assist/Nonphysical Assist: Sit/Stand supervision;verbal cues;1 person assist   Weightbearing Restrictions: Sit/Stand full weight-bearing   Assistive Device for Transfer: Sit/Stand rolling walker   Transfer Skill: Stand to Sit   Level of [place]: Stand/Sit minimum assist (75% patients effort)   Physical Assist/Nonphysical Assist: Stand/Sit supervision;verbal cues;1 person assist   Weight-Bearing Restrictions: Stand/Sit full weight-bearing   Assistive Device: Stand to Sit rolling walker   Gait   Gait Gait Skill;Gait Analysis;Stairs   Gait Skills   Level of [place]: Gait minimum assist (75% patients effort)  (of walker for safety)   Physical Assist/Nonphysical Assist: Gait 1 person assist;verbal cues;supervision   Weight-Bearing Restrictions: Gait full weight-bearing   Assistive Device for Transfer: Gait rolling walker   Gait Distance   (125 ft)   Gait Analysis   Gait Pattern Used swing-through gait   Gait Deviations Noted decreased toe-to-floor clearance;decreased stride length;decreased step length;increased time in double stance;decreased salvador;decreased velocity of limb motion   Impairments Contributing to Gait Deviations decreased strength;impaired balance;impaired coordination   Stairs   Self Performance Stand by assist   Physical/Nonphysical Assist: Stairs 1 person assist   Rails 2 rails   Indicate number of stairs 2   Balance   Balance Comments LOB with turning to the left x 1, assist to recover balance. when tremoring requires mod assist to prevent loss of balance upon coming to stand.   Sensory Examination   Sensory Perception no deficits were identified   Coordination   Coordination Comments poor targeting of hard to grab bar/railing. Poor  "visual tracking and attention to environment   Muscle Tone   Muscle Tone Comments muscle atrophy throughout   General Therapy Interventions   Planned Therapy Interventions gait training;strengthening;transfer training   Clinical Impression   Criteria for Skilled Therapeutic Intervention yes, treatment indicated   PT Diagnosis weakness, impaired balance, impaired gait, tremors   Influenced by the following impairments chronic medical history, Paralysis agitans   Functional limitations due to impairments decreased safety with transitional movements, inability to care for safe, increased physical asssitance for baseline mobility   Clinical Presentation Evolving/Changing   Clinical Presentation Rationale Patient with decline from baseline. She demonstrates mild improvement in tremors from admission, anticipate progression towards stability with medical management.   Clinical Decision Making (Complexity) Moderate complexity   Therapy Frequency Daily   Predicted Duration of Therapy Intervention (days/wks) 2-3 days   Anticipated Equipment Needs at Discharge   (defer to TCU)   Anticipated Discharge Disposition Transitional Care Facility   Risk & Benefits of therapy have been explained Yes   Patient, Family & other staff in agreement with plan of care Yes   Clinical Impression Comments Patient demonstrates decline from baseline over the past two weeks, patient able to participate with PT and follow minimal cues at this time. Anticipate patient to make progress towards baseline and increased safety with continues skilled intervention. Patient would benefit from TCU placement to receive further PT intervention and training, safe disposition prior to return to home alone.    [facility] InExchange-PAC TM \"6 Clicks\"   2016, Trustees of [facility], under license to [employer].  All rights reserved.   6 Clicks Short Forms Basic Mobility Inpatient Short Form   [facility] AM-PAC  \"6 Clicks\" V.2 Basic Mobility Inpatient " Short Form   1. Turning from your back to your side while in a flat bed without using bedrails? 3 - A Little   2. Moving from lying on your back to sitting on the side of a flat bed without using bedrails? 3 - A Little   3. Moving to and from a bed to a chair (including a wheelchair)? 3 - A Little   4. Standing up from a chair using your arms (e.g., wheelchair, or bedside chair)? 3 - A Little   5. To walk in hospital room? 3 - A Little   6. Climbing 3-5 steps with a railing? 3 - A Little   Basic Mobility Raw Score (Score out of 24.Lower scores equate to lower levels of function) 18   Total Evaluation Time   Total Evaluation Time (Minutes) 20     Thank you for your referral.  Elvia Long, PT, DPT, ATC    [facility]  O: [phone]  E: [email or web address]

## 2019-12-20 NOTE — ED NOTES
Pt drinking pop. Pt confused intermittently but will hold a normal conversation. Pt less shaky. Pt needs assist with transfers.

## 2019-12-20 NOTE — PROGRESS NOTES
[facility]    Medicine Progress Note - Hospitalist Service       Date of Admission:  12/19/2019  Assessment & Plan    64-year-old woman with advanced Parkinson's disease followed by neurology and with concern for urinary problems for which she is undergoing active evaluation by urology hospitalized after an ER visit last night because of concern about functional decline at home with continued frequent more severe shaking spells and abdominal pain.  Today, urinary retention has been demonstrated and resolved after placement of Lentz catheter, and she has not had recurring episodes of abdominal pain since that time.  Urinary retention could be due to Parkinson's disease or side effects of some of her medications, or both.  She has a history of uterine prolapse and cystocele and did have a pessary placed recently but displacement or malfunction of the pessary is not suspected.  She seems to be returning to her recent baseline after treatment of urinary retention with placement of Lentz catheter.  If she were to continue with Lentz catheter after hospital discharge, constant supervision at home was recommended to them.  Concerns about her functional status and disposition plan have been raised.    Active Problems:    Parkinson disease (H)    Dementia (H)    Hallucinations    Urinary retention    Midline cystocele    For now, continue indwelling Lentz catheter.  Discussed options for treatment of urinary retention with the patient and her spouse including indwelling Lentz catheter at least for the short-term, intermittent straight catheterization, or no bladder catheterization with attempt to manage symptoms of discomfort.  Recommended outpatient urology reevaluation and they report that urodynamic testing has been scheduled as an outpatient.      Discussed evaluating her chronic medications for possible adjustments to try to reduce risk for recurring urinary retention and they requested  that her neurologist be consulted.  Attempted to call her neurologist Dr. Porter who was unavailable by phone at this time.  With the family, reducing her dose of Exelon from 13.3 mg/day to 9.5 mg/day was discussed as had recently been suggested by her neurologist.  However, that medication is unavailable in this hospital pharmacy.  Offered writing a new prescription for the lower dose of Exelon for the family but they requested reviewing this medication change with her primary neurologist prior to writing any new prescriptions.    For now we will continue her other chronic medications without any adjustments.  She may take 1 tablet of Sinemet as needed for breakthrough episodes of severe tremor in addition to her scheduled doses of Sinemet as advised by her neurologist.  Family has just obtained a new prescription for Nuplazid as prescribed by her neurologist and will make that medication available for administration to the patient tomorrow morning.    Discussed options for disposition planning with the patient's spouse and family today and they are presently undecided.    Diet: Regular Diet Adult      Lentz Catheter: in place, indication:    Code Status: Full Code      Disposition Plan   Expected discharge: Tomorrow, recommended to Be determined once safe disposition plan/ TCU bed available and Appears to be returning to her baseline status.  Entered: Cj Gillette MD 12/20/2019, 4:42 PM       The patient's care was discussed with the Bedside Nurse, Care Coordinator/, Patient and Patient's Family.  This was a 50-minute visit today including 40 minutes spent in direct face-to-face counseling and discussion with the patient and her family from 4 PM until 4:40 p.m.    Cj Gillette MD  Hospitalist Service  [facility]    ______________________________________________________________________    Interval History   Patient is able to offer limited history because of her  memory problems.  Family reports that she frequently hallucinates and has been doing so for many years.  While on interdisciplinary team rounds this morning, patient appeared to be in severe distress holding her lower abdomen complaining about lower pelvic pain.  She had urge to void but was unable to do so despite multiple attempts.  She was found to have elevated urinary residual on bladder scan.  Nursing reports that they placed a Lentz catheter this morning with immediate return of about 600 mL urine.  After placement of the Lentz catheter, patient immediately appeared more comfortable with less pelvic pain although she appeared irritated by the catheter itself.  She has not had any of her increased shaking spells through the day today.  She has been afebrile and hemodynamically stable.  She has not yet had a bowel movement today although that is not unusual for her.  She is tolerating some limited oral intake.    Family reports that the patient has been taking the higher dose of Exelon for a long time.  They report that her dose of Sinemet was increased a few days ago by her neurologist although they have not noticed much change so far and her Parkinson's symptoms.  They report that her prior authorization for a prescription for Nuplazid was approved and that they just received that medication in the mail today.  They report that her neurologist has mentioned concern in the past about urinary retention associated with Parkinson's disease.  She has seen a urologist as recently as December 2.  They report that a pessary was placed at that time but that they have not noted any change in her episodic abdominal or pelvic discomfort symptoms since then.  They have seen a relatively recent functional decline over the last few weeks.    Data reviewed today: I reviewed all medications, new labs and imaging results over the last 24 hours. I personally reviewed no images or EKG's today.    Physical Exam   Vital Signs:  Temp: 98.8  F (37.1  C) Temp src: Oral BP: 139/76 Pulse: 65   Resp: 16 SpO2: 97 % O2 Device: None (Room air)    Weight: 120 lbs 0 oz  General Appearance: Initially appears to be resting comfortably in bed although toward the end of the interview starts to have generalized increased tremor with apparent complaints about lower abdominal or pelvic discomfort although this is difficult to clarify because of her abnormal mental status  Respiratory: Normal respiratory effort when resting quietly  Cardiovascular: Regular rate and rhythm  GI: Normal bowel sounds, nondistended abdomen, soft, no abdominal tenderness when she is resting comfortably, difficult to assess for abdominal discomfort when she becomes more agitated and tremulous  Other: Lentz catheter is present draining yellow urine    Data   Recent Labs   Lab 12/20/19  0122   WBC 7.2   HGB 11.3*   MCV 95         POTASSIUM 3.8   CHLORIDE 111*   CO2 28   BUN 32*   CR 0.90   ANIONGAP 3   KEYA 8.8   *   ALBUMIN 3.4   PROTTOTAL 6.5*   BILITOTAL 0.6   ALKPHOS 48   ALT <6   AST 17     Medications       carbidopa-levodopa  1 tablet Oral Once     carbidopa-levodopa  2 tablet Oral TID    And     carbidopa-levodopa  1 half-tab Oral TID     citalopram  30 mg Oral Daily     melatonin  10 mg Oral At Bedtime     polyethylene glycol  17 g Oral Daily     QUEtiapine  25 mg Oral QAM     QUEtiapine  75 mg Oral At Bedtime     rivastigmine  1 patch Transdermal At Bedtime     senna-docusate  2 tablet Oral At Bedtime

## 2019-12-20 NOTE — ED PROVIDER NOTES
History     Chief Complaint   Patient presents with     Tremors     Increase in parkinsons symptoms and low abdominal pain.History of bladder surgery. 1 mg of versed given per EMS.      The history is provided by the patient and the EMS personnel.     Nathaly Farias is a 64 year old female who presents to the ED complaining of an increase in her shakiness that started today, patient does have a history of parkinson's. Patient is also complaining suprapubic tenderness.  Otherwise limited history by the patient due to mental status, history of dementia and was given Versed in route so a little groggy.    Daughter arrived to the emergency department and describes that patient has had issues with episodes of shakiness for a while now.  She was seen by neurology earlier this week and they adjusted some of her medications.  She did have an episode of shaking at the neurology clinic that they observed.  Daughter reports that she went to the patient's house this evening to get her dinner.  While eating the patient started to have a shaking episode where her arms clenched up and her whole body started shaking and she was unable to move.  The daughter gave her her as needed Sinemet tablet as well as dark chocolate but the patient started drooling the dark chocolate out of her mouth and these did not seem to help.  She also gave her a tablet of Xanax without much relief.  Patient's daughter tried to call the neurology clinic and it took over an hour to get through but once they did she states they were not much help.  Patient ended up calling 911 to bring her to the hospital because the patient kept requesting to come to the hospital.  Patient's daughter states that she is not safe at home by herself and her  is out on a work trip until tomorrow afternoon.  Patient's daughter is unable to stay with her or bring her to her own house to watch overnight.  She also mentions that she has had ongoing urinary issues since  having a pessary placed and questions if that is what the patient is complaining of when she complains of her bladder pains.  Patient has otherwise had no other symptoms of fevers, cold symptoms, vomiting or diarrhea.  Daughter admits that patient is not safe at home in general and they are working with the family to figure out a solution to this since her  travels for work fairly regularly.        Allergies:  Allergies   Allergen Reactions     Sulfa Drugs      Tacchycardia, had to go to ED     Naproxen GI Disturbance     Oxycontin [Oxycodone] GI Disturbance     Made her feel funny/upset stomach     Ropinirole      Did not work       Problem List:    Patient Active Problem List    Diagnosis Date Noted     Frequency of urination 12/02/2019     Priority: Medium     Midline cystocele 12/02/2019     Priority: Medium     Atrophic vaginitis 12/02/2019     Priority: Medium     Urinary hesitancy 12/02/2019     Priority: Medium     Urinary urgency 12/02/2019     Priority: Medium     Hallucinations 01/26/2019     Priority: Medium     Hiatal hernia 07/05/2017     Priority: Medium     Dementia (H) 03/13/2017     Priority: Medium     IMPRESSIONS AND RECOMMENDATIONS     Current results indicate marked attentional difficulties, impairments in memory, which in some cases reflected impairments in retention and in other cases reflected retrieval difficulties, and mild anomia as well as mild executive dysfunction. Premorbid intellectual functioning is estimated to fall in the borderline range. She denied experiencing significant depressive symptomatology.     This pattern of performance is suggestive of mild dementia, and there is a suggestion of mesial temporal lobe as well as frontal and subcortical system involvement. She has had increasing difficulty managing her instrumental activities of daily living. Taken together with her history, a neurodegenerative etiology seems likely. Although Parkinson's disease with dementia is  a diagnostic consideration, the possibility of an additional etiology, including Alzheimer's disease, cannot entirely be ruled out. In view of the marked attentional difficulties, nonrestorative sleep and the effects of medications may also contribute. Although she has a history of depression, she is not reporting significant depressive symptomatology currently, but does appear to be experiencing mild anxiety.     In terms of daily functioning, Ms. Farias may require increasing assistance managing her instrumental activities of daily living. In particular, she may require additional assistance managing her medications. She continues to drive, although she has limited her driving to short distances and places with which she is familiar. Given her cognitive difficulties, it may be prudent for her to consider a formal 's assessment, such as that offered at [facility]. She will likely benefit from structure and routine. If she has difficulty managing large, complex tasks, others may assist by breaking down such tasks into smaller, more manageable parts. She has marked attentional difficulties, and may find it helpful when working on a task to work in an environment that is relatively free from distractions, such as noises or other interruptions. She may find it helpful to take frequent, brief breaks when working on a project. She may benefit from the use of written reminders or checklists. It may be helpful to follow her over time. Repeated neuropsychological evaluation in one year may help to determine whether her cognitive difficulties are progressive. These results have not been discussed with Ms. Farias or her family, although they were encouraged to contact my office to schedule an appointment for feedback should they so desire.          Cognitive disorder evalaution 2017 03/06/2017     Priority: Medium     She has marked attentional difficulties, impairments in memory including a rapid forgetting rate, mild  anomia, and mild executive dysfunction. 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 She's required increasing assistance with finances, medications, driving, and cooking. I think this is a mild dementia, and there's a suggestion of mesial temporal lobe involvement, so Alzheimer's disease can't be ruled out, although there are also some features consistent with frontal system involvement. She's not reporting significant problems with mood       Post-traumatic osteoarthritis of left knee 10/21/2016     Priority: Medium     Pain from implanted hardware, initial encounter 10/21/2016     Priority: Medium     Seborrheic dermatitis 02/29/2016     Priority: Medium     Rosacea 03/17/2015     Priority: Medium     Asymptomatic varicose veins 03/17/2015     Priority: Medium     Wears glasses 02/23/2015     Priority: Medium     Constipation 02/23/2015     Priority: Medium     Incomplete RBBB 02/23/2015     Priority: Medium     Heart murmur 02/23/2015     Priority: Medium     Family history of tremor 02/23/2015     Priority: Medium     Mother has an head tremor        History of MRI of brain and brain stem 02/21/2015     Priority: Medium     MRI OF THE BRAIN WITHOUT CONTRAST 7/10/2014 1:21 PM   COMPARISON: None.  HISTORY: Left-sided upper and lower extremity tremor. Balance issues.  TECHNIQUE:   Brain: Axial diffusion-weighted with ADC map, T2-weighted with fat  saturation, T1-weighted and turboFLAIR and coronal T1-weighted images  of the brain were obtained without intravenous contrast.   FINDINGS: There is mild diffuse cerebral volume loss. There are  multiple tiny scattered focal areas of abnormal T2 signal  hyperintensity in the cerebral white matter bilaterally that are  consistent with sequela of chronic small vessel ischemic disease.   The ventricles and basal cisterns are within normal limits in  configuration given the degree of  cerebral volume loss. There is no  midline shift. There are no extra-axial fluid collections. There is  no evidence for stroke or acute intracranial hemorrhage.   There is no sinusitis or mastoiditis.   IMPRESSION  IMPRESSION: Diffuse cerebral volume loss and cerebral white matter  changes consistent with chronic small vessel ischemic disease. No  evidence for acute intracranial pathology.  MD Dara HEARN (H) 07/30/2014     Priority: Medium     7/30/2014 evaluation by Dr. Jimenez    SUMMARY: She first noted this tremor in 2009. It affected her left hand. It occurred mainly at work. Later she noted the tremor affected her left leg. This is mainly a tremor at rest, and it will stop if she moves the limb. She can also have it to some extent with a sustained posture.   She reports her balance has worsened over the past couple of years, and her daughter points out that she tends to shuffle. She reports no change in her voice. She reports no change in her taste or smell. She describes her handwriting as slow and less smooth, but not necessarily small.   Aside from citalopram, she has not been on any psychiatric medications or antiemetic drugs.     Her evaluation for the tremor included a normal CBC. Comprehensive metabolic profile was notable only for sodium of 146. TSH was normal.   She did have a brain MRI scan that I reviewed. There are some scattered T2 signal changes in the white matter which are nonspecific. Note is made by the radiologist of diffuse cerebral volume loss, but I am not impressed by a great deal of atrophy.       HTN, goal below 140/90 07/07/2014     Priority: Medium     Advanced directives, counseling/discussion 04/23/2012     Priority: Medium     Discussed advance care planning with patient; information given to patient to review. 4/23/2012        Rani Russell PA-C  12/19/19 [id number]         Mild major depression (H) 10/04/2011     Priority: Medium     HYPERLIPIDEMIA  LDL GOAL <160 10/31/2010     Priority: Medium     Neck pain 09/17/2009     Priority: Medium     Headache 09/17/2009     Priority: Medium     Problem list name updated by automated process. Provider to review       Overactive bladder 03/05/2009     Priority: Medium     Chronic cholecystitis 04/17/2008     Priority: Medium     Essential hypertension, benign 11/26/2007     Priority: Medium     Sleep apnea      Priority: Medium     moderate, sleep study 11/07, on CPAP  Problem list name updated by automated process. 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       Obesity 06/16/2005     Priority: Medium     Problem list name updated by automated process. Provider to review       Malaise and fatigue 06/16/2005     Priority: Medium     Problem list name updated by automated process. Provider to review       Migraine variant 02/16/2004     Priority: Medium     Problem list name updated by automated process. Provider to review       Symptomatic menopausal or female climacteric states 02/16/2004     Priority: Medium     hot flashes, dec. libido, irritability       Prolapse of vaginal wall 08/13/2002     Priority: Medium     Problem list name updated by automated process. Provider to review and confirm  Do you wish to do the replacement in the background? yes            Past Medical History:    Past Medical History:   Diagnosis Date     Arthralgia of temporomandibular joint 10/22/1996     Arthritis      Cognitive disorder evalaution 2017 3/6/2017     Constipation 2/23/2015     Dementia (H) 3/13/2017     Depressive disorder      Heart murmur 2/23/2015     History of MRI of brain and brain  stem 2/21/2015     Hypertension      Incomplete RBBB 2/23/2015     Motion sickness      Other and unspecified hyperlipidemia      Parkinson's disease (H)      Seborrheic dermatitis 2/29/2016     Unspecified arthropathy, hand 08/07/1997     Unspecified sleep apnea      Variants of migraine, not elsewhere classified, without mention of intractable migraine without mention of status migrainosus      Wears glasses 2/23/2015       Past Surgical History:    Past Surgical History:   Procedure Laterality Date     ------------OTHER-------------      left shoulder     ARTHRODESIS TOE(S)  3/2/2011    ARTHRODESIS TOE(S) performed by CLARA LUCAS at  OR     C LIGATE FALLOPIAN TUBE  1998     CHOLECYSTECTOMY, LAPOROSCOPIC  5/12/2008    Cholecystectomy, Laparoscopic     ESOPHAGOSCOPY, GASTROSCOPY, DUODENOSCOPY (EGD), COMBINED N/A 7/5/2017    Procedure: COMBINED ESOPHAGOSCOPY, GASTROSCOPY, DUODENOSCOPY (EGD), BIOPSY SINGLE OR MULTIPLE;  Esophagogastroduodenoscopy with Multiple Biopsies by Biopsy;  Surgeon: Tj Denney MD;  Location: [facility]     HC COLONOSCOPY W/WO BRUSH/WASH  08/30/06    normal     HC OPEN TX TIBIAL SHAFT FX W PLATE/SCREWS W/WO CERCLAGE  2000    Multiple operations on left leg     HC SHLDR ARTHROSCOP,PART ACROMIOPLAS  11/16/06    Right shoulder     HC SHLDR ARTHROSCOP,SURG,DIS CLAVICULECTOMY  11/16/06    Right shoulder       Family History:    Family History   Problem Relation Age of Onset     Diabetes Father         type II, diagnosed in late 60's     Hypertension Father      Cancer Father         rare kidney cancer had kidney removed, 75 y.o. at onset     Cerebrovascular Disease Father      Rashes/Skin Problems Father         rosacea     Neurologic Disorder Mother         head tremor     Heart Disease Brother         Valvular disease corrected with surgery     Heart Disease Paternal Grandfather         MI     Migraines Son      Migraines Sister      Rashes/Skin Problems Sister         rosacea        Social History:  Marital Status:   [2]  Social History     Tobacco Use     Smoking status: Never Smoker     Smokeless tobacco: Never Used     Tobacco comment: no smokers in the household   Substance Use Topics     Alcohol use: Yes     Alcohol/week: 0.0 standard drinks     Comment: very occ     Drug use: No        Medications:    carbidopa-levodopa (SINEMET)  MG tablet  citalopram (CELEXA) 20 MG tablet  LORazepam (ATIVAN) 0.5 MG tablet  melatonin 5 MG tablet  polyethylene glycol (MIRALAX) powder  QUEtiapine (SEROQUEL) 25 MG tablet  rivastigmine (EXELON) 13.3 MG/24HR 24 hr patch  senna-docusate (SENOKOT-S/PERICOLACE) 8.6-50 MG tablet  estradiol (ESTRACE) 0.1 MG/GM vaginal cream  naproxen sodium 220 MG capsule  NONFORMULARY  Pimavanserin Tartrate 34 MG CAPS  ZOLMitriptan (ZOMIG) 5 MG tablet          Review of Systems   All other systems reviewed and are negative.      Physical Exam   BP: 102/80  Pulse: 70  Temp: 98.6  F (37  C)  Resp: 16  Weight: 54.4 kg (120 lb)  SpO2: 98 %      Physical Exam  Vitals signs and nursing note reviewed.   Constitutional:       General: She is not in acute distress.     Appearance: Normal appearance. She is well-developed. She is not ill-appearing, toxic-appearing or diaphoretic.   HENT:      Head: Normocephalic and atraumatic.      Nose: Nose normal.      Mouth/Throat:      Mouth: Mucous membranes are moist.   Eyes:      General:         Right eye: No discharge.         Left eye: No discharge.      Conjunctiva/sclera: Conjunctivae normal.      Pupils: Pupils are equal, round, and reactive to light.   Neck:      Musculoskeletal: Normal range of motion and neck supple.      Thyroid: No thyromegaly.   Cardiovascular:      Rate and Rhythm: Normal rate and regular rhythm.      Heart sounds: Murmur present. Systolic murmur present with a grade of 2/6.   Pulmonary:      Effort: Pulmonary effort is normal. No respiratory distress.      Breath sounds: Normal breath sounds. No  wheezing or rales.   Chest:      Chest wall: No tenderness.   Abdominal:      General: Bowel sounds are normal. There is no distension.      Palpations: Abdomen is soft. There is no mass.      Tenderness: There is abdominal tenderness in the suprapubic area. There is no guarding or rebound.      Hernia: No hernia is present.   Musculoskeletal: Normal range of motion.         General: No deformity.   Lymphadenopathy:      Cervical: No cervical adenopathy.   Skin:     General: Skin is warm.      Capillary Refill: Capillary refill takes less than 2 seconds.      Findings: No erythema or rash.   Neurological:      Mental Status: She is alert and oriented to person, place, and time.      Cranial Nerves: No cranial nerve deficit.      Comments: Diffuse tremorous activity in all extremities   Psychiatric:         Mood and Affect: Affect is flat.         Speech: Speech is delayed.         Behavior: Behavior is slowed.         Cognition and Memory: Cognition is impaired. Memory is impaired.         ED Course        Procedures      Results for orders placed or performed during the hospital encounter of 12/19/19 (from the past 24 hour(s))   UA with Microscopic reflex to Culture   Result Value Ref Range    Color Urine Yellow     Appearance Urine Clear     Glucose Urine Negative NEG^Negative mg/dL    Bilirubin Urine Negative NEG^Negative    Ketones Urine 5 (A) NEG^Negative mg/dL    Specific Gravity Urine 1.023 1.003 - 1.035    Blood Urine Negative NEG^Negative    pH Urine 6.0 5.0 - 7.0 pH    Protein Albumin Urine Negative NEG^Negative mg/dL    Urobilinogen mg/dL 0.0 0.0 - 2.0 mg/dL    Nitrite Urine Negative NEG^Negative    Leukocyte Esterase Urine Negative NEG^Negative    Source Catheterized Urine     WBC Urine 1 0 - 5 /HPF    RBC Urine 1 0 - 2 /HPF    Mucous Urine Present (A) NEG^Negative /LPF    Hyaline Casts 3 (H) 0 - 2 /LPF       Medications   carbidopa-levodopa (SINEMET)  MG per tablet 2 tablet (2 tablets Oral Given  12/19/19 2309)   QUEtiapine (SEROquel) tablet 75 mg (75 mg Oral Given 12/19/19 2308)       Assessments & Plan (with Medical Decision Making)  Nathaly Farias is a 64 year old female who presented to the ED via EMS for tremors.  History of Parkinson's with increased episodes of shakiness lately, saw neurology who adjusted her medications but meds were not working this evening.  Daughter notes that she is not safe at home by herself this evening because her  is out of town which happens fairly regularly.  On arrival to the ED her blood pressure was mildly elevated, otherwise normal vital signs.  Patient was tremorous but very calm after receiving 1 mg of Versed in route.  She was unable to provide history due to altered mental status from her baseline dementia and administration of Versed.  Daughter provided majority of history, see above.  On exam today the patient did have some mild suprapubic tenderness, I questioned if UTI could be causing exacerbation of her Parkinson symptoms.  UA was collected however and was negative for signs of infection.  Otherwise her exam was benign and without other symptoms I did not feel further work-up necessary.  Based on the daughter's report it sounds like this is more of a safety/social issue now that her shakiness has resolved.  Daughter states she cannot take the patient home tonight and is requesting observation stay until  returns from his business trip.  I did discuss this case with Dr. Cantu our hospitalist who is agreeable to keeping her in the hospital however I did explain to the patient's daughter that they need to come up with a long-term solution because this could be a case of a vulnerable adult if she is persistently home alone when her  is gone.  Her daughter is aware and is working with family for solutions of this issue.  Patient was given her evening medications here in the ED.  She will otherwise be admitted to hospital on observation stay for  social issues.  Staffed in the ED with Dr. Muñoz.     I have reviewed the nursing notes.    I have reviewed the findings, diagnosis, plan and need for follow up with the patient.       ED to Inpatient Handoff:    Discussed with Dr. Mcgrath at 2300  Patient accepted for Observation Stay  Pending studies include none  Code Status: Not Addressed           New Prescriptions    No medications on file       Final diagnoses:   Parkinson's disease (H)   Tremor   Insufficient social support     This document serves as a record of services personally performed by Rani Russell PA.  It was created on their behalf by Lucita Garcia, a trained medical scribe. The creation of this record is based on the provider's personal observations and the statements of the patient. This document has been checked and approved by the attending provider.    Disclaimer : This note consists of symbols derived from keyboarding, dictation and/or voice recognition software. As a result, there may be errors in the script that have gone undetected. Please consider this when interpreting information found in this chart.      12/19/2019   [facility] EMERGENCY DEPARTMENT

## 2019-12-20 NOTE — PROGRESS NOTES
Patient has an Exelon patch in place upon arriving to the floor that daughter placed in emergency department in the presence of staff. This is a home medication that pharmacy does not carry here. Pharmacy unable to verify because packaging not available. Physical patch on patient verifies dose and name of medication. Patch remains in place overnight because we do not have another to reapply.

## 2019-12-20 NOTE — ASSESSMENT & PLAN NOTE
Recent issues, question related to increased dosing of Sinemet  Currently on Seroquel  Continue current dosing of Seroquel, will speak to neurology regarding hallucinations

## 2019-12-20 NOTE — PROGRESS NOTES
SPIRITUAL HEALTH SERVICES  SPIRITUAL ASSESSMENT Progress Note  [facility]       introduced himself to Nathaly Farias & her niece and informed them of his availability.  Pt's niece told  she was a caregiver for pt.   affirmed her for that and gave pt a prayer shawl.    Rakan Kay M.Div., [facility]  Staff   Office tel: [phone]

## 2019-12-20 NOTE — ED TRIAGE NOTES
"Pt presents with increased shakiness per daughter and abdominal pain. Recent bladder surgery. Pt has parkinsons. 1 mg versed given per EMS. IV established. Pt asks \"can you see that horse there.' Visual hallucinations. Possibly after versed? Pt also has dementia. Daughter to be here soon. Pt lives with  who is an on the road  so he is not available tonite.   "

## 2019-12-20 NOTE — ED NOTES
Pt co urge to vd. Urine spec gotten by fem cath. Using sterile technique. Bladder drained for 350 ml total. Urine clear yellow. Spec sent

## 2019-12-20 NOTE — CONSULTS
CARE TRANSITION SOCIAL WORK INITIAL ASSESSMENT:      Met with: Family.    DATA  Active Problems:    Paralysis agitans (H)    Hallucinations    Pelvic pain in female    Parkinson disease (H)        ASSESSMENT  Cognitive Status: confused.       Insurance Concerns: No Insurance issues identified  Living Arrangements: house    This writer talked with patient's spouse, Wolf, introduced self and role. Discussed discharge planning and medicare guidelines in regards to home care and SNF benefits. Pt/family was given the Medicare Compare list for SNF, with associated star ratings to assist with choice for referrals/discharge planning Yes    Education was given to pt/family that star ratings are updated/maintained by Medicare and can be reviewed by visiting www.medicare.gov Yes    Discussed TCU by phone, and will meet with Wolf once he arrives this afternoon.  Discussed prior auth requirement by [facility] Medicare Advantage.     PLAN    TCU     ZENON Chaney  [facility] [phone]/ [place] [phone]

## 2019-12-20 NOTE — PLAN OF CARE
"Discharge Planner OT   Patient plan for discharge: Home with family assist  Current status: Patient is a 64 year old female admitted due to increased shakiness at home and paralysis agitans, patient also with complaint of suprapubic tenderness. Patient has a previous medical history of Parkinson s disease, dementia, hallucinations, neck pain, hyperlipidemia, sleep apnea, obesity, varicose veins, constipation, migraine, heart murmurs, paralysis agitans, HTN, overactive bladder and depression. Prior to admission patient living in a single family home with 2 stairs without railing to enter and stairs to the basement, however these are gated to prevent patient access. Patient lives with her  who drives truck 5 days a week, her niece (in law) lives down the road and assists patient during the week with meals (breakfast and lunch only), changing and dressing, re-orienting to home, medications, re-orient to transfers; patient's daughter assists with dinner and check in's. Niece has noticed a decline in the last two weeks. Patient is alone 17-20 hours a day. Prior to the recent decline she was IND in mobility, transfers and required assistance for cares. Per niece patient has demonstrated recent surface seeking behaviors, increased confusion with processing and reminders for safety. Her  completes bathing 1-2 times a week on the weekend. Currently, patient unable to state age, location, and year.  When asked these questions, patient becomes tearful and agitated due to confusion.  OT attempted SLUMs but unable to complete due to emotional agitation. Niece stated home for discharge due to increased confusion within novel environments, however, unable to provide 24/7 care.  OT session ended early due to emotional dysregulation even after attempts to distract and re-direct.   Patient stated, \"why me\".  Patient stated she was hot; warm to the touch and sweating - OT reported to nursing staff.  Patient also " hypervigilant on her gown exposing her bottom even though covered.   Barriers to return to prior living situation: cognition; level of assist needed for BADLs/IADLs  Recommendations for discharge: TCU  Rationale for recommendations: Patient would benefit from further skilled OT services within the TCU setting to increase independence with BADLs and progress toward prior level of function and safety.  Family is unable to provide 24/7 care at this time.          Entered by: Angelica Brink 12/20/2019 11:51 AM

## 2019-12-21 ENCOUNTER — APPOINTMENT (OUTPATIENT)
Dept: OCCUPATIONAL THERAPY | Facility: CLINIC | Age: 64
End: 2019-12-21
Payer: COMMERCIAL

## 2019-12-21 ENCOUNTER — APPOINTMENT (OUTPATIENT)
Dept: PHYSICAL THERAPY | Facility: CLINIC | Age: 64
End: 2019-12-21
Payer: COMMERCIAL

## 2019-12-21 PROCEDURE — 25000132 ZZH RX MED GY IP 250 OP 250 PS 637: Performed by: FAMILY MEDICINE

## 2019-12-21 PROCEDURE — 97116 GAIT TRAINING THERAPY: CPT | Mod: GP | Performed by: PHYSICAL THERAPIST

## 2019-12-21 PROCEDURE — 97112 NEUROMUSCULAR REEDUCATION: CPT | Mod: GP | Performed by: PHYSICAL THERAPIST

## 2019-12-21 PROCEDURE — 97530 THERAPEUTIC ACTIVITIES: CPT | Mod: GP | Performed by: PHYSICAL THERAPIST

## 2019-12-21 PROCEDURE — G0378 HOSPITAL OBSERVATION PER HR: HCPCS

## 2019-12-21 PROCEDURE — 99225 ZZC SUBSEQUENT OBSERVATION CARE,LEVEL II: CPT | Performed by: PEDIATRICS

## 2019-12-21 PROCEDURE — 25000128 H RX IP 250 OP 636: Performed by: PEDIATRICS

## 2019-12-21 PROCEDURE — 97535 SELF CARE MNGMENT TRAINING: CPT | Mod: GO,59 | Performed by: OCCUPATIONAL THERAPIST

## 2019-12-21 PROCEDURE — 90682 RIV4 VACC RECOMBINANT DNA IM: CPT | Performed by: PEDIATRICS

## 2019-12-21 PROCEDURE — 90471 IMMUNIZATION ADMIN: CPT

## 2019-12-21 RX ADMIN — SENNOSIDES AND DOCUSATE SODIUM 2 TABLET: 8.6; 5 TABLET ORAL at 20:16

## 2019-12-21 RX ADMIN — RIVASTIGMINE 1 PATCH: 13.3 PATCH, EXTENDED RELEASE TRANSDERMAL at 20:20

## 2019-12-21 RX ADMIN — INFLUENZA A VIRUS A/BRISBANE/02/2018 (H1N1) RECOMBINANT HEMAGGLUTININ ANTIGEN, INFLUENZA A VIRUS A/KANSAS/14/2017 (H3N2) RECOMBINANT HEMAGGLUTININ ANTIGEN, INFLUENZA B VIRUS B/PHUKET/3073/2013 RECOMBINANT HEMAGGLUTININ ANTIGEN, AND INFLUENZA B VIRUS B/MARYLAND/15/2016 RECOMBINANT HEMAGGLUTININ ANTIGEN 0.5 ML: 45; 45; 45; 45 INJECTION INTRAMUSCULAR at 14:38

## 2019-12-21 RX ADMIN — CARBIDOPA AND LEVODOPA 2 TABLET: 25; 100 TABLET ORAL at 07:20

## 2019-12-21 RX ADMIN — QUETIAPINE FUMARATE 25 MG: 25 TABLET ORAL at 08:57

## 2019-12-21 RX ADMIN — CARBIDOPA AND LEVODOPA 2 TABLET: 25; 100 TABLET ORAL at 11:37

## 2019-12-21 RX ADMIN — Medication 10 MG: at 20:16

## 2019-12-21 RX ADMIN — CARBIDOPA AND LEVODOPA 1 HALF-TAB: 25; 100 TABLET ORAL at 20:19

## 2019-12-21 RX ADMIN — POLYETHYLENE GLYCOL 3350 17 G: 17 POWDER, FOR SOLUTION ORAL at 08:58

## 2019-12-21 RX ADMIN — CARBIDOPA AND LEVODOPA 2 TABLET: 25; 100 TABLET ORAL at 20:18

## 2019-12-21 RX ADMIN — QUETIAPINE FUMARATE 75 MG: 25 TABLET ORAL at 20:17

## 2019-12-21 RX ADMIN — CITALOPRAM HYDROBROMIDE 30 MG: 20 TABLET, FILM COATED ORAL at 11:38

## 2019-12-21 RX ADMIN — CARBIDOPA AND LEVODOPA 1 HALF-TAB: 25; 100 TABLET ORAL at 11:37

## 2019-12-21 RX ADMIN — CARBIDOPA AND LEVODOPA 1 HALF-TAB: 25; 100 TABLET ORAL at 07:20

## 2019-12-21 NOTE — PROGRESS NOTES
Note that pt is clear at times and then becomes confused.  Pt alert this morning and able to tell writer, birth date, where she was and reason.  Unable to complete this as the day goes on.  Pt  is here and is supportive.  Pt is able to feed self.

## 2019-12-21 NOTE — PROGRESS NOTES
S-(situation): Note    B-(background): Placement    A-(assessment): Please review system assessment and VS.  Note that pt has become less oriented throughout the day.  Pt was in a chair for about 4 hours.  Eating well.  Pt was very weepy this afternoon.   here and is very supportive.  Lentz with good urine out put.    R-(recommendations): Cont poc as ordered.

## 2019-12-21 NOTE — PLAN OF CARE
Discharge Planner OT   Patient plan for discharge: TCU  Current status: Nathaly was able to state name and date of birth, if given enough time.  Pt continues to be hypervigilant on bathroom and urinary needs, enough though she has a catheter.  OT provided simple instruction of catheter and sensation may be having contributing to feeling of the need.  Pt responded positively and had increased understanding.  Bed mobility IND sit to EOB, CGA and verbal cues to transfer bed to chair.  Set up provided for patient to comb hair with SBA.  Patient able to IND doff/don socks and readjust socks.  Minimal hand tremors this date, able to hold and drink out of a straw and cup; without spilling.  Barriers to return to prior living situation: cognition; level of assist needed for BADLs/IADLs  Recommendations for discharge: TCU  Rationale for recommendations: Patient would benefit from further skilled OT services within the TCU setting to increase independence with BADLs and progress toward prior level of function and safety.  Family is unable to provide 24/7 care at this time.           Entered by: Radha Carney 12/21/2019 10:40 AM

## 2019-12-21 NOTE — PROGRESS NOTES
[facility]    Medicine Progress Note - Hospitalist Service       Date of Admission:  12/19/2019  Assessment & Plan    64-year-old woman with severe Parkinson's disease hospitalized due to a combination of increasing shaking spells with abdominal pain and acute functional decline.  She was found to have urinary retention relieved with placement of Lentz catheter and appears to be clinically improved after that intervention with less severe and fewer shaking spells and resolution of abdominal pain.  Functional status has not yet recovered to her normal baseline, and there continues to be concerns about her safety trying to return to function in her previous living environment at this time.  No other acute medical problems have been identified and she appears medically stable for discharge.  However, she does not yet have a safe disposition plan.    Active Problems:    Parkinson disease (H)    Dementia (H)    Hallucinations    Urinary retention    Midline cystocele    Continue present medication treatments for Parkinson's disease as recommended by her neurologist.  Continue indwelling Lentz catheter and anticipate this will continue after discharge until follow-up with her urology team  They requested influenza vaccination today which she has not yet received this season, and she does not appear to have any known contraindications to it so orders were placed for influenza vaccination    Diet: Regular Diet Adult      Lentz Catheter: in place, indication: Retention  Code Status: Full Code      Disposition Plan   Expected discharge: Once she has a safe disposition plan, recommended to probable transitional care unit.  Entered: Cj Gillette MD 12/21/2019, 1:39 PM       The patient's care was discussed with the Care Coordinator/, Patient and Patient's Family.    Cj Gillette MD  Hospitalist Service  [facility]    ______________________________________________________________________    Interval History   Patient herself is a poor historian.  Her  thinks that she is doing better today.  She has had 1 or 2 shaking spells today although these are much less severe than they had been recently at home.  She has not been complaining about abdominal pain although nursing says that the Lentz catheter seems to be irritating to her at times.  Her  has not noted her hallucinating yet today although nursing says that she did have hallucinations yesterday.  She has been afebrile and hemodynamically stable.  Oxygenation is normal.  Urine output has been good through her Lentz catheter.  When she had a more severe shaking spell yesterday she was administered an extra dose of Sinemet which did seem to help according to nursing.    Data reviewed today: I reviewed all medications, new labs and imaging results over the last 24 hours. I personally reviewed no images or EKG's today.    Physical Exam   Vital Signs: Temp: 98.3  F (36.8  C) Temp src: Oral BP: (!) 154/78 Pulse: 72   Resp: 20 SpO2: 98 % O2 Device: None (Room air)    Weight: 120 lbs 0 oz  General Appearance: Presently resting in bed, appears comfortable  Respiratory: Normal respiratory effort  Cardiovascular: Regular rate and rhythm  GI: Nondistended abdomen, soft, no abdominal tenderness  Other: Appears awake, slight tremor in the hands    Data   Recent Labs   Lab 12/20/19  0122   WBC 7.2   HGB 11.3*   MCV 95         POTASSIUM 3.8   CHLORIDE 111*   CO2 28   BUN 32*   CR 0.90   ANIONGAP 3   KEYA 8.8   *   ALBUMIN 3.4   PROTTOTAL 6.5*   BILITOTAL 0.6   ALKPHOS 48   ALT <6   AST 17     Medications       carbidopa-levodopa  2 tablet Oral TID    And     carbidopa-levodopa  1 half-tab Oral TID     citalopram  30 mg Oral Daily     melatonin  10 mg Oral At Bedtime     Pimavanserin Tartrate  34 mg Oral Daily     polyethylene glycol  17 g Oral Daily      QUEtiapine  25 mg Oral QAM     QUEtiapine  75 mg Oral At Bedtime     rivastigmine  1 patch Transdermal At Bedtime     rivastigmine   Transdermal Q8H     senna-docusate  2 tablet Oral At Bedtime

## 2019-12-21 NOTE — PLAN OF CARE
Pt is confused but calm and cooperative. Vitals stable, afebrile, on room air. Pt reports the frequent urge to urinate and needs frequent reminders that a peñaloza catheter is in place. Mild/moderate tremors noted, per pt tremors are at baseline. Pt rested comfortably for most of the night.

## 2019-12-21 NOTE — PROGRESS NOTES
ok with TCU if insurance will pay.  Pt/family was given the Medicare Compare list for SNF, with associated star ratings to assist with choice for referrals/discharge planning Yes    Education was given to pt/family that star ratings are updated/maintained by Medicare and can be reviewed by visiting www.medicare.gov Yes     requesting referrals be sent to all local SNFs to see bed availability.      Referrals sent to:    [facility] (Main Phone: [phone] Admissions Phone: [phone] Fax: [phone])    [facility] (Admissions: [phone] Main Phone: [phone] Fax: [phone])    [facility] at [facility] (Admissions Phone: [phone] Main Phone: [phone] Fax: [phone])    [employer] has APPROVED patient for TCU.  Approval is good through 12/29/19.  Patient needs to be admitted to a TCU by 12/19/19 or she will need new prior auth.  Referrals are pending at [facility].      ZENON Chaney  [facility] [phone]/ Danica [phone]    [facility] (Main Phone: [phone] Admissions Phone: [phone] Fax: [phone]) - NO BEDS     After further chart review, patient was declined at [facility] (Admissions: [phone] Main Phone: [phone] Fax: [phone])    Additional referrals sent to the following:    [facility] [place] (SNF)         [facility] (SNF)       [facility] (SNF)       [facility] (SNF)       [facility])       [facility] (SNF)       [facility] (SNF)       [facility] (SNF)       [facility] [place] (SNF)       Writer left a voicemail at [facility] to inform them that [facility] has approved admission to TCU.  No admissions in the office until Monday.    Charlotte [facility] [phone]/ [place]  [phone]    Sagar Goodman, from [facility] called, and will check [facility] facilities for an opening.  Maxine [phone] Phone and [phone] Fax.  Faxed Maxine prior auth from [employer].    Patient on a Parkinson's medication, Nuplazid.  This is a medication patient has a home supply of that can be used at TCU.  TCU pharmacies will likely not be able to obtain this medication.

## 2019-12-21 NOTE — PLAN OF CARE
Pt confused to time, place and situation. Alert to self. Blood pressures 130-150/70-80. Pt on room air and lungs are clear. Pt denies any pain. Skin intact. Tremors present throughout extremities. Pt anxiety increased. Extra one time dose of 1 tablet of Sinemet given. Tremors and anxiety decreased. Peñaloza in with 400 out on shift. Needs some redirection on why peñaloza is in place. Bowel movement on shift. Formed brown stool. Regular diet with good appetite. Assist of 1 or 2 with gait belt and walker. Will continue to monitor.

## 2019-12-21 NOTE — PLAN OF CARE
Discharge Planner PT   Patient plan for discharge: Per patient's , they are okay with TCU  Current status: Patient finishing with OT at start of PT session, agreeable to get up and walking.  Transferred sit > stand with Supervision, ambulated with CGA/SBA and 2WW for 150 feet with  accompanying.  He mentioned she has never used a walker before.  Patient demonstrated ability to ambulate 270 feet without 2WW and CGA from therapist.  Demonstrated occasional unsteadiness with turning, no LOB.  Able to ascend/descend 2 stairs x 2 reps with use of bilateral hand railings with only CGA from therapist.  Able to use the toilet without physical assistance, required verbal cueing and instructions for clothing management and self hygiene.  Completed Reeder Balance assessment - patient scored 36/56, indicating high fall risk.    Barriers to return to prior living situation: Medical status, decreased strength, increased fall risk, increased confusion/decreased cognition, increased level of assistance and supervision required for transfers.    Recommendations for discharge: TCU with memory care   Rationale for recommendations: Although patient demonstrates improvement as compared to yesterday, at this time, she continues to demonstrate decline from her baseline status.  She would benefit from continued skilled physical therapy services at a TCU to increase strength and independence and safety with functional mobility, transfers, and balance in order to ensure safe return to prior living environment.         Entered by: Edel Ramirez 12/21/2019 10:33 AM

## 2019-12-22 ENCOUNTER — APPOINTMENT (OUTPATIENT)
Dept: PHYSICAL THERAPY | Facility: CLINIC | Age: 64
End: 2019-12-22
Payer: COMMERCIAL

## 2019-12-22 PROCEDURE — G0378 HOSPITAL OBSERVATION PER HR: HCPCS

## 2019-12-22 PROCEDURE — 25000132 ZZH RX MED GY IP 250 OP 250 PS 637: Performed by: FAMILY MEDICINE

## 2019-12-22 PROCEDURE — 97530 THERAPEUTIC ACTIVITIES: CPT | Mod: GP | Performed by: PHYSICAL THERAPIST

## 2019-12-22 PROCEDURE — 97116 GAIT TRAINING THERAPY: CPT | Mod: GP | Performed by: PHYSICAL THERAPIST

## 2019-12-22 PROCEDURE — 99225 ZZC SUBSEQUENT OBSERVATION CARE,LEVEL II: CPT | Performed by: PEDIATRICS

## 2019-12-22 RX ORDER — RIVASTIGMINE 9.5 MG/24H
1 PATCH, EXTENDED RELEASE TRANSDERMAL DAILY
Qty: 30 PATCH | Refills: 0 | Status: SHIPPED | OUTPATIENT
Start: 2019-12-22

## 2019-12-22 RX ADMIN — CARBIDOPA AND LEVODOPA 2 TABLET: 25; 100 TABLET ORAL at 20:02

## 2019-12-22 RX ADMIN — QUETIAPINE FUMARATE 25 MG: 25 TABLET ORAL at 09:00

## 2019-12-22 RX ADMIN — CARBIDOPA AND LEVODOPA 1 HALF-TAB: 25; 100 TABLET ORAL at 20:01

## 2019-12-22 RX ADMIN — RIVASTIGMINE 1 PATCH: 13.3 PATCH, EXTENDED RELEASE TRANSDERMAL at 19:58

## 2019-12-22 RX ADMIN — CARBIDOPA AND LEVODOPA 1 HALF-TAB: 25; 100 TABLET ORAL at 09:00

## 2019-12-22 RX ADMIN — CITALOPRAM HYDROBROMIDE 30 MG: 20 TABLET, FILM COATED ORAL at 12:33

## 2019-12-22 RX ADMIN — QUETIAPINE FUMARATE 75 MG: 25 TABLET ORAL at 20:01

## 2019-12-22 RX ADMIN — CARBIDOPA AND LEVODOPA 2 TABLET: 25; 100 TABLET ORAL at 13:36

## 2019-12-22 RX ADMIN — POLYETHYLENE GLYCOL 3350 17 G: 17 POWDER, FOR SOLUTION ORAL at 09:00

## 2019-12-22 RX ADMIN — Medication 10 MG: at 20:01

## 2019-12-22 RX ADMIN — CARBIDOPA AND LEVODOPA 1 HALF-TAB: 25; 100 TABLET ORAL at 13:34

## 2019-12-22 RX ADMIN — CARBIDOPA AND LEVODOPA 2 TABLET: 25; 100 TABLET ORAL at 09:00

## 2019-12-22 RX ADMIN — SENNOSIDES AND DOCUSATE SODIUM 2 TABLET: 8.6; 5 TABLET ORAL at 20:02

## 2019-12-22 NOTE — PLAN OF CARE
Discharge Planner PT   Patient plan for discharge: TCU  Current status: Patient in bed at start of session.  Mild confusion.  With HOB flat, patient able to transfer supine > sitting EOB independently.  Verbal cueing for pushing off bed to stand up in front of 2WW, required repeating of instructions several times.  With 2WW and SBA-Supervision, patient ambulated 330 feet.  Without 2WW and CGA, patient ambulated an additional 120 feet.  Demonstrated occasional unsteadiness without walker, and occasional tendency of L foot to turn inwards (per , this is the result of an injury many years ago in which she broke her left leg after falling off her horse).  When using walker, no signs of unsteadiness.    Barriers to return to prior living situation: Medical status, decreased strength, increased fall risk, increased confusion, increased level of assistance and supervision required for mobility.  Recommendations for discharge: TCU  Rationale for recommendations: Patient continues to demonstrate decline from baseline status.  She would benefit from continued skilled physical therapy services at a TCU in order to increase strength and independence and safety with functional mobility and transfers, as well as improve balance in order to decrease fall risk and ensure safe return to prior living environment.        Entered by: Edel Ramirez 12/22/2019 10:05 AM

## 2019-12-22 NOTE — PLAN OF CARE
Pt alert and oriented to self, time and situation. Up with 1 assist to bathroom. Pt's spouse at bedside most of the day. Vitals have been stable. Exelon patch in place at right chest area. Pt's peñaloza with good output today. Pt resting this afternoon. Medications given as ordered and per 's routine for pt. Ongoing explanation provided to pt.

## 2019-12-22 NOTE — PLAN OF CARE
VSS. Afebrile. Denies any pain/discomfort. Pt oriented to self. Denies any nausea/vomiting. Lentz in place draining clear dominguez urine. No complaints.

## 2019-12-22 NOTE — PROGRESS NOTES
[facility]    Medicine Progress Note - Hospitalist Service       Date of Admission:  12/19/2019  Assessment & Plan    64-year-old woman with severe Parkinson's disease also complicated by dementia and hallucinations hospitalized because of worsening shaking spells and abdominal pain.  Lentz catheter was placed because of urinary retention and abdominal pain, and her abdominal pain seems to have since resolved.  Shaking spells also appear improved and may have been exacerbated by pain from urinary retention.  However, she also started Nuplazid during this hospital stay as recently advised by her neurologist and it is possible that that has also contributed to her clinical improvement.  Her neurologist had recently suggested decreasing her dose of Exelon with which they are now agreeable and they are today requesting a prescription for Exelon because she has only 3 days left of her current patches.  She has been medically stable but does not yet have a safe disposition plan.    Active Problems:    Parkinson disease (H)    Dementia (H)    Hallucinations    Urinary retention    Midline cystocele    Continue present medication treatment plan unchanged while in the hospital  New prescription written for Exelon 9.5 mg patches every 24 hours and sent to pharmacy in accordance with recommendations from her neurologist and request of her family  Continue Lentz catheter after discharge until outpatient follow-up with urology    Diet: Regular Diet Adult      Lentz Catheter: in place, indication: Retention  Code Status: Full Code      Disposition Plan   Expected discharge: Tomorrow once she has a safe disposition plan, recommended to transitional care unit once safe disposition plan/ TCU bed available.  Entered: Cj Gillette MD 12/22/2019, 2:26 PM       The patient's care was discussed with the Patient and Patient's Family.    Cj Gillette MD  Hospitalist Service  [facility]  [facility]    ______________________________________________________________________    Interval History   Patient herself does not provide much history.  Nursing reports that she did not have any hallucinations overnight.  Her  thinks that she has improved.  Her shaking spells are less severe and are occurring less frequently.  They have been giving her dark chocolate here in the hospital as advised recently by her neurologist and he believes the dark chocolate has been helping those spells.  She has been afebrile and hemodynamically stable with normal oxygenation.  Urine output has been good.  Oral intake has been good.  She continues to work with PT and continues to have unsteadiness.    Data reviewed today: I reviewed all medications, new labs and imaging results over the last 24 hours. I personally reviewed no images or EKG's today.    Physical Exam   Vital Signs: Temp: 97.6  F (36.4  C) Temp src: Oral BP: 123/60 Pulse: 80   Resp: 18 SpO2: 100 % O2 Device: None (Room air)    Weight: 120 lbs 0 oz  General Appearance: No acute distress sitting up in bed  Other: Appears awake and alert although does not herself respond to questions at this time    Data   Recent Labs   Lab 12/20/19  0122   WBC 7.2   HGB 11.3*   MCV 95         POTASSIUM 3.8   CHLORIDE 111*   CO2 28   BUN 32*   CR 0.90   ANIONGAP 3   KEYA 8.8   *   ALBUMIN 3.4   PROTTOTAL 6.5*   BILITOTAL 0.6   ALKPHOS 48   ALT <6   AST 17     Medications       carbidopa-levodopa  2 tablet Oral TID    And     carbidopa-levodopa  1 half-tab Oral TID     citalopram  30 mg Oral Daily     melatonin  10 mg Oral At Bedtime     Pimavanserin Tartrate  34 mg Oral Daily     polyethylene glycol  17 g Oral Daily     QUEtiapine  25 mg Oral QAM     QUEtiapine  75 mg Oral At Bedtime     rivastigmine  1 patch Transdermal At Bedtime     rivastigmine   Transdermal Q8H     senna-docusate  2 tablet Oral At Bedtime

## 2019-12-22 NOTE — PLAN OF CARE
Pt.ambulated in the halls after sitting up in a chair this evening.She is disoriented to place,time and reason she is here.Pt.is cooperative with taking meds and doing cares she does need lots of reminders.

## 2019-12-23 ENCOUNTER — TELEPHONE (OUTPATIENT)
Dept: PHARMACY | Facility: CLINIC | Age: 64
End: 2019-12-23

## 2019-12-23 ENCOUNTER — APPOINTMENT (OUTPATIENT)
Dept: OCCUPATIONAL THERAPY | Facility: CLINIC | Age: 64
End: 2019-12-23
Payer: COMMERCIAL

## 2019-12-23 VITALS
SYSTOLIC BLOOD PRESSURE: 126 MMHG | TEMPERATURE: 99.5 F | OXYGEN SATURATION: 96 % | WEIGHT: 120 LBS | HEIGHT: 60 IN | DIASTOLIC BLOOD PRESSURE: 61 MMHG | HEART RATE: 78 BPM | BODY MASS INDEX: 23.56 KG/M2 | RESPIRATION RATE: 16 BRPM

## 2019-12-23 PROCEDURE — 99217 ZZC OBSERVATION CARE DISCHARGE: CPT | Performed by: PEDIATRICS

## 2019-12-23 PROCEDURE — G0378 HOSPITAL OBSERVATION PER HR: HCPCS

## 2019-12-23 PROCEDURE — 25000132 ZZH RX MED GY IP 250 OP 250 PS 637: Performed by: FAMILY MEDICINE

## 2019-12-23 PROCEDURE — 97535 SELF CARE MNGMENT TRAINING: CPT | Mod: GO,59

## 2019-12-23 RX ORDER — LORAZEPAM 0.5 MG/1
0.5 TABLET ORAL 2 TIMES DAILY PRN
Qty: 10 TABLET | Refills: 0 | Status: SHIPPED | OUTPATIENT
Start: 2019-12-23 | End: 2020-01-27

## 2019-12-23 RX ORDER — COVID-19 ANTIGEN TEST
220 KIT MISCELLANEOUS 2 TIMES DAILY PRN
DISCHARGE
Start: 2019-12-23 | End: 2020-01-27

## 2019-12-23 RX ORDER — CARBIDOPA AND LEVODOPA 25; 100 MG/1; MG/1
2.5 TABLET ORAL 3 TIMES DAILY
Qty: 330 TABLET | Refills: 3 | DISCHARGE
Start: 2019-12-23 | End: 2020-01-27

## 2019-12-23 RX ORDER — CITALOPRAM HYDROBROMIDE 20 MG/1
30 TABLET ORAL
Qty: 135 TABLET | Refills: 3 | DISCHARGE
Start: 2019-12-23 | End: 2020-01-27

## 2019-12-23 RX ORDER — CARBIDOPA AND LEVODOPA 25; 100 MG/1; MG/1
1 TABLET ORAL 3 TIMES DAILY PRN
DISCHARGE
Start: 2019-12-23 | End: 2020-01-27

## 2019-12-23 RX ADMIN — QUETIAPINE FUMARATE 25 MG: 25 TABLET ORAL at 09:05

## 2019-12-23 RX ADMIN — CARBIDOPA AND LEVODOPA 1 HALF-TAB: 25; 100 TABLET ORAL at 09:05

## 2019-12-23 RX ADMIN — POLYETHYLENE GLYCOL 3350 17 G: 17 POWDER, FOR SOLUTION ORAL at 09:05

## 2019-12-23 RX ADMIN — CARBIDOPA AND LEVODOPA 2 TABLET: 25; 100 TABLET ORAL at 11:54

## 2019-12-23 RX ADMIN — CARBIDOPA AND LEVODOPA 2 TABLET: 25; 100 TABLET ORAL at 09:05

## 2019-12-23 RX ADMIN — CARBIDOPA AND LEVODOPA 1 HALF-TAB: 25; 100 TABLET ORAL at 11:21

## 2019-12-23 RX ADMIN — CITALOPRAM HYDROBROMIDE 30 MG: 20 TABLET, FILM COATED ORAL at 11:21

## 2019-12-23 NOTE — PLAN OF CARE
S-(situation): Patient discharged to [facility] via car with daughter at 3:45pm.    B-(background): Increased shaking and abdominal pain    A-(assessment): VSS. Room air. Pt denies pain. Peñaloza cath in with 350 yellow urine out. Assist of 1 to 2 with gait belt and walker. Disorientated x4. Skin intact.   Last bowel movement: 12/21/19     R-(recommendations):Report called to [facility]. Listed belongings gathered and sent with patient.     Discharge Nursing Criteria:     Care Plan and Patient education resolved: Yes    Vaccines  Influenza status verified at discharge:  Yes    Intentionally Retained Items (examples: Drains, Wound packing, ureteral stents, peñaloza, PICC line or IV)  Patient was sent to Nursing Home with Peñaloza Catheter left in place.   Information you need to know about your procedure form filled out and copy given to patient: Yes    MISC  Home and hospital aquired medications returned to patient: Yes  Medication Bin checked and emptied on discharge Yes  All paperwork sent with patient/Copy of AVS given to patient or family Yes.

## 2019-12-23 NOTE — TELEPHONE ENCOUNTER
Spoke with , Wolf. He states that the inpatient team is decreasing the rivastigmine dose per recommendation from Dr. Porter because of increased urinary frequency and he wanted my opinion. I think this sounds like a reasonable approach to try.     Wolf also mentioned it has been difficult to find a TCU for her because she is on Nuplazid and many places can't order this medication.     Wolf states that her shaking episodes have improved and the dark chocolate seems to work very well for these symptoms.     Croazon Dhaliwal, Pharm.D.  Medication Therapy Management Pharmacist  Phone: [phone]

## 2019-12-23 NOTE — PLAN OF CARE
Discharge Planner OT   Patient plan for discharge: Patient would like to return home with   Current status: Patient was agreeable to treatment.  She did express that she felt she needed to urinate.  With cuing she was able to focus at the task presented.  She completed grooming/hygiene with min-mod A standing at sink.  She did need cues on items at the sink such as which was the tooth brush and comb.  Provided support of BUE at wrist to stabilize; she reported she was took shaking with that assistance was able to put tooth paste on and brush teeth.  She started to brush own hair with assist to pull hair to side and then other side.  She was not able to reach the back of her head this date.  This took almost 20 minutes to complete with additional time and prompting she could do more for herself.  ALthough with fatigue she needed increased prompts with directions and what she was doing.  She has increased difficulty following directions and she fatigued.  She needed mod A with bed mobility after activity.    Barriers to return to prior living situation: Increased need for assistance, decreased activity tolerance, cognition  Recommendations for discharge: TCU  Rationale for recommendations: Patient would benefit from further skilled OT services within the TCU setting to increase independence with BADLs and progress toward prior level of function and safety.  Family is unable to provide 24/7 care at this time.        Entered by: Kristina Felix 12/23/2019 9:40 AM     Occupational Therapy Discharge Summary    Reason for therapy discharge:    Discharged to transitional care facility.    Progress towards therapy goal(s). See goals on Care Plan in [facility] electronic health record for goal details.  Goals partially met.  Barriers to achieving goals:   discharge from facility.    Therapy recommendation(s):    Continued therapy is recommended.  Rationale/Recommendations:  Patient would benefit from further skilled OT services  within the TCU setting to increase independence with BADLs and progress toward prior level of function and safety.  Family is unable to provide 24/7 care at this time. .     Thank you for the referral to Occupational Therapy!    KATELIN Mitchell/L  [facility]  [phone]

## 2019-12-23 NOTE — TELEPHONE ENCOUNTER
Attempted to reach , Wolf. Unable to leave voicemail.     Corazon Dhaliwal, Pharm.D.  Medication Therapy Management Pharmacist  Phone: [phone]

## 2019-12-23 NOTE — DISCHARGE SUMMARY
[facility]  Hospitalist Discharge Summary       Date of Admission:  12/19/2019  Date of Discharge:  12/23/2019  Discharging Provider: Cj Gillette MD      Discharge Diagnoses   Active Problems:    Parkinson disease (H)    Dementia (H)    Hallucinations    Urinary retention    Midline cystocele      Follow-ups Needed After Discharge   Follow-up Appointments     Follow Up and recommended labs and tests      Follow up with Dr. Porter within 1 month.    Follow up with Urology specialist in 1-2 weeks for re-evaluation of   urinary retention to determine whether ongoing indwelling bladder catheter   is recommended.             Discharge Disposition   Discharged to TCU  Condition at discharge: Stable    Hospital Course   64-year-old woman with advanced Parkinson's disease, dementia, and hallucinations presented with family due to concern for increasing episodes of abdominal pain with shaking.  Due to concerns for functional decline and safety in her usual living environment, she was hospitalized for observation.  Despite urged to void, she was unable to void for about 12 hours in the early part of her hospital stay and had bladder scan demonstrating increased urine residual volume.  She then had increasing pelvic and vaginal pain which was associated with constant movements and crying.  Lentz catheter was placed with immediate return of 500 mL urine output and resolution of her severe pelvic and vaginal pain.  Due to suspicion for urinary retention, ongoing Lentz catheter was recommended throughout her hospital stay until she could follow-up with her urologist for reevaluation.  After placement of Lentz catheter, she did not have episodes of severe abdominal or pelvic pain.  Episodes of intermittent increased shaking also decreased in frequency and severity.  At the recommendation of her neurologist, she had recently been prescribed a new medication (Nuplazid) for treatment of Parkinson's  disease, and this was started during her hospital stay and appeared to be well tolerated.  Also at the previous recommendation of her neurologist, and because of concerns for urinary retention, her dose of Exelon patch was decreased from 13.3 mg/day to 9.5 mg/day at discharge.  Chronic Parkinson's medications including Sinemet were continued including use of an extra dose of 1 tablet of Sinemet as needed for breakthrough episodes of shaking.  She was evaluated and treated by PT and OT.  Discharge to TCU for anticipated short-term rehabilitation was recommended with which the patient and her family were agreeable.    Consultations This Hospital Stay   SOCIAL WORK IP CONSULT  PHYSICAL THERAPY ADULT IP CONSULT  OCCUPATIONAL THERAPY ADULT IP CONSULT  PHYSICAL THERAPY ADULT IP CONSULT  OCCUPATIONAL THERAPY ADULT IP CONSULT    Code Status   Full Code    Time Spent on this Encounter   I, Cj Gillette MD, personally saw the patient today and spent less than or equal to 30 minutes discharging this patient.       Cj Gillette MD  [facility]  ______________________________________________________________________    Physical Exam   Vital Signs: Temp: 97.4  F (36.3  C) Temp src: Oral BP: (!) 140/86 Pulse: 56   Resp: 16 SpO2: 97 % O2 Device: None (Room air)    Weight: 120 lbs 0 oz  General Appearance: Presently tearful discussing her hospital discharge today but calms during discussion  Other: Alert and maintains wakefulness and attention       Primary Care Physician   Maxine Monae MD    Discharge Orders      General info for SNF    Length of Stay Estimate: Short Term Care: Estimated # of Days <30  Condition at Discharge: Stable  Level of care:skilled   Rehabilitation Potential: Fair  Admission H&P remains valid and up-to-date: Yes  Recent Chemotherapy: N/A  Use Nursing Home Standing Orders: Yes     Mantoux instructions    Give two-step Mantoux (PPD) Per Facility Policy Yes     Lentz catheter     To straight gravity drainage.  DO NOT change catheter without a specific MD order     Follow Up and recommended labs and tests    Follow up with Dr. Porter within 1 month.    Follow up with Urology specialist in 1-2 weeks for re-evaluation of urinary retention to determine whether ongoing indwelling bladder catheter is recommended.     Activity - Up with assistive device     Physical Therapy Adult Consult    Evaluate and treat as clinically indicated.    Reason:  Parkinson's, abnormal balance and gait     Occupational Therapy Adult Consult    Evaluate and treat as clinically indicated.    Reason:  Parkinson's, confusion     Fall precautions     Advance Diet as Tolerated    Follow this diet upon discharge: Orders Placed This Encounter      Regular Diet Adult       Significant Results and Procedures   Most Recent 3 CBC's:  Recent Labs   Lab Test 12/20/19  0122 11/17/19  1646 10/25/19  1811   WBC 7.2 6.4 7.0   HGB 11.3* 12.5 13.3   MCV 95 92 92    236 270     Most Recent 3 BMP's:  Recent Labs   Lab Test 12/20/19  0122 11/17/19  1646 10/25/19  1811    143 140   POTASSIUM 3.8 4.1 3.9   CHLORIDE 111* 111* 106   CO2 28 28 31   BUN 32* 27 12   CR 0.90 1.06* 0.92   ANIONGAP 3 4 3   KEYA 8.8 8.8 9.8   * 98 93     Most Recent Urinalysis:  Recent Labs   Lab Test 12/19/19  2230  08/29/19  1509   COLOR Yellow   < > Yellow   APPEARANCE Clear   < > Clear   URINEGLC Negative   < > Negative   URINEBILI Negative   < > Negative   URINEKETONE 5*   < > Trace*   SG 1.023   < > 1.020   UBLD Negative   < > Negative   URINEPH 6.0   < > 5.5   PROTEIN Negative   < > Negative   UROBILINOGEN  --   --  0.2   NITRITE Negative   < > Negative   LEUKEST Negative   < > Negative   RBCU 1  --   --    WBCU 1  --   --     < > = values in this interval not displayed.     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 carbidopa-levodopa (SINEMET)  MG tablet Take 2.5 tablets by mouth 3 times daily  Qty: 330 tablet, Refills: 3    Associated Diagnoses: Paralysis agitans (H)      !! carbidopa-levodopa (SINEMET)  MG tablet Take 1 tablet by mouth 3 times daily as needed (spells with increased shaking)    Associated Diagnoses: Parkinson disease (H)      citalopram (CELEXA) 20 MG tablet Take 1.5 tablets (30 mg) by mouth daily (before lunch) 1.5 x 20mg tab by mouth @12noon  Qty: 135 tablet, Refills: 3    Associated Diagnoses: Major depressive disorder, recurrent episode, mild (H)      LORazepam (ATIVAN) 0.5 MG tablet Take 1 tablet (0.5 mg) by mouth 2 times daily as needed for anxiety  Qty: 10 tablet, Refills: 0    Associated Diagnoses: Parkinson disease (H)      melatonin 5 MG tablet Take 2 tablets (10 mg) by mouth At Bedtime At 9 pm    Associated Diagnoses: Parkinson disease (H)      naproxen sodium 220 MG capsule Take 220 mg by mouth 2 times daily as needed (pain)    Associated Diagnoses: Parkinson disease (H)      Pimavanserin Tartrate 34 MG CAPS Take 1 capsule (34 mg) by mouth daily  Qty: 30 capsule, Refills: 11    Comments: May use home medication supply  Associated Diagnoses: Paralysis agitans (H); Hallucinations       !! - Potential duplicate medications found. Please discuss with provider.      CONTINUE these medications which have NOT CHANGED    Details   polyethylene glycol (MIRALAX) powder 1 capful in 8 oz water and sips throughout the day  Qty: 510 g, Refills: 1    Associated Diagnoses: Other constipation      QUEtiapine (SEROQUEL) 25 MG tablet Take one tablet (25 mg) in the morning and 3 tablets (75 mg) in the evening (9 pm)  Qty: 120 tablet,  Refills: 11    Comments: Dose increase  Associated Diagnoses: Hallucinations; Paralysis agitans (H)      senna-docusate (SENOKOT-S/PERICOLACE) 8.6-50 MG tablet Take 2 tablets by mouth At Bedtime      estradiol (ESTRACE) 0.1 MG/GM vaginal cream Place 2 g vaginally twice a week  Qty: 42.5 g, Refills: 1    Associated Diagnoses: Urethral caruncle         STOP taking these medications       NONFORMULARY Comments:   Reason for Stopping:         rivastigmine (EXELON) 13.3 MG/24HR 24 hr patch Comments:   Reason for Stopping:             Allergies   Allergies   Allergen Reactions     Sulfa Drugs      Tacchycardia, had to go to ED     Naproxen GI Disturbance     Oxycontin [Oxycodone] GI Disturbance     Made her feel funny/upset stomach     Ropinirole      Did not work

## 2019-12-23 NOTE — PLAN OF CARE
S-(situation): Patient scheduled for IP PT treatment this afternoon, however patient discharged to TCU prior to appointment.     B-(background): sepsis, generalized weakness    A-(assessment): Unable to assess at this date.     R-(recommendations): TCU. Please refer to previous Plan of Care notes for more details.     Physical Therapy Discharge Summary    Reason for therapy discharge:    Discharged to transitional care facility.    Progress towards therapy goal(s). See goals on Care Plan in Epic electronic health record for goal details.  Goals partially met.  Barriers to achieving goals:   discharge from facility.    Therapy recommendation(s):    See above       Thank you for your referral.     Radha Perales, PT, DPT    [facility]    O: [phone]  E: [email or web address]

## 2019-12-23 NOTE — PROGRESS NOTES
Care Transitions Progress note    Reason for Follow up: This writer confirmed with the patient and spouse that the patient will DC to [facility] TCU today. Daughter Veronica to transport. Updated TCU of DC and transport time.    Name: Nathaly Farias    MRN#: [id number]    Reason for Hospitalization: Tremor [R25.1]  Parkinson's disease (H) [G20]  Insufficient social support [Z65.8]    Discharge Date: 12/23/19    Patient/Family Response to DC Plan: in agreement    Transportation: Daughter Veronica    Other Providers (Care Coordinator, [employer] [facility], [facility] etc.):  No    Future Appointments :   Future Appointments   Date Time Provider Department Center   12/23/2019  3:30 PM Radha Perales PT [facility]   12/30/2019  3:15 PM Travis Barrera MD [facility]   1/13/2020  3:00 PM Shea Perez, CNP [facility]   1/14/2020  1:00 PM Rani Baez PA-C [facility]   1/27/2020 12:30 PM Jet Porter MD [facility]       Discharge Disposition: transitional care unit, [facility] [phone] Fax [phone]    Cherie Gusman RN Care Coordinator  [facility] [phone]  [facility] [phone]

## 2019-12-23 NOTE — PROGRESS NOTES
Care Transitions Progress note    Reason for Follow up: Patient has been accepted at [facility] TCU Give Report to this number [phone] Fax Number [phone]   Admissions Jordi (p) [phone] Send orders to this number(F) [phone]    Anticipated DC Needs: This writer spoke with Spouse Wolf regarding available TCU. Awaiting transportation arrangements from the patients spouse Wolf.    Next Steps: SNF would like to admit the patient in the early afternoon    Cherie Gusman RN Care Coordinator  [facility] [phone]  [facility] [phone]    ADDENDUM: This writer spoke with the patient spouse Wolf and updated patients Daughter Veronica with the DC TCU information. Wolf stated that his Niece In Law Princess, will transport the patient to the TCU, she will be here at the hospital soon this AM.   TCU [facility] aware of the transport time to be soon and arriving approximately at 1300.    ADDENDUM: Spouse in concerned that the patient remembers to take her HOME medications with her, and all of her belongings.

## 2019-12-23 NOTE — PROGRESS NOTES
SPIRITUAL HEALTH SERVICES  SPIRITUAL ASSESSMENT Progress Note  [facility]      Staff referral - Nursing Assistant told  was teary-eyed about gong to NH and might welcome a visit.  Pt expressed fear about going to NH.   normalized her fear.  Her niece commented it was for rehab and the plan was for to get home eventually.   encouraged pt to use the goal of getting home as motivation.  Pt replied she would.  Pt's mother-in-aw then talked about her own health struggles.     provided further supportive listening and a prayer.  With discharge planned for today, no follow up is needed.    Rakan Kay M.Div., [facility]  Staff   Office tel: [phone]

## 2019-12-23 NOTE — TELEPHONE ENCOUNTER
Received voicemail from patient's  stating he has a question and is requesting a call back.    Corazon Dhaliwal, Pharm.D.  Medication Therapy Management Pharmacist  Phone: [phone]

## 2019-12-23 NOTE — PROGRESS NOTES
Care Transitions Progress note    Reason for Follow up: This writer received a call from the patients spouse. He stated that he called a local facility and they told him that they have a TCU bed available for today.    Anticipated DC Needs: This writer is inquiring with this TCU to determine if they do have a bed for the patient for today. This writer updated the [facility] about possibility of the patient DC to a different facility    Next Steps: Awaiting call back from Local TCU.    Cherie Gusman RN Care Coordinator  [facility] [phone]  [facility] [phone]    ADDENDUM: Local TCU declined the patient. [facility] Declined.  This writer has a call out to [facility], awaiting a call back

## 2019-12-23 NOTE — PLAN OF CARE
VSS. Afebrile. Denies any pain/discomfort. Pt alert and oriented x1. Pt sleeping at this time. No complaints.

## 2019-12-23 NOTE — PROGRESS NOTES
Pt.is disoriented to place,time and situation.She has been cooperative.Pt.ambulated in the halls this evening.Vitals are stable.

## 2019-12-23 NOTE — PROGRESS NOTES
Nathaly Farias  Gender: female  : 1955  [address] [address] [facility] [id number] [phone] (home)     Medical Record: [id number]  Pharmacy:    [facility] - [place], [place] - [address] [place] [address] INPATIENT RX - [address]  Primary Care Provider: Maxine Monae    Parent's names are: Data Unavailable (mother) and Data Unavailable (father).      [facility]  2019     Discharge Phone Call:  Discharge to [facility]

## 2019-12-23 NOTE — PROGRESS NOTES
Care Transitions Progress note    Reason for Follow up: This writer re-called [facility] (Main Phone: [phone] Admissions Phone: [phone] Fax: [phone]), [facility] (Phone: [phone] Fax: [phone])[facility] (Admissions: [phone], Main Phone: [phone] Fax: [phone]) [facility] (Phone: [phone] Admissions: [phone] Fax: [phone]) and [facility] (Admissions Phone: [phone] Main Phone: [phone] Fax: [phone]) These facilities either have no beds available or they are unable to use the patients home medications.      Anticipated DC Needs: Patient is still accepted at [facility] for today. This writer is awaiting one facility to call back with possible opening at [facility].    Next Steps: Updated patients spouse Wolf, Will update again after [facility] calls back.    Cherie Gusman RN Care Coordinator  [facility] [phone]  [facility] [phone]

## 2019-12-24 ENCOUNTER — PATIENT OUTREACH (OUTPATIENT)
Dept: CARE COORDINATION | Facility: CLINIC | Age: 64
End: 2019-12-24

## 2019-12-24 NOTE — PROGRESS NOTES
[facility]      OUTPATIENT OCCUPATIONAL THERAPY  PLAN OF TREATMENT FOR OUTPATIENT REHABILITATION    Patient's Last Name, First Name, M.I.                YOB: 1955  Nathaly Farias                        Provider's Name  [facility] Medical Record No.  [id number]                               Onset Date: 12/19/19   Start of Care Date: 12/20/19   Type:     ___PT   _X_OT   ___SLP Medical Diagnosis: paralysis agitans, hallucinations, pelvic pain in female                       OT Diagnosis: decreased independence with BADLs/IADLs.       _________________________________________________________________________________  Plan of Treatment:    Frequency/Duration: daily; 1-2 days     Goals:  1. Hygiene/grooming - IND, supervision for safety, while standing  2. Lower body dressing - IND, supervision for safety, while standing  3. Toilet transfer/toileting - IND, toilet transfer, AE, supervision for safety  4. Cognitive - patient/caregiver will verbalize understanding of cognitive assessment results/recommendations as needed for safe discharge planning.     Certification date from 12/20/19 to 12/26/19.    HERVE Lewis CERTIFY THE NEED FOR THESE SERVICES FURNISHED UNDER        THIS PLAN OF TREATMENT AND WHILE UNDER MY CARE     (Physician co-signature of this document indicates review and certification of the therapy plan).                Referring Provider: José Miguel Mcgrath MD    Thank you for your referral.     HERVE Lewis [facility]  Occupational Therapy     Phone: [phone]  Email: [email or web address]

## 2019-12-24 NOTE — PROGRESS NOTES
Clinic Care Coordination Contact  Care Coordination Transition Communication    Referral Source: IP Handoff    Clinical Data: Patient was hospitalized at [facility] from 12-19 to 12-23 with diagnosis of Tremor, Parkinson's Dz.     Transition to Facility:             [facility]    Plan: RN/SW Care Coordinator will await notification from facility staff informing RN/SW Care Coordinator of patient's discharge plans/needs. RN/SW Care Coordinator will review chart and outreach to facility staff every 4 weeks and as needed.     Baldo Pate RN  Primary Care Clinic RN Care Coordinator  [facility]   [phone]

## 2019-12-24 NOTE — LETTER
[facility] CARE COORDINATION  [address] [address]  [phone]    December 24, 2019    Nathaly Farias  [address] [address] [facility] [address]      Dear Nathaly,    I am a clinic care coordinator who works with Maxine Monae MD, MD at [facility]. I wanted to introduce myself and provide you with my contact information so that you can call me with questions or concerns about your health care. Below is a description of clinic care coordination and how I can further assist you.     The clinic care coordinator is a registered nurse and/or  who understand the health care system. The goal of clinic care coordination is to help you manage your health and improve access to the [facility] system in the most efficient manner. The registered nurse can assist you in meeting your health care goals by providing education, coordinating services, and strengthening the communication among your providers. The  can assist you with financial, behavioral, psychosocial, chemical dependency, counseling, and/or psychiatric resources.    Please feel free to contact me at [phone], with any questions or concerns. We at [facility] are focused on providing you with the highest-quality healthcare experience possible and that all starts with you.     Sincerely,     Baldo Pate RN  Clinic Care Coordinator    Enclosed: I have enclosed a copy of a 24 Hour Access Plan. This has helpful phone numbers for you to call when needed. Please keep this in an easy to access place to use as needed.

## 2019-12-24 NOTE — LETTER
Health Care Home - Access Care Plan    About Me:    Patient Name:  Nathaly Curry    YOB: 1955  Age:                      64 year old   Crista MRN:     [id number] Telephone Information:   Home Phone [phone]   Mobile [phone]       Address:  [address] [place] [address] Email address:  [email or web address]      Emergency Contact(s)   Name Relationship Lgl Grd Work Phone Home Phone Mobile Phone   1. BLAYNE CURRY Spouse No  [phone]   2. DWIGHT AYALA Daughter No  [phone]              Health Maintenance: Routine Health maintenance Reviewed: Due/Overdue   Health Maintenance Due   Topic Date Due     HIV SCREENING  03/28/1972     ZOSTER IMMUNIZATION (2 of 3) 01/24/2015     ADVANCE CARE PLANNING  04/23/2017     MEDICARE ANNUAL WELLNESS VISIT  10/26/2018     MAMMO SCREENING  10/26/2018     PHQ-9  05/05/2019     LIPID  11/05/2019         My Access Plan  Medical Emergency 911   Questions or concerns during clinic hours Primary Clinic Line, I will call the clinic directly: [facility] [phone]   24 Hour Appointment Line [phone] or  [phone]) (toll free)   24 Hour Nurse Line [phone] (toll free)   Questions or concerns outside clinic hours 24 Hour Appointment Line, I will call the after-hours on-call line:   [facility] [phone] or [phone]) (toll-free)   Preferred Urgent Care [facility] [phone]   Preferred Hospital [facility]  [phone]   Preferred Pharmacy [facility] - [place], [place] - [address]     Behavioral Health Crisis Line The National Suicide Prevention Lifeline at 1-676.771.9011 or 911                     My Care Team Members  Patient Care Team       Relationship Specialty Notifications Start End    Maxine Monae MD PCP - General Family Practice  8/24/15     Referring to Urology    Phone:  [phone] Fax: [phone] [address] [facility] [address]    Maxine Monae MD Assigned PCP   9/27/15     Phone: [phone] Fax: [phone]         [address] [facility] [address]    Jet Porter MD Referring Physician Neurology  12/14/16     referring to neuropsych    Phone: [phone] Fax: [phone]          [facility] [address]    Radha Shah, PhD LP  Psychology  12/14/16     Phone: [phone] Fax: [phone]         [address] [facility] [address]    Cj Russell MD MD Urology  12/28/16     Phone: [phone] Fax: [phone]         [address] [facility] [address]    Corazon Dhaliwal [facility] Pharmacist Pharmacist  6/11/19     Phone: [phone] Pager: [phone]         [address] [facility] [address]    Narcisa Ruvalcaba, MSW    9/16/19     Phone: [phone] Pager: [phone]               My Medical and Care Information  Problem List   Patient Active Problem List   Diagnosis     Prolapse of vaginal wall     Migraine variant     Symptomatic menopausal or female climacteric states     Obesity     Malaise and fatigue     Other and unspecified adverse effect of drug, medicinal and biological substance     Other and unspecified disc disorder of cervical region     Sleep apnea     Essential hypertension, benign     Chronic cholecystitis     Overactive bladder     Neck pain     Headache     HYPERLIPIDEMIA LDL GOAL <160     Mild major depression (H)     Advanced directives, counseling/discussion     HTN, goal below 140/90     Paralysis agitans (H)     History of MRI of brain and brain stem     Wears glasses     Constipation     Incomplete RBBB     Heart murmur     Family history of tremor     Rosacea     Asymptomatic varicose veins     Seborrheic dermatitis     Post-traumatic osteoarthritis of left knee     Pain from implanted hardware, initial encounter     Cognitive disorder evalaution 2017      Dementia (H)     Hiatal hernia     Hallucinations     Frequency of urination     Midline cystocele     Atrophic vaginitis     Urinary hesitancy     Urinary urgency     Urinary retention     Parkinson disease (H)      Current Medications and Allergies:  See printed Medication Report

## 2019-12-27 ENCOUNTER — TELEPHONE (OUTPATIENT)
Dept: PHARMACY | Facility: CLINIC | Age: 64
End: 2019-12-27

## 2019-12-27 NOTE — TELEPHONE ENCOUNTER
Jet Porter MD  You; Corazon Dhaliwal [facility]; [facility] [place] 13 minutes ago (1:40 PM)      Just hold it for a week or so and see if she gets better    Routing comment        You  Jet Porter MD; [facility] 43 minutes ago (1:11 PM)      How long do you want the Nuplazid held for?    Routing comment        Jet Porter MD  You; Corazon Dhaliwal [facility]; [facility] 1 hour ago (12:22 PM)      Seems like they may want to hold the nuplazid        Called [place] and spoke with Rebecca Al, nurse. Writer gave Dr. Porter's above recommendation to hold Nuplazid for one week. Also called patient's  to inform him of Dr. Porter's plan.  agreeable and will call with any further questions or concerns.    Follow up reminder placed for one week to see if patient's confusion has improved.    Lorna Carpenter, RN, BSN  [facility]

## 2019-12-27 NOTE — TELEPHONE ENCOUNTER
"Received VM from , Wolf. He states that since the patient started Nuplazid she seems \"foggy\" and worse overall. She is currently in a nursing home for her bladder issues. Wolf is requesting a call back.     Routing to [facility] for triage.    Corazon Dhaliwal, Pharm.D.  Medication Therapy Management Pharmacist  Phone: [phone]  "

## 2019-12-27 NOTE — TELEPHONE ENCOUNTER
"Called and spoke with patient's . He states concerns about patient's increased fogginess and incoherence since starting the Nuplazid and being transferred to a TCU. Patient's  states Nathaly began taking the Nuplazid in the hospital and was transferred to [facility] on 12/23. Patient's  concerned patient is going \"downhill\" and unsure if TCU is administering her medications on time.  reports that the fogginess seems continuous and that she is confused when he speaks with her.  states she is aware of who she is and who he is, but is incoherent and confused to the situation. Per , no hallucinations or shaky episodes since ED visit on 12/19.     Patient's  gave me verbal consent to call [facility], the transitional care unit patient is staying at.    Called [facility] and spoke with Rebecca Al, a nurse. She states patient is incoherent and needs frequent directions. States that patient is oriented to person only. Also states that patient is vitally stable, able to swallow thin liquids, and is not pocketing her food. Rebecca reports that patient received a dose of Lorazepam last night. Writer advised medications be administered as ordered.    Routing to provider to review and advise.      Lorna Carpenter RN, BSN  [facility]  "

## 2019-12-28 ENCOUNTER — MEDICAL CORRESPONDENCE (OUTPATIENT)
Dept: HEALTH INFORMATION MANAGEMENT | Facility: CLINIC | Age: 64
End: 2019-12-28

## 2020-01-06 ENCOUNTER — PRE VISIT (OUTPATIENT)
Dept: UROLOGY | Facility: CLINIC | Age: 65
End: 2020-01-06

## 2020-01-06 ENCOUNTER — TELEPHONE (OUTPATIENT)
Dept: NEUROLOGY | Facility: CLINIC | Age: 65
End: 2020-01-06

## 2020-01-06 NOTE — TELEPHONE ENCOUNTER
Tried to reach Rebecca at [facility] but there was no answer. Message left on the  requesting a call back.  Jammie Traylor RNCC  Neurology

## 2020-01-06 NOTE — TELEPHONE ENCOUNTER
[facility] Call Center    Phone Message    May a detailed message be left on voicemail: yes    Reason for Call: Medication Question or concern regarding medication   Prescription Clarification  Due to patients altered mental status, nuplazid medication was being held. Wanting to know if ok to resume?          Action Taken: Message routed to:  Adult Clinics: Neurology p [phone]

## 2020-01-06 NOTE — TELEPHONE ENCOUNTER
Chief Complaint : UDS-Dr. Carpio    Hx/Sx: Frequency/ Hesitancy     Records/Orders: Available     Pt Contacted: letter on 1/6/20 and 1/27    At Rooming: Pessary in place

## 2020-01-07 NOTE — TELEPHONE ENCOUNTER
Tried to reach the number listed again but there was no answer and not able to leave a message.   Jammie Traylor, RNCC  Neurology

## 2020-01-08 NOTE — TELEPHONE ENCOUNTER
Was able to reach the nurse on duty but she knew nothing about the current situation because she doesn't normally work on that unit.  She will pass along a message to the nurses that are scheduled for tomorrow. She states that they would know more.  Jammie Traylor, RNCC  Neurology

## 2020-01-09 ENCOUNTER — TELEPHONE (OUTPATIENT)
Dept: FAMILY MEDICINE | Facility: OTHER | Age: 65
End: 2020-01-09

## 2020-01-09 NOTE — TELEPHONE ENCOUNTER
Ok I am not sure I have the info and can do this. Patient was hospitalized 12/19/19 to 12/23/19 and all information for admission sent with the SNF orders at that time. Please clarify as I have not seen her since this. Is she just needing these on the form or has something happened where she is moving from short term to long term. I may need to see her for this as I am a little out of the loop on what has been happening in the last few weeks.  Maxine Monae MD

## 2020-01-09 NOTE — TELEPHONE ENCOUNTER
Reason for Call:  Form, our goal is to have forms completed with 72 hours, however, some forms may require a visit or additional information.    Type of letter, form or note:  medical    Who is the form from?: [facility]    Where did the form come from: form was faxed in    What clinic location was the form placed at?: [facility] - [phone]    Where the form was placed: team B box Box/Folder    What number is listed as a contact on the form?: [phone]       Additional comments: Please return fax to [phone]    Call taken on 1/9/2020 at 10:20 AM by Castillo Colon

## 2020-01-09 NOTE — TELEPHONE ENCOUNTER
"Spoke with Daughter Dany:     -Mom has been very confused, there was someone going in am and siblings went at night prior to hospitalization.   On 12/16 mom had an appt with Parkinson doctor and he said she was having episodes of extreme shaking and Violet(pt ) stated parkinson's doctor said this was under medicated parkinson's. They upped her dose. Dany went there that evening and mom was shaking in chair she was grabbing at her abdomen and grabbing for her vagina stating \"no not again\", for 45-60, she couldn't stand up or move she just continued shaking. They tried extra dose of meds they also tried anxiety meds. After 3 hours of pain Dany called an ambulance. She was at [facility] 12/19-12/23 transferred to [facility] with PT and OT.   Dany states she doesn't know the answers to the questions on the admission form because there has been very poor communication between violet, staff and herself.   Transitional care took catheter out told daughter they called PCP.  Dany is confused as well and frustrated as she though AE had been involved this whole time and doesn't really know who to contact now for this stuff to be completed. She understands however that AE doesn't appear to have been \"in the loop\"  Nathaly currently at [facility]: family wants her transferred [place], Care team in [place] says she needs 24 hour care 7 days a week. This paperwork is in attempt to get her moved closer. I did attempted to schedule an appt as well however the only time daughter could get nathaly in is next Thursday, 1/16/20 you only have short spots available, is there a time you could work her in?     Nathaly currently in [facility].    Form currently hanging by CS desk.     "

## 2020-01-10 ENCOUNTER — TRANSFERRED RECORDS (OUTPATIENT)
Dept: HEALTH INFORMATION MANAGEMENT | Facility: CLINIC | Age: 65
End: 2020-01-10

## 2020-01-10 NOTE — TELEPHONE ENCOUNTER
The biggest thing in the paperwork is that when the filled out to go to [facility], I can copy the same to the [facility]. But if there has been any changes, I haven't heard anything since she was there. So Ii need any updates since the NH for the paperwork. OK to take a short, but may not be needed if we are just sending to a new location. BUT the hospital discharge was stating 30 days or less needed for short term care, so if she is expecting long term needs, we need to see her to document that.  Maxine Monae MD

## 2020-01-10 NOTE — TELEPHONE ENCOUNTER
Contacted patients daughter and scheduled Nathaly for a visit to complete forms.  Forms also placed in Dr Jackson ramos in her office.

## 2020-01-13 ENCOUNTER — RECORDS - HEALTHEAST (OUTPATIENT)
Dept: LAB | Facility: CLINIC | Age: 65
End: 2020-01-13

## 2020-01-13 LAB
ANION GAP SERPL CALCULATED.3IONS-SCNC: 9 MMOL/L (ref 5–18)
BUN SERPL-MCNC: 24 MG/DL (ref 8–22)
CALCIUM SERPL-MCNC: 10.2 MG/DL (ref 8.5–10.5)
CHLORIDE BLD-SCNC: 107 MMOL/L (ref 98–107)
CO2 SERPL-SCNC: 26 MMOL/L (ref 22–31)
CREAT SERPL-MCNC: 1.03 MG/DL (ref 0.6–1.1)
CREAT SERPL-MCNC: 1.03 MG/DL (ref 0.6–1.1)
ERYTHROCYTE [DISTWIDTH] IN BLOOD BY AUTOMATED COUNT: 13.6 % (ref 11–14.5)
GFR SERPL CREATININE-BSD FRML MDRD: 54 ML/MIN/1.73M2
GFR SERPL CREATININE-BSD FRML MDRD: 54 ML/MIN/1.73M2
GLUCOSE BLD-MCNC: 87 MG/DL (ref 70–125)
GLUCOSE SERPL-MCNC: 87 MG/DL (ref 70–125)
HCT VFR BLD AUTO: 40.9 % (ref 35–47)
HGB BLD-MCNC: 12.8 G/DL (ref 12–16)
MCH RBC QN AUTO: 30.1 PG (ref 27–34)
MCHC RBC AUTO-ENTMCNC: 31.3 G/DL (ref 32–36)
MCV RBC AUTO: 96 FL (ref 80–100)
PLATELET # BLD AUTO: 293 THOU/UL (ref 140–440)
PMV BLD AUTO: 9.9 FL (ref 8.5–12.5)
POTASSIUM BLD-SCNC: 3.9 MMOL/L (ref 3.5–5)
POTASSIUM SERPL-SCNC: 3.9 MMOL/L (ref 3.5–5)
RBC # BLD AUTO: 4.25 MILL/UL (ref 3.8–5.4)
SODIUM SERPL-SCNC: 142 MMOL/L (ref 136–145)
WBC: 6.9 THOU/UL (ref 4–11)

## 2020-01-13 RX ORDER — RIVASTIGMINE 13.3 MG/24H
PATCH, EXTENDED RELEASE TRANSDERMAL
Refills: 11 | COMMUNITY
Start: 2019-11-24 | End: 2020-01-27

## 2020-01-13 NOTE — PROGRESS NOTES
"Subjective     Nathaly Farias is a 64 year old female who presents to clinic today for the following health issues:    HPI   {SUPERLIST (Optional):167555}  {additonal problems for provider to add (Optional):859450}    {HIST REVIEW/ LINKS 2 (Optional):923071}    Reviewed and updated as needed this visit by Provider         Review of Systems   {ROS COMP (Optional):625310}      Objective    LMP 06/05/2006   There is no height or weight on file to calculate BMI.  Physical Exam   {Exam List (Optional):383018}    {Diagnostic Test Results (Optional):347658::\"Diagnostic Test Results:\",\"Labs reviewed in Epic\"}        {PROVIDER CHARTING PREFERENCE:043201}    "

## 2020-01-13 NOTE — PROGRESS NOTES
Diagnosis/Summary/Recommendations:    PATIENT: Nathaly Farias  64 year old female     : 1955    MOISE: 2020    [facility]  [address] [place] [id number]  [phone]    Dr. Kaity Best    Hallucinations - reportedly stable    Not communicating so well    Lost weight and now gained back some weight   Eating better.     She has had one fall at the nursing home.     When she was on nuplazid  Off this as made her loopy     Medications     7am 12noon 9pm         Acetaminophen 500mg tylenol Not listed              Carbidopa/levodopa Sinemet 25/100 2 2 2   as needed up to 3/day      Citalopram celexa 20mg    1            Estradiol cream Monday and            Melatonin 5mg        2        Naproxen sodium 220mg Not taking           nitrofurantoine macrobid 100mg Twice daily        Pimavanserin nuplazid 34mg Not taking           Polyethylene glycol miralax sipping throughout the day             Quetiapine seroquel 25mg 2   3       Rivastigmine exelon 9.5mg/24 hr patch 1           Senna-docusate senokot-S 8.6mg-50mg       2         Zolmitriptan zomig 5mg Not listed                                          History obtained from patient      PLAN  No change in medications  She does not have zolmitriptan or acetaminophen listed  Her dose of citalopram was reduced from 30mg to 20mg (ie 1.5 -->1 tab per day)  She is considering moving to care facility in [place] as it is closer to her home in [place] and she is in [place] in [place]   She is off the nuplazid and is on a total of of 125mg of quetiapine per day   She is on the 9.5mg of the rivastigmine patch rather than the 13.3mg due to bladder problems at the higher dose.   Sinemet is 2 tabs 3/day and 1 tab 3/day as needed  She is also on a bladder medication for infection - presumed still on this - unclear if it is long term plan.     Coding statement:   Duration of  Services: patient care and care coordination was 25  minutes  Greater than 50% of this visit was spent in counseling and coordination of care.     Jet Porter MD     ______________________________________    Last visit date and details:     MOISE: December 16, 2019     Taking h er pills and has had shaking  Eats 1.5 hours after her dose.      1230 1245pm today problem began. Varies from morning to night.   She has not taken extra sinemet.      9am it can happen  1pm it can happen  4 or 5 pm it can happen  Can happen in the evening.      Shaking lasts about one hour and then gets better.            Medications     7am 12noon 9pm         Acetaminophen 500mg tylenol As needed              Carbidopa/levodopa Sinemet 25/100 2 2 2         Citalopram celexa 20mg    1.5            Estradiol cream 2/week           Melatonin 5mg        2        Naproxen sodium 220mg As needed           Nonformulary cbd oil - young living oils  ran out           Pimavanserin nuplazid 34mg Not taking           Polyethylene glycol miralax sipping throughout the day             Probiotic young living Ran out           Quetiapine seroquel 25mg 1    3       Rivastigmine exelon 13.3mg/24 hr patch 1           Senna-docusate senokot-S 8.6mg-50mg       2         Terbinafine lamisil 1% external cream stopped           Trospium sanctura 20mg stopped              Zolmitriptan zomig 5mg As needed                                        History obtained from patient     PLAN  1. Recent dose increase in her seroquel to 25mg in the morning and 3 x 25mg in the evening.   2. Consideration for nuplazid 34mg/day - order put in and will need prior authorization.   3. She had significant shaking in the office today and is having spells throughout the day that we should try 1/2-1 tab by mouth as needed (chew it and use carbonated water)  4. Return back   5. Will  Have to decide if we should increase all her doses of sinemet. Right now she is on 2-2-2 (2 tabs 3/day) and may be needing to increasing to 2.5 tabs 3/day or  possibly 3 tabs 3/day but will start with prn change.   6. Will see Dr. Barrera on 12/30 for her headaches  7. Visit to urology 1/13 with Ana     ADDENDUM  Since it is hard to use prn medications may want to make all medications 2.5 tabs 3/day for the sinemet.      Medications     7am 12noon 9pm         Acetaminophen 500mg tylenol As needed              Carbidopa/levodopa Sinemet 25/100 2 2 2         Citalopram celexa 20mg    1.5            Estradiol cream 2/week           Melatonin 5mg        2        Naproxen sodium 220mg As needed           Pimavanserin nuplazid 34mg Not taking           Polyethylene glycol miralax sipping throughout the day             Quetiapine seroquel 25mg 1    3       Rivastigmine exelon 13.3mg/24 hr patch 1           Senna-docusate senokot-S 8.6mg-50mg       2         Zolmitriptan zomig 5mg As needed                                                         ______________________________________      Patient was asked about 14 Review of systems including changes in vision (dry eyes, double vision), hearing, heart, lungs, musculoskeletal, depression, anxiety, snoring, RBD, insomnia, urinary frequency, urinary urgency, constipation, swallowing problems, hematological, ID, allergies, skin problems: seborrhea, endocrinological: thyroid, diabetes, cholesterol; balance, weight changes, and other neurological problems and these were not significant at this time except for   Patient Active Problem List   Diagnosis     Prolapse of vaginal wall     Migraine variant     Symptomatic menopausal or female climacteric states     Obesity     Malaise and fatigue     Other and unspecified adverse effect of drug, medicinal and biological substance     Other and unspecified disc disorder of cervical region     Sleep apnea     Essential hypertension, benign     Chronic cholecystitis     Overactive bladder     Neck pain     Headache     HYPERLIPIDEMIA LDL GOAL <160     Mild major depression (H)     Advanced  directives, counseling/discussion     HTN, goal below 140/90     Paralysis agitans (H)     History of MRI of brain and brain stem     Wears glasses     Constipation     Incomplete RBBB     Heart murmur     Family history of tremor     Rosacea     Asymptomatic varicose veins     Seborrheic dermatitis     Post-traumatic osteoarthritis of left knee     Pain from implanted hardware, initial encounter     Cognitive disorder evalaution 2017     Dementia (H)     Hiatal hernia     Hallucinations     Frequency of urination     Midline cystocele     Atrophic vaginitis     Urinary hesitancy     Urinary urgency     Urinary retention     Parkinson disease (H)          Allergies   Allergen Reactions     Sulfa Drugs      Tacchycardia, had to go to ED     Naproxen GI Disturbance     Oxycontin [Oxycodone] GI Disturbance     Made her feel funny/upset stomach     Ropinirole      Did not work     Past Surgical History:   Procedure Laterality Date     ------------OTHER-------------      left shoulder     ARTHRODESIS TOE(S)  3/2/2011    ARTHRODESIS TOE(S) performed by CLARA LUCAS at  OR     C LIGATE FALLOPIAN TUBE  1998     CHOLECYSTECTOMY, LAPOROSCOPIC  5/12/2008    Cholecystectomy, Laparoscopic     ESOPHAGOSCOPY, GASTROSCOPY, DUODENOSCOPY (EGD), COMBINED N/A 7/5/2017    Procedure: COMBINED ESOPHAGOSCOPY, GASTROSCOPY, DUODENOSCOPY (EGD), BIOPSY SINGLE OR MULTIPLE;  Esophagogastroduodenoscopy with Multiple Biopsies by Biopsy;  Surgeon: Tj Denney MD;  Location:  GI     HC COLONOSCOPY W/WO BRUSH/WASH  08/30/06    normal     HC OPEN TX TIBIAL SHAFT FX W PLATE/SCREWS W/WO CERCLAGE  2000    [facility] left [facility]    [facility]     [facility]  11/16/06    Right shoulder     Past Medical History:   Diagnosis Date     Arthralgia of temporomandibular joint 10/22/1996     Arthritis      Cognitive disorder evalaution 2017 3/6/2017     She has marked attentional difficulties, impairments in memory including a rapid forgetting rate, mild anomia, and mild executive dysfunction. 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 She's required increasing assistance with finances, medications, driving, and cooking. I think this is a mild dementia, and     Constipation 2/23/2015     Dementia (H) 3/13/2017    IMPRESSIONS AND RECOMMENDATIONS   Current results indicate marked attentional difficulties, impairments in memory, which in some cases reflected impairments in retention and in other cases reflected retrieval difficulties, and mild anomia as well as mild executive dysfunction. Premorbid intellectual functioning is estimated to fall in the borderline range. She denied experiencing significant depre     Depressive disorder      Heart murmur 2/23/2015     History of MRI of brain and brain stem 2/21/2015    MRI OF THE BRAIN WITHOUT CONTRAST 7/10/2014 1:21 PM  COMPARISON: None. HISTORY: Left-sided upper and lower extremity tremor. Balance issues. TECHNIQUE:  Brain: Axial diffusion-weighted with ADC map, T2-weighted with fat saturation, T1-weighted and turboFLAIR and coronal T1-weighted images of the brain were obtained without intravenous contrast.  FINDINGS: There is mild diffuse cerebral volume loss     Hypertension      Incomplete RBBB 2/23/2015     Motion sickness      Other and unspecified hyperlipidemia      Parkinson's disease (H)      Seborrheic dermatitis 2/29/2016     Unspecified arthropathy, hand 08/07/1997     Unspecified sleep apnea     moderate, sleep study 11/07, on CPAP, has daytime somnolence with this     Variants of migraine, not elsewhere classified, without mention of intractable migraine without mention of status migrainosus      Wears glasses 2/23/2015     Social History     Socioeconomic History     Marital status:      Spouse name: violet  "    Number of children: 2     Years of education: Not on file     Highest education level: Not on file   Occupational History     Occupation:    Social Needs     Financial resource strain: Not on file     Food insecurity:     Worry: Not on file     Inability: Not on file     Transportation needs:     Medical: Not on file     Non-medical: Not on file   Tobacco Use     Smoking status: Never Smoker     Smokeless tobacco: Never Used     Tobacco comment: no smokers in the household   Substance and Sexual Activity     Alcohol use: Yes     Alcohol/week: 0.0 standard drinks     Comment: very occ     Drug use: No     Sexual activity: Yes     Partners: Male     Birth control/protection: Surgical, Female Surgical     Comment: tubal ligation   Lifestyle     Physical activity:     Days per week: Not on file     Minutes per session: Not on file     Stress: Not on file   Relationships     Social connections:     Talks on phone: Not on file     Gets together: Not on file     Attends Cheondoism service: Not on file     Active member of club or organization: Not on file     Attends meetings of clubs or organizations: Not on file     Relationship status: Not on file     Intimate partner violence:     Fear of current or ex partner: Not on file     Emotionally abused: Not on file     Physically abused: Not on file     Forced sexual activity: Not on file   Other Topics Concern      Service Not Asked     Blood Transfusions Not Asked     Caffeine Concern Yes     Comment: OCC     Occupational Exposure Not Asked     Hobby Hazards Not Asked     Sleep Concern Not Asked     Stress Concern Not Asked     Weight Concern Not Asked     Special Diet Not Asked     Back Care Not Asked     Exercise No     Comment: \"not faithfully\"     Bike Helmet Not Asked     Seat Belt Yes     Self-Exams No     Parent/sibling w/ CABG, MI or angioplasty before 65F 55M? Not Asked   Social History Narrative    ALLERGIES:  She is allergic to sulfa, and has " had gastrointestinal side effects from naproxen.               FAMILY HISTORY:  Strongly positive for headaches, including in her son and sister.  There is no family history of tremor.               SOCIAL HISTORY:  She does not work outside the home.  She does not smoke.  She uses very little alcohol.  Lives in [place]. . Has 2 kids: son who is 35 yr s old. And daughter who is 30 yrs old. Her  is working as a  .        Drug and lactation database from the [place] [employer] of Medicine:  http://toxnet.nlm.nih.gov/cgi-bin/sis/htmlgen?LACT      B/P: Data Unavailable, T: Data Unavailable, P: Data Unavailable, R: Data Unavailable 0 lbs 0 oz  Last menstrual period 06/05/2006, not currently breastfeeding., There is no height or weight on file to calculate BMI.  Medications and Vitals not listed above were documented in the cart and reviewed by me.     Current Outpatient Medications   Medication Sig Dispense Refill     carbidopa-levodopa (SINEMET)  MG tablet Take 2.5 tablets by mouth 3 times daily 330 tablet 3     carbidopa-levodopa (SINEMET)  MG tablet Take 1 tablet by mouth 3 times daily as needed (spells with increased shaking)       citalopram (CELEXA) 20 MG tablet Take 1.5 tablets (30 mg) by mouth daily (before lunch) 1.5 x 20mg tab by mouth @12noon 135 tablet 3     estradiol (ESTRACE) 0.1 MG/GM vaginal cream Place 2 g vaginally twice a week 42.5 g 1     LORazepam (ATIVAN) 0.5 MG tablet Take 1 tablet (0.5 mg) by mouth 2 times daily as needed for anxiety 10 tablet 0     melatonin 5 MG tablet Take 2 tablets (10 mg) by mouth At Bedtime At 9 pm       naproxen sodium 220 MG capsule Take 220 mg by mouth 2 times daily as needed (pain)       Pimavanserin Tartrate 34 MG CAPS Take 1 capsule (34 mg) by mouth daily 30 capsule 11     polyethylene glycol (MIRALAX) powder 1 capful in 8 oz water and sips throughout the day 510 g 1     QUEtiapine (SEROQUEL) 25 MG tablet Take one  tablet (25 mg) in the morning and 3 tablets (75 mg) in the evening (9 pm) 120 tablet 11     rivastigmine (EXELON) 9.5 MG/24HR 24 hr patch Place 1 patch onto the skin daily 30 patch 0     senna-docusate (SENOKOT-S/PERICOLACE) 8.6-50 MG tablet Take 2 tablets by mouth At Bedtime           Jet Porter MD

## 2020-01-16 ENCOUNTER — OFFICE VISIT (OUTPATIENT)
Dept: FAMILY MEDICINE | Facility: OTHER | Age: 65
End: 2020-01-16
Payer: COMMERCIAL

## 2020-01-16 ENCOUNTER — RECORDS - HEALTHEAST (OUTPATIENT)
Dept: LAB | Facility: CLINIC | Age: 65
End: 2020-01-16

## 2020-01-16 ENCOUNTER — TRANSFERRED RECORDS (OUTPATIENT)
Dept: HEALTH INFORMATION MANAGEMENT | Facility: CLINIC | Age: 65
End: 2020-01-16

## 2020-01-16 ENCOUNTER — MEDICAL CORRESPONDENCE (OUTPATIENT)
Dept: HEALTH INFORMATION MANAGEMENT | Facility: CLINIC | Age: 65
End: 2020-01-16

## 2020-01-16 VITALS
TEMPERATURE: 98.9 F | HEART RATE: 64 BPM | WEIGHT: 117 LBS | OXYGEN SATURATION: 99 % | BODY MASS INDEX: 22.85 KG/M2 | RESPIRATION RATE: 17 BRPM | SYSTOLIC BLOOD PRESSURE: 128 MMHG | DIASTOLIC BLOOD PRESSURE: 72 MMHG

## 2020-01-16 DIAGNOSIS — I10 ESSENTIAL HYPERTENSION, BENIGN: ICD-10-CM

## 2020-01-16 DIAGNOSIS — F32.0 MILD MAJOR DEPRESSION (H): ICD-10-CM

## 2020-01-16 DIAGNOSIS — N81.10 PROLAPSE OF VAGINAL WALL: ICD-10-CM

## 2020-01-16 DIAGNOSIS — R33.9 URINARY RETENTION: ICD-10-CM

## 2020-01-16 DIAGNOSIS — Z00.00 ENCOUNTER FOR MEDICARE ANNUAL WELLNESS EXAM: Primary | ICD-10-CM

## 2020-01-16 DIAGNOSIS — F02.80 DEMENTIA DUE TO PARKINSON'S DISEASE WITHOUT BEHAVIORAL DISTURBANCE (H): ICD-10-CM

## 2020-01-16 DIAGNOSIS — G20.A1 DEMENTIA DUE TO PARKINSON'S DISEASE WITHOUT BEHAVIORAL DISTURBANCE (H): ICD-10-CM

## 2020-01-16 DIAGNOSIS — G20.A1 PARKINSON DISEASE (H): ICD-10-CM

## 2020-01-16 DIAGNOSIS — G20.A1 PARALYSIS AGITANS (H): ICD-10-CM

## 2020-01-16 DIAGNOSIS — E78.5 HYPERLIPIDEMIA LDL GOAL <160: ICD-10-CM

## 2020-01-16 LAB
ALBUMIN UR-MCNC: ABNORMAL MG/DL
AMORPH CRY #/AREA URNS HPF: ABNORMAL /[HPF]
APPEARANCE UR: ABNORMAL
BACTERIA #/AREA URNS HPF: ABNORMAL HPF
BILIRUB UR QL STRIP: NEGATIVE
CAOX CRY #/AREA URNS HPF: PRESENT /[HPF]
COLOR UR AUTO: YELLOW
GLUCOSE UR STRIP-MCNC: NEGATIVE MG/DL
HGB UR QL STRIP: NEGATIVE
KETONES UR STRIP-MCNC: ABNORMAL MG/DL
LEUKOCYTE ESTERASE UR QL STRIP: ABNORMAL
MUCOUS THREADS #/AREA URNS LPF: ABNORMAL LPF
NITRATE UR QL: POSITIVE
PH UR STRIP: 6 [PH] (ref 4.5–8)
RBC #/AREA URNS AUTO: ABNORMAL HPF
SP GR UR STRIP: 1.02 (ref 1–1.03)
SQUAMOUS #/AREA URNS AUTO: ABNORMAL LPF
UROBILINOGEN UR STRIP-ACNC: ABNORMAL
WBC #/AREA URNS AUTO: ABNORMAL HPF

## 2020-01-16 PROCEDURE — 99215 OFFICE O/P EST HI 40 MIN: CPT | Mod: 25 | Performed by: FAMILY MEDICINE

## 2020-01-16 PROCEDURE — G0439 PPPS, SUBSEQ VISIT: HCPCS | Performed by: FAMILY MEDICINE

## 2020-01-16 RX ORDER — NITROFURANTOIN MACROCRYSTAL 100 MG
100 CAPSULE ORAL 4 TIMES DAILY
COMMUNITY

## 2020-01-16 ASSESSMENT — ACTIVITIES OF DAILY LIVING (ADL)
CURRENT_FUNCTION: PREPARING MEALS REQUIRES ASSISTANCE
CURRENT_FUNCTION: TRANSPORTATION REQUIRES ASSISTANCE
CURRENT_FUNCTION: HOUSEWORK REQUIRES ASSISTANCE
CURRENT_FUNCTION: LAUNDRY REQUIRES ASSISTANCE
CURRENT_FUNCTION: MEDICATION ADMINISTRATION REQUIRES ASSISTANCE
CURRENT_FUNCTION: BATHING REQUIRES ASSISTANCE

## 2020-01-16 ASSESSMENT — PAIN SCALES - GENERAL: PAINLEVEL: NO PAIN (0)

## 2020-01-16 NOTE — PROGRESS NOTES
"SUBJECTIVE:   Nathaly Farias is a 64 year old female who presents for Preventive Visit.      Are you in the first 12 months of your Medicare coverage?  No    Healthy Habits:    In general, how would you rate your overall health?  Good    Frequency of exercise:  None    Do you usually eat at least 4 servings of fruit and vegetables a day, include whole grains    & fiber and avoid regularly eating high fat or \"junk\" foods?  Yes    Taking medications regularly:  Yes    Barriers to taking medications:  None    Medication side effects:  None    Ability to successfully perform activities of daily living:  Housework requires assistance, bathing requires assistance, laundry requires assistance, medication administration requires assistance, preparing meals requires assistance and transportation requires assistance    Home Safety:  No safety concerns identified    Hearing Impairment:  No hearing concerns    In the past 6 months, have you been bothered by leaking of urine? Yes    In general, how would you rate your overall mental or emotional health?  Poor      PHQ-2 Total Score:    Additional concerns today:  No    Do you feel safe in your environment? Yes    Have you ever done Advance Care Planning? (For example, a Health Directive, POLST, or a discussion with a medical provider or your loved ones about your wishes): No, advance care planning information given to patient to review.  Patient plans to discuss their wishes with loved ones or provider.        Fall risk       Cognitive Screening   1) Repeat 3 items (Leader, Season, Table)    2) Clock draw: ABNORMAL   3) 3 item recall: Recalls NO objects   Results: 0 items recalled: PROBABLE COGNITIVE IMPAIRMENT, **INFORM PROVIDER**    Mini-CogTM Copyright PAZ Bowman. Licensed by the author for use in [facility]; reprinted with permission (shelby@.[facility] [place]). All rights reserved.      Do you have sleep apnea, excessive snoring or daytime drowsiness?: yes    Reviewed and " updated as needed this visit by clinical staff  Tobacco  Allergies  Meds  Problems  Med Hx  Surg Hx  Fam Hx  Soc Hx          Reviewed and updated as needed this visit by Provider  Tobacco  Allergies  Meds  Problems  Med Hx  Surg Hx  Fam Hx        Social History     Tobacco Use     Smoking status: Never Smoker     Smokeless tobacco: Never Used     Tobacco comment: no smokers in the household   Substance Use Topics     Alcohol use: Yes     Alcohol/week: 0.0 standard drinks     Comment: very occ     If you drink alcohol do you typically have >3 drinks per day or >7 drinks per week? No    Alcohol Use 1/16/2020   Prescreen: >3 drinks/day or >7 drinks/week? No           Hyperlipidemia Follow-Up      Are you regularly taking any medication or supplement to lower your cholesterol?   No    Are you having muscle aches or other side effects that you think could be caused by your cholesterol lowering medication?  n/a    Hypertension Follow-up      Do you check your blood pressure regularly outside of the clinic? No     Are you following a low salt diet? No    Are your blood pressures ever more than 140 on the top number (systolic) OR more   than 90 on the bottom number (diastolic), for example 140/90? n/a    Depression Followup    How are you doing with your depression since your last visit? No change    Are you having other symptoms that might be associated with depression? No    Have you had a significant life event?  OTHER: Nursing home      Are you feeling anxious or having panic attacks?   Yes:  some     Do you have any concerns with your use of alcohol or other drugs? No    Social History     Tobacco Use     Smoking status: Never Smoker     Smokeless tobacco: Never Used     Tobacco comment: no smokers in the household   Substance Use Topics     Alcohol use: Yes     Alcohol/week: 0.0 standard drinks     Comment: very occ     Drug use: No     PHQ 10/26/2017 5/3/2018 11/5/2018   PHQ-9 Total Score 2 4 8   Q9:  Thoughts of better off dead/self-harm past 2 weeks Not at all Not at all Not at all     REX-7 SCORE 7/10/2017 5/3/2018 11/5/2018   Total Score - - -   Total Score 5 (mild anxiety) - 4 (minimal anxiety)   Total Score 5 0 4           Suicide Assessment Five-step Evaluation and Treatment (SAFE-T)      Current providers sharing in care for this patient include:   Patient Care Team:  Maxine Monae MD as PCP - General (Family Practice)  Maxine Monae MD as Assigned PCP  Jet Porter MD as Referring Physician (Neurology)  Radha Shah, PhD LP (Psychology)  Cj Russell MD as MD (Urology)  Corazon Dhaliwal [facility] as Pharmacist (Pharmacist)  Narcisa Ruvalcaba MSW as   Jake Pate, RN as Clinic Care Coordinator (Primary Care - CC)    The following health maintenance items are reviewed in Epic and correct as of today:  Health Maintenance   Topic Date Due     HIV SCREENING  03/28/1970     ZOSTER IMMUNIZATION (2 of 3) 01/24/2015     ADVANCE CARE PLANNING  04/23/2017     MEDICARE ANNUAL WELLNESS VISIT  10/26/2018     MAMMO SCREENING  10/26/2018     PHQ-9  05/05/2019     LIPID  11/05/2019     PAP  10/26/2020     BMP  12/20/2020     DTAP/TDAP/TD IMMUNIZATION (3 - Td) 10/04/2021     HPV TEST  10/26/2022     COLONOSCOPY  12/21/2026     HEPATITIS C SCREENING  Completed     DEPRESSION ACTION PLAN  Completed     MIGRAINE ACTION PLAN  Completed     INFLUENZA VACCINE  Completed     IPV IMMUNIZATION  Aged Out     MENINGITIS IMMUNIZATION  Aged Out           Review of Systems  Constitutional, HEENT, cardiovascular, pulmonary, GI, , musculoskeletal, neuro, skin, endocrine and psych systems are negative, except as otherwise noted.    OBJECTIVE:   /72 (BP Location: Right arm, Patient Position: Sitting, Cuff Size: Adult Regular)   Pulse 64   Temp 98.9  F (37.2  C) (Temporal)   Resp 17   Wt 53.1 kg (117 lb)   LMP 06/05/2006   SpO2 99%   BMI 22.85 kg/m   Estimated body mass index  is 22.85 kg/m  as calculated from the following:    Height as of 12/20/19: 1.524 m (5').    Weight as of this encounter: 53.1 kg (117 lb).  Physical Exam  GENERAL: healthy, alert and no distress  RESP: lungs clear to auscultation - no rales, rhonchi or wheezes  CV: regular rate and rhythm, normal S1 S2, no S3 or S4, no murmur, click or rub, no peripheral edema and peripheral pulses strong  ABDOMEN: soft, nontender, no hepatosplenomegaly, no masses and bowel sounds normal  MS: no gross musculoskeletal defects noted, no edema  PSYCH: mood is tearful at times where she feels her  has left her, but reminded by family he visits her on weekends and calls during the week, though she is reassured, by end of visit she states the same again.        ASSESSMENT / PLAN:       ICD-10-CM    1. Encounter for Medicare annual wellness exam Z00.00    2. Mild major depression (H) F32.0    3. Parkinson disease (H) G20    4. Dementia due to Parkinson's disease without behavioral disturbance (H) G20     F02.80    5. Paralysis agitans (H) G20    6. Urinary retention R33.9    7. Prolapse of vaginal wall N81.10    8. Essential hypertension, benign I10    9. Hyperlipidemia LDL goal <160 E78.5      Discussed her health and she has had progressive dementia with evaluation in the hospital which discovered the urinary retention. This improved at the TCU, but her memory did not. Actually it worsened, likely from the unfamiliar environment, though was given benzos for her prn hallucinations which  expresses concerns for. Though despite stopping these, dementia did not improve.   Discussed progressive dementia with this large of a weight loss which is requiring consistent reminders for eating. This is a process that may cycle downward quickly. She does meet hospice criteria from the weight loss, though is still up and active at this time and family are not looking for further resources as they just need her safe in memory care.  Paperwork completed for this today.   Reviewed in light of this, plans for PT/OT and they would continue for now as she enjoys this. Though discussed code status and patient is confused by this. Easily off topic and asking about something else. Daughter would like her DNR/DNI due to worsening overall quality if life.  was asked separately by phone as no POA is named and both are next of kin. He separately expressed support for DNR/DNI. So asked Nathaly, she states whatever they feel is best. Code status updated on paperwork.   Also discussed pessary as daughter wondering if it should be removed, but sister mentions she was having such discomforts and frequency of urination without it. Agreed that if it helps symptoms, it can remain. Though removal is possible any time they think it isn;t helping.    In addition to the well exam:  Counseled on above issues for 60 min and >50% of time was spent in counseling for issues below:    1. Encounter for Medicare annual wellness exam    2. Mild major depression (H)    3. Parkinson disease (H)    4. Dementia due to Parkinson's disease without behavioral disturbance (H)    5. Paralysis agitans (H)    6. Urinary retention    7. Prolapse of vaginal wall    8. Essential hypertension, benign    9. Hyperlipidemia LDL goal <160    .     COUNSELING:  Reviewed preventive health counseling, as reflected in patient instructions       Bladder control       Advanced Planning     Estimated body mass index is 22.85 kg/m  as calculated from the following:    Height as of 12/20/19: 1.524 m (5').    Weight as of this encounter: 53.1 kg (117 lb).    Weight management plan discussed that we can start supplementation and they said they just give her whatever she wants,b ut she is easily distracted. T aking her to [employer] today.     reports that she has never smoked. She has never used smokeless tobacco.      Appropriate preventive services were discussed with this patient, including  applicable screening as appropriate for cardiovascular disease, diabetes, osteopenia/osteoporosis, and glaucoma.  As appropriate for age/gender, discussed screening for colorectal cancer, prostate cancer, breast cancer, and cervical cancer. Checklist reviewing preventive services available has been given to the patient.    Reviewed patients plan of care and provided an AVS. The Complex Care Plan (for patients with higher acuity and needing more deliberate coordination of services) for Nathaly meets the Care Plan requirement. This Care Plan has been established and reviewed with the Patient and daughter and sister.    Counseling Resources:  ATP IV Guidelines  Pooled Cohorts Equation Calculator  Breast Cancer Risk Calculator  FRAX Risk Assessment  ICSI Preventive Guidelines  Dietary Guidelines for Americans, 2010  USDA's MyPlate  ASA Prophylaxis  Lung CA Screening    Maxine Monae MD, MD  [facility]    Identified Health Risks:

## 2020-01-17 NOTE — PATIENT INSTRUCTIONS
Patient Education   Personalized Prevention Plan  You are due for the preventive services outlined below.  Your care team is available to assist you in scheduling these services.  If you have already completed any of these items, please share that information with your care team to update in your medical record.  Health Maintenance Due   Topic Date Due     HIV Screening  03/28/1970     Zoster (Shingles) Vaccine (2 of 3) 01/24/2015     Discuss Advance Care Planning  04/23/2017     Annual Wellness Visit  10/26/2018     Mammogram  10/26/2018     Depression Assessment  05/05/2019     Cholesterol Lab  11/05/2019       Exercise for a Healthier Heart     Exercise with a friend. When activity is fun, you're more likely to stick with it.   You may wonder how you can improve the health of your heart. If you re thinking about exercise, you re on the right track. You don t need to become an athlete, but you do need a certain amount of brisk exercise to help strengthen your heart. If you have been diagnosed with a heart condition, your doctor may recommend exercise to help stabilize your condition. To help make exercise a habit, choose safe, fun activities.  Be sure to check with your healthcare provider before starting an exercise program.  Why exercise?  Exercising regularly offers many healthy rewards. It can help you do all of the following:    Improve your blood cholesterol level to help prevent further heart trouble    Lower your blood pressure to help prevent a stroke or heart attack    Control diabetes, or reduce your risk of getting this disease    Improve your heart and lung function    Reach and maintain a healthy weight    Make your muscles stronger and more limber so you can stay active    Prevent falls and fractures by slowing the loss of bone mass (osteoporosis)    Manage stress better    Reduce your blood pressure    Improve your sense of self and your body image  Exercise tips  Ease into your routine. Set  small goals. Then build on them.  Exercise on most days. Aim for a total of 150 or more minutes of moderate to  vigorous intensity activity each week. Consider 40 minutes, 3 to 4 times a week. For best results, activity should last for 40 minutes on average. It is OK to work up to the 40 minute period over time. Examples of moderate-intensity activity is walking 1 mile in 15 minutes or 30 to 45 minutes of yard work.  Step up your daily activity level. Along with your exercise program, try being more active throughout the day. Walk instead of drive. Do more household tasks or yard work.  Choose one or more activities you enjoy. Walking is one of the easiest things you can do. You can also try swimming, riding a bike, dancing, or taking an exercise class.  Stop exercising and call your doctor if you:    Have chest pain or feel dizzy or lightheaded    Feel burning, tightness, pressure, or heaviness in your chest, neck, shoulders, back, or arms    Have unusual shortness of breath    Have increased joint or muscle pain    Have palpitations or an irregular heartbeat  Date Last Reviewed: 5/1/2016 2000-2019 [employer]. [address] [place] [address]. All rights reserved. This information is not intended as a substitute for professional medical care. Always follow your healthcare professional's instructions.        Activities of Daily Living    Your Health Risk Assessment indicates you have difficulties with activities of daily living such as housework, bathing, preparing meals, taking medication, etc. Please make a follow up appointment for us to address this issue in more detail.    Urinary Incontinence, Female (Adult)  Urinary incontinence means loss of control of the bladder. This problem affects many women, especially as they get older. If you have incontinence, you may be embarrassed to ask for help. But know that this problem can be treated.  Types of Incontinence  There are different  types of incontinence. Two of the main types are described here. You can have more than one type.    Stress incontinence. With this type, urine leaks when pressure (stress) is put on the bladder. This may happen when you cough, sneeze, or laugh. Stress incontinence most often occurs because the pelvic floor muscles that support the bladder and urethra are weak. This can happen after pregnancy and vaginal childbirth or a hysterectomy. It can also be due to excess body weight or hormone changes.    Urge incontinence (also called overactive bladder). With this type, a sudden urge to urinate is felt often. This may happen even though there may not be much urine in the bladder. The need to urinate often during the night is common. Urge incontinence most often occurs because of bladder spasms. This may be due to bladder irritation or infection. Damage to bladder nerves or pelvic muscles, constipation, and certain medicines can also lead to urge incontinence.  Treatment of urinary incontinence depends on the cause. Further evaluation is needed to find the type you have. This will likely include an exam and certain tests. Based on the results, you and your healthcare provider can then plan treatment. Until a diagnosis is made, the home care tips below can help relieve symptoms.  Home care    Do pelvic floor muscle exercises, if they are prescribed. The pelvic floor muscles help support the bladder and urethra. Many women find that their symptoms improve when doing special exercises that strengthen these muscles. To do the exercises contract the muscles you would use to stop your stream of urine, but do this when you re not urinating. Hold for 10 seconds, then relax. Repeat 10 to 20 times in a row, at least 3 times a day. Your provider may give you other instructions for how to do the exercises and how often.    Keep a bladder diary. This helps track how often and how much you urinate over a set period of time. Bring this  diary with you to your next visit with the provider. The information can help your provider learn more about your bladder problem.    Lose weight, if advised to by your provider. Excess weight puts pressure on the bladder. Your provider can help you create a weight-loss plan that s right for you. This may include exercising more and making certain diet changes.    Don't consume foods and drinks that may irritate the bladder. These can include alcohol and caffeinated drinks.    Quit smoking. Smoking and other tobacco use can lead to chronic cough that strains the pelvic floor muscles. Smoking may also damage the bladder and urethra. Talk with your provider about treatments or methods you can use to quit smoking.    If drinking large amounts of fluid causes you to have symptoms, you may be advised to limit your fluid intake. You may also be advised to drink most of your fluids during the day and to limit fluids at night.    If you re worried about urine leakage or accidents, you may wear absorbent pads to catch urine. Change the pads often. This helps reduce discomfort. It may also reduce the risk of skin or bladder infections.  Follow-up care  Follow up with your healthcare provider, or as directed. It may take some to find the right treatment for your problem. Your treatment plan may include special therapies or medicines. Certain procedures or surgery may also be options. Be sure to discuss any questions you have with your provider.  When to seek medical advice  Call the healthcare provider right away if any of these occur:    Fever of 100.4 F (38 C) or higher, or as directed by your provider    Bladder pain or fullness    Abdominal swelling    Nausea or vomiting    Back pain    Weakness, dizziness or fainting  Date Last Reviewed: 10/1/2017    5571-3234 [employer]. [address] [place] [address], [place] [address]. All rights reserved. This information is not intended as a substitute for professional medical  care. Always follow your healthcare professional's instructions.        Your Health Risk Assessment indicates you feel you are not in good emotional health.    Recreation   Recreation is not limited to sports and team events. It includes any activity that provides relaxation, interest, enjoyment, and exercise. Recreation provides an outlet for physical, mental, and social energy. It can give a sense of worth and achievement. It can help you stay healthy.    Mental Exercise and Social Involvement  Mental and emotional health is as important as physical health. Keep in touch with friends and family. Stay as active as possible. Continue to learn and challenge yourself.   Things you can do to stay mentally active are:    Learn something new, like a foreign language or musical instrument.     Play SCRABBLE or do crossword puzzles. If you cannot find people to play these games with you at home, you can play them with others on your computer through the Internet.     Join a games club--anything from card games to chess or checkers or lawn bowling.     Start a new hobby.     Go back to school.     Volunteer.     Read.   Keep up with world events.

## 2020-01-17 NOTE — PROGRESS NOTES
She is at risk for lack of exercise and has been provided with information to increase physical activity for the benefit of her well-being.  The patient reports that she has difficulty with activities of daily living. Moving to memory care unit.  Information on urinary incontinence and treatment options given to patient.  The patient was provided with suggestions to help her develop a healthy emotional lifestyle.

## 2020-01-18 LAB — BACTERIA SPEC CULT: ABNORMAL

## 2020-01-20 ENCOUNTER — TELEPHONE (OUTPATIENT)
Dept: FAMILY MEDICINE | Facility: OTHER | Age: 65
End: 2020-01-20

## 2020-01-20 NOTE — TELEPHONE ENCOUNTER
Panel Management Review    Do you still want the UA to be done? The order was placed for future for the urologist, they collected it but did not use the order AE placed. Can this one be deleted?    Summary:    Patient is due/failing the following:   UA, LDL, MAMMOGRAM and PHQ9    Action needed:   Routed to provider for review.    Type of outreach:    None, routed to provider for review.    Questions for provider review:    None                                                                                                                                    [facility]       Chart routed to Care Team .        Depression / Dysthymia review    Measure:  Needs PHQ-9 score of 4 or less during index window.  Administer PHQ-9 and if score is 5 or more, send encounter to provider for next steps.    5 - 7 month window range:     PHQ-9 SCORE 10/26/2017 5/3/2018 11/5/2018   PHQ-9 Total Score - - -   PHQ-9 Total Score MyChart - - 8 (Mild depression)   PHQ-9 Total Score 2 4 8       If PHQ-9 recheck is 5 or more, route to provider for next steps.    Patient is due for:  PHQ9    Hypertension   Last three blood pressure readings:  BP Readings from Last 3 Encounters:   01/16/20 128/72   12/23/19 126/61   12/16/19 137/76     Blood pressure: Passed    HTN Guidelines:  Less than 140/90      Composite cancer screening  Chart review shows that this patient is due/due soon for the following Mammogram

## 2020-01-22 RX ORDER — NITROFURANTOIN 25; 75 MG/1; MG/1
CAPSULE ORAL
COMMUNITY
Start: 2020-01-16 | End: 2020-01-27

## 2020-01-27 ENCOUNTER — OFFICE VISIT (OUTPATIENT)
Dept: NEUROLOGY | Facility: CLINIC | Age: 65
End: 2020-01-27
Payer: COMMERCIAL

## 2020-01-27 VITALS
BODY MASS INDEX: 22.85 KG/M2 | HEART RATE: 59 BPM | SYSTOLIC BLOOD PRESSURE: 114 MMHG | DIASTOLIC BLOOD PRESSURE: 73 MMHG | OXYGEN SATURATION: 94 % | WEIGHT: 117 LBS

## 2020-01-27 DIAGNOSIS — N36.2 URETHRAL CARUNCLE: ICD-10-CM

## 2020-01-27 DIAGNOSIS — G20.A1 PARALYSIS AGITANS (H): Primary | ICD-10-CM

## 2020-01-27 DIAGNOSIS — F33.0 MAJOR DEPRESSIVE DISORDER, RECURRENT EPISODE, MILD (H): ICD-10-CM

## 2020-01-27 DIAGNOSIS — R44.3 HALLUCINATIONS: ICD-10-CM

## 2020-01-27 PROCEDURE — 99214 OFFICE O/P EST MOD 30 MIN: CPT | Performed by: PSYCHIATRY & NEUROLOGY

## 2020-01-27 RX ORDER — CITALOPRAM HYDROBROMIDE 20 MG/1
TABLET ORAL
Qty: 135 TABLET | Refills: 3 | COMMUNITY
Start: 2020-01-27

## 2020-01-27 RX ORDER — NITROFURANTOIN 25; 75 MG/1; MG/1
CAPSULE ORAL
COMMUNITY
Start: 2020-01-27

## 2020-01-27 RX ORDER — CARBIDOPA AND LEVODOPA 25; 100 MG/1; MG/1
TABLET ORAL
Qty: 330 TABLET | Refills: 3 | COMMUNITY
Start: 2020-01-27

## 2020-01-27 RX ORDER — QUETIAPINE FUMARATE 25 MG/1
TABLET, FILM COATED ORAL
Qty: 450 TABLET | Refills: 11 | COMMUNITY
Start: 2020-01-27

## 2020-01-27 RX ORDER — ESTRADIOL 0.1 MG/G
2 CREAM VAGINAL
Qty: 42.5 G | Refills: 1 | COMMUNITY
Start: 2020-01-27

## 2020-01-27 RX ORDER — QUETIAPINE FUMARATE 50 MG/1
TABLET, FILM COATED ORAL
COMMUNITY
Start: 2020-01-23 | End: 2020-01-27

## 2020-01-27 NOTE — LETTER
2020         RE: Nathaly Farias  [address] [facility] [address]        Dear Colleague,    Thank you for referring your patient, Nathaly Farias, to the [facility]. Please see a copy of my visit note below.    Diagnosis/Summary/Recommendations:    PATIENT: Nathaly Farias  64 year old female     : 1955    MOISE: 2020    [facility]  [address] [place] [id number]  [phone]    Dr. Kaity Best    Hallucinations - reportedly stable    Not communicating so well    Lost weight and now gained back some weight   Eating better.     She has had one fall at the nursing home.     When she was on nuplazid  Off this as made her loopy     Medications     7am 12noon 9pm         Acetaminophen 500mg tylenol Not listed              Carbidopa/levodopa Sinemet 25/100 2 2 2   as needed up to 3/day      Citalopram celexa 20mg    1            Estradiol cream Monday and            Melatonin 5mg        2        Naproxen sodium 220mg Not taking           nitrofurantoine macrobid 100mg Twice daily        Pimavanserin nuplazid 34mg Not taking           Polyethylene glycol miralax sipping throughout the day             Quetiapine seroquel 25mg 2   3       Rivastigmine exelon 9.5mg/24 hr patch 1           Senna-docusate senokot-S 8.6mg-50mg       2         Zolmitriptan zomig 5mg Not listed                                          History obtained from patient      PLAN  No change in medications  She does not have zolmitriptan or acetaminophen listed  Her dose of citalopram was reduced from 30mg to 20mg (ie 1.5 -->1 tab per day)  She is considering moving to care facility in [place] as it is closer to her home in [place] and she is in [facility] in [place]   She is off the nuplazid and is on a total of of 125mg of quetiapine per day   She is on the 9.5mg of the rivastigmine patch rather than the 13.3mg due to bladder problems at the higher dose.   Sinemet is 2  tabs 3/day and 1 tab 3/day as needed  She is also on a bladder medication for infection - presumed still on this - unclear if it is long term plan.     Coding statement:   Duration of  Services: patient care and care coordination was 25 minutes  Greater than 50% of this visit was spent in counseling and coordination of care.     Jet Porter MD     ______________________________________    Last visit date and details:     MOISE: December 16, 2019     Taking h er pills and has had shaking  Eats 1.5 hours after her dose.      1230 1245pm today problem began. Varies from morning to night.   She has not taken extra sinemet.      9am it can happen  1pm it can happen  4 or 5 pm it can happen  Can happen in the evening.      Shaking lasts about one hour and then gets better.            Medications     7am 12noon 9pm         Acetaminophen 500mg tylenol As needed              Carbidopa/levodopa Sinemet 25/100 2 2 2         Citalopram celexa 20mg    1.5            Estradiol cream 2/week           Melatonin 5mg        2        Naproxen sodium 220mg As needed           Nonformulary cbd oil - young living oils  ran out           Pimavanserin nuplazid 34mg Not taking           Polyethylene glycol miralax sipping throughout the day             Probiotic young living Ran out           Quetiapine seroquel 25mg 1    3       Rivastigmine exelon 13.3mg/24 hr patch 1           Senna-docusate senokot-S 8.6mg-50mg       2         Terbinafine lamisil 1% external cream stopped           Trospium sanctura 20mg stopped              Zolmitriptan zomig 5mg As needed                                        History obtained from patient     PLAN  1. Recent dose increase in her seroquel to 25mg in the morning and 3 x 25mg in the evening.   2. Consideration for nuplazid 34mg/day - order put in and will need prior authorization.   3. She had significant shaking in the office today and is having spells throughout the day that we should try 1/2-1 tab by  mouth as needed (chew it and use carbonated water)  4. Return back   5. Will  Have to decide if we should increase all her doses of sinemet. Right now she is on 2-2-2 (2 tabs 3/day) and may be needing to increasing to 2.5 tabs 3/day or possibly 3 tabs 3/day but will start with prn change.   6. Will see Dr. Barrera on 12/30 for her headaches  7. Visit to urology 1/13 with Ana     ADDENDUM  Since it is hard to use prn medications may want to make all medications 2.5 tabs 3/day for the sinemet.      Medications     7am 12noon 9pm         Acetaminophen 500mg tylenol As needed              Carbidopa/levodopa Sinemet 25/100 2 2 2         Citalopram celexa 20mg    1.5            Estradiol cream 2/week           Melatonin 5mg        2        Naproxen sodium 220mg As needed           Pimavanserin nuplazid 34mg Not taking           Polyethylene glycol miralax sipping throughout the day             Quetiapine seroquel 25mg 1    3       Rivastigmine exelon 13.3mg/24 hr patch 1           Senna-docusate senokot-S 8.6mg-50mg       2         Zolmitriptan zomig 5mg As needed                                                         ______________________________________      Patient was asked about 14 Review of systems including changes in vision (dry eyes, double vision), hearing, heart, lungs, musculoskeletal, depression, anxiety, snoring, RBD, insomnia, urinary frequency, urinary urgency, constipation, swallowing problems, hematological, ID, allergies, skin problems: seborrhea, endocrinological: thyroid, diabetes, cholesterol; balance, weight changes, and other neurological problems and these were not significant at this time except for   Patient Active Problem List   Diagnosis     Prolapse of vaginal wall     Migraine variant     Symptomatic menopausal or female climacteric states     Obesity     Malaise and fatigue     Other and unspecified adverse effect of drug, medicinal and biological substance     Other and unspecified  disc disorder of cervical region     Sleep apnea     Essential hypertension, benign     Chronic cholecystitis     Overactive bladder     Neck pain     Headache     HYPERLIPIDEMIA LDL GOAL <160     Mild major depression (H)     Advanced directives, counseling/discussion     HTN, goal below 140/90     Paralysis agitans (H)     History of MRI of brain and brain stem     Wears glasses     Constipation     Incomplete RBBB     Heart murmur     Family history of tremor     Rosacea     Asymptomatic varicose veins     Seborrheic dermatitis     Post-traumatic osteoarthritis of left knee     Pain from implanted hardware, initial encounter     Cognitive disorder evalaution 2017     Dementia (H)     Hiatal hernia     Hallucinations     Frequency of urination     Midline cystocele     Atrophic vaginitis     Urinary hesitancy     Urinary urgency     Urinary retention     Parkinson disease (H)          Allergies   Allergen Reactions     Sulfa Drugs      Tacchycardia, had to go to ED     Naproxen GI Disturbance     Oxycontin [Oxycodone] GI Disturbance     Made her feel funny/upset stomach     Ropinirole      Did not work     Past Surgical History:   Procedure Laterality Date     ------------OTHER-------------      left shoulder     ARTHRODESIS TOE(S)  3/2/2011    ARTHRODESIS TOE(S) performed by CLARA [facility]AS at  OR     C LIGATE FALLOPIAN TUBE  1998     CHOLECYSTECTOMY, LAPOROSCOPIC  5/12/2008    Cholecystectomy, Laparoscopic     ESOPHAGOSCOPY, GASTROSCOPY, DUODENOSCOPY (EGD), COMBINED N/A 7/5/2017    Procedure: COMBINED ESOPHAGOSCOPY, GASTROSCOPY, DUODENOSCOPY (EGD), BIOPSY SINGLE OR MULTIPLE;  Esophagogastroduodenoscopy with Multiple Biopsies by Biopsy;  Surgeon: Tj Denney MD;  Location: [facility]     HC COLONOSCOPY W/WO BRUSH/WASH  08/30/06    normal     HC OPEN TX TIBIAL SHAFT FX W PLATE/SCREWS W/WO CERCLAGE  2000    Multiple operations on left leg     [facility] SHLDR ARTHROSCOP,PART ACROMIOPLAS  11/16/06    Right shoulder      [facility]  [facility]    Right shoulder     Past Medical History:   Diagnosis Date     Arthralgia of temporomandibular joint 10/22/1996     Arthritis      Cognitive disorder evalaution 2017 3/6/2017    She has marked attentional difficulties, impairments in memory including a rapid forgetting rate, mild anomia, and mild executive dysfunction. 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 She's required increasing assistance with finances, medications, driving, and cooking. I think this is a mild dementia, and     Constipation 2/23/2015     Dementia (H) 3/13/2017    IMPRESSIONS AND RECOMMENDATIONS   Current results indicate marked attentional difficulties, impairments in memory, which in some cases reflected impairments in retention and in other cases reflected retrieval difficulties, and mild anomia as well as mild executive dysfunction. Premorbid intellectual functioning is estimated to fall in the borderline range. She denied experiencing significant depre     Depressive disorder      Heart murmur 2/23/2015     History of MRI of brain and brain stem 2/21/2015    MRI OF THE BRAIN WITHOUT CONTRAST 7/10/2014 1:21 PM  COMPARISON: None. HISTORY: Left-sided upper and lower extremity tremor. Balance issues. TECHNIQUE:  Brain: Axial diffusion-weighted with ADC map, T2-weighted with fat saturation, T1-weighted and turboFLAIR and coronal T1-weighted images of the brain were obtained without intravenous contrast.  FINDINGS: There is mild diffuse cerebral volume loss     Hypertension      Incomplete RBBB 2/23/2015     Motion sickness      Other and unspecified hyperlipidemia      Parkinson's disease (H)      Seborrheic dermatitis 2/29/2016     Unspecified arthropathy, hand 08/07/1997     Unspecified sleep apnea     moderate, sleep study 11/07, on CPAP, has daytime somnolence with this     Variants of  migraine, not elsewhere classified, without mention of intractable migraine without mention of status migrainosus      Wears glasses 2/23/2015     Social History     Socioeconomic History     Marital status:      Spouse name: violet     Number of children: 2     Years of education: Not on file     Highest education level: Not on file   Occupational History     Occupation:    Social Needs     Financial resource strain: Not on file     Food insecurity:     Worry: Not on file     Inability: Not on file     Transportation needs:     Medical: Not on file     Non-medical: Not on file   Tobacco Use     Smoking status: Never Smoker     Smokeless tobacco: Never Used     Tobacco comment: no smokers in the household   Substance and Sexual Activity     Alcohol use: Yes     Alcohol/week: 0.0 standard drinks     Comment: very occ     Drug use: No     Sexual activity: Yes     Partners: Male     Birth control/protection: Surgical, Female Surgical     Comment: tubal ligation   Lifestyle     Physical activity:     Days per week: Not on file     Minutes per session: Not on file     Stress: Not on file   Relationships     Social connections:     Talks on phone: Not on file     Gets together: Not on file     Attends Mandaen service: Not on file     Active member of club or organization: Not on file     Attends meetings of clubs or organizations: Not on file     Relationship status: Not on file     Intimate partner violence:     Fear of current or ex partner: Not on file     Emotionally abused: Not on file     Physically abused: Not on file     Forced sexual activity: Not on file   Other Topics Concern      Service Not Asked     Blood Transfusions Not Asked     Caffeine Concern Yes     Comment: OCC     Occupational Exposure Not Asked     Hobby Hazards Not Asked     Sleep Concern Not Asked     Stress Concern Not Asked     Weight Concern Not Asked     Special Diet Not Asked     Back Care Not Asked     Exercise No     " Comment: \"not faithfully\"     Bike Helmet Not Asked     Seat Belt Yes     Self-Exams No     Parent/sibling w/ CABG, MI or angioplasty before 65F 55M? Not Asked   Social History Narrative    ALLERGIES:  She is allergic to sulfa, and has had gastrointestinal side effects from naproxen.               FAMILY HISTORY:  Strongly positive for headaches, including in her son and sister.  There is no family history of tremor.               SOCIAL HISTORY:  She does not work outside the home.  She does not smoke.  She uses very little alcohol.  Lives in [place]. . Has 2 kids: son who is 35 yr s old. And daughter who is 30 yrs old. Her  is working as a  .        Drug and lactation database from the [place] [employer] of Medicine:  http://toxnet.nlm.nih.gov/cgi-bin/sis/htmlgen?LACT      B/P: Data Unavailable, T: Data Unavailable, P: Data Unavailable, R: Data Unavailable 0 lbs 0 oz  Last menstrual period 06/05/2006, not currently breastfeeding., There is no height or weight on file to calculate BMI.  Medications and Vitals not listed above were documented in the cart and reviewed by me.     Current Outpatient Medications   Medication Sig Dispense Refill     carbidopa-levodopa (SINEMET)  MG tablet Take 2.5 tablets by mouth 3 times daily 330 tablet 3     carbidopa-levodopa (SINEMET)  MG tablet Take 1 tablet by mouth 3 times daily as needed (spells with increased shaking)       citalopram (CELEXA) 20 MG tablet Take 1.5 tablets (30 mg) by mouth daily (before lunch) 1.5 x 20mg tab by mouth @12noon 135 tablet 3     estradiol (ESTRACE) 0.1 MG/GM vaginal cream Place 2 g vaginally twice a week 42.5 g 1     LORazepam (ATIVAN) 0.5 MG tablet Take 1 tablet (0.5 mg) by mouth 2 times daily as needed for anxiety 10 tablet 0     melatonin 5 MG tablet Take 2 tablets (10 mg) by mouth At Bedtime At 9 pm       naproxen sodium 220 MG capsule Take 220 mg by mouth 2 times daily as needed (pain)   "     Pimavanserin Tartrate 34 MG CAPS Take 1 capsule (34 mg) by mouth daily 30 capsule 11     polyethylene glycol (MIRALAX) powder 1 capful in 8 oz water and sips throughout the day 510 g 1     QUEtiapine (SEROQUEL) 25 MG tablet Take one tablet (25 mg) in the morning and 3 tablets (75 mg) in the evening (9 pm) 120 tablet 11     rivastigmine (EXELON) 9.5 MG/24HR 24 hr patch Place 1 patch onto the skin daily 30 patch 0     senna-docusate (SENOKOT-S/PERICOLACE) 8.6-50 MG tablet Take 2 tablets by mouth At Bedtime           Jet Porter MD    Again, thank you for allowing me to participate in the care of your patient.        Sincerely,        Jet Porter MD

## 2020-01-28 ENCOUNTER — PATIENT OUTREACH (OUTPATIENT)
Dept: CARE COORDINATION | Facility: CLINIC | Age: 65
End: 2020-01-28

## 2020-01-28 NOTE — PROGRESS NOTES
Clinic Care Coordination Contact  Care Team Conversations    Patient is in SNF indefinitely  for cares. Patient's  in the process to get her transferred to [facility] to be closer to home. No other concerns reported at this time.  No further outreach at this time.     Baldo Pate RN  Primary Care Clinic RN Care Coordinator  [facility]   [phone]

## 2020-04-06 NOTE — PATIENT INSTRUCTIONS
Medications     7am 12noon 9pm         Acetaminophen 500mg tylenol Not listed              Carbidopa/levodopa Sinemet 25/100 2 2 2   as needed up to 3/day      Citalopram celexa 20mg    1            Estradiol cream Monday and thursdays           Melatonin 5mg        2        Naproxen sodium 220mg Not taking           nitrofurantoine macrobid 100mg Twice daily        Pimavanserin nuplazid 34mg Not taking           Polyethylene glycol miralax sipping throughout the day             Quetiapine seroquel 25mg 2   3       Rivastigmine exelon 9.5mg/24 hr patch 1           Senna-docusate senokot-S 8.6mg-50mg       2         Zolmitriptan zomig 5mg Not listed                                          History obtained from patient      PLAN  No change in medications  She does not have zolmitriptan or acetaminophen listed  Her dose of citalopram was reduced from 30mg to 20mg (ie 1.5 -->1 tab per day)  She is considering moving to care facility in [place] as it is closer to her home in [place] and she is in [place] in [place]   She is off the nuplazid and is on a total of of 125mg of quetiapine per day   She is on the 9.5mg of the rivastigmine patch rather than the 13.3mg due to bladder problems at the higher dose.   Sinemet is 2 tabs 3/day and 1 tab 3/day as needed  She is also on a bladder medication for infection - presumed still on this - unclear if it is long term plan.     
diminished
diminished

## 2020-04-20 ENCOUNTER — TELEPHONE (OUTPATIENT)
Dept: NEUROLOGY | Facility: CLINIC | Age: 65
End: 2020-04-20

## 2020-04-20 NOTE — TELEPHONE ENCOUNTER
I called  Wolf and he stated that pt is now in an assisted living facility and will not be able to have visitors for appt.  Wolf also states that at last appt it was discussed that she may change her neuro provider to closer to home.  Wolf stateshe is requesting that this appt be cancelled and he will call back  If needed.    Kacie Garza LPN

## 2020-07-03 NOTE — TELEPHONE ENCOUNTER
Patient had an Physical on 1/16/2020- declined mammo at that time.  Michelle Chi CMA ([facility])

## 2021-01-09 ENCOUNTER — HEALTH MAINTENANCE LETTER (OUTPATIENT)
Age: 66
End: 2021-01-09

## 2021-03-13 ENCOUNTER — HEALTH MAINTENANCE LETTER (OUTPATIENT)
Age: 66
End: 2021-03-13

## 2021-09-20 ENCOUNTER — LAB REQUISITION (OUTPATIENT)
Dept: LAB | Facility: CLINIC | Age: 66
End: 2021-09-20
Payer: COMMERCIAL

## 2021-09-20 DIAGNOSIS — Z51.81 ENCOUNTER FOR THERAPEUTIC DRUG LEVEL MONITORING: ICD-10-CM

## 2021-09-20 DIAGNOSIS — G20.A1 PARKINSON'S DISEASE (H): ICD-10-CM

## 2021-09-20 DIAGNOSIS — F22 DELUSIONAL DISORDERS (H): ICD-10-CM

## 2021-09-20 LAB
ANION GAP SERPL CALCULATED.3IONS-SCNC: 2 MMOL/L (ref 3–14)
BUN SERPL-MCNC: 14 MG/DL (ref 7–30)
CALCIUM SERPL-MCNC: 9.4 MG/DL (ref 8.5–10.1)
CHLORIDE BLD-SCNC: 108 MMOL/L (ref 94–109)
CO2 SERPL-SCNC: 31 MMOL/L (ref 20–32)
CREAT SERPL-MCNC: 0.89 MG/DL (ref 0.52–1.04)
ERYTHROCYTE [DISTWIDTH] IN BLOOD BY AUTOMATED COUNT: 13 % (ref 10–15)
GFR SERPL CREATININE-BSD FRML MDRD: 69 ML/MIN/1.73M2
GLUCOSE BLD-MCNC: 72 MG/DL (ref 70–99)
HCT VFR BLD AUTO: 40.3 % (ref 35–47)
HGB BLD-MCNC: 13 G/DL (ref 11.7–15.7)
MCH RBC QN AUTO: 31 PG (ref 26.5–33)
MCHC RBC AUTO-ENTMCNC: 32.3 G/DL (ref 31.5–36.5)
MCV RBC AUTO: 96 FL (ref 78–100)
PLATELET # BLD AUTO: 268 10E3/UL (ref 150–450)
POTASSIUM BLD-SCNC: 4.4 MMOL/L (ref 3.4–5.3)
RBC # BLD AUTO: 4.19 10E6/UL (ref 3.8–5.2)
SODIUM SERPL-SCNC: 141 MMOL/L (ref 133–144)
WBC # BLD AUTO: 5.8 10E3/UL (ref 4–11)

## 2021-09-20 PROCEDURE — 36415 COLL VENOUS BLD VENIPUNCTURE: CPT | Mod: ORL | Performed by: NURSE PRACTITIONER

## 2021-09-20 PROCEDURE — 80048 BASIC METABOLIC PNL TOTAL CA: CPT | Mod: ORL | Performed by: NURSE PRACTITIONER

## 2021-09-20 PROCEDURE — 85027 COMPLETE CBC AUTOMATED: CPT | Mod: ORL | Performed by: NURSE PRACTITIONER

## 2021-09-20 PROCEDURE — P9603 ONE-WAY ALLOW PRORATED MILES: HCPCS | Mod: ORL | Performed by: NURSE PRACTITIONER

## 2021-10-23 ENCOUNTER — HEALTH MAINTENANCE LETTER (OUTPATIENT)
Age: 66
End: 2021-10-23

## 2021-12-08 ENCOUNTER — LAB REQUISITION (OUTPATIENT)
Dept: LAB | Facility: CLINIC | Age: 66
End: 2021-12-08
Payer: COMMERCIAL

## 2021-12-08 DIAGNOSIS — M10.9 GOUT, UNSPECIFIED: ICD-10-CM

## 2021-12-08 LAB — URATE SERPL-MCNC: 3.8 MG/DL (ref 2.6–6)

## 2021-12-08 PROCEDURE — 36415 COLL VENOUS BLD VENIPUNCTURE: CPT | Mod: ORL | Performed by: NURSE PRACTITIONER

## 2021-12-08 PROCEDURE — P9603 ONE-WAY ALLOW PRORATED MILES: HCPCS | Mod: ORL | Performed by: NURSE PRACTITIONER

## 2021-12-08 PROCEDURE — 84550 ASSAY OF BLOOD/URIC ACID: CPT | Mod: ORL | Performed by: NURSE PRACTITIONER

## 2022-04-21 NOTE — PATIENT INSTRUCTIONS
How Severe Is Your Skin Lesion?: mild MOISE: Feb 4, 2019    Parkinson    Wants chiropractor or PT referral - was sent  mtm referral    Has not yet tried a heating pad  Has not used a heating    Has a yeast infection on her belly and using topical.     Falls asleep but has been getting up during the night    She has day time naps and when she naps more she sleeps better at night.      Cannabis    Still getting headaches - all day.             Medications     7am 12noon 9pm         Acetaminophen 500mg tylenol As needed              Carbidopa/levodopa Sinemet 25/100 2 2 2         Citalopram celexa 20mg    1.5            Diphenhydramine acetaminophen tylenol PM       stopped         Lisinopril prinivil/zestril 10mg    Off this            Lorazepam ativan 0.5mg Not taking               Melatonin 5mg        1        Meloxicam mobic 15mg Not sure if taking this.            Polyethylene glycol miralax Drinking throughout the day             Probiotic young living 1       Quetiapine seroquel 25mg   Take full tab     Senna senokot 8.6mg       1          Terbinafine lamisil 1% external cream        Zolmitriptan zomig 5mg As needed                                                            History obtained from patient      PLAN  Increase the seroquel to 1 tab at night for hallucinations.   Sill  Try therapy for her neck pain and use packs - microwave for her neck  Could see headache specialist if needed  Consideration for cbd oil.     Consider other products for migraine/headache but headaches may be related to her neck  Vitamin b2 400mg/day   Magnesium up to 600mg/day  Butter bur  Others    Return back in 3-6months       Has Your Skin Lesion Been Treated?: not been treated Is This A New Presentation, Or A Follow-Up?: Skin Lesions

## 2022-06-04 ENCOUNTER — HEALTH MAINTENANCE LETTER (OUTPATIENT)
Age: 67
End: 2022-06-04

## 2022-09-18 ENCOUNTER — LAB REQUISITION (OUTPATIENT)
Dept: LAB | Facility: CLINIC | Age: 67
End: 2022-09-18
Payer: OTHER MISCELLANEOUS

## 2022-09-18 DIAGNOSIS — G20.A1 PARKINSON'S DISEASE (H): ICD-10-CM

## 2022-09-19 LAB
ANION GAP SERPL CALCULATED.3IONS-SCNC: 3 MMOL/L (ref 3–14)
BUN SERPL-MCNC: 15 MG/DL (ref 7–30)
CALCIUM SERPL-MCNC: 9.3 MG/DL (ref 8.5–10.1)
CHLORIDE BLD-SCNC: 107 MMOL/L (ref 94–109)
CO2 SERPL-SCNC: 29 MMOL/L (ref 20–32)
CREAT SERPL-MCNC: 0.67 MG/DL (ref 0.52–1.04)
GFR SERPL CREATININE-BSD FRML MDRD: >90 ML/MIN/1.73M2
GLUCOSE BLD-MCNC: 71 MG/DL (ref 70–99)
POTASSIUM BLD-SCNC: 4.3 MMOL/L (ref 3.4–5.3)
SODIUM SERPL-SCNC: 139 MMOL/L (ref 133–144)

## 2022-09-19 PROCEDURE — 36415 COLL VENOUS BLD VENIPUNCTURE: CPT | Mod: ORL | Performed by: NURSE PRACTITIONER

## 2022-09-19 PROCEDURE — P9603 ONE-WAY ALLOW PRORATED MILES: HCPCS | Mod: ORL | Performed by: NURSE PRACTITIONER

## 2022-09-19 PROCEDURE — 80048 BASIC METABOLIC PNL TOTAL CA: CPT | Mod: ORL | Performed by: NURSE PRACTITIONER

## 2022-10-09 ENCOUNTER — HEALTH MAINTENANCE LETTER (OUTPATIENT)
Age: 67
End: 2022-10-09

## 2023-01-01 ENCOUNTER — HEALTH MAINTENANCE LETTER (OUTPATIENT)
Age: 68
End: 2023-01-01

## 2023-01-01 ENCOUNTER — LAB REQUISITION (OUTPATIENT)
Dept: LAB | Facility: CLINIC | Age: 68
End: 2023-01-01
Payer: COMMERCIAL

## 2023-01-01 DIAGNOSIS — G20.A1 PARKINSON'S DISEASE (H): ICD-10-CM

## 2023-01-01 LAB
ANION GAP SERPL CALCULATED.3IONS-SCNC: 11 MMOL/L (ref 7–15)
BUN SERPL-MCNC: 17.2 MG/DL (ref 8–23)
CALCIUM SERPL-MCNC: 9.3 MG/DL (ref 8.8–10.2)
CHLORIDE SERPL-SCNC: 106 MMOL/L (ref 98–107)
CREAT SERPL-MCNC: 0.66 MG/DL (ref 0.51–0.95)
DEPRECATED HCO3 PLAS-SCNC: 24 MMOL/L (ref 22–29)
EGFRCR SERPLBLD CKD-EPI 2021: >90 ML/MIN/1.73M2
GLUCOSE SERPL-MCNC: 76 MG/DL (ref 70–99)
POTASSIUM SERPL-SCNC: 4.5 MMOL/L (ref 3.4–5.3)
SODIUM SERPL-SCNC: 141 MMOL/L (ref 135–145)

## 2023-01-01 PROCEDURE — P9603 ONE-WAY ALLOW PRORATED MILES: HCPCS | Mod: ORL | Performed by: NURSE PRACTITIONER

## 2023-01-01 PROCEDURE — 80048 BASIC METABOLIC PNL TOTAL CA: CPT | Mod: ORL | Performed by: NURSE PRACTITIONER

## 2023-01-01 PROCEDURE — 36415 COLL VENOUS BLD VENIPUNCTURE: CPT | Mod: ORL | Performed by: NURSE PRACTITIONER

## 2023-01-01 NOTE — TELEPHONE ENCOUNTER
Lisinopril:  Appears neurology continues to change medication to historical. Dr. Murphy last sent refill on 2/9/18 with 1 month and advised follow up with PCP. Patient has seen neurology since, but not PCP.     Next 5 appointments (look out 90 days)     May 14, 2018  9:00 AM CDT   Return Visit with Jet Porter MD   [facility]    [address] [place] [id number]   [phone]                Iveth Mckeon, RN, BSN    
Notified pt.  She needs to arrange a ride so she will call back to schedule.   
Pending Prescriptions:                       Disp   Refills    citalopram (CELEXA) 20 MG tablet [Pharmac*90 tab*1            Sig: TAKE ONE TABLET BY MOUTH ONCE DAILY    Routing refill request to provider for review/approval because:  Medication is reported/historical  Last ov 10/26/2017  Arash Duran, RN, BSN                  
Please call her to see if she can get scheduled. Aleida again in meantime.  Maxine Monae MD    
Rx on med list as historical and [facility] is wanting Rx so they can fill.    lisinopril (PRINIVIL/ZESTRIL) 10 MG tablet 30 tablet 11 2018  No   Si tab by mouth at 11am   Class: Historical       
Home

## (undated) RX ORDER — LIDOCAINE HYDROCHLORIDE 10 MG/ML
INJECTION, SOLUTION EPIDURAL; INFILTRATION; INTRACAUDAL; PERINEURAL
Status: DISPENSED
Start: 2017-07-05

## (undated) RX ORDER — FENTANYL CITRATE 50 UG/ML
INJECTION, SOLUTION INTRAMUSCULAR; INTRAVENOUS
Status: DISPENSED
Start: 2017-07-05